# Patient Record
Sex: FEMALE | Race: BLACK OR AFRICAN AMERICAN | NOT HISPANIC OR LATINO | Employment: UNEMPLOYED | URBAN - METROPOLITAN AREA
[De-identification: names, ages, dates, MRNs, and addresses within clinical notes are randomized per-mention and may not be internally consistent; named-entity substitution may affect disease eponyms.]

---

## 2018-04-13 ENCOUNTER — HOSPITAL ENCOUNTER (EMERGENCY)
Facility: HOSPITAL | Age: 38
Discharge: HOME | End: 2018-04-13
Attending: EMERGENCY MEDICINE | Admitting: EMERGENCY MEDICINE
Payer: COMMERCIAL

## 2018-04-13 ENCOUNTER — APPOINTMENT (EMERGENCY)
Dept: CT IMAGING | Facility: HOSPITAL | Age: 38
End: 2018-04-13
Payer: COMMERCIAL

## 2018-04-13 VITALS
WEIGHT: 293 LBS | TEMPERATURE: 97.6 F | RESPIRATION RATE: 18 BRPM | OXYGEN SATURATION: 100 % | DIASTOLIC BLOOD PRESSURE: 100 MMHG | SYSTOLIC BLOOD PRESSURE: 187 MMHG | HEART RATE: 86 BPM

## 2018-04-13 DIAGNOSIS — R10.32 LEFT LOWER QUADRANT PAIN: ICD-10-CM

## 2018-04-13 DIAGNOSIS — M54.50 LOW BACK PAIN: Primary | ICD-10-CM

## 2018-04-13 LAB
ALBUMIN SERPL BCP-MCNC: 4.4 G/DL (ref 3.5–5)
ALP SERPL-CCNC: 68 U/L (ref 46–116)
ALT SERPL W P-5'-P-CCNC: 25 U/L (ref 12–78)
ANION GAP SERPL CALCULATED.3IONS-SCNC: 11 MMOL/L (ref 4–13)
AST SERPL W P-5'-P-CCNC: 18 U/L (ref 5–45)
BACTERIA UR QL AUTO: NORMAL /HPF
BASOPHILS # BLD AUTO: 0.04 THOUSANDS/ΜL (ref 0–0.1)
BASOPHILS NFR BLD AUTO: 0 % (ref 0–1)
BILIRUB SERPL-MCNC: 0.69 MG/DL (ref 0.2–1)
BILIRUB UR QL STRIP: ABNORMAL
BUN SERPL-MCNC: 7 MG/DL (ref 5–25)
CALCIUM SERPL-MCNC: 9.4 MG/DL
CHLORIDE SERPL-SCNC: 100 MMOL/L (ref 100–108)
CLARITY UR: ABNORMAL
CO2 SERPL-SCNC: 30 MMOL/L (ref 21–32)
COLOR UR: YELLOW
CREAT SERPL-MCNC: 0.87 MG/DL (ref 0.6–1.3)
EOSINOPHIL # BLD AUTO: 0.32 THOUSAND/ΜL (ref 0–0.61)
EOSINOPHIL NFR BLD AUTO: 4 % (ref 0–6)
ERYTHROCYTE [DISTWIDTH] IN BLOOD BY AUTOMATED COUNT: 15.2 % (ref 11.6–15.1)
EXT PREG TEST URINE: NEGATIVE
GFR SERPL CREATININE-BSD FRML MDRD: 86 ML/MIN/1.73SQ M
GLUCOSE SERPL-MCNC: 103 MG/DL (ref 65–140)
GLUCOSE UR STRIP-MCNC: NEGATIVE MG/DL
HCT VFR BLD AUTO: 39.1 % (ref 34.8–46.1)
HGB BLD-MCNC: 12.8 G/DL (ref 11.5–15.4)
HGB UR QL STRIP.AUTO: NEGATIVE
KETONES UR STRIP-MCNC: NEGATIVE MG/DL
LEUKOCYTE ESTERASE UR QL STRIP: NEGATIVE
LYMPHOCYTES # BLD AUTO: 3.99 THOUSANDS/ΜL (ref 0.6–4.47)
LYMPHOCYTES NFR BLD AUTO: 44 % (ref 14–44)
MCH RBC QN AUTO: 28.6 PG (ref 26.8–34.3)
MCHC RBC AUTO-ENTMCNC: 32.7 G/DL (ref 31.4–37.4)
MCV RBC AUTO: 87 FL (ref 82–98)
MONOCYTES # BLD AUTO: 0.59 THOUSAND/ΜL (ref 0.17–1.22)
MONOCYTES NFR BLD AUTO: 7 % (ref 4–12)
NEUTROPHILS # BLD AUTO: 4.18 THOUSANDS/ΜL (ref 1.85–7.62)
NEUTS SEG NFR BLD AUTO: 45 % (ref 43–75)
NITRITE UR QL STRIP: NEGATIVE
NON-SQ EPI CELLS URNS QL MICRO: NORMAL /HPF
NRBC BLD AUTO-RTO: 0 /100 WBCS
PH UR STRIP.AUTO: 7 [PH] (ref 4.5–8)
PLATELET # BLD AUTO: 317 THOUSANDS/UL (ref 149–390)
PMV BLD AUTO: 10.4 FL (ref 8.9–12.7)
POTASSIUM SERPL-SCNC: 3.8 MMOL/L (ref 3.5–5.3)
PROT SERPL-MCNC: 8.8 G/DL (ref 6.4–8.2)
PROT UR STRIP-MCNC: ABNORMAL MG/DL
RBC # BLD AUTO: 4.48 MILLION/UL (ref 3.81–5.12)
RBC #/AREA URNS AUTO: NORMAL /HPF
SODIUM SERPL-SCNC: 141 MMOL/L (ref 136–145)
SP GR UR STRIP.AUTO: 1.02 (ref 1–1.03)
UROBILINOGEN UR QL STRIP.AUTO: 1 E.U./DL
WBC # BLD AUTO: 9.12 THOUSAND/UL (ref 4.31–10.16)
WBC #/AREA URNS AUTO: NORMAL /HPF

## 2018-04-13 PROCEDURE — 81025 URINE PREGNANCY TEST: CPT | Performed by: PHYSICIAN ASSISTANT

## 2018-04-13 PROCEDURE — 74177 CT ABD & PELVIS W/CONTRAST: CPT

## 2018-04-13 PROCEDURE — 85025 COMPLETE CBC W/AUTO DIFF WBC: CPT | Performed by: PHYSICIAN ASSISTANT

## 2018-04-13 PROCEDURE — 81001 URINALYSIS AUTO W/SCOPE: CPT

## 2018-04-13 PROCEDURE — 99284 EMERGENCY DEPT VISIT MOD MDM: CPT

## 2018-04-13 PROCEDURE — 36415 COLL VENOUS BLD VENIPUNCTURE: CPT | Performed by: PHYSICIAN ASSISTANT

## 2018-04-13 PROCEDURE — 81002 URINALYSIS NONAUTO W/O SCOPE: CPT | Performed by: PHYSICIAN ASSISTANT

## 2018-04-13 PROCEDURE — 80053 COMPREHEN METABOLIC PANEL: CPT | Performed by: PHYSICIAN ASSISTANT

## 2018-04-13 RX ORDER — NAPROXEN 500 MG/1
500 TABLET ORAL 2 TIMES DAILY WITH MEALS
Qty: 10 TABLET | Refills: 0 | Status: SHIPPED | OUTPATIENT
Start: 2018-04-13 | End: 2018-05-09

## 2018-04-13 RX ADMIN — IOHEXOL 100 ML: 350 INJECTION, SOLUTION INTRAVENOUS at 20:14

## 2018-04-14 NOTE — DISCHARGE INSTRUCTIONS
Acute Abdominal Pain   WHAT YOU NEED TO KNOW:   The cause of your abdominal pain may not be found  If a cause is found, treatment will depend on what the cause is  DISCHARGE INSTRUCTIONS:   Return to the emergency department if:   · You vomit blood or cannot stop vomiting  · You have blood in your bowel movement or it looks like tar  · You have bleeding from your rectum  · Your abdomen is larger than usual, more painful, and hard  · You have severe pain in your abdomen  · You stop passing gas and having bowel movements  · You feel weak, dizzy, or faint  Contact your healthcare provider if:   · You have a fever  · You have new signs and symptoms  · Your symptoms do not get better with treatment  · You have questions or concerns about your condition or care  Medicines  may be given to decrease pain, treat an infection, and manage your symptoms  Take your medicine as directed  Call your healthcare provider if you think your medicine is not helping or if you have side effects  Tell him if you are allergic to any medicine  Keep a list of the medicines, vitamins, and herbs you take  Include the amounts, and when and why you take them  Bring the list or the pill bottles to follow-up visits  Carry your medicine list with you in case of an emergency  Manage your symptoms:   · Apply heat  on your abdomen for 20 to 30 minutes every 2 hours for as many days as directed  Heat helps decrease pain and muscle spasms  · Manage your stress  Stress may cause abdominal pain  Your healthcare provider may recommend relaxation techniques and deep breathing exercises to help decrease your stress  Your healthcare provider may recommend you talk to someone about your stress or anxiety, such as a counselor or a trusted friend  Get plenty of sleep and exercise regularly  · Limit or do not drink alcohol  Alcohol can make your abdominal pain worse   Ask your healthcare provider if it is safe for you to drink alcohol  Also ask how much is safe for you to drink  · Do not smoke  Nicotine and other chemicals in cigarettes can damage your esophagus and stomach  Ask your healthcare provider for information if you currently smoke and need help to quit  E-cigarettes or smokeless tobacco still contain nicotine  Talk to your healthcare provider before you use these products  Make changes to the food you eat as directed:  Do not eat foods that cause abdominal pain or other symptoms  Eat small meals more often  · Eat more high-fiber foods if you are constipated  High-fiber foods include fruits, vegetables, whole-grain foods, and legumes  · Do not eat foods that cause gas if you have bloating  Examples include broccoli, cabbage, and cauliflower  Do not drink soda or carbonated drinks, because these may also cause gas  · Do not eat foods or drinks that contain sorbitol or fructose if you have diarrhea and bloating  Some examples are fruit juices, candy, jelly, and sugar-free gum  · Do not eat high-fat foods, such as fried foods, cheeseburgers, hot dogs, and desserts  · Limit or do not drink caffeine  Caffeine may make symptoms, such as heart burn or nausea, worse  · Drink plenty of liquids to prevent dehydration from diarrhea or vomiting  Ask your healthcare provider how much liquid to drink each day and which liquids are best for you  Follow up with your healthcare provider as directed:  Write down your questions so you remember to ask them during your visits  © 2017 [address] Information is for End User's use only and may not be sold, redistributed or otherwise used for commercial purposes  All illustrations and images included in CareNotes® are the copyrighted property of [employer]  or [address]  The above information is an  only  It is not intended as medical advice for individual conditions or treatments   Talk to your doctor, nurse or pharmacist before following any medical regimen to see if it is safe and effective for you  Acute Low Back Pain   WHAT YOU NEED TO KNOW:   Acute low back pain is sudden discomfort in your lower back area that lasts for up to 6 weeks  The discomfort makes it difficult to tolerate activity  DISCHARGE INSTRUCTIONS:   Return to the emergency department if:   · You have severe pain  · You have sudden stiffness and heaviness on both buttocks down to both legs  · You have numbness or weakness in one leg, or pain in both legs  · You have numbness in your genital area or across your lower back  · You cannot control your urine or bowel movements  Contact your healthcare provider if:   · You have a fever  · You have pain at night or when you rest     · Your pain does not get better with treatment  · You have pain that worsens when you cough or sneeze  · You suddenly feel something pop or snap in your back  · You have questions or concerns about your condition or care  Medicines: The following medicines may be ordered by your healthcare provider:  · Acetaminophen  decreases pain  It is available without a doctor's order  Ask how much to take and how often to take it  Follow directions  Acetaminophen can cause liver damage if not taken correctly  · NSAIDs  help decrease swelling and pain  This medicine is available with or without a doctor's order  NSAIDs can cause stomach bleeding or kidney problems in certain people  If you take blood thinner medicine, always ask your healthcare provider if NSAIDs are safe for you  Always read the medicine label and follow directions  · Prescription pain medicine  may be given  Ask your healthcare provider how to take this medicine safely  · Muscle relaxers  decrease pain by relaxing the muscles in your lower spine  · Take your medicine as directed    Contact your healthcare provider if you think your medicine is not helping or if you have side effects  Tell him of her if you are allergic to any medicine  Keep a list of the medicines, vitamins, and herbs you take  Include the amounts, and when and why you take them  Bring the list or the pill bottles to follow-up visits  Carry your medicine list with you in case of an emergency  Self-care:   · Stay active  as much as you can without causing more pain  Bed rest could make your back pain worse  Start with some light exercises such as walking  Avoid heavy lifting until your pain is gone  Ask for more information about the activities or exercises that are right for you  · Ice  helps decrease swelling, pain, and muscle spams  Put crushed ice in a plastic bag  Cover it with a towel  Place it on your lower back for 20 to 30 minutes every 2 hours  Do this for about 2 to 3 days after your pain starts, or as directed  · Heat  helps decrease pain and muscle spasms  Start to use heat after treatment with ice has stopped  Use a small towel dampened with warm water or a heating pad, or sit in a warm bath  Apply heat on the area for 20 to 30 minutes every 2 hours for as many days as directed  Alternate heat and ice  Prevent acute low back pain:   · Use proper body mechanics  ¨ Bend at the hips and knees when you  objects  Do not bend from the waist  Use your leg muscles as you lift the load  Do not use your back  Keep the object close to your chest as you lift it  Try not to twist or lift anything above your waist     ¨ Change your position often when you stand for long periods of time  Rest one foot on a small box or footrest, and then switch to the other foot often  ¨ Try not to sit for long periods of time  When you do, sit in a straight-backed chair with your feet flat on the floor  Never reach, pull, or push while you are sitting  · Do exercises that strengthen your back muscles  Warm up before you exercise  Ask your healthcare provider the best exercises for you      · Maintain a healthy weight  Ask your healthcare provider how much you should weigh  Ask him to help you create a weight loss plan if you are overweight  Follow up with your healthcare provider as directed:  Return for a follow-up visit if you still have pain after 1 to 3 weeks of treatment  You may need to visit an orthopedist if your back pain lasts more than 12 weeks  Write down your questions so you remember to ask them during your visits  © 2017 [address] Information is for End User's use only and may not be sold, redistributed or otherwise used for commercial purposes  All illustrations and images included in CareNotes® are the copyrighted property of A D [employer] M [employer] Inc  or [employer]  The above information is an  only  It is not intended as medical advice for individual conditions or treatments  Talk to your doctor, nurse or pharmacist before following any medical regimen to see if it is safe and effective for you

## 2018-04-14 NOTE — ED PROVIDER NOTES
History  Chief Complaint   Patient presents with    Back Pain     lower back pain x 3 days  Pt states she has had abck pain like this before  Pt denies urinary symptoms/vomiting/nausea/fevers     Patient is a 30-year-old female past medical history of asthma and hypertension who presents today complaining of left-sided low back pain for the past few days  Patient reports history of similar pain a few weeks ago that resolved spontaneously  She denies any trauma or injuries  Says that the pain radiates to her left lower quadrant  Did not radiate down her legs  Pain is worse when she walks upstairs  PSH cholecystectomy  Denies headache, chest pain, shortness of breath, nausea, vomiting, diarrhea  Denies dysuria, hematuria, bowel or bladder incontinence  Prior to Admission Medications   Prescriptions Last Dose Informant Patient Reported? Taking? HYDROCHLOROTHIAZIDE PO   Yes Yes   Sig: Take by mouth   LISINOPRIL PO   Yes Yes   Sig: Take by mouth      Facility-Administered Medications: None       Past Medical History:   Diagnosis Date    Asthma     Hypertension        Past Surgical History:   Procedure Laterality Date    NO PAST SURGERIES         History reviewed  No pertinent family history  I have reviewed and agree with the history as documented  Social History   Substance Use Topics    Smoking status: Current Every Day Smoker    Smokeless tobacco: Never Used    Alcohol use No        Review of Systems   Gastrointestinal: Positive for abdominal pain  Genitourinary: Positive for flank pain  Musculoskeletal: Positive for back pain  All other systems reviewed and are negative        Physical Exam  ED Triage Vitals   Temperature Pulse Respirations Blood Pressure SpO2   04/13/18 1801 04/13/18 1803 04/13/18 1803 04/13/18 1804 04/13/18 1803   97 6 °F (36 4 °C) 86 18 (!) 199/86 100 %      Temp Source Heart Rate Source Patient Position - Orthostatic VS BP Location FiO2 (%)   04/13/18 1801 04/13/18 1803 04/13/18 1803 04/13/18 1803 --   Temporal Monitor Sitting Right arm       Pain Score       --                  Orthostatic Vital Signs  Vitals:    04/13/18 1803 04/13/18 1804 04/13/18 2044   BP:  (!) 199/86 (!) 187/100   Pulse: 86     Patient Position - Orthostatic VS: Sitting         Physical Exam   Constitutional: She appears well-developed and well-nourished  No distress  HENT:   Head: Normocephalic and atraumatic  Eyes: Conjunctivae and EOM are normal    Neck: Normal range of motion  Cardiovascular: Normal rate, regular rhythm and normal heart sounds  Pulmonary/Chest: Effort normal and breath sounds normal  No respiratory distress  She has no wheezes  Abdominal: Soft  Bowel sounds are normal  She exhibits no distension  There is tenderness in the left lower quadrant  Musculoskeletal: Normal range of motion  Lumbar back: She exhibits tenderness  She exhibits normal range of motion, no bony tenderness, no swelling, no edema, no spasm and normal pulse  Back:    Neurological: She is alert  Skin: Skin is warm and dry  Capillary refill takes less than 2 seconds  She is not diaphoretic  Psychiatric: She has a normal mood and affect         ED Medications  Medications   iohexol (OMNIPAQUE) 350 MG/ML injection (SINGLE-DOSE) 100 mL (100 mL Intravenous Given 4/13/18 2014)       Diagnostic Studies  Results Reviewed     Procedure Component Value Units Date/Time    Urine Microscopic [[id number]]  (Normal) Collected:  04/13/18 1940    Lab Status:  Final result Specimen:  Urine from Urine, Clean Catch Updated:  04/13/18 2007     RBC, UA None Seen /hpf      WBC, UA None Seen /hpf      Epithelial Cells Occasional /hpf      Bacteria, UA Occasional /hpf     Comprehensive metabolic panel [[id number]]  (Abnormal) Collected:  04/13/18 1939    Lab Status:  Final result Specimen:  Blood from Arm, Right Updated:  04/13/18 2001     Sodium 141 mmol/L      Potassium 3 8 mmol/L      Chloride 100 mmol/L CO2 30 mmol/L      Anion Gap 11 mmol/L      BUN 7 mg/dL      Creatinine 0 87 mg/dL      Glucose 103 mg/dL      Calcium 9 4 mg/dL      AST 18 U/L      ALT 25 U/L      Alkaline Phosphatase 68 U/L      Total Protein 8 8 (H) g/dL      Albumin 4 4 g/dL      Total Bilirubin 0 69 mg/dL      eGFR 86 ml/min/1 73sq m     Narrative:         National Kidney Disease Education Program recommendations are as follows:  GFR calculation is accurate only with a steady state creatinine  Chronic Kidney disease less than 60 ml/min/1 73 sq  meters  Kidney failure less than 15 ml/min/1 73 sq  meters      CBC and differential [[id number]]  (Abnormal) Collected:  04/13/18 1939    Lab Status:  Final result Specimen:  Blood from Arm, Right Updated:  04/13/18 1949     WBC 9 12 Thousand/uL      RBC 4 48 Million/uL      Hemoglobin 12 8 g/dL      Hematocrit 39 1 %      MCV 87 fL      MCH 28 6 pg      MCHC 32 7 g/dL      RDW 15 2 (H) %      MPV 10 4 fL      Platelets 183 Thousands/uL      nRBC 0 /100 WBCs      Neutrophils Relative 45 %      Lymphocytes Relative 44 %      Monocytes Relative 7 %      Eosinophils Relative 4 %      Basophils Relative 0 %      Neutrophils Absolute 4 18 Thousands/µL      Lymphocytes Absolute 3 99 Thousands/µL      Monocytes Absolute 0 59 Thousand/µL      Eosinophils Absolute 0 32 Thousand/µL      Basophils Absolute 0 04 Thousands/µL     POCT pregnancy, urine [[id number]]  (Normal) Resulted:  04/13/18 1942    Lab Status:  Final result Updated:  04/13/18 1942     EXT PREG TEST UR (Ref: Negative) Negative    POCT urinalysis dipstick [[id number]]  (Abnormal) Resulted:  04/13/18 1942    Lab Status:  Final result Updated:  04/13/18 1942    ED Urine Macroscopic [[id number]]  (Abnormal) Collected:  04/13/18 1940    Lab Status:  Final result Specimen:  Urine Updated:  04/13/18 1940     Color, UA Yellow     Clarity, UA Cloudy     pH, UA 7 0     Leukocytes, UA Negative     Nitrite, UA Negative     Protein, UA Trace (A) mg/dl Glucose, UA Negative mg/dl      Ketones, UA Negative mg/dl      Urobilinogen, UA 1 0 E U /dl      Bilirubin, UA Interference- unable to analyze (A)     Blood, UA Negative     Specific Gravity, UA 1 025    Narrative:       [facility] RESULT                 CT abdomen pelvis with contrast   Final Result by Trinity Ghosh MD (04/13 2022)         1  No evidence of bowel obstruction, colitis or diverticulitis  2   No pyelonephritis or obstructive uropathy            Workstation performed: [id number]                    Procedures  Procedures       Phone Contacts  ED Phone Contact    ED Course  ED Course                                MDM  Number of Diagnoses or Management Options  Left lower quadrant pain:   Low back pain:     CritCare Time    Disposition  Final diagnoses:   Low back pain   Left lower quadrant pain     Time reflects when diagnosis was documented in both MDM as applicable and the Disposition within this note     Time User Action Codes Description Comment    4/13/2018  8:43 PM Kera De La Torre [M54 5] Low back pain     4/13/2018  8:43 PM Lisa Frank Valiente [R10 32] Left lower quadrant pain       ED Disposition     ED Disposition Condition Comment    Discharge  Alleghany Zamora discharge to home/self care      Condition at discharge: Good        Follow-up Information     Follow up With Specialties Details Why Contact Silva Ugarte MD Family Medicine Schedule an appointment as soon as possible for a visit  [address]  [address] [facility] [address]  [phone]          Discharge Medication List as of 4/13/2018  8:44 PM      START taking these medications    Details   naproxen (NAPROSYN) 500 mg tablet Take 1 tablet (500 mg total) by mouth 2 (two) times a day with meals for 5 days, Starting Fri 4/13/2018, Until Wed 4/18/2018, Print         CONTINUE these medications which have NOT CHANGED    Details   HYDROCHLOROTHIAZIDE PO Take by mouth, Historical Med      LISINOPRIL PO Take by mouth, Historical Med           No discharge procedures on file      ED Provider  Electronically Signed by           Lavern Brandon PA-C  04/13/18 2103

## 2018-05-09 ENCOUNTER — APPOINTMENT (EMERGENCY)
Dept: RADIOLOGY | Facility: HOSPITAL | Age: 38
End: 2018-05-09
Payer: COMMERCIAL

## 2018-05-09 ENCOUNTER — HOSPITAL ENCOUNTER (EMERGENCY)
Facility: HOSPITAL | Age: 38
Discharge: HOME | End: 2018-05-09
Attending: EMERGENCY MEDICINE | Admitting: EMERGENCY MEDICINE
Payer: COMMERCIAL

## 2018-05-09 VITALS
TEMPERATURE: 98.4 F | DIASTOLIC BLOOD PRESSURE: 103 MMHG | HEART RATE: 83 BPM | WEIGHT: 293 LBS | RESPIRATION RATE: 16 BRPM | OXYGEN SATURATION: 99 % | SYSTOLIC BLOOD PRESSURE: 184 MMHG

## 2018-05-09 DIAGNOSIS — M25.562 ACUTE PAIN OF LEFT KNEE: ICD-10-CM

## 2018-05-09 DIAGNOSIS — M25.572 ACUTE LEFT ANKLE PAIN: ICD-10-CM
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
## 2018-05-09 PROCEDURE — 73564 X-RAY EXAM KNEE 4 OR MORE: CPT

## 2018-05-09 PROCEDURE — 73610 X-RAY EXAM OF ANKLE: CPT

## 2018-05-09 PROCEDURE — 99283 EMERGENCY DEPT VISIT LOW MDM: CPT

## 2018-05-09 RX ORDER — ACETAMINOPHEN 325 MG/1
650 TABLET ORAL ONCE
Status: COMPLETED | OUTPATIENT
Start: 2018-05-09 | End: 2018-05-09

## 2018-05-09 RX ORDER — METAXALONE 800 MG/1
TABLET ORAL 3 TIMES DAILY PRN
COMMUNITY
End: 2019-04-21

## 2018-05-09 RX ADMIN — ACETAMINOPHEN 650 MG: 325 TABLET, FILM COATED ORAL at 14:11

## 2018-05-09 NOTE — DISCHARGE INSTRUCTIONS
Knee Pain   WHAT YOU NEED TO KNOW:   Knee pain may start suddenly, or it may be a long-term problem  You may have pain on the side, front, or back of your knee  You may have knee stiffness and swelling  You may hear popping sounds or feel like your knee is giving way or locking up as you walk  You may feel pain when you sit, stand, walk, or climb up and down stairs  Knee pain can be caused by conditions such as obesity, inflammation, or strains or tears in ligaments or tendons  DISCHARGE INSTRUCTIONS:   Follow up with your healthcare provider within 24 hours or as directed: You may need follow-up treatments, such as steroid injections to decrease pain  Write down your questions so you remember to ask them during your visits  Self-care:   · Rest  your knee so it can heal  Limit activities that increase your pain  · Ice  can help reduce swelling  Wrap ice in a towel and put it on your knee for as long and as often as directed  · Compression  with a brace or bandage can help reduce swelling  Use a brace or bandage only as directed  · Elevation  helps decrease pain and swelling  Elevate your knee while you are sitting or lying down  Prop your leg on pillows to keep your knee above the level of your heart  Medicines:   · NSAIDs  help decrease swelling and pain or fever  This medicine is available with or without a doctor's order  NSAIDs can cause stomach bleeding or kidney problems in certain people  If you take blood thinner medicine, always ask your healthcare provider if NSAIDs are safe for you  Always read the medicine label and follow directions  · Acetaminophen  decreases pain and fever  It is available without a doctor's order  Ask how much to take and when to take it  Follow directions  Acetaminophen can cause liver damage if not taken correctly  · Take your medicine as directed  Contact your healthcare provider if you think your medicine is not helping or if you have side effects   Tell him or her if you are allergic to any medicine  Keep a list of the medicines, vitamins, and herbs you take  Include the amounts, and when and why you take them  Bring the list or the pill bottles to follow-up visits  Carry your medicine list with you in case of an emergency  Exercise as directed: You may need to see a physical therapist or do recommended exercises to improve movement and decrease your pain  You may be directed to walk, swim, or ride a bike  Follow your exercise plan exactly as directed to avoid further injury  Contact your healthcare provider if:   · You have questions or concerns about your condition or care  Return to the emergency department if:   · Your pain is worse, even after treatment  · You cannot bend or straighten your leg completely  · The swelling around your knee does not go down even with treatment  · Your knee is painful and hot to the touch  © 2017 [address] Information is for End User's use only and may not be sold, redistributed or otherwise used for commercial purposes  All illustrations and images included in CareNotes® are the copyrighted property of A D [employer] M [employer]  or [employer]gs  The above information is an  only  It is not intended as medical advice for individual conditions or treatments  Talk to your doctor, nurse or pharmacist before following any medical regimen to see if it is safe and effective for you  Arthralgia   WHAT YOU NEED TO KNOW:   Arthralgia is pain in one or more joints, with no inflammation  It may be short-term and get better within 6 to 8 weeks  Arthralgia can be an early sign of arthritis  Arthralgia may be caused by a medical condition, such as a hormone disorder or a tumor  It may also be caused by an infection or injury  DISCHARGE INSTRUCTIONS:   Medicines: The following medicines may  be ordered for you:  · Acetaminophen  decreases pain   Ask how much to take and how often to take it  Follow directions  Acetaminophen can cause liver damage if not taken correctly  · NSAIDs  decrease pain and prevent swelling  Ask your healthcare provider which medicine is right for you  Ask how much to take and when to take it  Take as directed  NSAIDs can cause stomach bleeding and kidney problems if not taken correctly  · Pain relief cream  decreases pain  Use this cream as directed  · Take your medicine as directed  Contact your healthcare provider if you think your medicine is not helping or if you have side effects  Tell him of her if you are allergic to any medicine  Keep a list of the medicines, vitamins, and herbs you take  Include the amounts, and when and why you take them  Bring the list or the pill bottles to follow-up visits  Carry your medicine list with you in case of an emergency  Follow up with your healthcare provider or specialist as directed:  Write down your questions so you remember to ask them during your visits  Self-care:   · Apply heat  to help decrease pain  Use a heating pad or heat wrap  Apply heat for 20 to 30 minutes every 2 hours for as many days as directed  · Rest  as much as possible  Avoid activities that cause joint pain  · Apply ice  to help decrease swelling and pain  Ice may also help prevent tissue damage  Use an ice pack, or put crushed ice in a plastic bag  Cover it with a towel and place it on your painful joint for 15 to 20 minutes every hour or as directed  · Support  the joint with a brace or elastic wrap as directed  · Elevate  your joint above the level of your heart as often as you can to help decrease swelling and pain  Prop your painful joint on pillows or blankets to keep it elevated comfortably  · Lose weight  if you are overweight  Extra weight can put pressure on your joints and cause more pain  Ask your healthcare provider how much you should weigh  Ask him to help you create a weight loss plan       · Exercise  regularly to help improve joint movement and to decrease pain  Ask about the best exercise plan for you  Low-impact exercises can help take the pressure off your joints  Examples are walking, swimming, and water aerobics  Physical therapy:  A physical therapist teaches you exercises to help improve movement and strength, and to decrease pain  Ask your healthcare provider if physical therapy is right for you  Contact your healthcare provider or specialist if:   · You have a fever  · You continue to have joint pain that cannot be relieved with heat, ice, or medicine  · You have pain and inflammation around your joint  · You have questions or concerns about your condition or care  Return to the emergency department if:   · You have sudden, severe pain when you move your joint  · You have a fever and shaking chills  · You cannot move your joint  · You lose feeling on the side of your body where you have the painful joint  © 2017 [facility] Information is for End User's use only and may not be sold, redistributed or otherwise used for commercial purposes  All illustrations and images included in CareNotes® are the copyrighted property of [employer] [address]  or [employer]gs  The above information is an  only  It is not intended as medical advice for individual conditions or treatments  Talk to your doctor, nurse or pharmacist before following any medical regimen to see if it is safe and effective for you  REST, ICE, ELEVATE  CONTINUE ALEVE FOR PAIN      FOLLOW UP WITH YOUR PCP OR ORTHO IF PAIN WORSENS

## 2018-06-27 ENCOUNTER — HOSPITAL ENCOUNTER (EMERGENCY)
Facility: HOSPITAL | Age: 38
Discharge: HOME | End: 2018-06-27
Attending: EMERGENCY MEDICINE
Payer: COMMERCIAL

## 2018-06-27 VITALS
TEMPERATURE: 97.9 F | WEIGHT: 293 LBS | HEIGHT: 63 IN | SYSTOLIC BLOOD PRESSURE: 164 MMHG | HEART RATE: 82 BPM | DIASTOLIC BLOOD PRESSURE: 96 MMHG | OXYGEN SATURATION: 96 % | BODY MASS INDEX: 51.91 KG/M2 | RESPIRATION RATE: 20 BRPM

## 2018-06-27 DIAGNOSIS — J45.901 ASTHMA EXACERBATION: Primary | ICD-10-CM

## 2018-06-27 PROCEDURE — 94640 AIRWAY INHALATION TREATMENT: CPT

## 2018-06-27 PROCEDURE — 99283 EMERGENCY DEPT VISIT LOW MDM: CPT

## 2018-06-27 RX ORDER — PREDNISONE 20 MG/1
TABLET ORAL
Status: COMPLETED
Start: 2018-06-27 | End: 2018-06-27

## 2018-06-27 RX ORDER — ALBUTEROL SULFATE 90 UG/1
1-2 AEROSOL, METERED RESPIRATORY (INHALATION) EVERY 6 HOURS PRN
Qty: 1 INHALER | Refills: 0 | Status: SHIPPED | OUTPATIENT
Start: 2018-06-27 | End: 2019-06-05 | Stop reason: SDUPTHER

## 2018-06-27 RX ORDER — ALBUTEROL SULFATE 2.5 MG/3ML
5 SOLUTION RESPIRATORY (INHALATION) ONCE
Status: COMPLETED | OUTPATIENT
Start: 2018-06-27 | End: 2018-06-27

## 2018-06-27 RX ORDER — PREDNISONE 20 MG/1
40 TABLET ORAL ONCE
Status: COMPLETED | OUTPATIENT
Start: 2018-06-27 | End: 2018-06-27

## 2018-06-27 RX ORDER — ALBUTEROL SULFATE 2.5 MG/3ML
5 SOLUTION RESPIRATORY (INHALATION) ONCE
Status: DISCONTINUED | OUTPATIENT
Start: 2018-06-27 | End: 2018-06-27 | Stop reason: HOSPADM

## 2018-06-27 RX ORDER — PREDNISONE 20 MG/1
40 TABLET ORAL DAILY
Qty: 8 TABLET | Refills: 0 | Status: SHIPPED | OUTPATIENT
Start: 2018-06-28 | End: 2018-07-02

## 2018-06-27 RX ADMIN — ALBUTEROL SULFATE 5 MG: 2.5 SOLUTION RESPIRATORY (INHALATION) at 19:57

## 2018-06-27 RX ADMIN — Medication 0.5 MG: at 19:57

## 2018-06-27 RX ADMIN — ALBUTEROL SULFATE 5 MG: 2.5 SOLUTION RESPIRATORY (INHALATION) at 18:49

## 2018-06-27 RX ADMIN — PREDNISONE 40 MG: 20 TABLET ORAL at 19:57

## 2018-06-27 RX ADMIN — IPRATROPIUM BROMIDE 0.5 MG: 0.5 SOLUTION RESPIRATORY (INHALATION) at 18:49

## 2018-06-27 RX ADMIN — IPRATROPIUM BROMIDE 0.5 MG: 0.5 SOLUTION RESPIRATORY (INHALATION) at 19:57

## 2018-06-27 NOTE — ED PROVIDER NOTES
History  Chief Complaint   Patient presents with    Asthma     pt c/o asthma exacerbation since this a m  with no relief from nebulizer  42 yo F with history of asthma who presents today for evaluation of asthma exacerbation x 2 days  She started yesterday with coughing, nasal congestion, rhinorrhea  States today she felt as if her asthma was acting up, she felt chest tightness and wheezing  She used her neb x 2 with only some relief  She does not have any inhaler or any other asthma medications  She vomited once yesterday but nothing today  She denies any f/c, CP, abd pain  She is a currently daily smoker, smokes 1/2 PPD  She denies any sick contacts or recent travel  She denies any hx of hospitalizations for asthma, or intubations  No recent steroid use  She has no other complaints at this time  Prior to Admission Medications   Prescriptions Last Dose Informant Patient Reported? Taking? HYDROCHLOROTHIAZIDE PO   Yes Yes   Sig: Take by mouth   LISINOPRIL PO   Yes Yes   Sig: Take by mouth   metaxalone (SKELAXIN) 800 mg tablet   Yes Yes   Sig: Take by mouth 3 (three) times a day as needed for muscle spasms      Facility-Administered Medications: None       Past Medical History:   Diagnosis Date    Asthma     Hypertension        Past Surgical History:   Procedure Laterality Date    NO PAST SURGERIES         History reviewed  No pertinent family history  I have reviewed and agree with the history as documented  Social History   Substance Use Topics    Smoking status: Current Every Day Smoker     Packs/day: 0 50     Types: Cigarettes    Smokeless tobacco: Never Used    Alcohol use No        Review of Systems   Constitutional: Negative for chills and fever  HENT: Positive for congestion and rhinorrhea  Negative for ear discharge, ear pain and sore throat  Respiratory: Positive for cough, chest tightness, shortness of breath and wheezing  Cardiovascular: Negative for chest pain  Gastrointestinal: Positive for vomiting (once, resolved)  Negative for abdominal pain, diarrhea and nausea  Musculoskeletal: Negative for back pain  Skin: Negative for rash  Physical Exam  Physical Exam   Constitutional: She is oriented to person, place, and time  She appears well-developed and well-nourished  Non-toxic appearance  She does not have a sickly appearance  She does not appear ill  No distress  Well appearing  Speaks in full sentences without difficulty  No hypoxia   HENT:   Head: Normocephalic and atraumatic  Right Ear: Hearing, tympanic membrane, external ear and ear canal normal    Left Ear: Hearing, tympanic membrane, external ear and ear canal normal    Nose: Mucosal edema present  Mouth/Throat: Uvula is midline, oropharynx is clear and moist and mucous membranes are normal  No tonsillar exudate  Eyes: Conjunctivae and EOM are normal  Pupils are equal, round, and reactive to light  Neck: Normal range of motion  Neck supple  Cardiovascular: Normal rate, regular rhythm and normal heart sounds  Exam reveals no gallop and no friction rub  No murmur heard  Pulmonary/Chest: Effort normal  No accessory muscle usage  No tachypnea  No respiratory distress  She has no decreased breath sounds  She has wheezes (end expiratory wheezing throughout)  She has no rhonchi  She has no rales  Musculoskeletal: Normal range of motion  Neurological: She is alert and oriented to person, place, and time  Skin: Skin is warm and dry  Capillary refill takes less than 2 seconds  She is not diaphoretic  Psychiatric: She has a normal mood and affect  Nursing note and vitals reviewed        Vital Signs  ED Triage Vitals [06/27/18 1839]   Temperature Pulse Respirations Blood Pressure SpO2   97 9 °F (36 6 °C) 94 (!) 24 164/96 96 %      Temp Source Heart Rate Source Patient Position - Orthostatic VS BP Location FiO2 (%)   Temporal Monitor Sitting Left arm --      Pain Score       No Pain Vitals:    06/27/18 1839 06/27/18 1945   BP: 164/96    Pulse: 94 82   Patient Position - Orthostatic VS: Sitting        Visual Acuity      ED Medications  Medications   albuterol inhalation solution 5 mg (5 mg Nebulization Not Given 6/27/18 1957)   ipratropium (ATROVENT) 0 02 % inhalation solution 0 5 mg (0 5 mg Nebulization Given 6/27/18 1957)   albuterol inhalation solution 5 mg (0 mg Nebulization Override Pull 6/27/18 1852)   ipratropium (ATROVENT) 0 02 % inhalation solution 0 5 mg (0 5 mg Nebulization Given 6/27/18 1849)   albuterol (5 mg/mL) 0 5 % inhalation solution **AcuDose Override Pull** (5 mg  Given 6/27/18 1849)   ipratropium (ATROVENT) 0 02 % inhalation solution **AcuDose Override Pull** ( Nebulization Override Pull 6/27/18 1852)   predniSONE tablet 40 mg (40 mg Oral Given 6/27/18 1957)   albuterol (5 mg/mL) 0 5 % inhalation solution **AcuDose Override Pull** (5 mg  Given 6/27/18 1957)       Diagnostic Studies  Results Reviewed     None                 No orders to display              Procedures  Procedures       Phone Contacts  ED Phone Contact    ED Course  ED Course as of Jun 27 2031 Wed Jun 27, 2018   1935 Reassessed after first neb, feeling improved, still with mild expiratory wheezing bilaterally in upper lobes, will repeat neb    2022 Reassessed after 2nd neb, feeling improve, faint expiratory wheezing throughout, no hypoxia, will d/c home                                MDM  Number of Diagnoses or Management Options  Asthma exacerbation: new and does not require workup  Diagnosis management comments:   42 yo F c/o asthma exacerbation  Well appearing on exam, speaks in full sentences without difficulty, no hypoxia  End expiratory wheezing throughout which improved with duoneb x 2 and prednisone  Patient felt improvement of symptoms  She was d/c home with prednisone, albuterol inhaler and advised to continue neb treatments  Advised f/u with PCP  RTED Precautions reviewed   Patient verbalizes understanding and agrees with plan  Patient Progress  Patient progress: stable    CritCare Time    Disposition  Final diagnoses:   Asthma exacerbation     Time reflects when diagnosis was documented in both MDM as applicable and the Disposition within this note     Time User Action Codes Description Comment    6/27/2018  8:23 PM Parisa Valiente [[id number]] Asthma exacerbation       ED Disposition     ED Disposition Condition Comment    Discharge  Kayla Guerrero discharge to home/self care  Condition at discharge: Good        Follow-up Information     Follow up With Specialties Details Why Contact Info    Sunita Pacheco MD Family Medicine Schedule an appointment as soon as possible for a visit  [address]  [address] [phone] [facility] Emergency Department Emergency Medicine  If symptoms worsen [address]  [facility]  [id number] [phone]          Patient's Medications   Discharge Prescriptions    ALBUTEROL (PROVENTIL HFA,VENTOLIN HFA) 90 MCG/ACT INHALER    Inhale 1-2 puffs every 6 (six) hours as needed for wheezing       Start Date: 6/27/2018 End Date: --       Order Dose: 1-2 puffs       Quantity: 1 Inhaler    Refills: 0    PREDNISONE 20 MG TABLET    Take 2 tablets (40 mg total) by mouth daily for 4 doses       Start Date: 6/28/2018 End Date: 7/2/2018       Order Dose: 40 mg       Quantity: 8 tablet    Refills: 0     No discharge procedures on file      ED Provider  Electronically Signed by           Roman Ruffin PA-C  06/27/18 2032

## 2018-06-28 NOTE — DISCHARGE INSTRUCTIONS
Asthma   WHAT YOU NEED TO KNOW:   Asthma is a lung disease that makes breathing difficult  Chronic inflammation and reactions to triggers narrow the airways in the lungs  Asthma can become life-threatening if it is not managed  DISCHARGE INSTRUCTIONS:   Return to the emergency department if:   · You have severe shortness of breath  · Your lips or nails turn blue or gray  · The skin around your neck and ribs pulls in with each breath  · You have shortness of breath, even after you take your short-term medicine as directed  · Your peak flow numbers are in the red zone of your AAP  Contact your healthcare provider if:   · You run out of medicine before your next refill is due  · Your symptoms get worse  · You need to take more medicine than usual to control your symptoms  · You have questions or concerns about your condition or care  Medicines:   · Medicines  decrease inflammation, open airways, and make it easier to breathe  Medicines may be inhaled, taken as a pill, or injected  Short-term medicines relieve your symptoms quickly  Long-term medicines are used to prevent future attacks  You may also need medicine to help control your allergies  Ask your healthcare provider for more information about the medicine you are given and how to take it safely  · Take your medicine as directed  Contact your healthcare provider if you think your medicine is not helping or if you have side effects  Tell him of her if you are allergic to any medicine  Keep a list of the medicines, vitamins, and herbs you take  Include the amounts, and when and why you take them  Bring the list or the pill bottles to follow-up visits  Carry your medicine list with you in case of an emergency  Follow up with your healthcare provider as directed: You will need to return to make sure your medicine is working and your symptoms are controlled   You may be referred to an asthma specialist  Helder Menendez may be asked to keep a record of your peak flow values and bring it with you to your appointments  Write down your questions so you remember to ask them during your visits  Manage your symptoms and prevent future attacks:   · Follow your Asthma Action Plan (AAP)  This is a written plan that you and your healthcare provider create  It explains which medicine you need and when to change doses if necessary  It also explains how you can monitor symptoms and use a peak flow meter  The meter measures how well your lungs are working  · Manage other health conditions , such as allergies, acid reflux, and sleep apnea  · Identify and avoid triggers  These may include pets, dust mites, mold, and cockroaches  · Do not smoke or be around others who smoke  Nicotine and other chemicals in cigarettes and cigars can cause lung damage  Ask your healthcare provider for information if you currently smoke and need help to quit  E-cigarettes or smokeless tobacco still contain nicotine  Talk to your healthcare provider before you use these products  · Ask about the flu vaccine  The flu can make your asthma worse  You may need a yearly flu shot  © 2017 [facility] Information is for End User's use only and may not be sold, redistributed or otherwise used for commercial purposes  All illustrations and images included in CareNotes® are the copyrighted property of [employer] D [employer]  or David Biggs  The above information is an  only  It is not intended as medical advice for individual conditions or treatments  Talk to your doctor, nurse or pharmacist before following any medical regimen to see if it is safe and effective for you

## 2018-12-10 ENCOUNTER — HOSPITAL ENCOUNTER (EMERGENCY)
Facility: HOSPITAL | Age: 38
Discharge: HOME | End: 2018-12-10
Attending: EMERGENCY MEDICINE | Admitting: EMERGENCY MEDICINE
Payer: COMMERCIAL

## 2018-12-10 VITALS
TEMPERATURE: 98.2 F | WEIGHT: 293 LBS | SYSTOLIC BLOOD PRESSURE: 190 MMHG | RESPIRATION RATE: 18 BRPM | OXYGEN SATURATION: 98 % | HEART RATE: 82 BPM | BODY MASS INDEX: 53.74 KG/M2 | DIASTOLIC BLOOD PRESSURE: 106 MMHG

## 2018-12-10 DIAGNOSIS — J45.909 ASTHMA: ICD-10-CM

## 2018-12-10 DIAGNOSIS — J40 BRONCHITIS: Primary | ICD-10-CM

## 2018-12-10 DIAGNOSIS — I10 HYPERTENSION: ICD-10-CM

## 2018-12-10 PROCEDURE — 94640 AIRWAY INHALATION TREATMENT: CPT

## 2018-12-10 PROCEDURE — 99283 EMERGENCY DEPT VISIT LOW MDM: CPT

## 2018-12-10 RX ORDER — ALBUTEROL SULFATE 90 UG/1
2 AEROSOL, METERED RESPIRATORY (INHALATION) EVERY 4 HOURS PRN
Qty: 1 INHALER | Refills: 0 | Status: SHIPPED | OUTPATIENT
Start: 2018-12-10 | End: 2019-06-05 | Stop reason: SDUPTHER

## 2018-12-10 RX ORDER — ALBUTEROL SULFATE 2.5 MG/3ML
2.5 SOLUTION RESPIRATORY (INHALATION) ONCE
Status: COMPLETED | OUTPATIENT
Start: 2018-12-10 | End: 2018-12-10

## 2018-12-10 RX ORDER — PREDNISONE 10 MG/1
TABLET ORAL
Qty: 30 TABLET | Refills: 0 | Status: SHIPPED | OUTPATIENT
Start: 2018-12-10 | End: 2019-04-21

## 2018-12-10 RX ORDER — ALBUTEROL SULFATE 2.5 MG/3ML
2.5 SOLUTION RESPIRATORY (INHALATION) EVERY 6 HOURS PRN
Qty: 75 ML | Refills: 0 | Status: SHIPPED | OUTPATIENT
Start: 2018-12-10 | End: 2019-12-06 | Stop reason: SDUPTHER

## 2018-12-10 RX ORDER — AZITHROMYCIN 250 MG/1
TABLET, FILM COATED ORAL
Qty: 6 TABLET | Refills: 0 | Status: SHIPPED | OUTPATIENT
Start: 2018-12-10 | End: 2018-12-14

## 2018-12-10 RX ORDER — PREDNISONE 20 MG/1
60 TABLET ORAL ONCE
Status: COMPLETED | OUTPATIENT
Start: 2018-12-10 | End: 2018-12-10

## 2018-12-10 RX ADMIN — IPRATROPIUM BROMIDE 0.5 MG: 0.5 SOLUTION RESPIRATORY (INHALATION) at 11:21

## 2018-12-10 RX ADMIN — ALBUTEROL SULFATE 2.5 MG: 2.5 SOLUTION RESPIRATORY (INHALATION) at 11:21

## 2018-12-10 RX ADMIN — PREDNISONE 60 MG: 20 TABLET ORAL at 11:21

## 2018-12-10 NOTE — DISCHARGE INSTRUCTIONS
Acute Bronchitis   WHAT YOU NEED TO KNOW:   Acute bronchitis is swelling and irritation in the air passages of your lungs  This irritation may cause you to cough or have other breathing problems  Acute bronchitis often starts because of another illness, such as a cold or the flu  The illness spreads from your nose and throat to your windpipe and airways  Bronchitis is often called a chest cold  Acute bronchitis lasts about 3 to 6 weeks and is usually not a serious illness  Your cough can last for several weeks  DISCHARGE INSTRUCTIONS:   Return to the emergency department if:   · You cough up blood  · Your lips or fingernails turn blue  · You feel like you are not getting enough air when you breathe  Contact your healthcare provider if:   · You have a fever  · Your breathing problems do not go away or get worse  · Your cough does not get better within 4 weeks  · You have questions or concerns about your condition or care  Self-care:   · Get more rest   Rest helps your body to heal  Slowly start to do more each day  Rest when you feel it is needed  · Avoid irritants in the air  Avoid chemicals, fumes, and dust  Wear a face mask if you must work around dust or fumes  Stay inside on days when air pollution levels are high  If you have allergies, stay inside when pollen counts are high  Do not use aerosol products, such as spray-on deodorant, bug spray, and hair spray  · Do not smoke or be around others who smoke  Nicotine and other chemicals in cigarettes and cigars damages the cilia that move mucus out of your lungs  Ask your healthcare provider for information if you currently smoke and need help to quit  E-cigarettes or smokeless tobacco still contain nicotine  Talk to your healthcare provider before you use these products  · Drink liquids as directed  Liquids help keep your air passages moist and help you cough up mucus   You may need to drink more liquids when you have acute bronchitis  Ask how much liquid to drink each day and which liquids are best for you  · Use a humidifier or vaporizer  Use a cool mist humidifier or a vaporizer to increase air moisture in your home  This may make it easier for you to breathe and help decrease your cough  Decrease risk for acute bronchitis:   · Get the vaccinations you need  Ask your healthcare provider if you should get vaccinated against the flu or pneumonia  · Prevent the spread of germs  You can decrease your risk of acute bronchitis and other illnesses by doing the following:     [facility] [place] [facility] your hands often with soap and water  Carry germ-killing hand lotion or gel with you  You can use the lotion or gel to clean your hands when soap and water are not available  ¨ Do not touch your eyes, nose, or mouth unless you have washed your hands first     ¨ Always cover your mouth when you cough to prevent the spread of germs  It is best to cough into a tissue or your shirt sleeve instead of into your hand  Ask those around you cover their mouths when they cough  ¨ Try to avoid people who have a cold or the flu  If you are sick, stay away from others as much as possible  Medicines: Your healthcare provider may  give you any of the following:  · Ibuprofen or acetaminophen  are medicines that help lower your fever  They are available without a doctor's order  Ask your healthcare provider which medicine is right for you  Ask how much to take and how often to take it  Follow directions  These medicines can cause stomach bleeding if not taken correctly  Ibuprofen can cause kidney damage  Do not take ibuprofen if you have kidney disease, an ulcer, or allergies to aspirin  Acetaminophen can cause liver damage  Do not take more than 4,000 milligrams in 24 hours  · Decongestants  help loosen mucus in your lungs and make it easier to cough up  This can help you breathe easier  · Cough suppressants  decrease your urge to cough   If your cough produces mucus, do not take a cough suppressant unless your healthcare provider tells you to  Your healthcare provider may suggest that you take a cough suppressant at night so you can rest     · Inhalers  may be given  Your healthcare provider may give you one or more inhalers to help you breathe easier and cough less  An inhaler gives your medicine to open your airways  Ask your healthcare provider to show you how to use your inhaler correctly  · Take your medicine as directed  Contact your healthcare provider if you think your medicine is not helping or if you have side effects  Tell him of her if you are allergic to any medicine  Keep a list of the medicines, vitamins, and herbs you take  Include the amounts, and when and why you take them  Bring the list or the pill bottles to follow-up visits  Carry your medicine list with you in case of an emergency  Follow up with your healthcare provider as directed:  Write down questions you have so you will remember to ask them during your follow-up visits  © 2017 [address] Information is for End User's use only and may not be sold, redistributed or otherwise used for commercial purposes  All illustrations and images included in CareNotes® are the copyrighted property of [employer]  or David Biggs  The above information is an  only  It is not intended as medical advice for individual conditions or treatments  Talk to your doctor, nurse or pharmacist before following any medical regimen to see if it is safe and effective for you  Asthma   WHAT YOU NEED TO KNOW:   What is asthma? Asthma is a lung disease that makes breathing difficult  Chronic inflammation and reactions to triggers narrow the airways in the lungs  Asthma can become life-threatening if it is not managed  What is cough-variant asthma? Cough-variant asthma is a type of asthma that causes a dry cough that keeps coming back   A dry cough may be your only symptom, or you may also have chest tightness  These symptoms may be caused by exercise or exposure to odors, allergens, or respiratory tract infections  Cough-variant asthma is treated the same way as typical asthma  What are the signs and symptoms of asthma? · Coughing     · Wheezing     · Shortness of breath     · Chest tightness  What may trigger an asthma attack? · A cold, the flu, or a sinus infection     · Exercise     · Weather changes, especially cold, dry air    · Smoking or secondhand smoke    · Fumes from chemicals, dust, air pollution, or other small particles in the air    · Pets, pollen, dust mites, or cockroaches       How is asthma diagnosed? Your healthcare provider will examine you and listen to your lungs  He or she will ask how often you have symptoms and what makes them worse  Tell him or her if you have trouble sleeping, exercising, or doing other activities because of shortness of breath  Your provider will ask about your allergies and past colds, and if anyone in your family has allergies or asthma  Tell your healthcare provider about medicines you take, including over-the-counter drugs and herbal supplements  You may need a chest x-ray to check for lung problems, or a lung function test  Lung function tests show how well you can breathe  How is asthma treated? · Medicines  decrease inflammation, open airways, and make it easier to breathe  Medicines may be inhaled, taken as a pill, or injected  Short-term medicines relieve your symptoms quickly  Long-term medicines are used to prevent future attacks  You may also need medicine to help control your allergies  · Allergy testing  may find allergies that trigger an asthma attack  You may need allergy shots or medicine to control allergies that make your asthma worse  How can I manage my symptoms and prevent future attacks? · Follow your Asthma Action Plan (AAP)    This is a written plan that you and your healthcare provider create  It explains which medicine you need and when to change doses if necessary  It also explains how you can monitor symptoms and use a peak flow meter  The meter measures how well your lungs are working  · Manage other health conditions , such as allergies, acid reflux, and sleep apnea  · Identify and avoid triggers  These may include pets, dust mites, mold, and cockroaches  · Do not smoke or be around others who smoke  Nicotine and other chemicals in cigarettes and cigars can cause lung damage  Ask your healthcare provider for information if you currently smoke and need help to quit  E-cigarettes or smokeless tobacco still contain nicotine  Talk to your healthcare provider before you use these products  · Ask about the flu vaccine  The flu can make your asthma worse  You may need a yearly flu shot  When should I seek immediate care? · You have severe shortness of breath  · Your lips or nails turn blue or gray  · The skin around your neck and ribs pulls in with each breath  · You have shortness of breath, even after you take your short-term medicine as directed  · Your peak flow numbers are in the red zone of your AAP  When should I contact my healthcare provider? · You run out of medicine before your next refill is due  · Your symptoms get worse  · You need to take more medicine than usual to control your symptoms  · You have questions or concerns about your condition or care  CARE AGREEMENT:   You have the right to help plan your care  Learn about your health condition and how it may be treated  Discuss treatment options with your caregivers to decide what care you want to receive  You always have the right to refuse treatment  The above information is an  only  It is not intended as medical advice for individual conditions or treatments   Talk to your doctor, nurse or pharmacist before following any medical regimen to see if it is safe and effective for you  © 2017 [address] Information is for End User's use only and may not be sold, redistributed or otherwise used for commercial purposes  All illustrations and images included in CareNotes® are the copyrighted property of [employer]  or David Biggs  [facility]   WHAT YOU NEED TO KNOW:   Hypertension is high blood pressure (BP)  Your BP is the force of your blood moving against the walls of your arteries  Normal BP is less than 120/80  Prehypertension is between 120/80 and 139/89  Hypertension is 140/90 or higher  Hypertension causes your BP to get so high that your heart has to work much harder than normal  This can damage your heart  You can control hypertension with a healthy lifestyle or medicines  A controlled blood pressure helps protect your organs, such as your heart, lungs, brain, and kidneys  DISCHARGE INSTRUCTIONS:   Call 911 for any of the following:   · You have discomfort in your chest that feels like squeezing, pressure, fullness, or pain  · You become confused or have difficulty speaking  · You suddenly feel lightheaded or have trouble breathing  · You have pain or discomfort in your back, neck, jaw, stomach, or arm  Return to the emergency department if:   · You have a severe headache or vision loss  · You have weakness in an arm or leg  Contact your healthcare provider if:   · You feel faint, dizzy, confused, or drowsy  · You have been taking your BP medicine and your BP is still higher than your healthcare provider says it should be  · You have questions or concerns about your condition or care  Medicines: You may  need any of the following:  · Medicine  may be used to help lower your BP  You may need more than one type of medicine  Take the medicine exactly as directed  · Diuretics  help decrease extra fluid that collects in your body  This will help lower your BP   You may urinate more often while you take this medicine  · Cholesterol medicine  helps lower your cholesterol level  A low cholesterol level helps prevent heart disease and makes it easier to control your blood pressure  · Take your medicine as directed  Contact your healthcare provider if you think your medicine is not helping or if you have side effects  Tell him or her if you are allergic to any medicine  Keep a list of the medicines, vitamins, and herbs you take  Include the amounts, and when and why you take them  Bring the list or the pill bottles to follow-up visits  Carry your medicine list with you in case of an emergency  Follow up with your healthcare provider as directed: You will need to return to have your BP checked and to have other lab tests done  Write down your questions so you remember to ask them during your visits  Manage hypertension:  Talk with your healthcare provider about these and other ways to manage hypertension:  · Check your BP at home  Sit and rest for 5 minutes before you take your BP  Extend your arm and support it on a flat surface  Your arm should be at the same level as your heart  Follow the directions that came with your BP monitor  If possible, take at least 2 BP readings each time  Take your BP at least twice a day at the same times each day, such as morning and evening  Keep a record of your BP readings and bring it to your follow-up visits  Ask your healthcare provider what your BP should be  · Limit sodium (salt) as directed  Too much sodium can affect your fluid balance  Check labels to find low-sodium or no-salt-added foods  Some low-sodium foods use potassium salts for flavor  Too much potassium can also cause health problems  Your healthcare provider will tell you how much sodium and potassium are safe for you to have in a day  He or she may recommend that you limit sodium to 2,300 mg a day  · Follow the meal plan recommended by your healthcare provider    A dietitian or your provider can give you more information on low-sodium plans or the DASH (Dietary Approaches to Stop Hypertension) eating plan  The DASH plan is low in sodium, unhealthy fats, and total fat  It is high in potassium, calcium, and fiber  · Exercise to maintain a healthy weight  Exercise at least 30 minutes per day, on most days of the week  This will help decrease your blood pressure  Ask your healthcare provider about the best exercise plan for you  · Decrease stress  This may help lower your BP  Learn ways to relax, such as deep breathing or listening to music  · Limit alcohol  Women should limit alcohol to 1 drink a day  Men should limit alcohol to 2 drinks a day  A drink of alcohol is 12 ounces of beer, 5 ounces of wine, or 1½ ounces of liquor  · Do not smoke  Nicotine and other chemicals in cigarettes and cigars can increase your BP and also cause lung damage  Ask your healthcare provider for information if you currently smoke and need help to quit  E-cigarettes or smokeless tobacco still contain nicotine  Talk to your healthcare provider before you use these products  · Manage any other health conditions you have  Health conditions such as diabetes can increase your risk for hypertension  Follow your healthcare provider's instructions and take all your medicines as directed  © 2017 [facility] Information is for End User's use only and may not be sold, redistributed or otherwise used for commercial purposes  All illustrations and images included in CareNotes® are the copyrighted property of [employer]  or David Biggs  The above information is an  only  It is not intended as medical advice for individual conditions or treatments  Talk to your doctor, nurse or pharmacist before following any medical regimen to see if it is safe and effective for you

## 2018-12-10 NOTE — ED PROVIDER NOTES
History  Chief Complaint   Patient presents with    Cough     Pt reports productive cough x one week       History provided by:  Patient   used: No    Medical Problem   Location:  Pt with cough and asthma for several days   nebulizer not helping  Severity:  Mild  Onset quality:  Gradual  Duration:  2 days  Timing:  Constant  Progression:  Unchanged  Chronicity:  Recurrent  Associated symptoms: congestion, cough and wheezing    Associated symptoms: no abdominal pain, no chest pain, no diarrhea, no ear pain, no fatigue, no fever, no headaches, no loss of consciousness, no myalgias, no nausea, no rash, no rhinorrhea, no shortness of breath, no sore throat and no vomiting        Prior to Admission Medications   Prescriptions Last Dose Informant Patient Reported? Taking? HYDROCHLOROTHIAZIDE PO   Yes No   Sig: Take by mouth   LISINOPRIL PO   Yes No   Sig: Take by mouth   albuterol (PROVENTIL HFA,VENTOLIN HFA) 90 mcg/act inhaler   No No   Sig: Inhale 1-2 puffs every 6 (six) hours as needed for wheezing   metaxalone (SKELAXIN) 800 mg tablet   Yes No   Sig: Take by mouth 3 (three) times a day as needed for muscle spasms      Facility-Administered Medications: None       Past Medical History:   Diagnosis Date    Asthma     Hypertension        Past Surgical History:   Procedure Laterality Date    NO PAST SURGERIES         History reviewed  No pertinent family history  I have reviewed and agree with the history as documented  Social History   Substance Use Topics    Smoking status: Current Every Day Smoker     Packs/day: 1 00     Types: Cigarettes    Smokeless tobacco: Never Used    Alcohol use No        Review of Systems   Constitutional: Negative  Negative for fatigue and fever  HENT: Positive for congestion  Negative for ear pain, rhinorrhea and sore throat  Eyes: Negative  Respiratory: Positive for cough and wheezing  Negative for shortness of breath  Cardiovascular: Negative  Negative for chest pain  Gastrointestinal: Negative  Negative for abdominal pain, diarrhea, nausea and vomiting  Endocrine: Negative  Genitourinary: Negative  Musculoskeletal: Negative  Negative for myalgias  Skin: Negative  Negative for rash  Allergic/Immunologic: Negative  Neurological: Negative  Negative for loss of consciousness and headaches  Hematological: Negative  Psychiatric/Behavioral: Negative  Physical Exam  Physical Exam   Constitutional: She is oriented to person, place, and time  She appears well-developed and well-nourished  Post neb increase airway  Pt feels normal with breathing    HENT:   Head: Normocephalic and atraumatic  Right Ear: External ear normal    Left Ear: External ear normal    Nose: Nose normal    Mouth/Throat: Oropharynx is clear and moist    Eyes: Pupils are equal, round, and reactive to light  Conjunctivae and EOM are normal    Neck: Normal range of motion  Neck supple  Cardiovascular: Normal rate, regular rhythm and normal heart sounds  Pulmonary/Chest: Effort normal    Decreased breath sounds all fields  minor wheeze all fields    Abdominal: Soft  Bowel sounds are normal    Neurological: She is alert and oriented to person, place, and time  Skin: Skin is warm  Psychiatric: She has a normal mood and affect  Her behavior is normal    Nursing note and vitals reviewed        Vital Signs  ED Triage Vitals [12/10/18 1037]   Temperature Pulse Respirations Blood Pressure SpO2   98 2 °F (36 8 °C) 82 18 (!) 190/106 98 %      Temp Source Heart Rate Source Patient Position - Orthostatic VS BP Location FiO2 (%)   Tympanic Monitor Sitting Right arm --      Pain Score       --           Vitals:    12/10/18 1037   BP: (!) 190/106   Pulse: 82   Patient Position - Orthostatic VS: Sitting       Visual Acuity      ED Medications  Medications   albuterol inhalation solution 2 5 mg (2 5 mg Nebulization Given 12/10/18 1121)   ipratropium (ATROVENT) 0 02 % inhalation solution 0 5 mg (0 5 mg Nebulization Given 12/10/18 1121)   predniSONE tablet 60 mg (60 mg Oral Given 12/10/18 1121)       Diagnostic Studies  Results Reviewed     None                 No orders to display              Procedures  Procedures       Phone Contacts  ED Phone Contact    ED Course                               MDM  CritCare Time    Disposition  Final diagnoses:   Bronchitis   Asthma   Hypertension     Time reflects when diagnosis was documented in both MDM as applicable and the Disposition within this note     Time User Action Codes Description Comment    12/10/2018 11:47 AM Janis Martin  Add [J40] Bronchitis     12/10/2018 11:48 AM Yolette Nelson Rosin  Add [J45 909] Asthma     12/10/2018 11:48 AM Darlys Prier Leslie Rosin  Add [I10] Hypertension       ED Disposition     ED Disposition Condition Comment    Discharge  Dorrine Justin discharge to home/self care      Condition at discharge: Good        Follow-up Information     Follow up With Specialties Details Why Contact Info Additional [address] [facility] Schedule an appointment as soon as possible for a visit  [address], [address] [facility] [phone]  [address], [address], [address] [place], [place], [address]          Discharge Medication List as of 12/10/2018 11:50 AM      START taking these medications    Details   albuterol (2 5 mg/3 mL) 0 083 % nebulizer solution Take 1 vial (2 5 mg total) by nebulization every 6 (six) hours as needed for wheezing or shortness of breath, Starting Mon 12/10/2018, Print      !! albuterol (PROVENTIL HFA,VENTOLIN HFA) 90 mcg/act inhaler Inhale 2 puffs every 4 (four) hours as needed for wheezing, Starting Mon 12/10/2018, Print      azithromycin (ZITHROMAX Z-CARROLL) 250 mg tablet Take 2 tablets today then 1 tablet daily x 4 days, Print      predniSONE 10 mg tablet 5 tabs po qd x 2 days then 4 tabs po qd x 2 days then 3 tabs po qd x 2 days then 2 tabs po qd x 2 days then 1 tab po qd x 2 days, Print       !! - Potential duplicate medications found  Please discuss with provider  CONTINUE these medications which have NOT CHANGED    Details   !! albuterol (PROVENTIL HFA,VENTOLIN HFA) 90 mcg/act inhaler Inhale 1-2 puffs every 6 (six) hours as needed for wheezing, Starting Wed 6/27/2018, Print      HYDROCHLOROTHIAZIDE PO Take by mouth, Historical Med      LISINOPRIL PO Take by mouth, Historical Med      metaxalone (SKELAXIN) 800 mg tablet Take by mouth 3 (three) times a day as needed for muscle spasms, Historical Med       !! - Potential duplicate medications found  Please discuss with provider  No discharge procedures on file      ED Provider  Electronically Signed by           Nayeli Figueroa PA-C  12/10/18 1650

## 2019-01-28 ENCOUNTER — HOSPITAL ENCOUNTER (EMERGENCY)
Facility: HOSPITAL | Age: 39
Discharge: HOME | End: 2019-01-28
Attending: EMERGENCY MEDICINE
Payer: COMMERCIAL

## 2019-01-28 VITALS
HEART RATE: 74 BPM | SYSTOLIC BLOOD PRESSURE: 161 MMHG | OXYGEN SATURATION: 98 % | WEIGHT: 293 LBS | BODY MASS INDEX: 53.85 KG/M2 | TEMPERATURE: 99.1 F | RESPIRATION RATE: 22 BRPM | DIASTOLIC BLOOD PRESSURE: 86 MMHG

## 2019-01-28 DIAGNOSIS — R94.31 EKG ABNORMALITY: ICD-10-CM

## 2019-01-28 DIAGNOSIS — R11.10 VOMITING: ICD-10-CM

## 2019-01-28 DIAGNOSIS — I10 HYPERTENSION: ICD-10-CM

## 2019-01-28 DIAGNOSIS — R11.0 NAUSEA: Primary | ICD-10-CM

## 2019-01-28 LAB
ALBUMIN SERPL BCP-MCNC: 4.4 G/DL (ref 3–5.2)
ALP SERPL-CCNC: 61 U/L (ref 43–122)
ALT SERPL W P-5'-P-CCNC: 22 U/L (ref 9–52)
ANION GAP SERPL CALCULATED.3IONS-SCNC: 8 MMOL/L (ref 5–14)
AST SERPL W P-5'-P-CCNC: 22 U/L (ref 14–36)
ATRIAL RATE: 81 BPM
BACTERIA UR QL AUTO: ABNORMAL /HPF
BASOPHILS # BLD AUTO: 0.1 THOUSANDS/ΜL (ref 0–0.1)
BASOPHILS NFR BLD AUTO: 1 % (ref 0–1)
BILIRUB SERPL-MCNC: 0.4 MG/DL
BILIRUB UR QL STRIP: NEGATIVE
BUN SERPL-MCNC: 9 MG/DL (ref 5–25)
CALCIUM SERPL-MCNC: 9.4 MG/DL (ref 8.4–10.2)
CHLORIDE SERPL-SCNC: 102 MMOL/L (ref 97–108)
CLARITY UR: ABNORMAL
CO2 SERPL-SCNC: 29 MMOL/L (ref 22–30)
COLOR UR: YELLOW
CREAT SERPL-MCNC: 0.7 MG/DL (ref 0.6–1.2)
EOSINOPHIL # BLD AUTO: 0.2 THOUSAND/ΜL (ref 0–0.4)
EOSINOPHIL NFR BLD AUTO: 2 % (ref 0–6)
ERYTHROCYTE [DISTWIDTH] IN BLOOD BY AUTOMATED COUNT: 15.7 %
EXT PREG TEST URINE: NEGATIVE
GFR SERPL CREATININE-BSD FRML MDRD: 112 ML/MIN/1.73SQ M
GLUCOSE SERPL-MCNC: 108 MG/DL (ref 70–99)
GLUCOSE UR STRIP-MCNC: NEGATIVE MG/DL
HCT VFR BLD AUTO: 40 % (ref 36–46)
HGB BLD-MCNC: 12.8 G/DL (ref 12–16)
HGB UR QL STRIP.AUTO: NEGATIVE
KETONES UR STRIP-MCNC: NEGATIVE MG/DL
LEUKOCYTE ESTERASE UR QL STRIP: 25
LIPASE SERPL-CCNC: 56 U/L (ref 23–300)
LYMPHOCYTES # BLD AUTO: 3 THOUSANDS/ΜL (ref 0.5–4)
LYMPHOCYTES NFR BLD AUTO: 35 % (ref 25–45)
MCH RBC QN AUTO: 27.7 PG (ref 26–34)
MCHC RBC AUTO-ENTMCNC: 31.9 G/DL (ref 31–36)
MCV RBC AUTO: 87 FL (ref 80–100)
MONOCYTES # BLD AUTO: 0.6 THOUSAND/ΜL (ref 0.2–0.9)
MONOCYTES NFR BLD AUTO: 7 % (ref 1–10)
NEUTROPHILS # BLD AUTO: 4.8 THOUSANDS/ΜL (ref 1.8–7.8)
NEUTS SEG NFR BLD AUTO: 55 % (ref 45–65)
NITRITE UR QL STRIP: NEGATIVE
NON-SQ EPI CELLS URNS QL MICRO: ABNORMAL /HPF
P AXIS: 63 DEGREES
PH UR STRIP.AUTO: 8 [PH] (ref 4.5–8)
PLATELET # BLD AUTO: 262 THOUSANDS/UL (ref 150–450)
PMV BLD AUTO: 9 FL (ref 8.9–12.7)
POTASSIUM SERPL-SCNC: 4.3 MMOL/L (ref 3.6–5)
PR INTERVAL: 150 MS
PROT SERPL-MCNC: 7.8 G/DL (ref 5.9–8.4)
PROT UR STRIP-MCNC: NEGATIVE MG/DL
QRS AXIS: 68 DEGREES
QRSD INTERVAL: 84 MS
QT INTERVAL: 364 MS
QTC INTERVAL: 422 MS
RBC # BLD AUTO: 4.6 MILLION/UL (ref 4–5.2)
RBC #/AREA URNS AUTO: ABNORMAL /HPF
SODIUM SERPL-SCNC: 139 MMOL/L (ref 137–147)
SP GR UR STRIP.AUTO: 1.01 (ref 1–1.04)
T WAVE AXIS: -28 DEGREES
TROPONIN I SERPL-MCNC: 0.02 NG/ML (ref 0–0.03)
UROBILINOGEN UA: 1 MG/DL
VENTRICULAR RATE: 81 BPM
WBC # BLD AUTO: 8.7 THOUSAND/UL (ref 4.5–11)
WBC #/AREA URNS AUTO: ABNORMAL /HPF

## 2019-01-28 PROCEDURE — 84484 ASSAY OF TROPONIN QUANT: CPT | Performed by: EMERGENCY MEDICINE

## 2019-01-28 PROCEDURE — 99283 EMERGENCY DEPT VISIT LOW MDM: CPT

## 2019-01-28 PROCEDURE — 83690 ASSAY OF LIPASE: CPT | Performed by: EMERGENCY MEDICINE

## 2019-01-28 PROCEDURE — 36415 COLL VENOUS BLD VENIPUNCTURE: CPT | Performed by: EMERGENCY MEDICINE

## 2019-01-28 PROCEDURE — 81001 URINALYSIS AUTO W/SCOPE: CPT | Performed by: EMERGENCY MEDICINE

## 2019-01-28 PROCEDURE — 93010 ELECTROCARDIOGRAM REPORT: CPT | Performed by: INTERNAL MEDICINE

## 2019-01-28 PROCEDURE — 93005 ELECTROCARDIOGRAM TRACING: CPT

## 2019-01-28 PROCEDURE — 85025 COMPLETE CBC W/AUTO DIFF WBC: CPT | Performed by: EMERGENCY MEDICINE

## 2019-01-28 PROCEDURE — 81025 URINE PREGNANCY TEST: CPT | Performed by: EMERGENCY MEDICINE

## 2019-01-28 PROCEDURE — 80053 COMPREHEN METABOLIC PANEL: CPT | Performed by: EMERGENCY MEDICINE

## 2019-01-28 RX ORDER — ONDANSETRON 4 MG/1
4-8 TABLET, ORALLY DISINTEGRATING ORAL EVERY 8 HOURS PRN
Qty: 20 TABLET | Refills: 0 | Status: SHIPPED | OUTPATIENT
Start: 2019-01-28 | End: 2019-04-21

## 2019-01-28 RX ORDER — ONDANSETRON 4 MG/1
8 TABLET, ORALLY DISINTEGRATING ORAL ONCE
Status: COMPLETED | OUTPATIENT
Start: 2019-01-28 | End: 2019-01-28

## 2019-01-28 RX ORDER — CLONIDINE HYDROCHLORIDE 0.1 MG/1
0.2 TABLET ORAL ONCE
Status: DISCONTINUED | OUTPATIENT
Start: 2019-01-28 | End: 2019-01-28

## 2019-01-28 RX ADMIN — ONDANSETRON 8 MG: 4 TABLET, ORALLY DISINTEGRATING ORAL at 17:09

## 2019-01-28 NOTE — ED PROVIDER NOTES
History  Chief Complaint   Patient presents with    Vomiting     pt c/o vomiting x3 days  Denies abdominal pain but states she has a upset stomach that won't stop   Headache     x3 days     Patient is a 68-year-old female  She has a history of hypertension  She reports compliance with her medications  She has a history of asthma  She has had a cholecystectomy  She presents to the emergency room with a 3 day history of a queasy feeling in her stomach and nausea/vomiting  The vomiting is not bloody or bilious  No associated diarrhea  No melena or hematochezia  She has had no relief with Pepto-Bismol  She denies any abdominal pain  No chest pain or shortness of breath  She did have a headache, which she described as a migraine  However that headache has been resolved for about a day now  No focal neurologic symptoms  She denies carbon monoxide exposure  There is no PD at home sick with similar symptoms  No fever or chills  No urinary or vaginal complaints  Patient denies pregnancy  Symptoms are moderate in intensity  She denies associated vertigo  Prior to Admission Medications   Prescriptions Last Dose Informant Patient Reported? Taking?    HYDROCHLOROTHIAZIDE PO   Yes No   Sig: Take by mouth   LISINOPRIL PO   Yes No   Sig: Take by mouth   albuterol (2 5 mg/3 mL) 0 083 % nebulizer solution   No No   Sig: Take 1 vial (2 5 mg total) by nebulization every 6 (six) hours as needed for wheezing or shortness of breath   albuterol (PROVENTIL HFA,VENTOLIN HFA) 90 mcg/act inhaler   No No   Sig: Inhale 1-2 puffs every 6 (six) hours as needed for wheezing   albuterol (PROVENTIL HFA,VENTOLIN HFA) 90 mcg/act inhaler   No No   Sig: Inhale 2 puffs every 4 (four) hours as needed for wheezing   metaxalone (SKELAXIN) 800 mg tablet   Yes No   Sig: Take by mouth 3 (three) times a day as needed for muscle spasms   predniSONE 10 mg tablet   No No   Si tabs po qd x 2 days then 4 tabs po qd x 2 days then 3 tabs po qd x 2 days then 2 tabs po qd x 2 days then 1 tab po qd x 2 days      Facility-Administered Medications: None       Past Medical History:   Diagnosis Date    Asthma     Hypertension        Past Surgical History:   Procedure Laterality Date    NO PAST SURGERIES         History reviewed  No pertinent family history  I have reviewed and agree with the history as documented  Social History   Substance Use Topics    Smoking status: Current Every Day Smoker     Packs/day: 1 00     Types: Cigarettes    Smokeless tobacco: Never Used    Alcohol use No        Review of Systems   Constitutional: Negative for chills and fever  HENT: Negative for rhinorrhea and sore throat  Eyes: Negative for pain, redness and visual disturbance  Respiratory: Negative for cough and shortness of breath  Cardiovascular: Negative for chest pain and leg swelling  Gastrointestinal: Positive for nausea and vomiting  Negative for abdominal pain and diarrhea  Endocrine: Negative for polydipsia and polyuria  Genitourinary: Negative for dysuria, frequency, hematuria, vaginal bleeding and vaginal discharge  Musculoskeletal: Negative for back pain and neck pain  Skin: Negative for rash and wound  Allergic/Immunologic: Negative for immunocompromised state  Neurological: Positive for headaches  Negative for weakness and numbness  Hematological: Does not bruise/bleed easily  Psychiatric/Behavioral: Negative for hallucinations and suicidal ideas  All other systems reviewed and are negative  Physical Exam  Physical Exam   Constitutional: She is oriented to person, place, and time  She appears well-developed and well-nourished  No distress  Morbidly obese  HENT:   Head: Normocephalic and atraumatic  Mouth/Throat: Oropharynx is clear and moist    Eyes: Conjunctivae are normal  Right eye exhibits no discharge  Left eye exhibits no discharge  No scleral icterus  Neck: Normal range of motion   Neck supple  Cardiovascular: Normal rate, regular rhythm, normal heart sounds and intact distal pulses  Exam reveals no gallop and no friction rub  No murmur heard  Pulmonary/Chest: Effort normal and breath sounds normal  No stridor  No respiratory distress  She has no wheezes  She has no rales  Abdominal: Soft  Bowel sounds are normal  She exhibits no distension  There is no tenderness  There is no rebound and no guarding  Musculoskeletal: Normal range of motion  She exhibits no edema, tenderness or deformity  No CVA tenderness  No calf tenderness  Neurological: She is alert and oriented to person, place, and time  She has normal strength  No sensory deficit  GCS eye subscore is 4  GCS verbal subscore is 5  GCS motor subscore is 6  Skin: Skin is warm and dry  No rash noted  She is not diaphoretic  Psychiatric: She has a normal mood and affect  Her behavior is normal    Vitals reviewed        Vital Signs  ED Triage Vitals   Temperature Pulse Respirations Blood Pressure SpO2   01/28/19 1525 01/28/19 1526 01/28/19 1525 01/28/19 1526 01/28/19 1526   99 1 °F (37 3 °C) 87 22 (!) 202/106 98 %      Temp Source Heart Rate Source Patient Position - Orthostatic VS BP Location FiO2 (%)   01/28/19 1525 01/28/19 1525 01/28/19 1525 01/28/19 1525 --   Tympanic Monitor Sitting Left arm       Pain Score       01/28/19 1525       6           Vitals:    01/28/19 1525 01/28/19 1526 01/28/19 1707   BP:  (!) 202/106 161/86   Pulse:  87 74   Patient Position - Orthostatic VS: Sitting  Lying       Visual Acuity      ED Medications  Medications   ondansetron (ZOFRAN-ODT) dispersible tablet 8 mg (8 mg Oral Given 1/28/19 1709)       Diagnostic Studies  Results Reviewed     Procedure Component Value Units Date/Time    Troponin I [[id number]]  (Normal) Collected:  01/28/19 1818    Lab Status:  Final result Specimen:  Blood from Arm, Right Updated:  01/28/19 1848     Troponin I 0 02 ng/mL     Lipase [[id number]]  (Normal) Collected: 01/28/19 1818    Lab Status:  Final result Specimen:  Blood from Arm, Right Updated:  01/28/19 1836     Lipase 56 u/L     Comprehensive metabolic panel [[id number]]  (Abnormal) Collected:  01/28/19 1818    Lab Status:  Final result Specimen:  Blood from Arm, Right Updated:  01/28/19 1836     Sodium 139 mmol/L      Potassium 4 3 mmol/L      Chloride 102 mmol/L      CO2 29 mmol/L      ANION GAP 8 mmol/L      BUN 9 mg/dL      Creatinine 0 70 mg/dL      Glucose 108 (H) mg/dL      Calcium 9 4 mg/dL      AST 22 U/L      ALT 22 U/L      Alkaline Phosphatase 61 U/L      Total Protein 7 8 g/dL      Albumin 4 4 g/dL      Total Bilirubin 0 40 mg/dL      eGFR 112 ml/min/1 73sq m     Narrative:         National Kidney Disease Education Program recommendations are as follows:  GFR calculation is accurate only with a steady state creatinine  Chronic Kidney disease less than 60 ml/min/1 73 sq  meters  Kidney failure less than 15 ml/min/1 73 sq  meters      CBC and differential [[id number]]  (Abnormal) Collected:  01/28/19 1818    Lab Status:  Final result Specimen:  Blood from Arm, Right Updated:  01/28/19 1827     WBC 8 70 Thousand/uL      RBC 4 60 Million/uL      Hemoglobin 12 8 g/dL      Hematocrit 40 0 %      MCV 87 fL      MCH 27 7 pg      MCHC 31 9 g/dL      RDW 15 7 (H) %      MPV 9 0 fL      Platelets 216 Thousands/uL      Neutrophils Relative 55 %      Lymphocytes Relative 35 %      Monocytes Relative 7 %      Eosinophils Relative 2 %      Basophils Relative 1 %      Neutrophils Absolute 4 80 Thousands/µL      Lymphocytes Absolute 3 00 Thousands/µL      Monocytes Absolute 0 60 Thousand/µL      Eosinophils Absolute 0 20 Thousand/µL      Basophils Absolute 0 10 Thousands/µL     Urine Microscopic [[id number]]  (Abnormal) Collected:  01/28/19 1608    Lab Status:  Final result Specimen:  Urine from Urine, Clean Catch Updated:  01/28/19 1624     RBC, UA None Seen /hpf      WBC, UA 2-4 (A) /hpf      Epithelial Cells Moderate (A) /hpf Bacteria, UA Occasional /hpf     UA w Reflex to Microscopic [[id number]]  (Abnormal) Collected:  01/28/19 1608    Lab Status:  Final result Specimen:  Urine from Urine, Clean Catch Updated:  01/28/19 1618     Color, UA Yellow     Clarity, UA Slightly Cloudy (A)     Specific Gravity, UA 1 015     pH, UA 8 0     Leukocytes, UA 25 0 (A)     Nitrite, UA Negative     Protein, UA Negative mg/dl      Glucose, UA Negative mg/dl      Ketones, UA Negative mg/dl      Bilirubin, UA Negative     Blood, UA Negative     UROBILINOGEN UA 1 0 mg/dL     POCT pregnancy, urine [[id number]]  (Normal) Resulted:  01/28/19 1609    Lab Status:  Final result Updated:  01/28/19 1609     EXT PREG TEST UR (Ref: Negative) negative                 No orders to display              Procedures  ECG 12 Lead Documentation  Date/Time: 1/28/2019 4:07 PM  Performed by: Edelmira Elliott  Authorized by: Edelmira Elliott     ECG reviewed by me, the ED Provider: yes    Patient location:  ED  Comments:      Normal sinus rhythm with PAC  Nonspecific T-wave abnormality inferiorly  No ectopy  Phone Contacts  ED Phone Contact    ED Course         HEART Risk Score      Most Recent Value   History  0 Filed at: 01/28/2019 1608   ECG  1 Filed at: 01/28/2019 1608   Age  0 Filed at: 01/28/2019 1608   Risk Factors  2 Filed at: 01/28/2019 1608   Troponin  0 Filed at: 01/28/2019 1608   Heart Score Risk Calculator   History  0 Filed at: 01/28/2019 1608   ECG  1 Filed at: 01/28/2019 1608   Age  0 Filed at: 01/28/2019 1608   Risk Factors  2 Filed at: 01/28/2019 1608   Troponin  0 Filed at: 01/28/2019 1608   HEART Score  3 Filed at: 01/28/2019 1608   HEART Score  3 Filed at: 01/28/2019 1608                            MDM  Number of Diagnoses or Management Options  Diagnosis management comments: Patient is not pregnant  Laboratory evaluation is nonspecific  She has benign abdomen  She has a nonspecific EKG change  Heart score is low    Troponin is negative despite several days of nausea  Doubt acute coronary syndrome  These EKG abnormalities can be worked up as an outpatient  Blood pressure improved spontaneously  Patient is appropriate for discharge and outpatient management  Amount and/or Complexity of Data Reviewed  Clinical lab tests: ordered and reviewed  Independent visualization of images, tracings, or specimens: yes      CritCare Time    Disposition  Final diagnoses:   Nausea   Hypertension   EKG abnormality     Time reflects when diagnosis was documented in both MDM as applicable and the Disposition within this note     Time User Action Codes Description Comment    1/28/2019  7:02 PM Lutricia Balo [R11 0] Nausea     1/28/2019  7:02 PM [facility] Add [I10] Hypertension     1/28/2019  7:03 PM [facility] Add [R94 31] EKG abnormality       ED Disposition     ED Disposition Condition Comment    Discharge  Acquanetta Locus discharge to home/self care  Condition at discharge: Good        Follow-up Information     Follow up With Specialties Details Why [address] Karyna Strange MD Family Medicine In 2 days  [address]  [address] [facility] [address]             Patient's Medications   Discharge Prescriptions    ONDANSETRON (ZOFRAN-ODT) 4 MG DISINTEGRATING TABLET    Take 1-2 tablets (4-8 mg total) by mouth every 8 (eight) hours as needed for nausea or vomiting       Start Date: 1/28/2019 End Date: --       Order Dose: 4-8 mg       Quantity: 20 tablet    Refills: 0     No discharge procedures on file      ED Provider  Electronically Signed by           Lucero Rayo MD  01/28/19 1287

## 2019-01-28 NOTE — ED NOTES
Provider notified of pt's updated vitals   Pt no longer needs clonidine at this time per provider     Eder Short RN  01/28/19 6513

## 2019-01-29 NOTE — DISCHARGE INSTRUCTIONS
Acute Nausea and Vomiting   WHAT YOU NEED TO KNOW:   Acute nausea and vomiting start suddenly, worsen quickly, and last a short time  DISCHARGE INSTRUCTIONS:   Return to the emergency department if:   · You see blood in your vomit or your bowel movements  · You have sudden, severe pain in your chest and upper abdomen after hard vomiting or retching  · You have swelling in your neck and chest      · You are dizzy, cold, and thirsty and your eyes and mouth are dry  · You are urinating very little or not at all  · You have muscle weakness, leg cramps, and trouble breathing  · Your heart is beating much faster than normal      · You continue to vomit for more than 48 hours  Contact your healthcare provider if:   · You have frequent dry heaves (vomiting but nothing comes out)  · Your nausea and vomiting does not get better or go away after you use medicine  · You have questions or concerns about your condition or treatment  Medicines: You may need any of the following:  · Medicines  may be given to calm your stomach and stop your vomiting  You may also need medicines to help you feel more relaxed or to stop nausea and vomiting caused by motion sickness  · Gastrointestinal stimulants  are used to help empty your stomach and bowels  This may help decrease nausea and vomiting  · Take your medicine as directed  Contact your healthcare provider if you think your medicine is not helping or if you have side effects  Tell him or her if you are allergic to any medicine  Keep a list of the medicines, vitamins, and herbs you take  Include the amounts, and when and why you take them  Bring the list or the pill bottles to follow-up visits  Carry your medicine list with you in case of an emergency  Prevent or manage acute nausea and vomiting:   · Do not drink alcohol  Alcohol may upset or irritate your stomach  Too much alcohol can also cause acute nausea and vomiting  · Control stress    Headaches due to stress may cause nausea and vomiting  Find ways to relax and manage your stress  Get more rest and sleep  · Drink more liquids as directed  Vomiting can lead to dehydration  It is important to drink more liquids to help replace lost body fluids  Ask your healthcare provider how much liquid to drink each day and which liquids are best for you  Your provider may recommend that you drink an oral rehydration solution (ORS)  ORS contains water, salts, and sugar that are needed to replace the lost body fluids  Ask what kind of ORS to use, how much to drink, and where to get it  · Eat smaller meals, more often  Eat small amounts of food every 2 to 3 hours, even if you are not hungry  Food in your stomach may decrease your nausea  · Talk to your healthcare provider before you take over-the-counter (OTC) medicines  These medicines can cause serious problems if you use certain other medicines, or you have a medical condition  You may have problems if you use too much or use them for longer than the label says  Follow directions on the label carefully  Follow up with your healthcare provider as directed:  Write down your questions so you remember to ask them during your follow-up visits  © 2017 [employer]  Information is for End User's use only and may not be sold, redistributed or otherwise used for commercial purposes  All illustrations and images included in CareNotes® are the copyrighted property of [employer]  or David Biggs  The above information is an  only  It is not intended as medical advice for individual conditions or treatments  Talk to your doctor, nurse or pharmacist before following any medical regimen to see if it is safe and effective for you  Hypertension   WHAT YOU NEED TO KNOW:   Hypertension is high blood pressure (BP)  Your BP is the force of your blood moving against the walls of your arteries  Normal BP is less than 120/80  Prehypertension is between 120/80 and 139/89  Hypertension is 140/90 or higher  Hypertension causes your BP to get so high that your heart has to work much harder than normal  This can damage your heart  You can control hypertension with a healthy lifestyle or medicines  A controlled blood pressure helps protect your organs, such as your heart, lungs, brain, and kidneys  DISCHARGE INSTRUCTIONS:   Call 911 for any of the following:   · You have discomfort in your chest that feels like squeezing, pressure, fullness, or pain  · You become confused or have difficulty speaking  · You suddenly feel lightheaded or have trouble breathing  · You have pain or discomfort in your back, neck, jaw, stomach, or arm  Return to the emergency department if:   · You have a severe headache or vision loss  · You have weakness in an arm or leg  Contact your healthcare provider if:   · You feel faint, dizzy, confused, or drowsy  · You have been taking your BP medicine and your BP is still higher than your healthcare provider says it should be  · You have questions or concerns about your condition or care  Medicines: You may  need any of the following:  · Medicine  may be used to help lower your BP  You may need more than one type of medicine  Take the medicine exactly as directed  · Diuretics  help decrease extra fluid that collects in your body  This will help lower your BP  You may urinate more often while you take this medicine  · Cholesterol medicine  helps lower your cholesterol level  A low cholesterol level helps prevent heart disease and makes it easier to control your blood pressure  · Take your medicine as directed  Contact your healthcare provider if you think your medicine is not helping or if you have side effects  Tell him or her if you are allergic to any medicine  Keep a list of the medicines, vitamins, and herbs you take  Include the amounts, and when and why you take them   Bring the list or the pill bottles to follow-up visits  Carry your medicine list with you in case of an emergency  Follow up with your healthcare provider as directed: You will need to return to have your BP checked and to have other lab tests done  Write down your questions so you remember to ask them during your visits  Manage hypertension:  Talk with your healthcare provider about these and other ways to manage hypertension:  · Check your BP at home  Sit and rest for 5 minutes before you take your BP  Extend your arm and support it on a flat surface  Your arm should be at the same level as your heart  Follow the directions that came with your BP monitor  If possible, take at least 2 BP readings each time  Take your BP at least twice a day at the same times each day, such as morning and evening  Keep a record of your BP readings and bring it to your follow-up visits  Ask your healthcare provider what your BP should be  · Limit sodium (salt) as directed  Too much sodium can affect your fluid balance  Check labels to find low-sodium or no-salt-added foods  Some low-sodium foods use potassium salts for flavor  Too much potassium can also cause health problems  Your healthcare provider will tell you how much sodium and potassium are safe for you to have in a day  He or she may recommend that you limit sodium to 2,300 mg a day  · Follow the meal plan recommended by your healthcare provider  A dietitian or your provider can give you more information on low-sodium plans or the DASH (Dietary Approaches to Stop Hypertension) eating plan  The DASH plan is low in sodium, unhealthy fats, and total fat  It is high in potassium, calcium, and fiber  · Exercise to maintain a healthy weight  Exercise at least 30 minutes per day, on most days of the week  This will help decrease your blood pressure  Ask your healthcare provider about the best exercise plan for you  · Decrease stress    This may help lower your BP  Learn ways to relax, such as deep breathing or listening to music  · Limit alcohol  Women should limit alcohol to 1 drink a day  Men should limit alcohol to 2 drinks a day  A drink of alcohol is 12 ounces of beer, 5 ounces of wine, or 1½ ounces of liquor  · Do not smoke  Nicotine and other chemicals in cigarettes and cigars can increase your BP and also cause lung damage  Ask your healthcare provider for information if you currently smoke and need help to quit  E-cigarettes or smokeless tobacco still contain nicotine  Talk to your healthcare provider before you use these products  · Manage any other health conditions you have  Health conditions such as diabetes can increase your risk for hypertension  Follow your healthcare provider's instructions and take all your medicines as directed  © 2017 [facility] Information is for End User's use only and may not be sold, redistributed or otherwise used for commercial purposes  All illustrations and images included in CareNotes® are the copyrighted property of A [employer] M [employer] Inc  or David Biggs  The above information is an  only  It is not intended as medical advice for individual conditions or treatments  Talk to your doctor, nurse or pharmacist before following any medical regimen to see if it is safe and effective for you  Electrocardiogram   WHAT YOU NEED TO KNOW:   What do I need to know about an electrocardiogram?  An electrocardiogram is a test that measures the electrical activity of your heart  It is also called an EKG or ECG  It may be used to monitor your heart or to diagnose heart problems  How do I prepare for an EKG? Do not use lotion on your skin for 1 day before the test  Tell your healthcare provider about the medicines you take  Some medicine may affect the results of your EKG  Tell your healthcare provider if you have an allergy to adhesives  What will happen during an EKG? · Your healthcare provider will place several patches, called electrodes, on your chest, arms, or legs  He may need to clean or shave a small area of skin if the patches will not stick  Your healthcare provider will attach a wire to each patch  The wires are connected to a monitor that will display the electrical activity of your heart  Lie still, and do not talk while the test is being done  · If you are in the hospital, your healthcare provider may want to monitor your heart continually  Some, or all, of the patches may be left on your chest  He may also do an EKG when you are resting, then again after you exercise or take medicine to stress your heart  CARE AGREEMENT:   You have the right to help plan your care  Learn about your health condition and how it may be treated  Discuss treatment options with your caregivers to decide what care you want to receive  You always have the right to refuse treatment  The above information is an  only  It is not intended as medical advice for individual conditions or treatments  Talk to your doctor, nurse or pharmacist before following any medical regimen to see if it is safe and effective for you  © 2017 [employer] Information is for End User's use only and may not be sold, redistributed or otherwise used for commercial purposes  All illustrations and images included in CareNotes® are the copyrighted property of [employer]  or David Biggs

## 2019-04-21 ENCOUNTER — HOSPITAL ENCOUNTER (EMERGENCY)
Facility: HOSPITAL | Age: 39
Discharge: HOME | End: 2019-04-21
Attending: EMERGENCY MEDICINE | Admitting: EMERGENCY MEDICINE
Payer: COMMERCIAL

## 2019-04-21 ENCOUNTER — APPOINTMENT (EMERGENCY)
Dept: RADIOLOGY | Facility: HOSPITAL | Age: 39
End: 2019-04-21
Payer: COMMERCIAL

## 2019-04-21 VITALS
OXYGEN SATURATION: 100 % | BODY MASS INDEX: 57.52 KG/M2 | HEIGHT: 60 IN | HEART RATE: 76 BPM | TEMPERATURE: 98.5 F | DIASTOLIC BLOOD PRESSURE: 93 MMHG | RESPIRATION RATE: 20 BRPM | WEIGHT: 293 LBS | SYSTOLIC BLOOD PRESSURE: 183 MMHG

## 2019-04-21 DIAGNOSIS — J45.41 MODERATE PERSISTENT ASTHMA WITH EXACERBATION: Primary | ICD-10-CM

## 2019-04-21 LAB — TROPONIN I SERPL-MCNC: 0.02 NG/ML (ref 0–0.03)

## 2019-04-21 PROCEDURE — 36415 COLL VENOUS BLD VENIPUNCTURE: CPT | Performed by: PHYSICIAN ASSISTANT

## 2019-04-21 PROCEDURE — 71046 X-RAY EXAM CHEST 2 VIEWS: CPT

## 2019-04-21 PROCEDURE — 94640 AIRWAY INHALATION TREATMENT: CPT

## 2019-04-21 PROCEDURE — 96372 THER/PROPH/DIAG INJ SC/IM: CPT

## 2019-04-21 PROCEDURE — 93005 ELECTROCARDIOGRAM TRACING: CPT

## 2019-04-21 PROCEDURE — 99284 EMERGENCY DEPT VISIT MOD MDM: CPT | Performed by: EMERGENCY MEDICINE

## 2019-04-21 PROCEDURE — 84484 ASSAY OF TROPONIN QUANT: CPT | Performed by: PHYSICIAN ASSISTANT

## 2019-04-21 PROCEDURE — 99284 EMERGENCY DEPT VISIT MOD MDM: CPT

## 2019-04-21 RX ORDER — GUAIFENESIN/DEXTROMETHORPHAN 100-10MG/5
5 SYRUP ORAL 3 TIMES DAILY PRN
Qty: 118 ML | Refills: 0 | Status: SHIPPED | OUTPATIENT
Start: 2019-04-21 | End: 2019-05-01

## 2019-04-21 RX ORDER — LORATADINE 10 MG/1
10 TABLET ORAL DAILY
Qty: 20 TABLET | Refills: 0 | Status: SHIPPED | OUTPATIENT
Start: 2019-04-21 | End: 2019-06-05 | Stop reason: SDUPTHER

## 2019-04-21 RX ORDER — LORATADINE 10 MG/1
10 TABLET ORAL ONCE
Status: COMPLETED | OUTPATIENT
Start: 2019-04-21 | End: 2019-04-21

## 2019-04-21 RX ORDER — GUAIFENESIN/DEXTROMETHORPHAN 100-10MG/5
10 SYRUP ORAL ONCE
Status: COMPLETED | OUTPATIENT
Start: 2019-04-21 | End: 2019-04-21

## 2019-04-21 RX ORDER — CLONIDINE HYDROCHLORIDE 0.1 MG/1
0.2 TABLET ORAL ONCE
Status: DISCONTINUED | OUTPATIENT
Start: 2019-04-21 | End: 2019-04-21

## 2019-04-21 RX ORDER — KETOROLAC TROMETHAMINE 30 MG/ML
15 INJECTION, SOLUTION INTRAMUSCULAR; INTRAVENOUS ONCE
Status: COMPLETED | OUTPATIENT
Start: 2019-04-21 | End: 2019-04-21

## 2019-04-21 RX ORDER — ALBUTEROL SULFATE 90 UG/1
2 AEROSOL, METERED RESPIRATORY (INHALATION) EVERY 4 HOURS PRN
Qty: 1 INHALER | Refills: 0 | Status: SHIPPED | OUTPATIENT
Start: 2019-04-21 | End: 2019-06-05 | Stop reason: SDUPTHER

## 2019-04-21 RX ORDER — ALBUTEROL SULFATE 2.5 MG/3ML
5 SOLUTION RESPIRATORY (INHALATION) ONCE
Status: COMPLETED | OUTPATIENT
Start: 2019-04-21 | End: 2019-04-21

## 2019-04-21 RX ADMIN — KETOROLAC TROMETHAMINE 15 MG: 30 INJECTION, SOLUTION INTRAMUSCULAR; INTRAVENOUS at 09:43

## 2019-04-21 RX ADMIN — ALBUTEROL SULFATE 5 MG: 2.5 SOLUTION RESPIRATORY (INHALATION) at 09:42

## 2019-04-21 RX ADMIN — LORATADINE 10 MG: 10 TABLET ORAL at 09:42

## 2019-04-21 RX ADMIN — GUAIFENESIN AND DEXTROMETHORPHAN 10 ML: 100; 10 SYRUP ORAL at 09:42

## 2019-04-21 RX ADMIN — IPRATROPIUM BROMIDE 0.5 MG: 0.5 SOLUTION RESPIRATORY (INHALATION) at 09:42

## 2019-04-22 LAB
ATRIAL RATE: 89 BPM
P AXIS: 67 DEGREES
PR INTERVAL: 148 MS
QRS AXIS: 66 DEGREES
QRSD INTERVAL: 84 MS
QT INTERVAL: 378 MS
QTC INTERVAL: 459 MS
T WAVE AXIS: -64 DEGREES
VENTRICULAR RATE: 89 BPM

## 2019-04-22 PROCEDURE — 93010 ELECTROCARDIOGRAM REPORT: CPT | Performed by: INTERNAL MEDICINE

## 2019-06-05 ENCOUNTER — OFFICE VISIT (OUTPATIENT)
Dept: FAMILY MEDICINE CLINIC | Facility: CLINIC | Age: 39
End: 2019-06-05

## 2019-06-05 VITALS
BODY MASS INDEX: 58.39 KG/M2 | SYSTOLIC BLOOD PRESSURE: 144 MMHG | RESPIRATION RATE: 19 BRPM | DIASTOLIC BLOOD PRESSURE: 92 MMHG | TEMPERATURE: 97.8 F | WEIGHT: 293 LBS | HEART RATE: 88 BPM | OXYGEN SATURATION: 98 %

## 2019-06-05 DIAGNOSIS — J45.41 MODERATE PERSISTENT ASTHMA WITH EXACERBATION: Primary | ICD-10-CM

## 2019-06-05 DIAGNOSIS — J30.9 ALLERGIC RHINITIS, UNSPECIFIED SEASONALITY, UNSPECIFIED TRIGGER: ICD-10-CM

## 2019-06-05 DIAGNOSIS — Z00.00 HEALTHCARE MAINTENANCE: ICD-10-CM

## 2019-06-05 DIAGNOSIS — I10 HYPERTENSION, UNSPECIFIED TYPE: ICD-10-CM

## 2019-06-05 PROCEDURE — 99213 OFFICE O/P EST LOW 20 MIN: CPT | Performed by: PHYSICIAN ASSISTANT

## 2019-06-05 PROCEDURE — 3725F SCREEN DEPRESSION PERFORMED: CPT | Performed by: PHYSICIAN ASSISTANT

## 2019-06-05 RX ORDER — LISINOPRIL 10 MG/1
10 TABLET ORAL DAILY
Qty: 30 TABLET | Refills: 1 | Status: SHIPPED | OUTPATIENT
Start: 2019-06-05 | End: 2019-11-24 | Stop reason: HOSPADM

## 2019-06-05 RX ORDER — LORATADINE 10 MG/1
10 TABLET ORAL DAILY
Qty: 90 TABLET | Refills: 0 | Status: SHIPPED | OUTPATIENT
Start: 2019-06-05 | End: 2019-11-23 | Stop reason: ALTCHOICE

## 2019-06-05 RX ORDER — ALBUTEROL SULFATE 90 UG/1
2 AEROSOL, METERED RESPIRATORY (INHALATION) EVERY 4 HOURS PRN
Qty: 1 INHALER | Refills: 3 | Status: SHIPPED | OUTPATIENT
Start: 2019-06-05 | End: 2019-12-06 | Stop reason: SDUPTHER

## 2019-06-05 RX ORDER — FLUTICASONE PROPIONATE 50 MCG
1 SPRAY, SUSPENSION (ML) NASAL DAILY
Qty: 16 G | Refills: 2 | Status: SHIPPED | OUTPATIENT
Start: 2019-06-05

## 2019-06-06 ENCOUNTER — APPOINTMENT (OUTPATIENT)
Dept: LAB | Facility: CLINIC | Age: 39
End: 2019-06-06
Payer: COMMERCIAL

## 2019-06-06 DIAGNOSIS — Z00.00 ANNUAL PHYSICAL EXAM: ICD-10-CM

## 2019-06-06 PROCEDURE — 83036 HEMOGLOBIN GLYCOSYLATED A1C: CPT

## 2019-06-06 PROCEDURE — 36415 COLL VENOUS BLD VENIPUNCTURE: CPT

## 2019-06-06 PROCEDURE — 85025 COMPLETE CBC W/AUTO DIFF WBC: CPT

## 2019-06-06 PROCEDURE — 84443 ASSAY THYROID STIM HORMONE: CPT

## 2019-06-06 PROCEDURE — 80053 COMPREHEN METABOLIC PANEL: CPT

## 2019-06-06 PROCEDURE — 80061 LIPID PANEL: CPT

## 2019-06-07 LAB
ALBUMIN SERPL BCP-MCNC: 3.8 G/DL (ref 3.5–5)
ALP SERPL-CCNC: 58 U/L (ref 46–116)
ALT SERPL W P-5'-P-CCNC: 18 U/L (ref 12–78)
ANION GAP SERPL CALCULATED.3IONS-SCNC: 6 MMOL/L (ref 4–13)
AST SERPL W P-5'-P-CCNC: 14 U/L (ref 5–45)
BASOPHILS # BLD AUTO: 0.04 THOUSANDS/ΜL (ref 0–0.1)
BASOPHILS NFR BLD AUTO: 1 % (ref 0–1)
BILIRUB SERPL-MCNC: 0.21 MG/DL (ref 0.2–1)
BUN SERPL-MCNC: 12 MG/DL (ref 5–25)
CALCIUM SERPL-MCNC: 9.5 MG/DL (ref 8.3–10.1)
CHLORIDE SERPL-SCNC: 108 MMOL/L (ref 100–108)
CHOLEST SERPL-MCNC: 146 MG/DL (ref 50–200)
CO2 SERPL-SCNC: 30 MMOL/L (ref 21–32)
CREAT SERPL-MCNC: 0.88 MG/DL (ref 0.6–1.3)
EOSINOPHIL # BLD AUTO: 0.3 THOUSAND/ΜL (ref 0–0.61)
EOSINOPHIL NFR BLD AUTO: 4 % (ref 0–6)
ERYTHROCYTE [DISTWIDTH] IN BLOOD BY AUTOMATED COUNT: 15.5 % (ref 11.6–15.1)
EST. AVERAGE GLUCOSE BLD GHB EST-MCNC: 117 MG/DL
GFR SERPL CREATININE-BSD FRML MDRD: 84 ML/MIN/1.73SQ M
GLUCOSE P FAST SERPL-MCNC: 79 MG/DL (ref 65–99)
HBA1C MFR BLD: 5.7 % (ref 4.2–6.3)
HCT VFR BLD AUTO: 39.2 % (ref 34.8–46.1)
HDLC SERPL-MCNC: 45 MG/DL (ref 40–60)
HGB BLD-MCNC: 11.8 G/DL (ref 11.5–15.4)
IMM GRANULOCYTES # BLD AUTO: 0.05 THOUSAND/UL (ref 0–0.2)
IMM GRANULOCYTES NFR BLD AUTO: 1 % (ref 0–2)
LDLC SERPL CALC-MCNC: 82 MG/DL (ref 0–100)
LYMPHOCYTES # BLD AUTO: 2.76 THOUSANDS/ΜL (ref 0.6–4.47)
LYMPHOCYTES NFR BLD AUTO: 37 % (ref 14–44)
MCH RBC QN AUTO: 28.2 PG (ref 26.8–34.3)
MCHC RBC AUTO-ENTMCNC: 30.1 G/DL (ref 31.4–37.4)
MCV RBC AUTO: 94 FL (ref 82–98)
MONOCYTES # BLD AUTO: 0.51 THOUSAND/ΜL (ref 0.17–1.22)
MONOCYTES NFR BLD AUTO: 7 % (ref 4–12)
NEUTROPHILS # BLD AUTO: 3.84 THOUSANDS/ΜL (ref 1.85–7.62)
NEUTS SEG NFR BLD AUTO: 50 % (ref 43–75)
NONHDLC SERPL-MCNC: 101 MG/DL
NRBC BLD AUTO-RTO: 0 /100 WBCS
PLATELET # BLD AUTO: 328 THOUSANDS/UL (ref 149–390)
PMV BLD AUTO: 11.2 FL (ref 8.9–12.7)
POTASSIUM SERPL-SCNC: 4.4 MMOL/L (ref 3.5–5.3)
PROT SERPL-MCNC: 7.3 G/DL (ref 6.4–8.2)
RBC # BLD AUTO: 4.19 MILLION/UL (ref 3.81–5.12)
SODIUM SERPL-SCNC: 144 MMOL/L (ref 136–145)
TRIGL SERPL-MCNC: 96 MG/DL
TSH SERPL DL<=0.05 MIU/L-ACNC: 2.74 UIU/ML (ref 0.36–3.74)
WBC # BLD AUTO: 7.5 THOUSAND/UL (ref 4.31–10.16)

## 2019-06-10 PROBLEM — I10 HYPERTENSION: Status: ACTIVE | Noted: 2019-06-10

## 2019-06-10 PROBLEM — J45.41 MODERATE PERSISTENT ASTHMA WITH EXACERBATION: Status: ACTIVE | Noted: 2019-06-10

## 2019-06-10 PROBLEM — J30.9 ALLERGIC RHINITIS: Status: ACTIVE | Noted: 2019-06-10

## 2019-11-23 ENCOUNTER — HOSPITAL ENCOUNTER (OUTPATIENT)
Facility: HOSPITAL | Age: 39
Setting detail: OBSERVATION
Discharge: HOME | End: 2019-11-24
Attending: EMERGENCY MEDICINE | Admitting: FAMILY MEDICINE
Payer: COMMERCIAL

## 2019-11-23 ENCOUNTER — APPOINTMENT (EMERGENCY)
Dept: RADIOLOGY | Facility: IMAGING CENTER | Age: 39
End: 2019-11-23
Payer: COMMERCIAL

## 2019-11-23 ENCOUNTER — APPOINTMENT (EMERGENCY)
Dept: CT IMAGING | Facility: HOSPITAL | Age: 39
End: 2019-11-23
Payer: COMMERCIAL

## 2019-11-23 DIAGNOSIS — I16.0 HYPERTENSIVE URGENCY: ICD-10-CM

## 2019-11-23 DIAGNOSIS — R94.31 EKG ABNORMALITIES: ICD-10-CM

## 2019-11-23 DIAGNOSIS — Z72.0 TOBACCO ABUSE: ICD-10-CM

## 2019-11-23 DIAGNOSIS — I10 HYPERTENSION: Primary | ICD-10-CM

## 2019-11-23 DIAGNOSIS — E87.6 HYPOKALEMIA: ICD-10-CM

## 2019-11-23 DIAGNOSIS — R42 DIZZINESS: ICD-10-CM

## 2019-11-23 PROBLEM — I49.3 PVC (PREMATURE VENTRICULAR CONTRACTION): Status: ACTIVE | Noted: 2019-11-23

## 2019-11-23 PROBLEM — R82.90 ABNORMAL URINALYSIS: Status: ACTIVE | Noted: 2019-11-23

## 2019-11-23 PROBLEM — R51.9 HEADACHE: Status: ACTIVE | Noted: 2019-11-23

## 2019-11-23 LAB
ANION GAP SERPL CALCULATED.3IONS-SCNC: 7 MMOL/L (ref 5–14)
BACTERIA UR QL AUTO: ABNORMAL /HPF
BASOPHILS # BLD AUTO: 0.07 THOUSAND/UL (ref 0–0.1)
BASOPHILS NFR MAR MANUAL: 1 % (ref 0–1)
BILIRUB UR QL STRIP: NEGATIVE
BUN SERPL-MCNC: 9 MG/DL (ref 5–25)
CALCIUM SERPL-MCNC: 9.5 MG/DL (ref 8.4–10.2)
CHLORIDE SERPL-SCNC: 101 MMOL/L (ref 97–108)
CLARITY UR: ABNORMAL
CO2 SERPL-SCNC: 32 MMOL/L (ref 22–30)
COLOR UR: ABNORMAL
CREAT SERPL-MCNC: 0.79 MG/DL (ref 0.6–1.2)
EOSINOPHIL # BLD AUTO: 0.29 THOUSAND/UL (ref 0–0.4)
EOSINOPHIL NFR BLD MANUAL: 4 % (ref 0–6)
ERYTHROCYTE [DISTWIDTH] IN BLOOD BY AUTOMATED COUNT: 15.3 %
EXT PREG TEST URINE: NEGATIVE
EXT. CONTROL ED NAV: NORMAL
GFR SERPL CREATININE-BSD FRML MDRD: 96 ML/MIN/1.73SQ M
GLUCOSE SERPL-MCNC: 105 MG/DL (ref 70–99)
GLUCOSE UR STRIP-MCNC: NEGATIVE MG/DL
HCT VFR BLD AUTO: 41.5 % (ref 36–46)
HGB BLD-MCNC: 13.8 G/DL (ref 12–16)
HGB UR QL STRIP.AUTO: 250
KETONES UR STRIP-MCNC: ABNORMAL MG/DL
LEUKOCYTE ESTERASE UR QL STRIP: 100
LYMPHOCYTES # BLD AUTO: 2.95 THOUSAND/UL (ref 0.5–4)
LYMPHOCYTES # BLD AUTO: 41 % (ref 25–45)
MCH RBC QN AUTO: 29 PG (ref 26–34)
MCHC RBC AUTO-ENTMCNC: 33.2 G/DL (ref 31–36)
MCV RBC AUTO: 87 FL (ref 80–100)
MONOCYTES # BLD AUTO: 0.72 THOUSAND/UL (ref 0.2–0.9)
MONOCYTES NFR BLD AUTO: 10 % (ref 1–10)
NEUTS SEG # BLD: 3.17 THOUSAND/UL (ref 1.8–7.8)
NEUTS SEG NFR BLD AUTO: 44 %
NITRITE UR QL STRIP: NEGATIVE
NON-SQ EPI CELLS URNS QL MICRO: ABNORMAL /HPF
PH UR STRIP.AUTO: 6 [PH]
PLATELET # BLD AUTO: 325 THOUSANDS/UL (ref 150–450)
PLATELET BLD QL SMEAR: ADEQUATE
PMV BLD AUTO: 8.8 FL (ref 8.9–12.7)
POTASSIUM SERPL-SCNC: 3.4 MMOL/L (ref 3.6–5)
PROT UR STRIP-MCNC: ABNORMAL MG/DL
RBC # BLD AUTO: 4.76 MILLION/UL (ref 4–5.2)
RBC #/AREA URNS AUTO: ABNORMAL /HPF
RBC MORPH BLD: NORMAL
SODIUM SERPL-SCNC: 140 MMOL/L (ref 137–147)
SP GR UR STRIP.AUTO: 1.02 (ref 1–1.04)
TOTAL CELLS COUNTED SPEC: 100
TROPONIN I SERPL-MCNC: 0.03 NG/ML (ref 0–0.03)
UROBILINOGEN UA: 1 MG/DL
WBC # BLD AUTO: 7.2 THOUSAND/UL (ref 4.5–11)
WBC #/AREA URNS AUTO: ABNORMAL /HPF

## 2019-11-23 PROCEDURE — 84484 ASSAY OF TROPONIN QUANT: CPT | Performed by: PHYSICIAN ASSISTANT

## 2019-11-23 PROCEDURE — 81025 URINE PREGNANCY TEST: CPT | Performed by: PHYSICIAN ASSISTANT

## 2019-11-23 PROCEDURE — 70450 CT HEAD/BRAIN W/O DYE: CPT

## 2019-11-23 PROCEDURE — 99285 EMERGENCY DEPT VISIT HI MDM: CPT

## 2019-11-23 PROCEDURE — 85027 COMPLETE CBC AUTOMATED: CPT | Performed by: PHYSICIAN ASSISTANT

## 2019-11-23 PROCEDURE — 93005 ELECTROCARDIOGRAM TRACING: CPT

## 2019-11-23 PROCEDURE — 81001 URINALYSIS AUTO W/SCOPE: CPT | Performed by: PHYSICIAN ASSISTANT

## 2019-11-23 PROCEDURE — 96375 TX/PRO/DX INJ NEW DRUG ADDON: CPT

## 2019-11-23 PROCEDURE — 80048 BASIC METABOLIC PNL TOTAL CA: CPT | Performed by: PHYSICIAN ASSISTANT

## 2019-11-23 PROCEDURE — 99285 EMERGENCY DEPT VISIT HI MDM: CPT | Performed by: PHYSICIAN ASSISTANT

## 2019-11-23 PROCEDURE — 90686 IIV4 VACC NO PRSV 0.5 ML IM: CPT | Performed by: FAMILY MEDICINE

## 2019-11-23 PROCEDURE — 85007 BL SMEAR W/DIFF WBC COUNT: CPT | Performed by: PHYSICIAN ASSISTANT

## 2019-11-23 PROCEDURE — 36415 COLL VENOUS BLD VENIPUNCTURE: CPT | Performed by: PHYSICIAN ASSISTANT

## 2019-11-23 PROCEDURE — 90471 IMMUNIZATION ADMIN: CPT | Performed by: FAMILY MEDICINE

## 2019-11-23 PROCEDURE — NC001 PR NO CHARGE: Performed by: FAMILY MEDICINE

## 2019-11-23 PROCEDURE — 83735 ASSAY OF MAGNESIUM: CPT | Performed by: FAMILY MEDICINE

## 2019-11-23 PROCEDURE — 96374 THER/PROPH/DIAG INJ IV PUSH: CPT

## 2019-11-23 PROCEDURE — 81003 URINALYSIS AUTO W/O SCOPE: CPT | Performed by: PHYSICIAN ASSISTANT

## 2019-11-23 RX ORDER — HYDROCHLOROTHIAZIDE 12.5 MG/1
12.5 TABLET ORAL DAILY
Status: DISCONTINUED | OUTPATIENT
Start: 2019-11-23 | End: 2019-11-24 | Stop reason: HOSPADM

## 2019-11-23 RX ORDER — HYDRALAZINE HYDROCHLORIDE 20 MG/ML
5 INJECTION INTRAMUSCULAR; INTRAVENOUS EVERY 8 HOURS PRN
Status: DISCONTINUED | OUTPATIENT
Start: 2019-11-23 | End: 2019-11-24 | Stop reason: HOSPADM

## 2019-11-23 RX ORDER — ALBUTEROL SULFATE 2.5 MG/3ML
2.5 SOLUTION RESPIRATORY (INHALATION) EVERY 6 HOURS PRN
Status: DISCONTINUED | OUTPATIENT
Start: 2019-11-23 | End: 2019-11-23

## 2019-11-23 RX ORDER — KETOROLAC TROMETHAMINE 30 MG/ML
15 INJECTION, SOLUTION INTRAMUSCULAR; INTRAVENOUS ONCE
Status: COMPLETED | OUTPATIENT
Start: 2019-11-23 | End: 2019-11-23

## 2019-11-23 RX ORDER — ACETAMINOPHEN 325 MG/1
650 TABLET ORAL EVERY 8 HOURS PRN
Status: DISCONTINUED | OUTPATIENT
Start: 2019-11-23 | End: 2019-11-24 | Stop reason: HOSPADM

## 2019-11-23 RX ORDER — ONDANSETRON 2 MG/ML
4 INJECTION INTRAMUSCULAR; INTRAVENOUS ONCE
Status: COMPLETED | OUTPATIENT
Start: 2019-11-23 | End: 2019-11-23

## 2019-11-23 RX ORDER — LISINOPRIL 5 MG/1
5 TABLET ORAL DAILY
Status: DISCONTINUED | OUTPATIENT
Start: 2019-11-24 | End: 2019-11-24

## 2019-11-23 RX ORDER — POTASSIUM CHLORIDE 750 MG/1
20 TABLET, EXTENDED RELEASE ORAL ONCE
Status: COMPLETED | OUTPATIENT
Start: 2019-11-23 | End: 2019-11-23

## 2019-11-23 RX ORDER — DIPHENHYDRAMINE HYDROCHLORIDE 50 MG/ML
25 INJECTION INTRAMUSCULAR; INTRAVENOUS ONCE
Status: COMPLETED | OUTPATIENT
Start: 2019-11-23 | End: 2019-11-23

## 2019-11-23 RX ORDER — ALBUTEROL SULFATE 90 UG/1
2 AEROSOL, METERED RESPIRATORY (INHALATION) EVERY 4 HOURS PRN
Status: DISCONTINUED | OUTPATIENT
Start: 2019-11-23 | End: 2019-11-24 | Stop reason: HOSPADM

## 2019-11-23 RX ORDER — LABETALOL 20 MG/4 ML (5 MG/ML) INTRAVENOUS SYRINGE
10 ONCE
Status: COMPLETED | OUTPATIENT
Start: 2019-11-23 | End: 2019-11-23

## 2019-11-23 RX ORDER — LISINOPRIL 10 MG/1
10 TABLET ORAL DAILY
Status: DISCONTINUED | OUTPATIENT
Start: 2019-11-24 | End: 2019-11-23

## 2019-11-23 RX ORDER — ONDANSETRON 2 MG/ML
4 INJECTION INTRAMUSCULAR; INTRAVENOUS EVERY 8 HOURS PRN
Status: DISCONTINUED | OUTPATIENT
Start: 2019-11-23 | End: 2019-11-24 | Stop reason: HOSPADM

## 2019-11-23 RX ADMIN — POTASSIUM CHLORIDE 20 MEQ: 10 TABLET, EXTENDED RELEASE ORAL at 22:17

## 2019-11-23 RX ADMIN — DIPHENHYDRAMINE HYDROCHLORIDE 25 MG: 50 INJECTION INTRAMUSCULAR; INTRAVENOUS at 21:23

## 2019-11-23 RX ADMIN — HYDROCHLOROTHIAZIDE 12.5 MG: 12.5 TABLET ORAL at 23:44

## 2019-11-23 RX ADMIN — LABETALOL 20 MG/4 ML (5 MG/ML) INTRAVENOUS SYRINGE 10 MG: at 21:24

## 2019-11-23 RX ADMIN — ONDANSETRON 4 MG: 2 INJECTION, SOLUTION INTRAMUSCULAR; INTRAVENOUS at 21:24

## 2019-11-23 RX ADMIN — KETOROLAC TROMETHAMINE 15 MG: 30 INJECTION, SOLUTION INTRAMUSCULAR; INTRAVENOUS at 22:17

## 2019-11-23 RX ADMIN — INFLUENZA VIRUS VACCINE 0.5 ML: 15; 15; 15; 15 SUSPENSION INTRAMUSCULAR at 23:44

## 2019-11-24 VITALS
HEIGHT: 60 IN | HEART RATE: 66 BPM | OXYGEN SATURATION: 98 % | TEMPERATURE: 97 F | BODY MASS INDEX: 56.22 KG/M2 | SYSTOLIC BLOOD PRESSURE: 158 MMHG | DIASTOLIC BLOOD PRESSURE: 86 MMHG | RESPIRATION RATE: 18 BRPM | WEIGHT: 286.38 LBS

## 2019-11-24 PROBLEM — I16.0 HYPERTENSIVE URGENCY: Status: RESOLVED | Noted: 2019-11-23 | Resolved: 2019-11-24

## 2019-11-24 PROBLEM — G47.30 SLEEP APNEA: Status: ACTIVE | Noted: 2019-11-24

## 2019-11-24 PROBLEM — J45.41 MODERATE PERSISTENT ASTHMA WITH EXACERBATION: Status: RESOLVED | Noted: 2019-06-10 | Resolved: 2019-11-24

## 2019-11-24 LAB
ANION GAP SERPL CALCULATED.3IONS-SCNC: 7 MMOL/L (ref 5–14)
BUN SERPL-MCNC: 9 MG/DL (ref 5–25)
CALCIUM SERPL-MCNC: 8.7 MG/DL (ref 8.4–10.2)
CHLORIDE SERPL-SCNC: 102 MMOL/L (ref 97–108)
CO2 SERPL-SCNC: 30 MMOL/L (ref 22–30)
CREAT SERPL-MCNC: 0.74 MG/DL (ref 0.6–1.2)
GFR SERPL CREATININE-BSD FRML MDRD: 104 ML/MIN/1.73SQ M
GLUCOSE SERPL-MCNC: 101 MG/DL (ref 70–99)
MAGNESIUM SERPL-MCNC: 1.9 MG/DL (ref 1.6–2.3)
POTASSIUM SERPL-SCNC: 3.4 MMOL/L (ref 3.6–5)
SODIUM SERPL-SCNC: 139 MMOL/L (ref 137–147)
TROPONIN I SERPL-MCNC: 0.02 NG/ML (ref 0–0.03)
TROPONIN I SERPL-MCNC: 0.02 NG/ML (ref 0–0.03)

## 2019-11-24 PROCEDURE — 99220 PR INITIAL OBSERVATION CARE/DAY 70 MINUTES: CPT | Performed by: FAMILY MEDICINE

## 2019-11-24 PROCEDURE — 93005 ELECTROCARDIOGRAM TRACING: CPT

## 2019-11-24 PROCEDURE — 84484 ASSAY OF TROPONIN QUANT: CPT | Performed by: FAMILY MEDICINE

## 2019-11-24 PROCEDURE — 80048 BASIC METABOLIC PNL TOTAL CA: CPT | Performed by: FAMILY MEDICINE

## 2019-11-24 RX ORDER — ACETAMINOPHEN 325 MG/1
650 TABLET ORAL EVERY 8 HOURS PRN
Qty: 30 TABLET | Refills: 0 | Status: SHIPPED | OUTPATIENT
Start: 2019-11-24 | End: 2019-12-06 | Stop reason: SDUPTHER

## 2019-11-24 RX ORDER — POTASSIUM CHLORIDE 20 MEQ/1
40 TABLET, EXTENDED RELEASE ORAL ONCE
Status: COMPLETED | OUTPATIENT
Start: 2019-11-24 | End: 2019-11-24

## 2019-11-24 RX ORDER — AMLODIPINE BESYLATE 5 MG/1
5 TABLET ORAL DAILY
Status: DISCONTINUED | OUTPATIENT
Start: 2019-11-24 | End: 2019-11-24 | Stop reason: HOSPADM

## 2019-11-24 RX ORDER — POTASSIUM CHLORIDE 750 MG/1
10 TABLET, EXTENDED RELEASE ORAL DAILY
Qty: 10 TABLET | Refills: 0 | Status: SHIPPED | OUTPATIENT
Start: 2019-11-24 | End: 2020-03-11

## 2019-11-24 RX ORDER — AMLODIPINE BESYLATE 5 MG/1
5 TABLET ORAL DAILY
Qty: 30 TABLET | Refills: 0 | Status: SHIPPED | OUTPATIENT
Start: 2019-11-25 | End: 2019-12-06 | Stop reason: DRUGHIGH

## 2019-11-24 RX ORDER — HYDROCHLOROTHIAZIDE 12.5 MG/1
12.5 TABLET ORAL DAILY
Qty: 30 TABLET | Refills: 0 | Status: SHIPPED | OUTPATIENT
Start: 2019-11-25 | End: 2019-12-06 | Stop reason: SDUPTHER

## 2019-11-24 RX ADMIN — HYDRALAZINE HYDROCHLORIDE 5 MG: 20 INJECTION INTRAMUSCULAR; INTRAVENOUS at 08:48

## 2019-11-24 RX ADMIN — NICOTINE 1 PATCH: 7 PATCH, EXTENDED RELEASE TRANSDERMAL at 08:09

## 2019-11-24 RX ADMIN — ACETAMINOPHEN 650 MG: 325 TABLET ORAL at 06:06

## 2019-11-24 RX ADMIN — HYDROCHLOROTHIAZIDE 12.5 MG: 12.5 TABLET ORAL at 08:09

## 2019-11-24 RX ADMIN — ENOXAPARIN SODIUM 40 MG: 40 INJECTION SUBCUTANEOUS at 08:08

## 2019-11-24 RX ADMIN — AMLODIPINE BESYLATE 5 MG: 5 TABLET ORAL at 08:09

## 2019-11-24 RX ADMIN — POTASSIUM CHLORIDE 40 MEQ: 20 TABLET, EXTENDED RELEASE ORAL at 06:02

## 2019-11-24 RX ADMIN — ACETAMINOPHEN 650 MG: 325 TABLET ORAL at 13:33

## 2019-11-24 NOTE — PLAN OF CARE
Problem: Potential for Falls  Goal: Patient will remain free of falls  Description  INTERVENTIONS:  - Assess patient frequently for physical needs  -  Identify cognitive and physical deficits and behaviors that affect risk of falls    -  [place] fall precautions as indicated by assessment   - Educate patient/family on patient safety including physical limitations  - Instruct patient to call for assistance with activity based on assessment  - Modify environment to reduce risk of injury  - Consider OT/PT consult to assist with strengthening/mobility  Outcome: Progressing     Problem: PAIN - ADULT  Goal: Verbalizes/displays adequate comfort level or baseline comfort level  Description  Interventions:  - Encourage patient to monitor pain and request assistance  - Assess pain using appropriate pain scale  - Administer analgesics based on type and severity of pain and evaluate response  - Implement non-pharmacological measures as appropriate and evaluate response  - Consider cultural and social influences on pain and pain management  - Notify physician/advanced practitioner if interventions unsuccessful or patient reports new pain  Outcome: Progressing     Problem: INFECTION - ADULT  Goal: Absence or prevention of progression during hospitalization  Description  INTERVENTIONS:  - Assess and monitor for signs and symptoms of infection  - Monitor lab/diagnostic results  - Monitor all insertion sites, i e  indwelling lines, tubes, and drains  - Monitor endotracheal if appropriate and nasal secretions for changes in amount and color  - [place] appropriate cooling/warming therapies per order  - Administer medications as ordered  - Instruct and encourage patient and family to use good hand hygiene technique  - Identify and instruct in appropriate isolation precautions for identified infection/condition  Outcome: Progressing     Problem: SAFETY ADULT  Goal: Patient will remain free of falls  Description  INTERVENTIONS:  - Assess patient frequently for physical needs  -  Identify cognitive and physical deficits and behaviors that affect risk of falls    -  [place] fall precautions as indicated by assessment   - Educate patient/family on patient safety including physical limitations  - Instruct patient to call for assistance with activity based on assessment  - Modify environment to reduce risk of injury  - Consider OT/PT consult to assist with strengthening/mobility  Outcome: Progressing  Goal: Maintain or return to baseline ADL function  Description  INTERVENTIONS:  -  Assess patient's ability to carry out ADLs; assess patient's baseline for ADL function and identify physical deficits which impact ability to perform ADLs (bathing, care of mouth/teeth, toileting, grooming, dressing, etc )  - Assess/evaluate cause of self-care deficits   - Assess range of motion  - Assess patient's mobility; develop plan if impaired  - Assess patient's need for assistive devices and provide as appropriate  - Encourage maximum independence but intervene and supervise when necessary  - Involve family in performance of ADLs  - Assess for home care needs following discharge   - Consider OT consult to assist with ADL evaluation and planning for discharge  - Provide patient education as appropriate  Outcome: Progressing  Goal: Maintain or return mobility status to optimal level  Description  INTERVENTIONS:  - Assess patient's baseline mobility status (ambulation, transfers, stairs, etc )    - Identify cognitive and physical deficits and behaviors that affect mobility  - Identify mobility aids required to assist with transfers and/or ambulation (gait belt, sit-to-stand, lift, walker, cane, etc )  - [place] fall precautions as indicated by assessment  - Record patient progress and toleration of activity level on Mobility SBAR; progress patient to next Phase/Stage  - Instruct patient to call for assistance with activity based on assessment  - Consider rehabilitation consult to assist with strengthening/weightbearing, etc   Outcome: Progressing     Problem: DISCHARGE PLANNING  Goal: Discharge to home or other facility with appropriate resources  Description  INTERVENTIONS:  - Identify barriers to discharge w/patient and caregiver  - Arrange for needed discharge resources and transportation as appropriate  - Identify discharge learning needs (meds, wound care, etc )  - Arrange for interpretive services to assist at discharge as needed  - Refer to Case Management Department for coordinating discharge planning if the patient needs post-hospital services based on physician/advanced practitioner order or complex needs related to functional status, cognitive ability, or social support system  Outcome: Progressing     Problem: Knowledge Deficit  Goal: Patient/family/caregiver demonstrates understanding of disease process, treatment plan, medications, and discharge instructions  Description  Complete learning assessment and assess knowledge base    Interventions:  - Provide teaching at level of understanding  - Provide teaching via preferred learning methods  Outcome: Progressing

## 2019-11-24 NOTE — DISCHARGE INSTRUCTIONS
Hypertension   WHAT YOU NEED TO KNOW:   Hypertension is high blood pressure (BP)  Your BP is the force of your blood moving against the walls of your arteries  Normal BP is less than 120/80  Prehypertension is between 120/80 and 139/89  Hypertension is 140/90 or higher  Hypertension causes your BP to get so high that your heart has to work much harder than normal  This can damage your heart  You can control hypertension with a healthy lifestyle or medicines  A controlled blood pressure helps protect your organs, such as your heart, lungs, brain, and kidneys  DISCHARGE INSTRUCTIONS:   Call 911 for any of the following:   · You have discomfort in your chest that feels like squeezing, pressure, fullness, or pain  · You become confused or have difficulty speaking  · You suddenly feel lightheaded or have trouble breathing  · You have pain or discomfort in your back, neck, jaw, stomach, or arm  Return to the emergency department if:   · You have a severe headache or vision loss  · You have weakness in an arm or leg  Contact your healthcare provider if:   · You feel faint, dizzy, confused, or drowsy  · You have been taking your BP medicine and your BP is still higher than your healthcare provider says it should be  · You have questions or concerns about your condition or care  Medicines: You may  need any of the following:  · Medicine  may be used to help lower your BP  You may need more than one type of medicine  Take the medicine exactly as directed  · Diuretics  help decrease extra fluid that collects in your body  This will help lower your BP  You may urinate more often while you take this medicine  · Cholesterol medicine  helps lower your cholesterol level  A low cholesterol level helps prevent heart disease and makes it easier to control your blood pressure  · Take your medicine as directed    Contact your healthcare provider if you think your medicine is not helping or if you have side effects  Tell him or her if you are allergic to any medicine  Keep a list of the medicines, vitamins, and herbs you take  Include the amounts, and when and why you take them  Bring the list or the pill bottles to follow-up visits  Carry your medicine list with you in case of an emergency  Follow up with your healthcare provider as directed: You will need to return to have your BP checked and to have other lab tests done  Write down your questions so you remember to ask them during your visits  Manage hypertension:  Talk with your healthcare provider about these and other ways to manage hypertension:  · Check your BP at home  Sit and rest for 5 minutes before you take your BP  Extend your arm and support it on a flat surface  Your arm should be at the same level as your heart  Follow the directions that came with your BP monitor  If possible, take at least 2 BP readings each time  Take your BP at least twice a day at the same times each day, such as morning and evening  Keep a record of your BP readings and bring it to your follow-up visits  Ask your healthcare provider what your BP should be  · Limit sodium (salt) as directed  Too much sodium can affect your fluid balance  Check labels to find low-sodium or no-salt-added foods  Some low-sodium foods use potassium salts for flavor  Too much potassium can also cause health problems  Your healthcare provider will tell you how much sodium and potassium are safe for you to have in a day  He or she may recommend that you limit sodium to 2,300 mg a day  · Follow the meal plan recommended by your healthcare provider  A dietitian or your provider can give you more information on low-sodium plans or the DASH (Dietary Approaches to Stop Hypertension) eating plan  The DASH plan is low in sodium, unhealthy fats, and total fat  It is high in potassium, calcium, and fiber  · Exercise to maintain a healthy weight    Exercise at least 30 minutes per day, on most days of the week  This will help decrease your blood pressure  Ask your healthcare provider about the best exercise plan for you  · Decrease stress  This may help lower your BP  Learn ways to relax, such as deep breathing or listening to music  · Limit alcohol  Women should limit alcohol to 1 drink a day  Men should limit alcohol to 2 drinks a day  A drink of alcohol is 12 ounces of beer, 5 ounces of wine, or 1½ ounces of liquor  · Do not smoke  Nicotine and other chemicals in cigarettes and cigars can increase your BP and also cause lung damage  Ask your healthcare provider for information if you currently smoke and need help to quit  E-cigarettes or smokeless tobacco still contain nicotine  Talk to your healthcare provider before you use these products  · Manage any other health conditions you have  Health conditions such as diabetes can increase your risk for hypertension  Follow your healthcare provider's instructions and take all your medicines as directed  © 2017 [employer] Information is for End User's use only and may not be sold, redistributed or otherwise used for commercial purposes  All illustrations and images included in CareNotes® are the copyrighted property of [employer]  or [employer]  The above information is an  only  It is not intended as medical advice for individual conditions or treatments  Talk to your doctor, nurse or pharmacist before following any medical regimen to see if it is safe and effective for you  Sleep Apnea   AMBULATORY CARE:   Sleep apnea  is also called obstructive sleep apnea  It is a condition that causes you to stop breathing for 10 seconds or more while you are sleeping  During normal sleep, your throat is kept open by muscles that let air pass through easily  Sleep apnea causes the muscles and tissues around your throat to relax and block air from passing through   Sleep apnea may happen many times while you are asleep  Common symptoms include the following:   · No signs of breathing for 10 seconds or more while you sleep    · Snoring loudly, snorting, gasping or choking while you sleep, and waking up suddenly because of these    · A hard time thinking, remembering things, or focusing on your tasks the following day    · Headache or nausea    · Bedwetting or waking up often during the night to urinate    · Feeling sleepy, slow, and tired during the day  Seek care immediately if:   · You have chest pain or trouble breathing  Contact your healthcare provider if:   · You feel tired or depressed  · You have trouble staying awake during the day  · You have trouble thinking clearly  · You have questions or concerns about your condition or care  Treatment for sleep apnea  includes using a continuous positive airway pressure (CPAP) machine to keep your airway open during sleep  A mask is placed over your nose and mouth, or just your nose  The mask is hooked to the CPAP machine that blows a gentle stream of air into the mask when you breathe  This helps keep your airway open so you can breathe more regularly  Extra oxygen may be given to you through the machine  You may be given a mouth device  It looks like a mouth guard or dental retainer and stops your tongue and mouth tissues from blocking your throat while you sleep  Surgery may be needed to remove extra tissues that block your mouth, throat, or nose  Manage sleep apnea:   · Do not smoke  Nicotine and other chemicals in cigarettes and cigars can cause lung damage  Ask your healthcare provider for information if you currently smoke and need help to quit  E-cigarettes or smokeless tobacco still contain nicotine  Talk to your healthcare provider before you use these products  · Do not drink alcohol or take sedative medicine before you go to sleep    Alcohol and sedatives can relax the muscles and tissues around your throat  This can block the airflow to your lungs  · Maintain a healthy weight  Excess tissue around your throat may restrict your breathing  Ask your healthcare provider for information if you need to lose weight  · Sleep on your side or use pillows designed to prevent sleep apnea  This prevents your tongue or other tissues from blocking your throat  You can also raise the head of your bed  Follow up with your healthcare provider as directed:  Write down your questions so you remember to ask them during your visits  © 2017 [employer] Information is for End User's use only and may not be sold, redistributed or otherwise used for commercial purposes  All illustrations and images included in CareNotes® are the copyrighted property of [employer]  or [employer]  The above information is an  only  It is not intended as medical advice for individual conditions or treatments  Talk to your doctor, nurse or pharmacist before following any medical regimen to see if it is safe and effective for you

## 2019-11-24 NOTE — NURSING NOTE
A&Ox4  No c/o pain  Lungs CTA  No SOB  SR with occassional PVCs on teli monitor  Heart sounds present  No edema  Pulses +2 in all extremities  ABD soft, NT, ND, with +BS x4  Skin warm D&I  VSS  IV site C/D/I and flushed  Pt resting comfortably with call bell in reach

## 2019-11-24 NOTE — NURSING NOTE
Pt discharged  IV removed  Belongings accounted for  AVS reviewed with pt  Pt aware of meds to  at pharmacy tomorrow morning  Pt ambulated out with RN

## 2019-11-24 NOTE — H&P
History and Physical - Moisés Mabry    Patient Information: Alvera Cancer 42 y o  female MRN: [id number]  Unit/Bed#: 7T [facility] 710-02 Encounter: [id number]  Admitting Physician: Jose F Mei MD  PCP: Columba Rosa PA-C  Date of Admission:  11/24/19    Assessment and Plan    * Hypertensive urgency  Assessment & Plan  41 y/o W/ history of HTN  presents with sharp headache frontal ,10/10, reports that she has had headache in the past but this headache was not responsive tylenol  Patient denies any lightheadedness, syncope, focal weakness, chest pain, shortness of breath  Patient reports that she has not been very complaint with her medication  She reports that she only takes lisinopril but inconsistently       Patieint presented to ED with BP of 211/127 and received labetalol 10 mg Iv and blood pressure improved to 168/106  Received 15 mg of Toradol injection, benadryl 25 mg and reports that headache has resolved  CT head Negative for any acute intracranial process  Troponin negative X 1  EKG shows nonspecific T wave changes similar to old EKG in April 2019 and PVC   CBC and BMP unremarkable except for mild hypokalemia  Had Lengthy discussion on importance of blood pressure control with patient  Her headache is likely due to hypertensive urgency since there is no sign of any end organ damage but other differentials include primary headache    Will start 12 5mg of HCTZ tonight and start 5mg of lisinopril tomorrow (11/25), 5 mg of hydralazine PRN for systolic >807  Trend Troponin X 2  24 hour tele  Monitor I/O  EKG in am  BMP in am  Tylenol/zofran prn for headache          PVC (premature ventricular contraction)  Assessment & Plan  PVC on EKG    Will check Mg  Follow up morning BMP    Hypokalemia  Assessment & Plan  Hypokalemia on admission  Received 20 meq of K PO    BMP in am  Goal K>4 0 before discharge      Moderate persistent asthma with exacerbation  Assessment & Plan  Stable, well controllled    Continue Albuterol inhaler as needed    Abnormal urinalysis  Assessment & Plan  On Admission, her Urinalysis shows hematuria and numerous RBC  Patient denies any urinary complaints  She reports that is currently on her period  Tobacco abuse  Assessment & Plan  Smokes 1 PPD since 3 years ago    Tobacco cessation counseling provided  7 mg/24hour of Nicotine patch       VTE Prophylaxis: Enoxaparin (Lovenox)  Code Status: Level 1 - Full Code  Anticipated Length of Stay:  Patient will be admitted on an Observation basis with an anticipated length of stay of  less than 2 midnights  Justification for Hospital Stay: hypertensive urgency  Total Time for Visit, including Counseling / Coordination of Care: 30 mins  Greater than 50% of this total time spent on direct patient counseling and coordination of care  Chief Complaint:     Chief Complaint   Patient presents with    Headache     "I have a headache, it started this morning "  Took extra strength Tylenol this morning  History of Present Illness:    Abimael Rooney is a 42 y o  female w/ PMH of HTN who presents with headache that started earlier in the morning  Patient reports sharp, frontal ,10/10, nonradiating headache that was associated with some lightheadedness today  She reports that she gets headache occasionally in the past that usually responds to tylenol  She reports that her headache has been persistent the entire day and was not relieved with tylenol and rest  Patient denies any fever, chills, chest pain, SOB, focal weakness, nausea, vomiting, abdominal pain, urinary complaints, and leg swelling  She reports that she has not been very complaint with her medication  She reports that she has only takes lisinopril but inconsistently  In the ED, her blood pressure was elevated at 211/127 and she got IV 10 mg labetalol, 15 mg Toradol and 25 mg of benadryl and reports that her headache has resolved           Review of Systems:  Review of Systems   Constitutional: Negative for chills, diaphoresis, fatigue and fever  HENT: Negative for congestion, ear pain, hearing loss, nosebleeds, postnasal drip, rhinorrhea, sinus pressure, sinus pain and sneezing  Eyes: Negative for photophobia, pain and redness  Respiratory: Negative for cough, choking and shortness of breath  Cardiovascular: Negative for chest pain, palpitations and leg swelling  Gastrointestinal: Negative for abdominal distention, abdominal pain, anal bleeding, blood in stool and constipation  Endocrine: Negative for polydipsia, polyphagia and polyuria  Genitourinary: Negative for dysuria, flank pain, frequency, genital sores and menstrual problem  Musculoskeletal: Negative for arthralgias, back pain and gait problem  Skin: Negative for color change  Neurological: Positive for headaches  Negative for dizziness, tremors, seizures, syncope, facial asymmetry, weakness, light-headedness and numbness  Hematological: Negative for adenopathy  Psychiatric/Behavioral: Negative for agitation  Past Medical and Surgical History:   Past Medical History:   Diagnosis Date    Asthma     Hypertension      Past Surgical History:   Procedure Laterality Date    NO PAST SURGERIES       Meds/Allergies: Allergies: No Known Allergies  Prior to Admission Medications   Prescriptions Last Dose Informant Patient Reported? Taking?    HYDROCHLOROTHIAZIDE PO 11/23/2019 at Unknown time  Yes Yes   Sig: Take by mouth   albuterol (2 5 mg/3 mL) 0 083 % nebulizer solution Past Month at Unknown time  No Yes   Sig: Take 1 vial (2 5 mg total) by nebulization every 6 (six) hours as needed for wheezing or shortness of breath   albuterol (PROVENTIL HFA,VENTOLIN HFA) 90 mcg/act inhaler Past Month at Unknown time  No Yes   Sig: Inhale 2 puffs every 4 (four) hours as needed for wheezing or shortness of breath   fluticasone (FLONASE) 50 mcg/act nasal spray More than a month at Unknown time  No No   Si spray into each nostril daily   lisinopril (ZESTRIL) 10 mg tablet 2019 at Unknown time  No Yes   Sig: Take 1 tablet (10 mg total) by mouth daily      Facility-Administered Medications: None     Social History:     Social History     Socioeconomic History    Marital status: /Civil Union     Spouse name: Not on file    Number of children: Not on file    Years of education: Not on file    Highest education level: Not on file   Occupational History    Not on file   Social Needs    Financial resource strain: Not on file    Food insecurity:     Worry: Not on file     Inability: Not on file    Transportation needs:     Medical: Not on file     Non-medical: Not on file   Tobacco Use    Smoking status: Current Every Day Smoker     Packs/day:      Types: Cigarettes    Smokeless tobacco: Never Used   Substance and Sexual Activity    Alcohol use: No    Drug use: No    Sexual activity: Not on file   Lifestyle    Physical activity:     Days per week: Not on file     Minutes per session: Not on file    Stress: Not on file   Relationships    Social connections:     Talks on phone: Not on file     Gets together: Not on file     Attends Taoism service: Not on file     Active member of club or organization: Not on file     Attends meetings of clubs or organizations: Not on file     Relationship status: Not on file    Intimate partner violence:     Fear of current or ex partner: Not on file     Emotionally abused: Not on file     Physically abused: Not on file     Forced sexual activity: Not on file   Other Topics Concern    Not on file   Social History Narrative    Not on file     Patient Pre-hospital Living Situation: Home  Patient Pre-hospital Level of Mobility: ambulates on her own  Patient Pre-hospital Diet Restrictions: none    Family History:  History reviewed  No pertinent family history    Physical Exam:   Vitals:   Blood Pressure: 164/97 (19 2305)  Pulse: 64 (11/23/19 2305)  Temperature: 98 1 °F (36 7 °C) (11/23/19 2305)  Temp Source: Temporal (11/23/19 2305)  Respirations: 18 (11/23/19 2305)  Height: 5' (152 4 cm) (11/23/19 2305)  Weight - Scale: 130 kg (286 lb 6 oz) (11/23/19 2305)  SpO2: 96 % (11/23/19 2305)    Physical Exam   Constitutional: She is oriented to person, place, and time  She appears well-developed and well-nourished  HENT:   Head: Normocephalic and atraumatic  Mouth/Throat: Oropharynx is clear and moist  No oropharyngeal exudate  Eyes: Pupils are equal, round, and reactive to light  EOM are normal  Right eye exhibits no discharge  Left eye exhibits no discharge  No scleral icterus  Neck: Normal range of motion  Neck supple  No JVD present  No tracheal deviation present  No thyromegaly present  Cardiovascular: Normal rate, regular rhythm, normal heart sounds and intact distal pulses  Exam reveals no gallop and no friction rub  No murmur heard  Pulmonary/Chest: Effort normal and breath sounds normal  No respiratory distress  She has no wheezes  Abdominal: Soft  Bowel sounds are normal  She exhibits no distension and no mass  There is no tenderness  There is no rebound and no guarding  Musculoskeletal: Normal range of motion  She exhibits no edema or tenderness  Neurological: She is alert and oriented to person, place, and time  She displays normal reflexes  No cranial nerve deficit or sensory deficit  She exhibits normal muscle tone  Coordination normal    CNII-XII grossly intact   Skin: Skin is warm  Capillary refill takes less than 2 seconds  No rash noted  Psychiatric: She has a normal mood and affect  Her behavior is normal        Lab Results: I have personally reviewed pertinent reports      Results from last 7 days   Lab Units 11/23/19  2121   WBC Thousand/uL 7 20   HEMOGLOBIN g/dL 13 8   HEMATOCRIT % 41 5   PLATELETS Thousands/uL 325   LYMPHO PCT % 41   MONO PCT % 10   EOS PCT % 4     Results from last 7 days   Lab Units 11/23/19 2121   POTASSIUM mmol/L 3 4*   CHLORIDE mmol/L 101   CO2 mmol/L 32*   BUN mg/dL 9   CREATININE mg/dL 0 79   CALCIUM mg/dL 9 5   EGFR ml/min/1 73sq m 96   MAGNESIUM mg/dL 1 9         Results from last 7 days   Lab Units 11/23/19 2121   TROPONIN I ng/mL 0 03                  Results from last 7 days   Lab Units 11/23/19 2124   COLOR UA  Red*   CLARITY UA  Turbid*   SPEC GRAV UA  1 020   PH UA  6 0   LEUKOCYTES UA  100 0*   NITRITE UA  Negative   GLUCOSE UA mg/dl Negative   KETONES UA mg/dl 5 (Trace)*   BILIRUBIN UA  Negative   BLOOD UA  250 0*      Results from last 7 days   Lab Units 11/23/19 2124   RBC UA /hpf Innumerable*   WBC UA /hpf 1-2*   EPITHELIAL CELLS WET PREP /hpf Occasional   BACTERIA UA /hpf Occasional        Imaging: I have personally reviewed pertinent reports  Ct Head Without Contrast    Result Date: 11/23/2019  Narrative: CT BRAIN - WITHOUT CONTRAST INDICATION:   HTN/headache  Headache, hypertension, dizziness  COMPARISON:  April 11, 2016  TECHNIQUE:  CT examination of the brain was performed  In addition to axial images, coronal 2D reformatted images were created and submitted for interpretation  Radiation dose length product (DLP) for this visit:  858 mGy-cm   This examination, like all CT scans performed in the [facility], was performed utilizing techniques to minimize radiation dose exposure, including the use of iterative reconstruction and automated exposure control  IMAGE QUALITY:  Diagnostic  FINDINGS: PARENCHYMA:  No intracranial mass, mass effect or midline shift  No CT signs of acute infarction  No acute parenchymal hemorrhage  VENTRICLES AND EXTRA-AXIAL SPACES:  Normal for the patient's age  VISUALIZED ORBITS AND PARANASAL SINUSES:  Unremarkable  CALVARIUM AND EXTRACRANIAL SOFT TISSUES:  Normal      Impression: No acute intracranial abnormality   Workstation performed: [id number]       EKG, Pathology, and Other Studies Reviewed on Admission: EKG  Result Date: 11/24/19  Impression: Normal sinus rhythm, Nonspecific T wave changes, PVC    [employer] Records Reviewed: Yes    Entire H&P was discussed with Dr Abisai Edmonds who agreed to what is noted above    Cindy Gray MD  11/24/19  12:45 AM

## 2019-11-24 NOTE — ASSESSMENT & PLAN NOTE
41 y/o W/ history of HTN  presents with sharp headache frontal ,10/10, reports that she has had headache in the past but this headache was not responsive tylenol  Patient denies any lightheadedness, syncope, focal weakness, chest pain, shortness of breath  Patient reports that she has not been very complaint with her medication  She reports that she only takes lisinopril but inconsistently       Patieint presented to ED with BP of 211/127 and received labetalol 10 mg Iv and blood pressure improved to 168/106  Received 15 mg of Toradol injection, benadryl 25 mg and reports that headache has resolved  CT head Negative for any acute intracranial process  Troponin negative X 1  EKG shows nonspecific T wave changes similar to old EKG in April 2019 and PVC   CBC and BMP unremarkable except for mild hypokalemia  Had Lengthy discussion on importance of blood pressure control with patient  Her headache is likely due to hypertensive urgency since there is no sign of any end organ damage but other differentials include primary headache    Will start 12 5mg of HCTZ tonight and start 5mg of lisinopril tomorrow (11/25), 5 mg of hydralazine PRN for systolic >220  Trend Troponin X 2  24 hour tele  Monitor I/O  EKG in am  BMP in am  Tylenol/zofran prn for headache

## 2019-11-24 NOTE — ASSESSMENT & PLAN NOTE
On Admission, her Urinalysis shows hematuria and numerous RBC  Patient denies any urinary complaints  She reports that is currently on her period

## 2019-11-24 NOTE — DISCHARGE INSTR - AVS FIRST PAGE
Please get blood work the day before your next PCP appointment, this appointment will be made for you this week, our office will contact you within 48 hrs to do so  Be sure to mention your sleep apnea at your next appointment

## 2019-11-24 NOTE — ED PROVIDER NOTES
History  Chief Complaint   Patient presents with    Headache     "I have a headache, it started this morning "  Took extra strength Tylenol this morning  This is a 17-year-old female with past medical history of asthma and hypertension who presents today for evaluation of headache that has been persistent since this morning  Patient reports she took 2 Tylenol without minimal relief  She reports no focal weakness of her lower or upper extremities  She reports no blurred vision  She does report a slight dizziness and lightheadedness  She states she felt like she was going to pass out prior to arrival   She states she has not had a syncopal episode  She denies any chest pain or shortness of breath  She reports that she does have some photophobia  She has no lower extremity edema  No abdominal pain  No shortness of breath  Prior to Admission Medications   Prescriptions Last Dose Informant Patient Reported? Taking? HYDROCHLOROTHIAZIDE PO 2019 at Unknown time  Yes Yes   Sig: Take by mouth   albuterol (2 5 mg/3 mL) 0 083 % nebulizer solution Past Month at Unknown time  No Yes   Sig: Take 1 vial (2 5 mg total) by nebulization every 6 (six) hours as needed for wheezing or shortness of breath   albuterol (PROVENTIL HFA,VENTOLIN HFA) 90 mcg/act inhaler Past Month at Unknown time  No Yes   Sig: Inhale 2 puffs every 4 (four) hours as needed for wheezing or shortness of breath   fluticasone (FLONASE) 50 mcg/act nasal spray More than a month at Unknown time  No No   Si spray into each nostril daily   lisinopril (ZESTRIL) 10 mg tablet 2019 at Unknown time  No Yes   Sig: Take 1 tablet (10 mg total) by mouth daily      Facility-Administered Medications: None       Past Medical History:   Diagnosis Date    Asthma     Hypertension        Past Surgical History:   Procedure Laterality Date    NO PAST SURGERIES         History reviewed  No pertinent family history    I have reviewed and agree with the history as documented  Social History     Tobacco Use    Smoking status: Current Every Day Smoker     Packs/day: 1 00     Types: Cigarettes    Smokeless tobacco: Never Used   Substance Use Topics    Alcohol use: No    Drug use: No        Review of Systems   Constitutional: Negative  HENT: Negative  Eyes: Negative  Respiratory: Negative  Cardiovascular: Negative  Gastrointestinal: Negative  Endocrine: Negative  Genitourinary: Negative  Negative for vaginal pain  Musculoskeletal: Negative  Skin: Negative  Allergic/Immunologic: Negative  Neurological: Positive for headaches  Hematological: Negative  Psychiatric/Behavioral: Negative  Physical Exam  Physical Exam   Constitutional: She is oriented to person, place, and time  She appears well-developed and well-nourished  HENT:   Head: Normocephalic and atraumatic  Right Ear: Tympanic membrane normal    Left Ear: Tympanic membrane normal    Mouth/Throat: Uvula is midline, oropharynx is clear and moist and mucous membranes are normal    Eyes: Pupils are equal, round, and reactive to light  EOM are normal    Neck: Normal range of motion  Cardiovascular: Normal rate, regular rhythm and normal heart sounds  Pulmonary/Chest: Effort normal and breath sounds normal    Abdominal: Soft  She exhibits no distension  There is no tenderness  There is no guarding  Neurological: She is alert and oriented to person, place, and time  She has normal strength  No cranial nerve deficit or sensory deficit  She displays a negative Romberg sign  GCS eye subscore is 4  GCS verbal subscore is 5  GCS motor subscore is 6  Reflex Scores:       Brachioradialis reflexes are 2+ on the right side and 2+ on the left side  Patellar reflexes are 2+ on the right side and 2+ on the left side  Skin: Skin is warm  Capillary refill takes less than 2 seconds         Vital Signs  ED Triage Vitals [11/23/19 2059]   Temperature Pulse Respirations Blood Pressure SpO2   98 8 °F (37 1 °C) 84 16 (!) 211/127 98 %      Temp src Heart Rate Source Patient Position - Orthostatic VS BP Location FiO2 (%)   -- Monitor Lying Left arm --      Pain Score       Worst Possible Pain           Vitals:    11/23/19 2059 11/23/19 2148 11/23/19 2208   BP: (!) 211/127 167/100 (!) 173/96   Pulse: 84 72 71   Patient Position - Orthostatic VS: Lying Lying Lying         Visual Acuity      ED Medications  Medications   Labetalol HCl (NORMODYNE) injection 10 mg (10 mg Intravenous Given 11/23/19 2124)   ondansetron (ZOFRAN) injection 4 mg (4 mg Intravenous Given 11/23/19 2124)   diphenhydrAMINE (BENADRYL) injection 25 mg (25 mg Intravenous Given 11/23/19 2123)   potassium chloride (K-DUR,KLOR-CON) CR tablet 20 mEq (20 mEq Oral Given 11/23/19 2217)   ketorolac (TORADOL) injection 15 mg (15 mg Intravenous Given 11/23/19 2217)       Diagnostic Studies  Results Reviewed     Procedure Component Value Units Date/Time    Troponin I [[id number]]  (Normal) Collected:  11/23/19 2121    Lab Status:  Final result Specimen:  Blood from Arm, Right Updated:  11/23/19 2151     Troponin I 0 03 ng/mL     Basic metabolic panel [[id number]]  (Abnormal) Collected:  11/23/19 2121    Lab Status:  Final result Specimen:  Blood from Arm, Right Updated:  11/23/19 2138     Sodium 140 mmol/L      Potassium 3 4 mmol/L      Chloride 101 mmol/L      CO2 32 mmol/L      ANION GAP 7 mmol/L      BUN 9 mg/dL      Creatinine 0 79 mg/dL      Glucose 105 mg/dL      Calcium 9 5 mg/dL      eGFR 96 ml/min/1 73sq m     Narrative:       Zofia guidelines for Chronic Kidney Disease (CKD):     Stage 1 with normal or high GFR (GFR > 90 mL/min/1 73 square meters)    Stage 2 Mild CKD (GFR = 60-89 mL/min/1 73 square meters)    Stage 3A Moderate CKD (GFR = 45-59 mL/min/1 73 square meters)    Stage 3B Moderate CKD (GFR = 30-44 mL/min/1 73 square meters)    Stage 4 Severe CKD (GFR = 15-29 mL/min/1 73 square meters)    Stage 5 End Stage CKD (GFR <15 mL/min/1 73 square meters)  Note: GFR calculation is accurate only with a steady state creatinine    Urine Microscopic [[id number]]  (Abnormal) Collected:  11/23/19 2124    Lab Status:  Final result Specimen:  Urine, Clean Catch Updated:  11/23/19 2137     RBC, UA Innumerable /hpf      WBC, UA 1-2 /hpf      Epithelial Cells Occasional /hpf      Bacteria, UA Occasional /hpf     UA w Reflex to Microscopic w Reflex to Culture [[id number]]  (Abnormal) Collected:  11/23/19 2124    Lab Status:  Final result Specimen:  Urine, Clean Catch Updated:  11/23/19 2132     Color, UA Red     Clarity, UA Turbid     Specific Treadwell, UA 1 020     pH, UA 6 0     Leukocytes,  0     Nitrite, UA Negative     Protein,  (2+) mg/dl      Glucose, UA Negative mg/dl      Ketones, UA 5 (Trace) mg/dl      Bilirubin, UA Negative     Blood,  0     UROBILINOGEN UA 1 0 mg/dL     CBC and differential [[id number]]  (Abnormal) Collected:  11/23/19 2121    Lab Status:  Final result Specimen:  Blood from Arm, Right Updated:  11/23/19 2132     WBC 7 20 Thousand/uL      RBC 4 76 Million/uL      Hemoglobin 13 8 g/dL      Hematocrit 41 5 %      MCV 87 fL      MCH 29 0 pg      MCHC 33 2 g/dL      RDW 15 3 %      MPV 8 8 fL      Platelets 783 Thousands/uL     POCT pregnancy, urine [[id number]]  (Normal) Resulted:  11/23/19 2120    Lab Status:  Final result Updated:  11/23/19 2121     EXT PREG TEST UR (Ref: Negative) negative     Control valid                 CT head without contrast    (Results Pending)              Procedures  ECG 12 Lead Documentation Only  Date/Time: 11/23/2019 9:12 PM  Performed by: Oh Ortiz PA-C  Authorized by: Oh Ortiz PA-C     Indications / Diagnosis:  HTN/headache  Patient location:  ED  Interpretation:     Interpretation: non-specific    Rate:     ECG rate:  81    ECG rate assessment: normal    Rhythm:     Rhythm: sinus rhythm Ectopy:     Ectopy: none    QRS:     QRS axis:  Normal  ST segments:     ST segments:  Normal  T waves:     T waves: non-specific    Comments:      Noted flattening in lateral leads  ED Course  ED Course as of Nov 23 2224   Sat Nov 23, 2019 2206 With the patient  Pain is still 6/10  She reports some dizziness and lightheadedness  2214 Will await CT scan readings  Spoke to family medicine who is agreeable to admission to observation under Dr Lissett Perez  MDM  Number of Diagnoses or Management Options  Dizziness:   EKG abnormalities:   Hypertension:   Diagnosis management comments: This is a 82-year-old female with past medical history of hypertension and morbid obesity presents today for evaluation headache that has been persistent since earlier this morning  She states that currently the pain is 10/10  Blood pressure was elevated upon presentation to the emergency department  Patient was given IV labetalol, Toradol, and Zofran, and Benadryl for relief  EKG revealed some T-wave flattening and depressions  CT of head revealed no acute abnormality  Spoke to family medicine who agreed to admit the patient for observation  Disposition  Final diagnoses:   Hypertension   Dizziness   EKG abnormalities     Time reflects when diagnosis was documented in both MDM as applicable and the Disposition within this note     Time User Action Codes Description Comment    11/23/2019 10:23 PM Lala Ban R Add [I10] Hypertension     11/23/2019 10:24 PM Lala Ban R Add [R42] Dizziness     11/23/2019 10:24 PM Jayda Pierce Add [R94 31] EKG abnormalities       ED Disposition     ED Disposition Condition Date/Time Comment    Admit Stable Sat Nov 23, 2019 10:24 PM Case was discussed with Raffy and the patient's admission status was agreed to be Admission Status: observation status to the service of Dr Lissett Perez           Follow-up Information    None         Patient's Medications   Discharge Prescriptions    No medications on file     No discharge procedures on file      ED Provider  Electronically Signed by           Viv Liz PA-C  11/25/19 2514

## 2019-11-24 NOTE — ASSESSMENT & PLAN NOTE
Smokes 1 PPD since 3 years ago    Tobacco cessation counseling provided  7 mg/24hour of Nicotine patch

## 2019-11-24 NOTE — DISCHARGE SUMMARY
Discharge Summary - Moisés Mabry    Patient Information: Scott Pringle 42 y o  female MRN: [id number]  Unit/Bed#: 7T [facility] 710-02 Encounter: [id number]    Discharging Physician / Practitioner: Aster Carmen MD  PCP: Jeff Kay PA-C  Admission Date:   Admission Orders (From admission, onward)     Ordered        11/23/19 2234  Place in Observation (expected length of stay for this patient is less than two midnights)  Once         11/23/19 2246  Place in Observation  Once                   Discharge Date: 11/24/19   Reason for Admission: Hypertensive urgency     Discharge Diagnoses:     Principal Problem (Resolved): Hypertensive urgency  Active Problems:    Hypokalemia    PVC (premature ventricular contraction)    Tobacco abuse    Abnormal urinalysis  Resolved Problems: Moderate persistent asthma with exacerbation    Consultations During Hospital Stay:  · None    Procedures Performed:   · None    Significant Findings / Test Results:   · CT head 11/23/2019  - No acute intracranial abnormality  · Creat 11/23/2019 0 79  · Troponin negative x 3     Incidental Findings:   · Hypokalemia - 10 mEq x 10 days      Test Results Pending at Discharge (will require follow up): · None      Outpatient Tests Requested:  · BMP one day prior to TCM visit  · Will need CPAP readjusted as patient has not used  · Suggest a focused secondary hypertension work up    Outpatient Follow-up Requested:   · PCP Office   · Sleep medicine     Complications:    None    Hospital Course:     Scott Pringle is a 42 y o  female patient who originally presented to the hospital on 11/23/2019 due to C/O headache of one day duration  On arrival to the ER was found to have BP of 211/127  On initial exam had no focal neurological deficits  CT head negative   W/u showed normal CBC, serum creat and negative troponin  EKG showed PVC and non specific T wave changes unchanged from 4/2019   In the ER patient received labetalol 10 mg OT, toradol 15 mg, Benadryl 25 mg, Zofran 4 mg and placed under observation  Headache improved but patients BP was elevated in the AM and required hydralazine once  BP medication was adjusted to hydrochlorothiazide 12 5 mg daily and Amlodipine 5 mg daily for discharge  Will give K-Dur 10 mEq x 10 days with a repeat BMP  Patient reports known sleep apnea but has not used CPAP machine due to dryness  Suggested that it gets reassessed  Patient agreed to NRT with patches and appeared motivated to stop smoking  It is important to assess for compliance as patient previously reported using her lisinopril mainly when she would get a headache from her blood pressure  Prior to discharge patients blood pressure was improved  At time of discharge patient denied any fever, chills, chest pain, palpitations, light headedness, headaches, vision change, abdominal pain, N/V/D, urinary symptoms  Condition at Discharge: good     Discharge Day Visit / Exam:     Vitals: Blood Pressure: 158/86 (11/24/19 1202)  Pulse: 66 (11/24/19 0746)  Temperature: (!) 97 °F (36 1 °C) (11/24/19 0746)  Temp Source: Temporal (11/24/19 0746)  Respirations: 18 (11/24/19 0746)  Height: 5' (152 4 cm) (11/23/19 2305)  Weight - Scale: 130 kg (286 lb 6 oz) (11/23/19 2305)  SpO2: 98 % (11/24/19 0746)  Exam:   Physical Exam   Constitutional: She is oriented to person, place, and time  She appears well-developed and well-nourished  No distress  HENT:   Head: Normocephalic and atraumatic  Mouth/Throat: Oropharynx is clear and moist    Eyes: EOM are normal    Neck: Normal range of motion  Neck supple  Cardiovascular: Normal rate, regular rhythm, normal heart sounds and intact distal pulses  No murmur heard  Distant heart sounds 2/2 body habitus   Pulmonary/Chest: Effort normal and breath sounds normal  No respiratory distress  She has no wheezes  She exhibits no tenderness  Abdominal: Soft  Bowel sounds are normal  She exhibits no distension   There is no tenderness  Musculoskeletal: Normal range of motion  She exhibits no edema or tenderness  Neurological: She is alert and oriented to person, place, and time  Skin: Skin is warm and dry  Psychiatric: She has a normal mood and affect  Her behavior is normal    Nursing note and vitals reviewed  Physical exam was completed by [facility] medical student on our team Jorge Lopez)    Discussion with Family: None    Discharge instructions/Information to patient and family:   See after visit summary for information provided to patient and family  Discharge Medications:  STOP taking these medications    lisinopril 10 mg tablet   Commonly known as: ZESTRIL      TAKE these medications     Morning Afternoon Evening Bedtime As Needed    acetaminophen 325 mg tablet   Commonly known as: TYLENOL   Take 2 tablets (650 mg total) by mouth every 8 (eight) hours as needed for mild pain or headaches   Refills: 0          * albuterol (2 5 mg/3 mL) 0 083 % nebulizer solution   Take 1 vial (2 5 mg total) by nebulization every 6 (six) hours as needed for wheezing or shortness of breath   Refills: 0          * albuterol 90 mcg/act inhaler   Commonly known as: PROVENTIL HFA,VENTOLIN HFA   Inhale 2 puffs every 4 (four) hours as needed for wheezing or shortness of breath   Refills: 3   Doctor's comments: Substitution to a formulary equivalent within the same pharmaceutical class is authorized            amLODIPine 5 mg tablet   Commonly known as: NORVASC   Start taking on: November 25, 2019   Take 1 tablet (5 mg total) by mouth daily   Refills: 0          fluticasone 50 mcg/act nasal spray   Commonly known as: FLONASE   1 spray into each nostril daily   Refills: 2          hydrochlorothiazide 12 5 mg tablet   Commonly known as: HYDRODIURIL   Start taking on: November 25, 2019   Take 1 tablet (12 5 mg total) by mouth daily   Refills: 0   What changed:    0 medication strength   0 how much to take   0 when to take this nicotine 7 mg/24hr TD 24 hr patch   Commonly known as: Unk Mcburney   Start taking on: November 25, 2019   Place 1 patch on the skin daily   Refills: 0          potassium chloride 10 mEq tablet   Commonly known as: UNRULY-DUR,KLOR-CON   Take 1 tablet (10 mEq total) by mouth daily for 10 days   Refills: 0           Provisions for Follow-Up Care:  See after visit summary for information related to follow-up care and any pertinent home health orders  Disposition:     Home    For Discharges to [facility]:   · Not Applicable to this Patient - Not Applicable to this Patient    Planned Readmission: None     Discharge Statement:  I spent 45 minutes discharging the patient  This time was spent on the day of discharge  I had direct contact with the patient on the day of discharge  Greater than 50% of the total time was spent examining patient, answering all patient questions, arranging and discussing plan of care with patient as well as directly providing post-discharge instructions  Additional time then spent on discharge activities      ** Please Note: This note has been constructed using a voice recognition system Alex Boyle MD  11/24/19  12:43 PM

## 2019-11-25 LAB
ATRIAL RATE: 70 BPM
ATRIAL RATE: 81 BPM
P AXIS: 62 DEGREES
P AXIS: 71 DEGREES
PR INTERVAL: 138 MS
PR INTERVAL: 144 MS
QRS AXIS: 65 DEGREES
QRS AXIS: 68 DEGREES
QRSD INTERVAL: 78 MS
QRSD INTERVAL: 90 MS
QT INTERVAL: 370 MS
QT INTERVAL: 398 MS
QTC INTERVAL: 429 MS
QTC INTERVAL: 429 MS
T WAVE AXIS: -26 DEGREES
T WAVE AXIS: 270 DEGREES
VENTRICULAR RATE: 70 BPM
VENTRICULAR RATE: 81 BPM

## 2019-11-25 PROCEDURE — 93010 ELECTROCARDIOGRAM REPORT: CPT | Performed by: INTERNAL MEDICINE

## 2019-11-26 ENCOUNTER — TRANSITIONAL CARE MANAGEMENT (OUTPATIENT)
Dept: FAMILY MEDICINE CLINIC | Facility: CLINIC | Age: 39
End: 2019-11-26

## 2019-12-06 ENCOUNTER — TELEPHONE (OUTPATIENT)
Dept: FAMILY MEDICINE CLINIC | Facility: CLINIC | Age: 39
End: 2019-12-06

## 2019-12-06 ENCOUNTER — OFFICE VISIT (OUTPATIENT)
Dept: FAMILY MEDICINE CLINIC | Facility: CLINIC | Age: 39
End: 2019-12-06

## 2019-12-06 VITALS
TEMPERATURE: 97 F | WEIGHT: 280 LBS | DIASTOLIC BLOOD PRESSURE: 100 MMHG | OXYGEN SATURATION: 97 % | SYSTOLIC BLOOD PRESSURE: 152 MMHG | RESPIRATION RATE: 18 BRPM | BODY MASS INDEX: 54.68 KG/M2 | HEART RATE: 96 BPM

## 2019-12-06 DIAGNOSIS — I16.0 HYPERTENSIVE URGENCY: ICD-10-CM

## 2019-12-06 DIAGNOSIS — J45.41 MODERATE PERSISTENT ASTHMA WITH EXACERBATION: ICD-10-CM

## 2019-12-06 DIAGNOSIS — J45.909 ASTHMA: ICD-10-CM

## 2019-12-06 DIAGNOSIS — G47.30 SLEEP APNEA, UNSPECIFIED TYPE: ICD-10-CM

## 2019-12-06 DIAGNOSIS — Z09 HOSPITAL DISCHARGE FOLLOW-UP: Primary | ICD-10-CM

## 2019-12-06 DIAGNOSIS — R51.9 NONINTRACTABLE HEADACHE, UNSPECIFIED CHRONICITY PATTERN, UNSPECIFIED HEADACHE TYPE: ICD-10-CM

## 2019-12-06 DIAGNOSIS — I10 HYPERTENSION: ICD-10-CM

## 2019-12-06 DIAGNOSIS — E87.6 HYPOKALEMIA: ICD-10-CM

## 2019-12-06 PROCEDURE — 1111F DSCHRG MED/CURRENT MED MERGE: CPT | Performed by: PHYSICIAN ASSISTANT

## 2019-12-06 PROCEDURE — 99495 TRANSJ CARE MGMT MOD F2F 14D: CPT | Performed by: PHYSICIAN ASSISTANT

## 2019-12-06 RX ORDER — AMLODIPINE BESYLATE 10 MG/1
10 TABLET ORAL DAILY
Qty: 90 TABLET | Refills: 0 | Status: ON HOLD | OUTPATIENT
Start: 2019-12-06 | End: 2020-07-25 | Stop reason: ALTCHOICE

## 2019-12-06 RX ORDER — ACETAMINOPHEN 325 MG/1
650 TABLET ORAL EVERY 8 HOURS PRN
Qty: 30 TABLET | Refills: 0 | Status: SHIPPED | OUTPATIENT
Start: 2019-12-06

## 2019-12-06 RX ORDER — ALBUTEROL SULFATE 2.5 MG/3ML
2.5 SOLUTION RESPIRATORY (INHALATION) EVERY 6 HOURS PRN
Qty: 75 ML | Refills: 1 | Status: SHIPPED | OUTPATIENT
Start: 2019-12-06 | End: 2021-05-26 | Stop reason: SDUPTHER

## 2019-12-06 RX ORDER — HYDROCHLOROTHIAZIDE 12.5 MG/1
12.5 TABLET ORAL DAILY
Qty: 90 TABLET | Refills: 0 | Status: SHIPPED | OUTPATIENT
Start: 2019-12-06 | End: 2020-07-28 | Stop reason: HOSPADM

## 2019-12-06 RX ORDER — LISINOPRIL 20 MG/1
TABLET ORAL EVERY 24 HOURS
COMMUNITY
Start: 2018-04-26 | End: 2019-12-06 | Stop reason: ALTCHOICE

## 2019-12-06 RX ORDER — HYDROCHLOROTHIAZIDE 25 MG/1
12.5 TABLET ORAL EVERY 24 HOURS
COMMUNITY
Start: 2017-10-27 | End: 2019-12-06 | Stop reason: SDUPTHER

## 2019-12-06 RX ORDER — ALBUTEROL SULFATE 90 UG/1
2 AEROSOL, METERED RESPIRATORY (INHALATION) EVERY 4 HOURS PRN
Qty: 1 INHALER | Refills: 3 | Status: SHIPPED | OUTPATIENT
Start: 2019-12-06 | End: 2021-05-26 | Stop reason: SDUPTHER

## 2019-12-06 NOTE — PROGRESS NOTES
Transition of Care  Follow-up After Hospitalization    Anna Jones 42 y o  female   Date:  12/6/2019    TCM Call (since 11/7/2019)     Date and time call was made  11/26/2019  2:35 PM    Hospital care reviewed  Records reviewed    Patient was hospitialized at  [facility]    Date of Admission  11/23/19    Date of discharge  11/24/19    Diagnosis  Hypertensive urgency    Disposition  Home    Current Symptoms  Headache    Headache pain severity  Severe    Headache pain onset  Progressive    Episode pattern  Constant    Headache pain level  7      TCM Call (since 11/7/2019)     Scheduled for follow up? Yes    I have advised the patient to call PCP with any new or worsening symptoms  Sujatha [facility] records were reviewed  Medications upon discharge reviewed/updated  Discharge Disposition: Home  Follow up visits with other specialists: Sleep medicine      HPI:  - Patient presented to [facility] Emergency Department on 11/23/2019 due to headache  On arrival, patient's blood pressure was found to be 211/127  CT head was completed and results were negative  Blood work was completed which revealed normal CBC, serum creatinine, and negative troponin  EKG showed PVC and nonspecific ST wave changes unchanged from April 2019  In the emergency department, patient received labetalol 10 mg, Toradol 15 mg, Benadryl 25 mg, Zofran 4 mg, and was placed under observation  Patient's headache improved, but patient's blood pressure was elevated in the morning and required hydralazine once  Patient's blood pressure medication was adjusted to hydrochlorothiazide 12 5 mg daily and amlodipine 5 mg daily for discharge  Patient was also given a 10 day supply of potassium 10 mEq due to low potassium level  Patient was to have repeat BMP completed before this visit, however patient has yet to do so    Patient also reported about known sleep apnea but has not used her CPAP machine due to dryness  It was suggested that patient return to sleep medicine to have this reassessed  Patient was also given nicotine patches to wear to help to stop smoking     - Today, patient notes overall she is doing a lot better than she was before  Patient notes she still sometimes has a headache, and when she has it she rates it as 3/10  Patient notes she has noticed that her headaches is usually worse when her stress increases such as when she is yelling at her children  Patient notes she is currently still wearing the nicotine patches currently, the patient does not believe they are effective  Patient notes she can stop smoking on her own  Patient is currently smoking about 5-6 cigarettes a day, which patient notes is less than her usual   Patient notes her boyfriend also smokes, but he is also trying to quit and he only smokes if he sees that patient has a pack  Therefore, they are helping each other to quit smoking     - Patient notes her biggest thing is remembering to take her medications every day  Patient notes she has been better with taking them since hospital discharge  Patient notes she will put her medications right in front of the TV so that she is reminded to take them every day  Patient notes she is planning to go to the [facility] and by a pill organized her so that will also help with taking her medications  - Patient notes she used to use CPAP machine  Patient notes she last used it probably about a year so ago  Patient notes that did not break necessarily, but it caused a lot of irritation to her nose and then causes her mouth to be very dry  Patient notes she could not take it anymore so she stopped using it  Patient would like to return back to Sleep Medicine for further evaluation  ROS: Review of Systems   Constitutional: Negative for appetite change, fatigue and fever     HENT: Negative for congestion, ear pain, rhinorrhea and sore throat  Eyes: Negative for pain and visual disturbance  Respiratory: Negative for cough, chest tightness and shortness of breath  Cardiovascular: Negative for chest pain and palpitations  Gastrointestinal: Negative for abdominal pain, constipation, diarrhea, nausea and vomiting  Genitourinary: Negative for difficulty urinating and dysuria  Musculoskeletal: Negative for arthralgias  Skin: Negative for rash  Neurological: Positive for headaches  Negative for dizziness  Psychiatric/Behavioral: Negative for behavioral problems  The patient is not nervous/anxious          Past Medical History:   Diagnosis Date    Asthma     Hypertension        Past Surgical History:   Procedure Laterality Date    NO PAST SURGERIES         Social History     Socioeconomic History    Marital status: /Civil Union     Spouse name: None    Number of children: None    Years of education: None    Highest education level: None   Occupational History    None   Social Needs    Financial resource strain: None    Food insecurity:     Worry: None     Inability: None    Transportation needs:     Medical: None     Non-medical: None   Tobacco Use    Smoking status: Current Every Day Smoker     Packs/day: 1 00     Types: Cigarettes    Smokeless tobacco: Never Used   Substance and Sexual Activity    Alcohol use: No    Drug use: No    Sexual activity: None   Lifestyle    Physical activity:     Days per week: None     Minutes per session: None    Stress: None   Relationships    Social connections:     Talks on phone: None     Gets together: None     Attends [employer] service: None     Active member of club or organization: None     Attends meetings of clubs or organizations: None     Relationship status: None    Intimate partner violence:     Fear of current or ex partner: None     Emotionally abused: None     Physically abused: None     Forced sexual activity: None   Other Topics Concern    None   Social History Narrative    None       History reviewed  No pertinent family history  No Known Allergies      Current Outpatient Medications:     acetaminophen (TYLENOL) 325 mg tablet, Take 2 tablets (650 mg total) by mouth every 8 (eight) hours as needed for mild pain or headaches, Disp: 30 tablet, Rfl: 0    albuterol (2 5 mg/3 mL) 0 083 % nebulizer solution, Take 1 vial (2 5 mg total) by nebulization every 6 (six) hours as needed for wheezing or shortness of breath, Disp: 75 mL, Rfl: 1    albuterol (PROVENTIL HFA,VENTOLIN HFA) 90 mcg/act inhaler, Inhale 2 puffs every 4 (four) hours as needed for wheezing or shortness of breath, Disp: 1 Inhaler, Rfl: 3    hydrochlorothiazide (HYDRODIURIL) 12 5 mg tablet, Take 1 tablet (12 5 mg total) by mouth daily, Disp: 90 tablet, Rfl: 0    nicotine (NICODERM CQ) 7 mg/24hr TD 24 hr patch, Place 1 patch on the skin daily, Disp: 28 patch, Rfl: 0    potassium chloride (K-DUR,KLOR-CON) 10 mEq tablet, Take 1 tablet (10 mEq total) by mouth daily for 10 days, Disp: 10 tablet, Rfl: 0    amLODIPine (NORVASC) 10 mg tablet, Take 1 tablet (10 mg total) by mouth daily, Disp: 90 tablet, Rfl: 0    fluticasone (FLONASE) 50 mcg/act nasal spray, 1 spray into each nostril daily (Patient not taking: Reported on 12/6/2019), Disp: 16 g, Rfl: 2      Physical Exam:  /100   Pulse 96   Temp (!) 97 °F (36 1 °C) (Temporal)   Resp 18   Wt 127 kg (280 lb)   LMP 11/22/2019 (Exact Date)   SpO2 97%   BMI 54 68 kg/m²     Physical Exam   Constitutional: She is oriented to person, place, and time  She appears well-developed and well-nourished  No distress  HENT:   Head: Normocephalic and atraumatic  Right Ear: External ear normal    Left Ear: External ear normal    Nose: Nose normal    Mouth/Throat: Uvula is midline, oropharynx is clear and moist and mucous membranes are normal    Eyes: Pupils are equal, round, and reactive to light   Conjunctivae and EOM are normal  Neck: Normal range of motion  Neck supple  Cardiovascular: Normal rate, regular rhythm, normal heart sounds and intact distal pulses  No murmur heard  Pulmonary/Chest: Effort normal and breath sounds normal  She has no wheezes  Musculoskeletal: Normal range of motion  She exhibits no edema  Neurological: She is alert and oriented to person, place, and time  Skin: Skin is warm and dry  Psychiatric: She has a normal mood and affect  Her speech is normal and behavior is normal    Nursing note and vitals reviewed  Labs:  Lab Results   Component Value Date    WBC 7 20 11/23/2019    HGB 13 8 11/23/2019    HCT 41 5 11/23/2019    MCV 87 11/23/2019     11/23/2019     Lab Results   Component Value Date     04/19/2015    K 3 4 (L) 11/24/2019     11/24/2019    CO2 30 11/24/2019    ANIONGAP 6 04/19/2015    BUN 9 11/24/2019    CREATININE 0 74 11/24/2019    GLUCOSE 98 02/07/2016    GLUF 79 06/06/2019    CALCIUM 8 7 11/24/2019    AST 14 06/06/2019    ALT 18 06/06/2019    ALKPHOS 58 06/06/2019    PROT 7 9 04/19/2015    BILITOT 0 3 04/19/2015    EGFR 104 11/24/2019       Assessment and Plan:    Hypertension/Hypertensive Urgency  - Patient continues to have headaches, although they have significantly decreased in severity (from rating her headaches as 10/10 to 3/10)  Blood pressure is elevated today, although it is much better when compared to her emergency room visit (152/100 vs 211/127)  Will increase amlodipine from 5 mg once daily to 10 mg once daily  Continue HCTZ 12 5 mg once daily    - Reviewed BP target goal with patient  - Expressed importance of taking her antihypertensive medications every day  Agreed with patient to purchase pill organizer and to place it in front of her television so she is always reminded to take her medications everyday  - Advised to maintain healthy balanced diet with focus on low salt intake  Limit alcohol intake   Advised to exercise at least 30 minutes a day for at least 5 days out of the week  Hypokalemia  - Patient has finished 10-day course of potassium  Instructed patient to obtain the repeat BMP to recheck potassium level  Will reassess after blood test is resulted  Tobacco Abuse  - Patient did not find the nicotine patches effective  At this time, patient believes she can stop smoking on her own  Advised patient to continue working on smoking cessation  Will continue to monitor  Sleep Apnea  - Will refer to sleep medicine for further evaluation and management  Headaches  - Patient continues to have headaches, although they have significantly decreased in severity (from rating her headaches as 10/10 to 3/10)  Explained to patient her headaches can be due to her elevated blood pressure  Advised to continue taking tylenol as needed for now for the headaches  Will optimize blood pressure control and see if that makes a difference for patient's headaches  - Will continue to monitor  Asthma  - Stable  Continue using albuterol inhaler and nebulizer as needed  Loyda Tejada was seen today for transition of care management  Diagnoses and all orders for this visit:    Hospital discharge follow-up    Hypertensive urgency    Hypertension  -     amLODIPine (NORVASC) 10 mg tablet; Take 1 tablet (10 mg total) by mouth daily  -     albuterol (2 5 mg/3 mL) 0 083 % nebulizer solution; Take 1 vial (2 5 mg total) by nebulization every 6 (six) hours as needed for wheezing or shortness of breath  -     hydrochlorothiazide (HYDRODIURIL) 12 5 mg tablet; Take 1 tablet (12 5 mg total) by mouth daily    Moderate persistent asthma with exacerbation  -     albuterol (PROVENTIL HFA,VENTOLIN HFA) 90 mcg/act inhaler; Inhale 2 puffs every 4 (four) hours as needed for wheezing or shortness of breath    Asthma  -     albuterol (2 5 mg/3 mL) 0 083 % nebulizer solution;  Take 1 vial (2 5 mg total) by nebulization every 6 (six) hours as needed for wheezing or shortness of breath    Hypokalemia  -     Basic metabolic panel; Future    Sleep apnea, unspecified type  -     Ambulatory referral to Sleep Medicine; Future    Nonintractable headache, unspecified chronicity pattern, unspecified headache type  -     acetaminophen (TYLENOL) 325 mg tablet; Take 2 tablets (650 mg total) by mouth every 8 (eight) hours as needed for mild pain or headaches          All of patients questions were answered  Patient understands and agrees with the above plan       Delfina Corcoran PA-C  12/06/19  DREA Quinteros

## 2019-12-06 NOTE — TELEPHONE ENCOUNTER
LM and order mailed     Sleep dr MIRELES([phone]) is on 12/13/2019 at 10:30 am at [address] [place] [address], [address], [facility]

## 2019-12-08 PROBLEM — I10 HYPERTENSION: Status: ACTIVE | Noted: 2019-12-08

## 2019-12-08 PROBLEM — R51.9 NONINTRACTABLE HEADACHE: Status: ACTIVE | Noted: 2019-12-08

## 2020-03-01 ENCOUNTER — APPOINTMENT (EMERGENCY)
Dept: RADIOLOGY | Facility: HOSPITAL | Age: 40
End: 2020-03-01
Payer: COMMERCIAL

## 2020-03-01 ENCOUNTER — HOSPITAL ENCOUNTER (EMERGENCY)
Facility: HOSPITAL | Age: 40
Discharge: HOME | End: 2020-03-01
Attending: EMERGENCY MEDICINE | Admitting: EMERGENCY MEDICINE
Payer: COMMERCIAL

## 2020-03-01 VITALS
WEIGHT: 284.83 LBS | HEART RATE: 93 BPM | HEIGHT: 60 IN | RESPIRATION RATE: 20 BRPM | TEMPERATURE: 99.2 F | SYSTOLIC BLOOD PRESSURE: 188 MMHG | BODY MASS INDEX: 55.92 KG/M2 | OXYGEN SATURATION: 97 % | DIASTOLIC BLOOD PRESSURE: 97 MMHG

## 2020-03-01 DIAGNOSIS — S63.501A RIGHT WRIST SPRAIN, INITIAL ENCOUNTER: Primary | ICD-10-CM

## 2020-03-01 PROCEDURE — 73110 X-RAY EXAM OF WRIST: CPT

## 2020-03-01 PROCEDURE — 99283 EMERGENCY DEPT VISIT LOW MDM: CPT

## 2020-03-01 PROCEDURE — 99284 EMERGENCY DEPT VISIT MOD MDM: CPT | Performed by: EMERGENCY MEDICINE

## 2020-03-02 NOTE — ED PROVIDER NOTES
History  Chief Complaint   Patient presents with   Pete Villalpando Fall     Passed out last night and now has pain to right wrist      Patient is a 30-year-old right-hand-dominant female who is complaining of pain in the right wrist that started around 6:00 p m  Tonight  Achy pain on the lateral aspect worse with any attempted movement  Denies any numbness weakness tingling  Patient states that she when out drinking last night in celebration her mother's birthday and proceeded to fall on her wrist   Did not wake up with pain but started later today  Did take Motrin without any relief  Patient worried because 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  Taking?   acetaminophen (TYLENOL) 325 mg tablet   No No   Sig: Take 2 tablets (650 mg total) by mouth every 8 (eight) hours as needed for mild pain or headaches   albuterol (2 5 mg/3 mL) 0 083 % nebulizer solution   No No   Sig: Take 1 vial (2 5 mg total) by nebulization every 6 (six) hours as needed for wheezing or shortness of breath   albuterol (PROVENTIL HFA,VENTOLIN HFA) 90 mcg/act inhaler   No No   Sig: Inhale 2 puffs every 4 (four) hours as needed for wheezing or shortness of breath   amLODIPine (NORVASC) 10 mg tablet   No No   Sig: Take 1 tablet (10 mg total) by mouth daily   fluticasone (FLONASE) 50 mcg/act nasal spray   No No   Si spray into each nostril daily   Patient not taking: Reported on 2019   hydrochlorothiazide (HYDRODIURIL) 12 5 mg tablet   No No   Sig: Take 1 tablet (12 5 mg total) by mouth daily   nicotine (NICODERM CQ) 7 mg/24hr TD 24 hr patch   No No   Sig: Place 1 patch on the skin daily   potassium chloride (K-DUR,KLOR-CON) 10 mEq tablet   No No   Sig: Take 1 tablet (10 mEq total) by mouth daily for 10 days      Facility-Administered Medications: None       Past Medical History:   Diagnosis Date    Asthma     Hypertension        Past Surgical History:   Procedure Laterality Date    NO PAST SURGERIES         History reviewed  No pertinent family history  I have reviewed and agree with the history as documented  E-Cigarette/Vaping     E-Cigarette/Vaping Substances     Social History     Tobacco Use    Smoking status: Current Every Day Smoker     Packs/day: 1 00     Types: Cigarettes    Smokeless tobacco: Never Used   Substance Use Topics    Alcohol use: No    Drug use: No       Review of Systems   Constitutional: Negative  HENT: Negative  Eyes: Negative  Respiratory: Negative  Cardiovascular: Negative  Gastrointestinal: Negative  Endocrine: Negative  Genitourinary: Negative  Musculoskeletal: Positive for arthralgias  Skin: Negative  Allergic/Immunologic: Negative  Neurological: Negative  Negative for weakness and numbness  Hematological: Negative  Psychiatric/Behavioral: Negative  All other systems reviewed and are negative  Physical Exam  Physical Exam   Constitutional: She is oriented to person, place, and time  She appears well-developed and well-nourished  Neck: Normal range of motion  Neck supple  Cardiovascular: Normal rate, regular rhythm and normal heart sounds  Pulmonary/Chest: Effort normal and breath sounds normal    Abdominal: Soft  Bowel sounds are normal    Musculoskeletal: Normal range of motion  She exhibits tenderness  She exhibits no edema or deformity  Arms:  Patient with tenderness to the ulnar styloid on the right wrist   No snuffbox tenderness palpation no elbow pain and no pain at the digits  Patient with pain with wrist deviation  Normal apposition flexion abduction adduction of the fingers  Neurological: She is alert and oriented to person, place, and time  No sensory deficit  She exhibits normal muscle tone  Skin: Skin is warm and dry  Capillary refill takes less than 2 seconds     No skin breakdown   Psychiatric: She has a normal mood and affect  Her behavior is normal    Nursing note and vitals reviewed  Vital Signs  ED Triage Vitals [03/01/20 2114]   Temperature Pulse Respirations Blood Pressure SpO2   99 2 °F (37 3 °C) 93 20 (!) 188/97 97 %      Temp Source Heart Rate Source Patient Position - Orthostatic VS BP Location FiO2 (%)   Tympanic Monitor Sitting Left arm --      Pain Score       7           Vitals:    03/01/20 2114   BP: (!) 188/97   Pulse: 93   Patient Position - Orthostatic VS: Sitting         Visual Acuity      ED Medications  Medications - No data to display    Diagnostic Studies  Results Reviewed     None                 XR wrist 3+ views RIGHT   ED Interpretation by Rajesh Cuellar MD (03/01 2126)   No fx                 Procedures  Procedures         ED Course  ED Course as of Mar 01 2321   Tom Verde Mar 01, 2020   2126 One over results with patient  Explained will call back if x-ray is read differently by Radiology  Offer patient splint  Declining splint  Wants ace instead  will give follow up with ortho, light duty, return for any concern  2138 Ace wrap applied by me  Neurovascular intact pre and postprocedure  MDM  Number of Diagnoses or Management Options  Right wrist sprain, initial encounter:      Amount and/or Complexity of Data Reviewed  Tests in the radiology section of CPT®: ordered and reviewed  Independent visualization of images, tracings, or specimens: yes          Disposition  Final diagnoses:   Right wrist sprain, initial encounter     Time reflects when diagnosis was documented in both MDM as applicable and the Disposition within this note     Time User Action Codes Description Comment    3/1/2020  9:31 PM Luan Almodovar Add [N92 [id number]] Right wrist sprain, initial encounter       ED Disposition     ED Disposition Condition Date/Time Comment    Discharge Stable Sun Mar 1, 2020  9:31 PM Jame Bumpers discharge to home/self care              Follow-up Information     Follow up With Specialties Details Why Contact Info    Lucero GARCIA Louise, [address]  1000 [facility]  Anuj MARRERO  [phone]  [phone]  [address] [place] [address], [facility][note 3] E [facility]  [place] [address]  [phone]            Discharge Medication List as of 3/1/2020  9:34 PM      CONTINUE these medications which have NOT CHANGED    Details   acetaminophen (TYLENOL) 325 mg tablet Take 2 tablets (650 mg total) by mouth every 8 (eight) hours as needed for mild pain or headaches, Starting Fri 12/6/2019, Normal      albuterol (2 5 mg/3 mL) 0 083 % nebulizer solution Take 1 vial (2 5 mg total) by nebulization every 6 (six) hours as needed for wheezing or shortness of breath, Starting Fri 12/6/2019, Normal      albuterol (PROVENTIL HFA,VENTOLIN HFA) 90 mcg/act inhaler Inhale 2 puffs every 4 (four) hours as needed for wheezing or shortness of breath, Starting Fri 12/6/2019, Normal      amLODIPine (NORVASC) 10 mg tablet Take 1 tablet (10 mg total) by mouth daily, Starting Fri 12/6/2019, Normal      fluticasone (FLONASE) 50 mcg/act nasal spray 1 spray into each nostril daily, Starting Wed 6/5/2019, Normal      hydrochlorothiazide (HYDRODIURIL) 12 5 mg tablet Take 1 tablet (12 5 mg total) by mouth daily, Starting Fri 12/6/2019, Until Thu 3/5/2020, Normal      nicotine (NICODERM CQ) 7 mg/24hr TD 24 hr patch Place 1 patch on the skin daily, Starting Mon 11/25/2019, Normal      potassium chloride (K-DUR,KLOR-CON) 10 mEq tablet Take 1 tablet (10 mEq total) by mouth daily for 10 days, Starting Sun 11/24/2019, Until Fri 12/6/2019, Normal           No discharge procedures on file      PDMP Review     None          ED Provider  Electronically Signed by           Janette Stiles MD  03/01/20 0172

## 2020-03-11 ENCOUNTER — APPOINTMENT (EMERGENCY)
Dept: RADIOLOGY | Facility: HOSPITAL | Age: 40
End: 2020-03-11
Payer: COMMERCIAL

## 2020-03-11 ENCOUNTER — HOSPITAL ENCOUNTER (EMERGENCY)
Facility: HOSPITAL | Age: 40
Discharge: HOME | End: 2020-03-11
Attending: EMERGENCY MEDICINE
Payer: COMMERCIAL

## 2020-03-11 VITALS
WEIGHT: 289.4 LBS | BODY MASS INDEX: 56.52 KG/M2 | TEMPERATURE: 97.3 F | HEART RATE: 82 BPM | DIASTOLIC BLOOD PRESSURE: 111 MMHG | OXYGEN SATURATION: 98 % | RESPIRATION RATE: 19 BRPM | SYSTOLIC BLOOD PRESSURE: 162 MMHG

## 2020-03-11 DIAGNOSIS — I10 HIGH BLOOD PRESSURE: ICD-10-CM

## 2020-03-11 DIAGNOSIS — J18.9 PNEUMONIA: Primary | ICD-10-CM

## 2020-03-11 PROCEDURE — 71046 X-RAY EXAM CHEST 2 VIEWS: CPT

## 2020-03-11 PROCEDURE — 99283 EMERGENCY DEPT VISIT LOW MDM: CPT

## 2020-03-11 PROCEDURE — 94640 AIRWAY INHALATION TREATMENT: CPT

## 2020-03-11 PROCEDURE — 99284 EMERGENCY DEPT VISIT MOD MDM: CPT | Performed by: EMERGENCY MEDICINE

## 2020-03-11 PROCEDURE — 94640 AIRWAY INHALATION TREATMENT: CPT | Performed by: EMERGENCY MEDICINE

## 2020-03-11 RX ORDER — AMOXICILLIN 500 MG/1
500 CAPSULE ORAL ONCE
Status: COMPLETED | OUTPATIENT
Start: 2020-03-11 | End: 2020-03-11

## 2020-03-11 RX ORDER — AMOXICILLIN 500 MG/1
500 CAPSULE ORAL EVERY 12 HOURS SCHEDULED
Qty: 20 CAPSULE | Refills: 0 | Status: SHIPPED | OUTPATIENT
Start: 2020-03-11 | End: 2020-03-21

## 2020-03-11 RX ORDER — ALBUTEROL SULFATE 2.5 MG/3ML
5 SOLUTION RESPIRATORY (INHALATION) ONCE
Status: COMPLETED | OUTPATIENT
Start: 2020-03-11 | End: 2020-03-11

## 2020-03-11 RX ADMIN — AMOXICILLIN 500 MG: 500 CAPSULE ORAL at 12:55

## 2020-03-11 RX ADMIN — ALBUTEROL SULFATE 5 MG: 2.5 SOLUTION RESPIRATORY (INHALATION) at 12:24

## 2020-03-11 RX ADMIN — IPRATROPIUM BROMIDE 0.5 MG: 0.5 SOLUTION RESPIRATORY (INHALATION) at 12:24

## 2020-03-11 RX ADMIN — PREDNISONE 50 MG: 20 TABLET ORAL at 12:23

## 2020-03-11 NOTE — ED PROVIDER NOTES
History  Chief Complaint   Patient presents with    Asthma     "My asthma is acting up"  Productive cough x 1 week, green in color  States did 3 treatments PTA with no relief  Used treatments all last night as well  "My ribs and back hurt when I cough"  37-year-old female presents for evaluation of productive cough for 1 week  Patient states she has been using her nebulizer much more frequently for the last few days  Reports associated chest pain only with coughing  Associated myalgias  Prior to Admission Medications   Prescriptions Last Dose Informant Patient Reported? Taking?   acetaminophen (TYLENOL) 325 mg tablet   No No   Sig: Take 2 tablets (650 mg total) by mouth every 8 (eight) hours as needed for mild pain or headaches   albuterol (2 5 mg/3 mL) 0 083 % nebulizer solution   No No   Sig: Take 1 vial (2 5 mg total) by nebulization every 6 (six) hours as needed for wheezing or shortness of breath   albuterol (PROVENTIL HFA,VENTOLIN HFA) 90 mcg/act inhaler   No No   Sig: Inhale 2 puffs every 4 (four) hours as needed for wheezing or shortness of breath   amLODIPine (NORVASC) 10 mg tablet   No No   Sig: Take 1 tablet (10 mg total) by mouth daily   fluticasone (FLONASE) 50 mcg/act nasal spray Not Taking at Unknown time  No No   Si spray into each nostril daily   Patient not taking: Reported on 2019   hydrochlorothiazide (HYDRODIURIL) 12 5 mg tablet   No No   Sig: Take 1 tablet (12 5 mg total) by mouth daily   nicotine (NICODERM CQ) 7 mg/24hr TD 24 hr patch Not Taking at Unknown time  No No   Sig: Place 1 patch on the skin daily   Patient not taking: Reported on 3/11/2020      Facility-Administered Medications: None       Past Medical History:   Diagnosis Date    Asthma     Hypertension        Past Surgical History:   Procedure Laterality Date    NO PAST SURGERIES         History reviewed  No pertinent family history    I have reviewed and agree with the history as documented  E-Cigarette/Vaping     E-Cigarette/Vaping Substances     Social History     Tobacco Use    Smoking status: Current Every Day Smoker     Packs/day: 1 00     Types: Cigarettes    Smokeless tobacco: Never Used    Tobacco comment: " not for past couple days"  Substance Use Topics    Alcohol use: No    Drug use: No       Review of Systems   Constitutional: Positive for chills and fever  Negative for diaphoresis  HENT: Negative for congestion and rhinorrhea  Eyes: Negative for pain and visual disturbance  Respiratory: Positive for cough and wheezing  Negative for shortness of breath  Cardiovascular: Positive for chest pain  Negative for leg swelling  Gastrointestinal: Negative for abdominal pain, diarrhea, nausea and vomiting  Genitourinary: Negative for difficulty urinating, dysuria, frequency and urgency  Musculoskeletal: Positive for myalgias  Negative for back pain and neck pain  Skin: Negative for color change and rash  Neurological: Negative for syncope, numbness and headaches  All other systems reviewed and are negative  Physical Exam  Physical Exam   Constitutional: She is oriented to person, place, and time  She appears well-developed and well-nourished  HENT:   Head: Normocephalic and atraumatic  Eyes: Conjunctivae and EOM are normal    Neck: Normal range of motion  Neck supple  Cardiovascular: Normal rate and regular rhythm  Pulmonary/Chest: Effort normal  No respiratory distress  She has no wheezes  She has no rales  Diminished breath sounds diffusely   Abdominal: Soft  Bowel sounds are normal  There is no tenderness  There is no guarding  Musculoskeletal: Normal range of motion  She exhibits no edema or tenderness  Neurological: She is alert and oriented to person, place, and time  No cranial nerve deficit  Skin: Skin is warm  No erythema  Psychiatric: She has a normal mood and affect   Her behavior is normal    Nursing note and vitals reviewed  Vital Signs  ED Triage Vitals [03/11/20 1201]   Temperature Pulse Respirations Blood Pressure SpO2   (!) 97 3 °F (36 3 °C) 82 19 (!) 162/111 98 %      Temp Source Heart Rate Source Patient Position - Orthostatic VS BP Location FiO2 (%)   Tympanic Monitor Sitting Left arm --      Pain Score       --           Vitals:    03/11/20 1201   BP: (!) 162/111   Pulse: 82   Patient Position - Orthostatic VS: Sitting         Visual Acuity      ED Medications  Medications   albuterol inhalation solution 5 mg (5 mg Nebulization Given 3/11/20 1224)   ipratropium (ATROVENT) 0 02 % inhalation solution 0 5 mg (0 5 mg Nebulization Given 3/11/20 1224)   predniSONE tablet 50 mg (50 mg Oral Given 3/11/20 1223)   amoxicillin (AMOXIL) capsule 500 mg (500 mg Oral Given 3/11/20 1255)       Diagnostic Studies  Results Reviewed     None                 XR chest 2 views   ED Interpretation by Morena Mortensen DO (03/11 1231)   RLL infiltrate      Final Result by Nitesh Friedman MD (03/11 1311)      No acute cardiopulmonary disease  Workstation performed: [id number]                    Procedures  Procedures         ED Course                                 MDM  Number of Diagnoses or Management Options  High blood pressure:   Pneumonia:   Diagnosis management comments: 79-year-old female presenting with URI symptoms  Obtain chest work to assess for pneumonia  DuoNeb and steroids  Chest x-ray reveals pneumonia  Will treat with antibiotics  Follow-up with PCP  Return precautions provided          Disposition  Final diagnoses:   Pneumonia   High blood pressure     Time reflects when diagnosis was documented in both MDM as applicable and the Disposition within this note     Time User Action Codes Description Comment    3/11/2020 12:43 PM Washington Valiente [J18 [id number]] Pneumonia     3/11/2020 12:44 PM Cyndy Jenkins [facility] High blood pressure       ED Disposition     ED Disposition Condition Date/Time Comment    Discharge Stable Wed Mar 11, 2020 12:39 PM Sang Salvador discharge to home/self care  Follow-up Information     Follow up With Specialties Details Why 125 Margarita Cole PA-C Family Medicine In 1 week  [address]  [address] Emergency Department Emergency Medicine  If symptoms worsen [address] [address] [phone]          Discharge Medication List as of 3/11/2020 12:44 PM      START taking these medications    Details   amoxicillin (AMOXIL) 500 mg capsule Take 1 capsule (500 mg total) by mouth every 12 (twelve) hours for 10 days, Starting Wed 3/11/2020, Until Sat 3/21/2020, Print         CONTINUE these medications which have NOT CHANGED    Details   acetaminophen (TYLENOL) 325 mg tablet Take 2 tablets (650 mg total) by mouth every 8 (eight) hours as needed for mild pain or headaches, Starting Fri 12/6/2019, Normal      albuterol (2 5 mg/3 mL) 0 083 % nebulizer solution Take 1 vial (2 5 mg total) by nebulization every 6 (six) hours as needed for wheezing or shortness of breath, Starting Fri 12/6/2019, Normal      albuterol (PROVENTIL HFA,VENTOLIN HFA) 90 mcg/act inhaler Inhale 2 puffs every 4 (four) hours as needed for wheezing or shortness of breath, Starting Fri 12/6/2019, Normal      amLODIPine (NORVASC) 10 mg tablet Take 1 tablet (10 mg total) by mouth daily, Starting Fri 12/6/2019, Normal      fluticasone (FLONASE) 50 mcg/act nasal spray 1 spray into each nostril daily, Starting Wed 6/5/2019, Normal      hydrochlorothiazide (HYDRODIURIL) 12 5 mg tablet Take 1 tablet (12 5 mg total) by mouth daily, Starting Fri 12/6/2019, Until Thu 3/5/2020, Normal      nicotine (NICODERM CQ) 7 mg/24hr TD 24 hr patch Place 1 patch on the skin daily, Starting Mon 11/25/2019, Normal           No discharge procedures on file      PDMP Review     None          ED Provider  Electronically Signed by Randolph Stratton, DO  03/11/20 1341

## 2020-07-24 ENCOUNTER — HOSPITAL ENCOUNTER (EMERGENCY)
Facility: HOSPITAL | Age: 40
End: 2020-07-25
Attending: EMERGENCY MEDICINE | Admitting: EMERGENCY MEDICINE
Payer: COMMERCIAL

## 2020-07-24 DIAGNOSIS — I21.4 NSTEMI (NON-ST ELEVATED MYOCARDIAL INFARCTION) (HCC): Primary | ICD-10-CM

## 2020-07-24 PROCEDURE — 93005 ELECTROCARDIOGRAM TRACING: CPT

## 2020-07-24 PROCEDURE — 80061 LIPID PANEL: CPT | Performed by: FAMILY MEDICINE

## 2020-07-24 PROCEDURE — 84484 ASSAY OF TROPONIN QUANT: CPT | Performed by: EMERGENCY MEDICINE

## 2020-07-24 PROCEDURE — 83880 ASSAY OF NATRIURETIC PEPTIDE: CPT | Performed by: EMERGENCY MEDICINE

## 2020-07-24 PROCEDURE — 99285 EMERGENCY DEPT VISIT HI MDM: CPT

## 2020-07-24 PROCEDURE — 83690 ASSAY OF LIPASE: CPT | Performed by: EMERGENCY MEDICINE

## 2020-07-24 PROCEDURE — 85025 COMPLETE CBC W/AUTO DIFF WBC: CPT | Performed by: EMERGENCY MEDICINE

## 2020-07-24 PROCEDURE — 80053 COMPREHEN METABOLIC PANEL: CPT | Performed by: EMERGENCY MEDICINE

## 2020-07-24 PROCEDURE — 36415 COLL VENOUS BLD VENIPUNCTURE: CPT | Performed by: EMERGENCY MEDICINE

## 2020-07-25 ENCOUNTER — HOSPITAL ENCOUNTER (INPATIENT)
Facility: HOSPITAL | Age: 40
LOS: 3 days | Discharge: HOME | DRG: 192 | End: 2020-07-28
Attending: FAMILY MEDICINE | Admitting: FAMILY MEDICINE
Payer: COMMERCIAL

## 2020-07-25 ENCOUNTER — APPOINTMENT (EMERGENCY)
Dept: RADIOLOGY | Facility: HOSPITAL | Age: 40
End: 2020-07-25
Payer: COMMERCIAL

## 2020-07-25 VITALS
OXYGEN SATURATION: 99 % | RESPIRATION RATE: 18 BRPM | HEART RATE: 89 BPM | TEMPERATURE: 97.9 F | DIASTOLIC BLOOD PRESSURE: 102 MMHG | WEIGHT: 286 LBS | SYSTOLIC BLOOD PRESSURE: 196 MMHG | BODY MASS INDEX: 55.86 KG/M2

## 2020-07-25 DIAGNOSIS — Z72.0 TOBACCO ABUSE: ICD-10-CM

## 2020-07-25 DIAGNOSIS — I10 HYPERTENSION, UNSPECIFIED TYPE: ICD-10-CM

## 2020-07-25 DIAGNOSIS — R77.8 ELEVATED TROPONIN: Primary | ICD-10-CM

## 2020-07-25 PROBLEM — R79.89 ELEVATED TROPONIN: Status: ACTIVE | Noted: 2020-07-25

## 2020-07-25 PROBLEM — R07.9 CHEST PAIN: Status: ACTIVE | Noted: 2020-07-25

## 2020-07-25 PROBLEM — J45.909 ASTHMA: Status: ACTIVE | Noted: 2020-07-25

## 2020-07-25 LAB
ALBUMIN SERPL BCP-MCNC: 4.2 G/DL (ref 3–5.2)
ALP SERPL-CCNC: 54 U/L (ref 43–122)
ALT SERPL W P-5'-P-CCNC: 18 U/L (ref 9–52)
ANION GAP SERPL CALCULATED.3IONS-SCNC: 7 MMOL/L (ref 5–14)
APTT PPP: 33 SECONDS (ref 23–37)
APTT PPP: 59 SECONDS (ref 23–37)
APTT PPP: 64 SECONDS (ref 23–37)
APTT PPP: 79 SECONDS (ref 23–37)
AST SERPL W P-5'-P-CCNC: 32 U/L (ref 14–36)
ATRIAL RATE: 76 BPM
ATRIAL RATE: 78 BPM
BASOPHILS # BLD AUTO: 0 THOUSANDS/ΜL (ref 0–0.1)
BASOPHILS NFR BLD AUTO: 0 % (ref 0–1)
BILIRUB SERPL-MCNC: 0.4 MG/DL
BUN SERPL-MCNC: 8 MG/DL (ref 5–25)
CALCIUM SERPL-MCNC: 9.5 MG/DL (ref 8.4–10.2)
CHLORIDE SERPL-SCNC: 105 MMOL/L (ref 97–108)
CHOLEST SERPL-MCNC: 154 MG/DL
CO2 SERPL-SCNC: 28 MMOL/L (ref 22–30)
CREAT SERPL-MCNC: 0.82 MG/DL (ref 0.6–1.2)
EOSINOPHIL # BLD AUTO: 0.1 THOUSAND/ΜL (ref 0–0.4)
EOSINOPHIL NFR BLD AUTO: 3 % (ref 0–6)
ERYTHROCYTE [DISTWIDTH] IN BLOOD BY AUTOMATED COUNT: 14.4 % (ref 11.6–15.1)
ERYTHROCYTE [DISTWIDTH] IN BLOOD BY AUTOMATED COUNT: 15.1 %
EST. AVERAGE GLUCOSE BLD GHB EST-MCNC: 123 MG/DL
GFR SERPL CREATININE-BSD FRML MDRD: 91 ML/MIN/1.73SQ M
GLUCOSE SERPL-MCNC: 110 MG/DL (ref 70–99)
HBA1C MFR BLD: 5.9 %
HCT VFR BLD AUTO: 38.6 % (ref 34.8–46.1)
HCT VFR BLD AUTO: 39 % (ref 36–46)
HDLC SERPL-MCNC: 35 MG/DL
HGB BLD-MCNC: 12.5 G/DL (ref 11.5–15.4)
HGB BLD-MCNC: 13.1 G/DL (ref 12–16)
INR PPP: 1.05 (ref 0.84–1.19)
INR PPP: 1.09 (ref 0.84–1.19)
LDLC SERPL CALC-MCNC: 79 MG/DL
LIPASE SERPL-CCNC: 84 U/L (ref 23–300)
LYMPHOCYTES # BLD AUTO: 2.4 THOUSANDS/ΜL (ref 0.5–4)
LYMPHOCYTES NFR BLD AUTO: 45 % (ref 25–45)
MCH RBC QN AUTO: 29.1 PG (ref 26.8–34.3)
MCH RBC QN AUTO: 29.4 PG (ref 26–34)
MCHC RBC AUTO-ENTMCNC: 32.4 G/DL (ref 31.4–37.4)
MCHC RBC AUTO-ENTMCNC: 33.6 G/DL (ref 31–36)
MCV RBC AUTO: 87 FL (ref 80–100)
MCV RBC AUTO: 90 FL (ref 82–98)
MONOCYTES # BLD AUTO: 0.6 THOUSAND/ΜL (ref 0.2–0.9)
MONOCYTES NFR BLD AUTO: 11 % (ref 1–10)
NEUTROPHILS # BLD AUTO: 2.2 THOUSANDS/ΜL (ref 1.8–7.8)
NEUTS SEG NFR BLD AUTO: 41 % (ref 45–65)
NONHDLC SERPL-MCNC: 119 MG/DL
NT-PROBNP SERPL-MCNC: 283 PG/ML (ref 0–299)
P AXIS: 51 DEGREES
P AXIS: 67 DEGREES
PLATELET # BLD AUTO: 228 THOUSANDS/UL (ref 149–390)
PLATELET # BLD AUTO: 239 THOUSANDS/UL (ref 150–450)
PMV BLD AUTO: 10.6 FL (ref 8.9–12.7)
PMV BLD AUTO: 8.8 FL (ref 8.9–12.7)
POTASSIUM SERPL-SCNC: 3.9 MMOL/L (ref 3.6–5)
PR INTERVAL: 152 MS
PR INTERVAL: 168 MS
PROT SERPL-MCNC: 7.3 G/DL (ref 5.9–8.4)
PROTHROMBIN TIME: 13.8 SECONDS (ref 11.6–14.5)
PROTHROMBIN TIME: 14.2 SECONDS (ref 11.6–14.5)
QRS AXIS: 60 DEGREES
QRS AXIS: 72 DEGREES
QRSD INTERVAL: 82 MS
QRSD INTERVAL: 88 MS
QT INTERVAL: 402 MS
QT INTERVAL: 402 MS
QTC INTERVAL: 452 MS
QTC INTERVAL: 458 MS
RBC # BLD AUTO: 4.3 MILLION/UL (ref 3.81–5.12)
RBC # BLD AUTO: 4.46 MILLION/UL (ref 4–5.2)
SODIUM SERPL-SCNC: 140 MMOL/L (ref 137–147)
T WAVE AXIS: -50 DEGREES
T WAVE AXIS: 269 DEGREES
TRIGL SERPL-MCNC: 200 MG/DL
TROPONIN I SERPL-MCNC: 0.18 NG/ML (ref 0–0.03)
TROPONIN I SERPL-MCNC: 0.36 NG/ML (ref 0–0.03)
TROPONIN I SERPL-MCNC: 0.52 NG/ML (ref 0–0.03)
TROPONIN I SERPL-MCNC: 1.01 NG/ML
TROPONIN I SERPL-MCNC: 1.69 NG/ML
TROPONIN I SERPL-MCNC: 2.1 NG/ML
TROPONIN I SERPL-MCNC: 2.25 NG/ML
VENTRICULAR RATE: 76 BPM
VENTRICULAR RATE: 78 BPM
WBC # BLD AUTO: 5.28 THOUSAND/UL (ref 4.31–10.16)
WBC # BLD AUTO: 5.3 THOUSAND/UL (ref 4.5–11)

## 2020-07-25 PROCEDURE — 96375 TX/PRO/DX INJ NEW DRUG ADDON: CPT

## 2020-07-25 PROCEDURE — G0379 DIRECT REFER HOSPITAL OBSERV: HCPCS

## 2020-07-25 PROCEDURE — 96365 THER/PROPH/DIAG IV INF INIT: CPT

## 2020-07-25 PROCEDURE — 85730 THROMBOPLASTIN TIME PARTIAL: CPT | Performed by: FAMILY MEDICINE

## 2020-07-25 PROCEDURE — 71045 X-RAY EXAM CHEST 1 VIEW: CPT

## 2020-07-25 PROCEDURE — 84484 ASSAY OF TROPONIN QUANT: CPT | Performed by: EMERGENCY MEDICINE

## 2020-07-25 PROCEDURE — 85730 THROMBOPLASTIN TIME PARTIAL: CPT | Performed by: EMERGENCY MEDICINE

## 2020-07-25 PROCEDURE — 85610 PROTHROMBIN TIME: CPT | Performed by: FAMILY MEDICINE

## 2020-07-25 PROCEDURE — 96361 HYDRATE IV INFUSION ADD-ON: CPT

## 2020-07-25 PROCEDURE — 93010 ELECTROCARDIOGRAM REPORT: CPT | Performed by: INTERNAL MEDICINE

## 2020-07-25 PROCEDURE — 85027 COMPLETE CBC AUTOMATED: CPT | Performed by: FAMILY MEDICINE

## 2020-07-25 PROCEDURE — 84484 ASSAY OF TROPONIN QUANT: CPT | Performed by: FAMILY MEDICINE

## 2020-07-25 PROCEDURE — 93005 ELECTROCARDIOGRAM TRACING: CPT

## 2020-07-25 PROCEDURE — 94760 N-INVAS EAR/PLS OXIMETRY 1: CPT

## 2020-07-25 PROCEDURE — 96374 THER/PROPH/DIAG INJ IV PUSH: CPT

## 2020-07-25 PROCEDURE — 36415 COLL VENOUS BLD VENIPUNCTURE: CPT | Performed by: EMERGENCY MEDICINE

## 2020-07-25 PROCEDURE — 94660 CPAP INITIATION&MGMT: CPT

## 2020-07-25 PROCEDURE — 85610 PROTHROMBIN TIME: CPT | Performed by: EMERGENCY MEDICINE

## 2020-07-25 PROCEDURE — NC001 PR NO CHARGE: Performed by: FAMILY MEDICINE

## 2020-07-25 PROCEDURE — 99255 IP/OBS CONSLTJ NEW/EST HI 80: CPT | Performed by: INTERNAL MEDICINE

## 2020-07-25 PROCEDURE — 99285 EMERGENCY DEPT VISIT HI MDM: CPT | Performed by: EMERGENCY MEDICINE

## 2020-07-25 PROCEDURE — 96376 TX/PRO/DX INJ SAME DRUG ADON: CPT

## 2020-07-25 PROCEDURE — 99223 1ST HOSP IP/OBS HIGH 75: CPT | Performed by: FAMILY MEDICINE

## 2020-07-25 PROCEDURE — 83036 HEMOGLOBIN GLYCOSYLATED A1C: CPT | Performed by: FAMILY MEDICINE

## 2020-07-25 PROCEDURE — 96366 THER/PROPH/DIAG IV INF ADDON: CPT

## 2020-07-25 RX ORDER — LABETALOL 20 MG/4 ML (5 MG/ML) INTRAVENOUS SYRINGE
10 ONCE
Status: COMPLETED | OUTPATIENT
Start: 2020-07-25 | End: 2020-07-25

## 2020-07-25 RX ORDER — HYDROCHLOROTHIAZIDE 12.5 MG/1
12.5 TABLET ORAL DAILY
Status: DISCONTINUED | OUTPATIENT
Start: 2020-07-25 | End: 2020-07-26

## 2020-07-25 RX ORDER — HEPARIN SODIUM 1000 [USP'U]/ML
2000 INJECTION, SOLUTION INTRAVENOUS; SUBCUTANEOUS
Status: DISCONTINUED | OUTPATIENT
Start: 2020-07-25 | End: 2020-07-27

## 2020-07-25 RX ORDER — LISINOPRIL 10 MG/1
10 TABLET ORAL DAILY
Status: DISCONTINUED | OUTPATIENT
Start: 2020-07-25 | End: 2020-07-25

## 2020-07-25 RX ORDER — HEPARIN SODIUM 10000 [USP'U]/100ML
3-20 INJECTION, SOLUTION INTRAVENOUS
Status: DISCONTINUED | OUTPATIENT
Start: 2020-07-25 | End: 2020-07-27

## 2020-07-25 RX ORDER — ALBUTEROL SULFATE 90 UG/1
2 AEROSOL, METERED RESPIRATORY (INHALATION) EVERY 4 HOURS PRN
Status: DISCONTINUED | OUTPATIENT
Start: 2020-07-25 | End: 2020-07-28 | Stop reason: HOSPADM

## 2020-07-25 RX ORDER — HYDRALAZINE HYDROCHLORIDE 20 MG/ML
5 INJECTION INTRAMUSCULAR; INTRAVENOUS ONCE
Status: COMPLETED | OUTPATIENT
Start: 2020-07-25 | End: 2020-07-25

## 2020-07-25 RX ORDER — HEPARIN SODIUM 1000 [USP'U]/ML
2000 INJECTION, SOLUTION INTRAVENOUS; SUBCUTANEOUS
Status: DISCONTINUED | OUTPATIENT
Start: 2020-07-25 | End: 2020-07-25 | Stop reason: HOSPADM

## 2020-07-25 RX ORDER — ACETAMINOPHEN 325 MG/1
650 TABLET ORAL EVERY 6 HOURS PRN
Status: DISCONTINUED | OUTPATIENT
Start: 2020-07-25 | End: 2020-07-26

## 2020-07-25 RX ORDER — HEPARIN SODIUM 10000 [USP'U]/100ML
3-20 INJECTION, SOLUTION INTRAVENOUS
Status: DISCONTINUED | OUTPATIENT
Start: 2020-07-25 | End: 2020-07-25 | Stop reason: HOSPADM

## 2020-07-25 RX ORDER — HEPARIN SODIUM 1000 [USP'U]/ML
4000 INJECTION, SOLUTION INTRAVENOUS; SUBCUTANEOUS
Status: DISCONTINUED | OUTPATIENT
Start: 2020-07-25 | End: 2020-07-27

## 2020-07-25 RX ORDER — MORPHINE SULFATE 4 MG/ML
4 INJECTION, SOLUTION INTRAMUSCULAR; INTRAVENOUS ONCE
Status: DISCONTINUED | OUTPATIENT
Start: 2020-07-25 | End: 2020-07-25 | Stop reason: HOSPADM

## 2020-07-25 RX ORDER — FLUTICASONE PROPIONATE 50 MCG
1 SPRAY, SUSPENSION (ML) NASAL DAILY
Status: DISCONTINUED | OUTPATIENT
Start: 2020-07-25 | End: 2020-07-28 | Stop reason: HOSPADM

## 2020-07-25 RX ORDER — NICOTINE 21 MG/24HR
1 PATCH, TRANSDERMAL 24 HOURS TRANSDERMAL DAILY
Status: DISCONTINUED | OUTPATIENT
Start: 2020-07-25 | End: 2020-07-25

## 2020-07-25 RX ORDER — LISINOPRIL 20 MG/1
20 TABLET ORAL DAILY
Status: DISCONTINUED | OUTPATIENT
Start: 2020-07-26 | End: 2020-07-28 | Stop reason: HOSPADM

## 2020-07-25 RX ORDER — HEPARIN SODIUM 1000 [USP'U]/ML
4000 INJECTION, SOLUTION INTRAVENOUS; SUBCUTANEOUS
Status: DISCONTINUED | OUTPATIENT
Start: 2020-07-25 | End: 2020-07-25 | Stop reason: HOSPADM

## 2020-07-25 RX ORDER — LISINOPRIL 10 MG/1
10 TABLET ORAL DAILY
COMMUNITY
End: 2020-07-28 | Stop reason: HOSPADM

## 2020-07-25 RX ORDER — MORPHINE SULFATE 4 MG/ML
4 INJECTION, SOLUTION INTRAMUSCULAR; INTRAVENOUS ONCE
Status: COMPLETED | OUTPATIENT
Start: 2020-07-25 | End: 2020-07-25

## 2020-07-25 RX ORDER — ASPIRIN 81 MG/1
324 TABLET, CHEWABLE ORAL ONCE
Status: COMPLETED | OUTPATIENT
Start: 2020-07-25 | End: 2020-07-25

## 2020-07-25 RX ORDER — SODIUM CHLORIDE 9 MG/ML
125 INJECTION, SOLUTION INTRAVENOUS CONTINUOUS
Status: DISCONTINUED | OUTPATIENT
Start: 2020-07-25 | End: 2020-07-26

## 2020-07-25 RX ORDER — NITROGLYCERIN 0.4 MG/1
0.4 TABLET SUBLINGUAL ONCE
Status: COMPLETED | OUTPATIENT
Start: 2020-07-25 | End: 2020-07-25

## 2020-07-25 RX ORDER — HEPARIN SODIUM 1000 [USP'U]/ML
4000 INJECTION, SOLUTION INTRAVENOUS; SUBCUTANEOUS ONCE
Status: COMPLETED | OUTPATIENT
Start: 2020-07-25 | End: 2020-07-25

## 2020-07-25 RX ORDER — NICOTINE 21 MG/24HR
21 PATCH, TRANSDERMAL 24 HOURS TRANSDERMAL DAILY
Status: DISCONTINUED | OUTPATIENT
Start: 2020-07-25 | End: 2020-07-28 | Stop reason: HOSPADM

## 2020-07-25 RX ADMIN — SODIUM CHLORIDE 125 ML/HR: 0.9 INJECTION, SOLUTION INTRAVENOUS at 10:42

## 2020-07-25 RX ADMIN — ACETAMINOPHEN 650 MG: 325 TABLET, FILM COATED ORAL at 13:43

## 2020-07-25 RX ADMIN — NITROGLYCERIN 0.4 MG: 0.4 TABLET SUBLINGUAL at 01:36

## 2020-07-25 RX ADMIN — HEPARIN SODIUM AND DEXTROSE 11.1 UNITS/KG/HR: 10000; 5 INJECTION INTRAVENOUS at 10:41

## 2020-07-25 RX ADMIN — HYDROCHLOROTHIAZIDE 12.5 MG: 12.5 TABLET ORAL at 10:52

## 2020-07-25 RX ADMIN — SODIUM CHLORIDE 1000 ML: 0.9 INJECTION, SOLUTION INTRAVENOUS at 00:04

## 2020-07-25 RX ADMIN — MORPHINE SULFATE 4 MG: 4 INJECTION INTRAVENOUS at 00:09

## 2020-07-25 RX ADMIN — ALBUTEROL SULFATE 2 PUFF: 90 AEROSOL, METERED RESPIRATORY (INHALATION) at 10:53

## 2020-07-25 RX ADMIN — NICOTINE 21 MG: 21 PATCH TRANSDERMAL at 10:52

## 2020-07-25 RX ADMIN — HEPARIN SODIUM 2000 UNITS: 1000 INJECTION, SOLUTION INTRAVENOUS; SUBCUTANEOUS at 07:27

## 2020-07-25 RX ADMIN — LISINOPRIL 10 MG: 10 TABLET ORAL at 10:53

## 2020-07-25 RX ADMIN — SODIUM CHLORIDE 125 ML/HR: 0.9 INJECTION, SOLUTION INTRAVENOUS at 20:06

## 2020-07-25 RX ADMIN — LABETALOL 20 MG/4 ML (5 MG/ML) INTRAVENOUS SYRINGE 10 MG: at 00:47

## 2020-07-25 RX ADMIN — FLUTICASONE PROPIONATE 1 SPRAY: 50 SPRAY, METERED NASAL at 10:53

## 2020-07-25 RX ADMIN — HEPARIN SODIUM AND DEXTROSE 11.1 UNITS/KG/HR: 10000; 5 INJECTION INTRAVENOUS at 01:03

## 2020-07-25 RX ADMIN — HYDRALAZINE HYDROCHLORIDE 5 MG: 20 INJECTION INTRAMUSCULAR; INTRAVENOUS at 23:42

## 2020-07-25 RX ADMIN — ASPIRIN 81 MG 324 MG: 81 TABLET ORAL at 00:09

## 2020-07-25 RX ADMIN — HEPARIN SODIUM 4000 UNITS: 1000 INJECTION, SOLUTION INTRAVENOUS; SUBCUTANEOUS at 01:02

## 2020-07-25 RX ADMIN — MORPHINE SULFATE 4 MG: 4 INJECTION INTRAVENOUS at 07:23

## 2020-07-25 NOTE — ED NOTES
Patient noted to be sleeping upon entering room  Dr Mouna Ramos in room to make patient aware of transfer to [facility] but arrangements are pending  Monitor - sinus with PAC's       Elias Elizabeth RN  07/25/20 3263

## 2020-07-25 NOTE — ED PROVIDER NOTES
History  Chief Complaint   Patient presents with    Chest Pain       History provided by:  Patient   used: No    Chest Pain   Pain location:  Substernal area  Pain quality: aching and sharp    Pain radiates to the back: no    Pain severity:  No pain  Onset quality:  Gradual  Duration:  1 day  Timing:  Constant  Progression:  Worsening  Chronicity:  Recurrent  Context: at rest    Relieved by:  Nothing  Worsened by:  Nothing tried  Associated symptoms: no abdominal pain, no claudication, no cough, no dizziness, no dysphagia, no fever, no nausea, no numbness, no palpitations, no shortness of breath, not vomiting and no weakness    Risk factors: hypertension and obesity        Prior to Admission Medications   Prescriptions Last Dose Informant Patient Reported?  Taking?   acetaminophen (TYLENOL) 325 mg tablet Not Taking at Unknown time  No No   Sig: Take 2 tablets (650 mg total) by mouth every 8 (eight) hours as needed for mild pain or headaches   Patient not taking: Reported on 2020   albuterol (2 5 mg/3 mL) 0 083 % nebulizer solution   No No   Sig: Take 1 vial (2 5 mg total) by nebulization every 6 (six) hours as needed for wheezing or shortness of breath   albuterol (PROVENTIL HFA,VENTOLIN HFA) 90 mcg/act inhaler   No No   Sig: Inhale 2 puffs every 4 (four) hours as needed for wheezing or shortness of breath   amLODIPine (NORVASC) 10 mg tablet Not Taking at Unknown time  No No   Sig: Take 1 tablet (10 mg total) by mouth daily   Patient not taking: Reported on 2020   fluticasone (FLONASE) 50 mcg/act nasal spray Not Taking at Unknown time  No No   Si spray into each nostril daily   Patient not taking: Reported on 2019   hydrochlorothiazide (HYDRODIURIL) 12 5 mg tablet   No No   Sig: Take 1 tablet (12 5 mg total) by mouth daily   lisinopril (ZESTRIL) 10 mg tablet  Self Yes Yes   Sig: Take 10 mg by mouth daily   nicotine (NICODERM CQ) 7 mg/24hr TD 24 hr patch   No No   Sig: Place 1 patch on the skin daily   Patient not taking: Reported on 3/11/2020      Facility-Administered Medications: None       Past Medical History:   Diagnosis Date    Asthma     Hypertension        Past Surgical History:   Procedure Laterality Date    NO PAST SURGERIES         History reviewed  No pertinent family history  I have reviewed and agree with the history as documented  E-Cigarette/Vaping     E-Cigarette/Vaping Substances     Social History     Tobacco Use    Smoking status: Current Every Day Smoker     Packs/day: 1 00     Types: Cigarettes    Smokeless tobacco: Never Used    Tobacco comment: " not for past couple days"  Substance Use Topics    Alcohol use: No    Drug use: No       Review of Systems   Constitutional: Negative  Negative for chills and fever  HENT: Negative  Negative for rhinorrhea, sore throat, trouble swallowing and voice change  Eyes: Negative  Negative for pain and visual disturbance  Respiratory: Negative  Negative for cough, shortness of breath and wheezing  Cardiovascular: Positive for chest pain  Negative for palpitations and claudication  Gastrointestinal: Negative for abdominal pain, diarrhea, nausea and vomiting  Genitourinary: Negative  Negative for dysuria and frequency  Musculoskeletal: Negative  Negative for neck pain and neck stiffness  Skin: Negative  Negative for rash  Neurological: Negative  Negative for dizziness, speech difficulty, weakness, light-headedness and numbness  Physical Exam  Physical Exam   Constitutional: She is oriented to person, place, and time  She appears well-developed and well-nourished  No distress  HENT:   Head: Normocephalic and atraumatic  Mouth/Throat: Oropharynx is clear and moist    Eyes: Pupils are equal, round, and reactive to light  Conjunctivae and EOM are normal    Neck: Normal range of motion  Neck supple  No tracheal deviation present     Cardiovascular: Normal rate, regular rhythm and intact distal pulses  Pulmonary/Chest: Effort normal and breath sounds normal  No respiratory distress  She has no wheezes  She has no rales  Abdominal: Soft  Bowel sounds are normal  She exhibits no distension  There is no tenderness  There is no rebound and no guarding  Musculoskeletal: Normal range of motion  She exhibits no tenderness or deformity  Neurological: She is alert and oriented to person, place, and time  Skin: Skin is warm and dry  Capillary refill takes less than 2 seconds  No rash noted  Psychiatric: She has a normal mood and affect  Her behavior is normal    Nursing note and vitals reviewed        Vital Signs  ED Triage Vitals [07/24/20 2323]   Temperature Pulse Respirations Blood Pressure SpO2   97 9 °F (36 6 °C) 80 20 (!) 185/108 100 %      Temp Source Heart Rate Source Patient Position - Orthostatic VS BP Location FiO2 (%)   Tympanic Monitor Sitting Left arm --      Pain Score       Worst Possible Pain           Vitals:    07/25/20 0200 07/25/20 0215 07/25/20 0230 07/25/20 0300   BP: 163/92 157/77 158/90 167/88   Pulse: 79 82 76 82   Patient Position - Orthostatic VS: Lying Lying Lying Lying         Visual Acuity      ED Medications  Medications   heparin (porcine) 25,000 units in 250 mL infusion (premix) (11 1 Units/kg/hr × 90 kg (Order-Specific) Intravenous New Bag 7/25/20 0103)   heparin (porcine) injection 4,000 Units (has no administration in time range)   heparin (porcine) injection 2,000 Units (has no administration in time range)   morphine (PF) 4 mg/mL injection 4 mg (has no administration in time range)   sodium chloride 0 9 % bolus 1,000 mL (1,000 mL Intravenous New Bag 7/25/20 0004)   aspirin chewable tablet 324 mg (324 mg Oral Given 7/25/20 0009)   morphine (PF) 4 mg/mL injection 4 mg (4 mg Intravenous Given 7/25/20 0009)   heparin (porcine) injection 4,000 Units (4,000 Units Intravenous Given 7/25/20 0102)   Labetalol HCl (NORMODYNE) injection 10 mg (10 mg Intravenous Given 7/25/20 0047)   nitroglycerin (NITROSTAT) SL tablet 0 4 mg (0 4 mg Sublingual Given 7/25/20 0136)       Diagnostic Studies  Results Reviewed     Procedure Component Value Units Date/Time    Troponin I [[id number]]  (Abnormal) Collected:  07/25/20 0237    Lab Status:  Final result Specimen:  Blood from Arm, Right Updated:  07/25/20 0305     Troponin I 0 36 ng/mL     APTT six (6) hours after Heparin bolus/drip initiation or dosing change [[id number]]  (Normal) Collected:  07/25/20 0038    Lab Status:  Final result Specimen:  Blood from Arm, Right Updated:  07/25/20 0100     PTT 33 seconds     Protime-INR [[id number]]  (Normal) Collected:  07/25/20 0038    Lab Status:  Final result Specimen:  Blood from Arm, Right Updated:  07/25/20 0100     Protime 13 8 seconds      INR 1 05    Troponin I [[id number]]  (Abnormal) Collected:  07/24/20 2359    Lab Status:  Final result Specimen:  Blood from Arm, Right Updated:  07/25/20 0029     Troponin I 0 18 ng/mL     NT-BNP PRO [[id number]]  (Normal) Collected:  07/24/20 2359    Lab Status:  Final result Specimen:  Blood from Arm, Right Updated:  07/25/20 0027     NT-proBNP 283 pg/mL     Lipase [[id number]]  (Normal) Collected:  07/24/20 2359    Lab Status:  Final result Specimen:  Blood from Arm, Right Updated:  07/25/20 0018     Lipase 84 u/L     Comprehensive metabolic panel [[id number]]  (Abnormal) Collected:  07/24/20 2359    Lab Status:  Final result Specimen:  Blood from Arm, Right Updated:  07/25/20 0018     Sodium 140 mmol/L      Potassium 3 9 mmol/L      Chloride 105 mmol/L      CO2 28 mmol/L      ANION GAP 7 mmol/L      BUN 8 mg/dL      Creatinine 0 82 mg/dL      Glucose 110 mg/dL      Calcium 9 5 mg/dL      AST 32 U/L      ALT 18 U/L      Alkaline Phosphatase 54 U/L      Total Protein 7 3 g/dL      Albumin 4 2 g/dL      Total Bilirubin 0 40 mg/dL      eGFR 91 ml/min/1 73sq m     Narrative:       [place] guidelines for Chronic Kidney Disease (CKD):    Stage 1 with normal or high GFR (GFR > 90 mL/min/1 73 square meters)    Stage 2 Mild CKD (GFR = 60-89 mL/min/1 73 square meters)    Stage 3A Moderate CKD (GFR = 45-59 mL/min/1 73 square meters)    Stage 3B Moderate CKD (GFR = 30-44 mL/min/1 73 square meters)    Stage 4 Severe CKD (GFR = 15-29 mL/min/1 73 square meters)    Stage 5 End Stage CKD (GFR <15 mL/min/1 73 square meters)  Note: GFR calculation is accurate only with a steady state creatinine    CBC and differential [[id number]]  (Abnormal) Collected:  07/24/20 9030    Lab Status:  Final result Specimen:  Blood from Arm, Right Updated:  07/25/20 0008     WBC 5 30 Thousand/uL      RBC 4 46 Million/uL      Hemoglobin 13 1 g/dL      Hematocrit 39 0 %      MCV 87 fL      MCH 29 4 pg      MCHC 33 6 g/dL      RDW 15 1 %      MPV 8 8 fL      Platelets 992 Thousands/uL      Neutrophils Relative 41 %      Lymphocytes Relative 45 %      Monocytes Relative 11 %      Eosinophils Relative 3 %      Basophils Relative 0 %      Neutrophils Absolute 2 20 Thousands/µL      Lymphocytes Absolute 2 40 Thousands/µL      Monocytes Absolute 0 60 Thousand/µL      Eosinophils Absolute 0 10 Thousand/µL      Basophils Absolute 0 00 Thousands/µL     POCT pregnancy, urine [[id number]]     Lab Status:  No result                  XR chest 1 view portable    (Results Pending)              Procedures  Procedures         ED Course  ED Course as of Jul 26 2145   Sat Jul 25, 2020   0005 Procedure Note: EKG  Date/Time: 07/25/20 12:05 AM   Performed by: Nadeen Esqueda  Authorized by: Nadeen Esqueda  ECG interpreted by me, the ED Provider: yes   The EKG demonstrates:  Rate 78  Rhythm sinus w/ 2nd degree AV block  Qtc 458  No new ST elevations/depressions  II,III, aVF unchanged from previous EKG  0114 Patient elevated troponin  Reviewed case with on call cards, Dr Austin Parada  Rec for transfer to higher level of care and heparin infusion  Patient updated on plan and results   Agreeable with current plan of action  Chest pain controlled  States she did take her BP medication as directed  Will give additional intervention in ED  Awaiting call back from transfer center  0225 Patient accepted for transfer by family medicine service at [facility]  Care accepted in the service of Dr Ana Luisa Wiley  0226 Pain remains controlled  BP improved  2536 Patient's repeat troponin is slightly elevated from previous  She is chest pain-free  Awaiting for transport to higher level of care  1079 Procedure Note: EKG  Date/Time: 07/25/20 6:53 AM   Performed by: Quin Colon  Authorized by: Quin Colon  ECG interpreted by me, the ED Provider: yes   The EKG demonstrates:  Rate 76  Rhythm sinus  QTc 452  No ST elevations  Chronic ST depressions inferior leads  US AUDIT      Most Recent Value   Initial Alcohol Screen: US AUDIT-C    1  How often do you have a drink containing alcohol?  0 Filed at: 07/25/2020 0017   2  How many drinks containing alcohol do you have on a typical day you are drinking? 0 Filed at: 07/25/2020 0017   3b  FEMALE Any Age, or MALE 65+: How often do you have 4 or more drinks on one occassion? 0 Filed at: 07/25/2020 0017   Audit-C Score  0 Filed at: 07/25/2020 0017            HEART Risk Score      Most Recent Value   Heart Score Risk Calculator   History  2 Filed at: 07/25/2020 0044   ECG  2 Filed at: 07/25/2020 0044   Age  0 Filed at: 07/25/2020 0044   Risk Factors  1 Filed at: 07/25/2020 0044   Troponin  2 Filed at: 07/25/2020 0044   HEART Score  7 Filed at: 07/25/2020 0044            VANCE/DAST-10      Most Recent Value   How many times in the past year have you    Used an illegal drug or used a prescription medication for non-medical reasons?   Never Filed at: 07/25/2020 0000                                MDM  Number of Diagnoses or Management Options  NSTEMI (non-ST elevated myocardial infarction) [facility]):   Diagnosis management comments: See ED course       Amount and/or Complexity of Data Reviewed  Clinical lab tests: ordered and reviewed  Tests in the radiology section of CPT®: ordered and reviewed  Tests in the medicine section of CPT®: ordered and reviewed  Independent visualization of images, tracings, or specimens: yes          Disposition  Final diagnoses:   NSTEMI (non-ST elevated myocardial infarction) ([facility] [address] )     Time reflects when diagnosis was documented in both MDM as applicable and the Disposition within this note     Time User Action Codes Description Comment    7/25/2020  2:21 AM Donna Valiente [I21 4] NSTEMI (non-ST elevated myocardial infarction) [facility])       ED Disposition     ED Disposition Condition Date/Time Comment    Transfer to Another Facility-In [facility] Jul 25, 2020  2:21 AM Transfer to [facility]           MD Documentation      Most Recent Value   Patient Condition  The patient has been stabilized such that within reasonable medical probability, no material deterioration of the patient condition or the condition of the unborn child(yoan) is likely to result from the transfer   Reason for Transfer  Level of Care needed not available at this facility   Benefits of Transfer  Specialized equipment and/or services available at the receiving facility (Include comment)________________________   Risks of Transfer  Potential for delay in receiving treatment, Potential deterioration of medical condition, Loss of IV, Increased discomfort during transfer, Possible worsening of condition or death during transfer   Accepting Physician  Dr Leslee Isaacs Name, Radha Rodgers   Provider Certification  General risk, such as traffic hazards, adverse weather conditions, rough terrain or turbulence, possible failure of equipment (including vehicle or aircraft), or consequences of actions of persons outside the control of the transport personnel, Unanticipated needs of medical equipment and personnel during transport, Risk of worsening condition, The possibility of a transport vehicle being unavailable      RN Documentation      [facility] [address], [address]   Bed Assignment  728      Follow-up Information    None         Patient's Medications   Discharge Prescriptions    No medications on file     No discharge procedures on file      PDMP Review     None          ED Provider  Electronically Signed by           Riley Birmingham,   07/25/20 Kelsey Noland,   07/26/20 6065

## 2020-07-25 NOTE — ED NOTES
Labatelol given as ordered for elevated BP  Patient states that her chest pain is coming and going and states that it is an 8 at this time  Dr Alis Parham in to speak with patient concerning results of her tests and transfer       Tony Evans RN  07/25/20 Faisal Lehman RN  07/25/20 6896

## 2020-07-25 NOTE — EMTALA/ACUTE CARE TRANSFER
[facility] EMERGENCY DEPARTMENT  [address] [facility] [address]  [phone]  Dept: [address] [facility] TRANSFER CONSENT    NAME Noemi Veronica                                         1980                              MRN [id number]    I have been informed of my rights regarding examination, treatment, and transfer   by Dr Gladys Lee DO    Benefits: Specialized equipment and/or services available at the receiving facility (Include comment)________________________    Risks: Potential for delay in receiving treatment, Potential deterioration of medical condition, Loss of IV, Increased discomfort during transfer, Possible worsening of condition or death during transfer      Consent for Transfer:  I acknowledge that my medical condition has been evaluated and explained to me by the emergency department physician or other qualified medical person and/or my attending physician, who has recommended that I be transferred to the service of  Accepting Physician: Dr Feliz Saul at [facility] Name, [address] : Ted Conde  The above potential benefits of such transfer, the potential risks associated with such transfer, and the probable risks of not being transferred have been explained to me, and I fully understand them  The doctor has explained that, in my case, the benefits of transfer outweigh the risks  I agree to be transferred  I authorize the performance of emergency medical procedures and treatments upon me in both transit and upon arrival at the receiving facility  Additionally, I authorize the release of any and all medical records to the receiving facility and request they be transported with me, if possible  I understand that the safest mode of transportation during a medical emergency is an ambulance and that the Hospital advocates the use of this mode of transport   Risks of traveling to the receiving facility by car, including absence of medical control, life sustaining equipment, such as oxygen, and medical personnel has been explained to me and I fully understand them  (ASIF CORRECT BOX BELOW)  [  ]  I consent to the stated transfer and to be transported by ambulance/helicopter  [  ]  I consent to the stated transfer, but refuse transportation by ambulance and accept full responsibility for my transportation by car  I understand the risks of non-ambulance transfers and I exonerate the Hospital and its staff from any deterioration in my condition that results from this refusal     X___________________________________________    DATE  20  TIME________  Signature of patient or legally responsible individual signing on patient behalf           RELATIONSHIP TO PATIENT_________________________          Provider Certification    NAME Sandra Ureña                                         1980                              MRN [id number]    A medical screening exam was performed on the above named patient  Based on the examination:    Condition Necessitating Transfer The encounter diagnosis was NSTEMI (non-ST elevated myocardial infarction) ([facility] [address] )      Patient Condition: The patient has been stabilized such that within reasonable medical probability, no material deterioration of the patient condition or the condition of the unborn child(yoan) is likely to result from the transfer    Reason for Transfer: Level of Care needed not available at this facility    Transfer Requirements: [address]   · Space available and qualified personnel available for treatment as acknowledged by    · Agreed to accept transfer and to provide appropriate medical treatment as acknowledged by       Dr Guardado Breeding  · Appropriate medical records of the examination and treatment of the patient are provided at the time of transfer   [address] _______  · Transfer will be performed by qualified personnel from    and appropriate transfer equipment as required, including the use of necessary and appropriate life support measures  Provider Certification: I have examined the patient and explained the following risks and benefits of being transferred/refusing transfer to the patient/family:  General risk, such as traffic hazards, adverse weather conditions, rough terrain or turbulence, possible failure of equipment (including vehicle or aircraft), or consequences of actions of persons outside the control of the transport personnel, Unanticipated needs of medical equipment and personnel during transport, Risk of worsening condition, The possibility of a transport vehicle being unavailable      Based on these reasonable risks and benefits to the patient and/or the unborn child(yoan), and based upon the information available at the time of the patients examination, I certify that the medical benefits reasonably to be expected from the provision of appropriate medical treatments at another medical facility outweigh the increasing risks, if any, to the individuals medical condition, and in the case of labor to the unborn child, from effecting the transfer      X____________________________________________ DATE 07/25/20        TIME_______      ORIGINAL - SEND TO MEDICAL RECORDS   COPY - SEND WITH PATIENT DURING TRANSFER

## 2020-07-25 NOTE — ASSESSMENT & PLAN NOTE
Smoker, 1 PPD for past 3 years  Nicotine 21 mg patch ordered  Tobacco counseling prior to discharge

## 2020-07-25 NOTE — ASSESSMENT & PLAN NOTE
Elevated troponins x3 - 0 18 -> 0 36 -> 0 52  Will continue to trend until peak  Cardiology consulted

## 2020-07-25 NOTE — ED NOTES
Patient reports that her pain is now down to a 5 - she states that she is feeling better and doesn't want any further pain medication at this time  Dr Brea Branch was made aware of vital signs and pain status  Patient appears quite comfortable, using cell phone as well  Monitor - sinus rhythm       Dry Branch [place], RN  07/25/20 3589

## 2020-07-25 NOTE — ED NOTES
Several more attempts at starting second line for patient but catheters unalbe to be advanced  AC heparin started as ordered  Still waiting to hear form PACS  For transfer information       Huy Guillermo, CLAIR  07/25/20 7892

## 2020-07-25 NOTE — ASSESSMENT & PLAN NOTE
PVCs present on recent EKG (07/24/20) performed at [facility] ED  Unchanged from previous EKG - 11/2019  Cardiology consulted for current admission of chest pain

## 2020-07-25 NOTE — ED NOTES
Patient reports that her pain is now a 7 prior to transfer - Dr Jaime Lazaro made aware of same and patient medicated with morphine prior to transfer       Robert Thompson RN  07/25/20 5238

## 2020-07-25 NOTE — ASSESSMENT & PLAN NOTE
Current /103  On admission - 185/105  Continuing Lisinopril 10 mg PO DY and HCTZ 12 5 mg PO DY  Monitoring BMP  Monitoring BP

## 2020-07-25 NOTE — ASSESSMENT & PLAN NOTE
Typical, exertional chest pain  Primarily midsternal and radiates in a bandlike fashion around her neck, patient also felt that up to her shoulders and upper arms bilaterally  Initial troponin positive x3 ( 0 18 -> 0 36 -> 0 52), no acute changes on EKG from previous  Continue to trend troponins until peaked  CXR - negative for cardiopulmonary disease 07/24/20  EKG - PVCs, 2nd degree heart block, and old ST depressions in inferior leads  (unchanged)  Patient to be admitted for observation  Monitor on tele  Given the elevation in troponin and patient with acute chest pain will consult Cardiology  Gave nitroglycerin which helped with pain at [facility] ED  Gave morphine which also reduced pain at [facility] ED  Currently on heparin drip 25k units based on weight  Patient made NPO for potential intervention if cardiology deems necessary, hydration via IV NS 0 9% 125cc/hr  Pt denies N/V, SOB, or cough

## 2020-07-25 NOTE — H&P
SENIOR History and Physical Note - Sundra Castleman 47 y o  female MRN: [id number]    Unit/Bed#: [facility] 728-01 Encounter: [id number]        Subjective:  Sundra Castleman is a 47 y o  female who presented as transfer to [facility] with for evaluation of chest pain  She has a past medical history of HTN, KEYONA, tobacco abuse, and asthma  She developed chest pain two days ago, worsening yesterday with radiation to upper back and arms, with associated fatigue/weakness  She was evaluated in [facility] ED and found to have mildly elevated troponin 0 16 > 0 38 > 0 52  Her SBP was 190s - improved with Labetalol, and EKG showed ST depressions in inferior leads and second degree AV block which was unchanged from previous studies  Patient received Aspirin 324 mg and was started on Heparin drip  Morphine and Nitro relieved chest pain  CXR negative for acute process  CBC and CMP were unremarkable, Cardiology was consulted and recommended transfer to [facility] for higher level of care  Patient arrived this morning doing well with no acute complaints  Chest pain has not recurred  Denies shortness of breath, nausea, abdominal pain, diaphoresis, leg pain  She reports her asthma has been worsening recently with weather changes and she is using her Albuterol more frequently  Objective:  Vitals:    07/25/20 1041   BP: (!) 161/103   Pulse: 73   Resp: 18   Temp:        Physical Exam   Constitutional: She is oriented to person, place, and time  She appears well-developed and well-nourished  Obese   HENT:   Head: Normocephalic and atraumatic  Mouth/Throat: Oropharynx is clear and moist    Eyes: Conjunctivae and EOM are normal    Neck: Normal range of motion  Cardiovascular: Normal rate, regular rhythm, normal heart sounds and intact distal pulses  Pulmonary/Chest: Effort normal    Breath sounds diminished and mild wheezing bilaterally   Abdominal: Soft  Bowel sounds are normal  She exhibits no distension  There is no tenderness  Musculoskeletal: She exhibits no edema  Neurological: She is alert and oriented to person, place, and time  No cranial nerve deficit  Skin: Skin is warm and dry  Capillary refill takes less than 2 seconds  Psychiatric: She has a normal mood and affect  Her behavior is normal    Tearful, scared  Vitals reviewed        Pertinent Labs:  Recent Results (from the past 12 hour(s))   ECG 12 lead    Collection Time: 07/24/20 11:27 PM   Result Value Ref Range    Ventricular Rate 78 BPM    Atrial Rate 78 BPM    WY Interval 152 ms    QRSD Interval 82 ms    QT Interval 402 ms    QTC Interval 458 ms    P Axis 51 degrees    QRS Axis 60 degrees    T Wave Axis -50 degrees   CBC and differential    Collection Time: 07/24/20 11:59 PM   Result Value Ref Range    WBC 5 30 4 50 - 11 00 Thousand/uL    RBC 4 46 4 00 - 5 20 Million/uL    Hemoglobin 13 1 12 0 - 16 0 g/dL    Hematocrit 39 0 36 0 - 46 0 %    MCV 87 80 - 100 fL    MCH 29 4 26 0 - 34 0 pg    MCHC 33 6 31 0 - 36 0 g/dL    RDW 15 1 <15 3 %    MPV 8 8 (L) 8 9 - 12 7 fL    Platelets 781 622 - 802 Thousands/uL    Neutrophils Relative 41 (L) 45 - 65 %    Lymphocytes Relative 45 25 - 45 %    Monocytes Relative 11 (H) 1 - 10 %    Eosinophils Relative 3 0 - 6 %    Basophils Relative 0 0 - 1 %    Neutrophils Absolute 2 20 1 80 - 7 80 Thousands/µL    Lymphocytes Absolute 2 40 0 50 - 4 00 Thousands/µL    Monocytes Absolute 0 60 0 20 - 0 90 Thousand/µL    Eosinophils Absolute 0 10 0 00 - 0 40 Thousand/µL    Basophils Absolute 0 00 0 00 - 0 10 Thousands/µL   Comprehensive metabolic panel    Collection Time: 07/24/20 11:59 PM   Result Value Ref Range    Sodium 140 137 - 147 mmol/L    Potassium 3 9 3 6 - 5 0 mmol/L    Chloride 105 97 - 108 mmol/L    CO2 28 22 - 30 mmol/L    ANION GAP 7 5 - 14 mmol/L    BUN 8 5 - 25 mg/dL    Creatinine 0 82 0 60 - 1 20 mg/dL    Glucose 110 (H) 70 - 99 mg/dL    Calcium 9 5 8 4 - 10 2 mg/dL    AST 32 14 - 36 U/L    ALT 18 9 - 52 U/L    Alkaline Phosphatase 54 43 - 122 U/L    Total Protein 7 3 5 9 - 8 4 g/dL    Albumin 4 2 3 0 - 5 2 g/dL    Total Bilirubin 0 40 <1 30 mg/dL    eGFR 91 >60 ml/min/1 73sq m   Lipase    Collection Time: 07/24/20 11:59 PM   Result Value Ref Range    Lipase 84 23 - 300 u/L   Troponin I    Collection Time: 07/24/20 11:59 PM   Result Value Ref Range    Troponin I 0 18 (H) 0 00 - 0 03 ng/mL   NT-BNP PRO    Collection Time: 07/24/20 11:59 PM   Result Value Ref Range    NT-proBNP 283 0 - 299 pg/mL   APTT six (6) hours after Heparin bolus/drip initiation or dosing change    Collection Time: 07/25/20 12:38 AM   Result Value Ref Range    PTT 33 23 - 37 seconds   Protime-INR    Collection Time: 07/25/20 12:38 AM   Result Value Ref Range    Protime 13 8 11 6 - 14 5 seconds    INR 1 05 0 84 - 1 19   Troponin I    Collection Time: 07/25/20  2:37 AM   Result Value Ref Range    Troponin I 0 36 (H) 0 00 - 0 03 ng/mL   Troponin I    Collection Time: 07/25/20  5:54 AM   Result Value Ref Range    Troponin I 0 52 (H) 0 00 - 0 03 ng/mL   APTT    Collection Time: 07/25/20  6:50 AM   Result Value Ref Range    PTT 59 (H) 23 - 37 seconds   ECG 12 lead    Collection Time: 07/25/20  6:50 AM   Result Value Ref Range    Ventricular Rate 76 BPM    Atrial Rate 76 BPM    SC Interval 168 ms    QRSD Interval 88 ms    QT Interval 402 ms    QTC Interval 452 ms    P [place] 67 degrees    QRS Axis 72 degrees    T Wave Axis 269 degrees       Imaging:  No orders to display         A/P    Chest pain with elevated troponin  - Relieved with Morphine and Nitro, has not recurred   - EKG with PVCs and ST depressions in inferior leads and second degree AV block, unchanged from previous studies  - CXR negative for acute process  - Heparin gtt  - Monitor on telemetry  - Continue trending troponins q3h until peaked  - Cardiology consulted, appreciate recommendations  - Will keep NPO until cardiology input and NS @ 125 ml/hr IV hydration while NPO    Hypertensive emergency  - SBP 185 on admission, improved with Labetalol  - Noncompliant with home medications, will resume home Lisinopril and HCTZ - monitor BP and adjust doses as needed    KEYONA  - Home CPAP damaged and not being used x3 years, likely contributing to uncontrolled BP  - Will order CPAP while admitted, RT to titrate settings  - Patient will need outpatient follow up with sleep medicine for CPAP titration and replacement    Asthma  - Worsening recently with heat/humidity/allergies  - Continue home Albuterol inhaler prn  - Continue home Flonase    Tobacco abuse  - 1 PPD smoker x3 years  - Patient would like to quit, counseling provided  - Will provide cessation resources upon discharge  - Nicoderm 21 mg daily     Discussed with Dr Yoselin Zamora  I have personally seen and examined patient and agree with the intern's documentation  Please contact Jh Child at  or via [employer] with any questions      Jorge Alberto Jc DO  [facility] Medicine PGY2  7/25/2020  11:02 AM

## 2020-07-25 NOTE — ED NOTES
Patient was medicated with morphine for her 10/10 chest discomfort  Monitor - sinus rhythm  Lights were dimmed, call bell within reach       Shawna Christianson RN  07/25/20 1261

## 2020-07-25 NOTE — ED NOTES
Patient reports her pain is a 11/5 - Dr Nickolas Bauman made aware and 1 sl nitro ordered for same and given - BP at time of admittance 169/86  Patient does not appear uncomfortable and is resting quietly       Royce Haro RN  07/25/20 6951

## 2020-07-25 NOTE — PLAN OF CARE
Problem: CARDIOVASCULAR - ADULT  Goal: Maintains optimal cardiac output and hemodynamic stability  Description  INTERVENTIONS:  - Monitor I/O, vital signs and rhythm  - Monitor for S/S and trends of decreased cardiac output  - Administer and titrate ordered vasoactive medications to optimize hemodynamic stability  - Assess quality of pulses, skin color and temperature  - Assess for signs of decreased coronary artery perfusion  - Instruct patient to report change in severity of symptoms  Outcome: Progressing  Goal: Absence of cardiac dysrhythmias or at baseline rhythm  Description  INTERVENTIONS:  - Continuous cardiac monitoring, vital signs, obtain 12 lead EKG if ordered  - Administer antiarrhythmic and heart rate control medications as ordered  - Monitor electrolytes and administer replacement therapy as ordered  Outcome: Progressing     Problem: RESPIRATORY - ADULT  Goal: Achieves optimal ventilation and oxygenation  Description  INTERVENTIONS:  - Assess for changes in respiratory status  - Assess for changes in mentation and behavior  - Position to facilitate oxygenation and minimize respiratory effort  - Oxygen administered by appropriate delivery if ordered  - Initiate smoking cessation education as indicated  - Encourage broncho-pulmonary hygiene including cough, deep breathe, Incentive Spirometry  - Assess the need for suctioning and aspirate as needed  - Assess and instruct to report SOB or any respiratory difficulty  - Respiratory Therapy support as indicated  Outcome: Progressing     Problem: HEMATOLOGIC - ADULT  Goal: Maintains hematologic stability  Description  INTERVENTIONS  - Assess for signs and symptoms of bleeding or hemorrhage  - Monitor labs  - Administer supportive blood products/factors as ordered and appropriate  Outcome: Progressing     Problem: PAIN - ADULT  Goal: Verbalizes/displays adequate comfort level or baseline comfort level  Description  Interventions:  - Encourage patient to monitor pain and request assistance  - Assess pain using appropriate pain scale  - Administer analgesics based on type and severity of pain and evaluate response  - Implement non-pharmacological measures as appropriate and evaluate response  - Consider cultural and social influences on pain and pain management  - Notify physician/advanced practitioner if interventions unsuccessful or patient reports new pain  Outcome: Progressing     Problem: INFECTION - ADULT  Goal: Absence or prevention of progression during hospitalization  Description  INTERVENTIONS:  - Assess and monitor for signs and symptoms of infection  - Monitor lab/diagnostic results  - Monitor all insertion sites, i e  indwelling lines, tubes, and drains  - Monitor endotracheal if appropriate and nasal secretions for changes in amount and color  - [place] appropriate cooling/warming therapies per order  - Administer medications as ordered  - Instruct and encourage patient and family to use good hand hygiene technique  - Identify and instruct in appropriate isolation precautions for identified infection/condition  Outcome: Progressing     Problem: SAFETY ADULT  Goal: Patient will remain free of falls  Description  INTERVENTIONS:  - Assess patient frequently for physical needs  -  Identify cognitive and physical deficits and behaviors that affect risk of falls    -  [place] fall precautions as indicated by assessment   - Educate patient/family on patient safety including physical limitations  - Instruct patient to call for assistance with activity based on assessment  - Modify environment to reduce risk of injury  - Consider OT/PT consult to assist with strengthening/mobility  Outcome: Progressing  Goal: Maintain or return to baseline ADL function  Description  INTERVENTIONS:  -  Assess patient's ability to carry out ADLs; assess patient's baseline for ADL function and identify physical deficits which impact ability to perform ADLs (bathing, care of mouth/teeth, toileting, grooming, dressing, etc )  - Assess/evaluate cause of self-care deficits   - Assess range of motion  - Assess patient's mobility; develop plan if impaired  - Assess patient's need for assistive devices and provide as appropriate  - Encourage maximum independence but intervene and supervise when necessary  - Involve family in performance of ADLs  - Assess for home care needs following discharge   - Consider OT consult to assist with ADL evaluation and planning for discharge  - Provide patient education as appropriate  Outcome: Progressing  Goal: Maintain or return mobility status to optimal level  Description  INTERVENTIONS:  - Assess patient's baseline mobility status (ambulation, transfers, stairs, etc )    - Identify cognitive and physical deficits and behaviors that affect mobility  - Identify mobility aids required to assist with transfers and/or ambulation (gait belt, sit-to-stand, lift, walker, cane, etc )  - Mobile fall precautions as indicated by assessment  - Record patient progress and toleration of activity level on Mobility SBAR; progress patient to next Phase/Stage  - Instruct patient to call for assistance with activity based on assessment  - Consider rehabilitation consult to assist with strengthening/weightbearing, etc   Outcome: Progressing     Problem: DISCHARGE PLANNING  Goal: Discharge to home or other facility with appropriate resources  Description  INTERVENTIONS:  - Identify barriers to discharge w/patient and caregiver  - Arrange for needed discharge resources and transportation as appropriate  - Identify discharge learning needs (meds, wound care, etc )  - Arrange for interpretive services to assist at discharge as needed  - Refer to Case Management Department for coordinating discharge planning if the patient needs post-hospital services based on physician/advanced practitioner order or complex needs related to functional status, cognitive ability, or social support system  Outcome: Progressing     Problem: Knowledge Deficit  Goal: Patient/family/caregiver demonstrates understanding of disease process, treatment plan, medications, and discharge instructions  Description  Complete learning assessment and assess knowledge base    Interventions:  - Provide teaching at level of understanding  - Provide teaching via preferred learning methods  Outcome: Progressing

## 2020-07-25 NOTE — H&P
H&P- Delia Henderson 1980, 47 y o  female MRN: [id number]    Unit/Bed#: [facility] 728-01 Encounter: [id number]    Primary Care Provider: Emilia Taylor PA-C   Date and time admitted to hospital: 7/25/2020  8:08 AM      * Chest pain  Assessment & Plan  Typical, exertional chest pain  Primarily midsternal and radiates in a bandlike fashion around her neck, patient also felt that up to her shoulders and upper arms bilaterally  Initial troponin positive x3 ( 0 18 -> 0 36 -> 0 52), no acute changes on EKG from previous  Continue to trend troponins until peaked  CXR - negative for cardiopulmonary disease 07/24/20  EKG - PVCs, 2nd degree heart block, and old ST depressions in inferior leads  (unchanged)  Patient to be admitted for observation  Monitor on tele  Given the elevation in troponin and patient with acute chest pain will consult Cardiology  Gave nitroglycerin which helped with pain at [address] [place] [address] ED  Gave morphine which also reduced pain at [address] [place] [address] ED  Currently on heparin drip 25k units based on weight  Patient made NPO for potential intervention if cardiology deems necessary, hydration via IV NS 0 9% 125cc/hr  Pt denies N/V, SOB, or cough  Asthma  Assessment & Plan  Chronic asthmatic  Albulterol inhaler ordered PRN for any exacerbations  Continuing at home med - Flonase  Elevated troponin  Assessment & Plan  Elevated troponins x3 - 0 18 -> 0 36 -> 0 52  Will continue to trend until peak  Cardiology consulted  Hypertension  Assessment & Plan  Current /103  On admission - 185/105  Continuing Lisinopril 10 mg PO DY and HCTZ 12 5 mg PO DY  Monitoring BMP  Monitoring BP  Tobacco abuse  Assessment & Plan  Smoker, 1 PPD for past 3 years  Nicotine 21 mg patch ordered  Tobacco counseling prior to discharge  PVC (premature ventricular contraction)  Assessment & Plan  PVCs present on recent EKG (07/24/20) performed at [address] [place] [address]    Unchanged from previous EKG - 11/2019  Cardiology consulted for current admission of chest pain  Diet: NPO  DVT PPx: heparin drip  Code Status: Full code  Disposition: This patient requires observation  History of Present Illness     HPI:  Collin Coronado is a 47 y o  female who presents with Chest pain as a transfer from [facility]  Patient has been having progressively increasing chest pain on exertion since yesterday (07/24/20)  The chest pain is described as a "stabbing" and squeezing pain that radiates to the back of the neck and bilateral shoulders and upper arms  Patient states the pain had a gradual onset and was constant  She mentions it was relieved by nothing and she had not tried anything for the pain  Patient also admits to fatigue and mild shortness of breath with ambulation  Patient does have a past medical history of hypertension and chronic asthma  Patient denies cough, chills, recent fevers, weight changes, bowel movement changes, or palpitations  ED Management: Patient was seen at [facility] ED  EKG performed, rate 78, 2nd degree AV block, , no new ST elevations or depressions, chronic II, III, aVF depressions unchanged from previous EKG  Troponins were elevated x3, 018 -> 0 36 -> 0 52  Was given morphine for pain, heparin infusion and sublingual Nitro which alleviated her pain further  CXR negative for cardiopulmonary dx  CBC, CMP, Lipase, BNP, and PT/INR ordered - unremarkable  Initiated transfer to higher level of care - [facility]  Review of Systems   Constitutional: Negative for chills, fatigue, fever and unexpected weight change  Eyes: Negative for discharge and redness  Respiratory: Positive for chest tightness  Negative for cough, shortness of breath and wheezing  Cardiovascular: Positive for chest pain  Negative for palpitations and leg swelling  Gastrointestinal: Negative for abdominal pain, constipation, diarrhea, nausea and vomiting     Musculoskeletal: Positive for myalgias and neck pain  Neurological: Negative for syncope, weakness and headaches  Historical Information   Past Medical History:   Diagnosis Date    Asthma     Hypertension      Past Surgical History:   Procedure Laterality Date    NO PAST SURGERIES       Social History   Social History     Substance and Sexual Activity   Alcohol Use No     Social History     Substance and Sexual Activity   Drug Use No     Social History     Tobacco Use   Smoking Status Current Every Day Smoker    Packs/day: 1 00    Types: Cigarettes   Smokeless Tobacco Never Used   Tobacco Comment    " not for past couple days"  Family History: No family history on file  Meds/Allergies   PTA meds:   Prior to Admission Medications   Prescriptions Last Dose Informant Patient Reported?  Taking?   acetaminophen (TYLENOL) 325 mg tablet   No No   Sig: Take 2 tablets (650 mg total) by mouth every 8 (eight) hours as needed for mild pain or headaches   Patient not taking: Reported on 2020   albuterol (2 5 mg/3 mL) 0 083 % nebulizer solution   No No   Sig: Take 1 vial (2 5 mg total) by nebulization every 6 (six) hours as needed for wheezing or shortness of breath   albuterol (PROVENTIL HFA,VENTOLIN HFA) 90 mcg/act inhaler   No No   Sig: Inhale 2 puffs every 4 (four) hours as needed for wheezing or shortness of breath   fluticasone (FLONASE) 50 mcg/act nasal spray   No No   Si spray into each nostril daily   Patient not taking: Reported on 2019   hydrochlorothiazide (HYDRODIURIL) 12 5 mg tablet   No No   Sig: Take 1 tablet (12 5 mg total) by mouth daily   lisinopril (ZESTRIL) 10 mg tablet  Self Yes No   Sig: Take 10 mg by mouth daily   nicotine (NICODERM CQ) 7 mg/24hr TD 24 hr patch   No No   Sig: Place 1 patch on the skin daily   Patient not taking: Reported on 3/11/2020      Facility-Administered Medications: None     No Known Allergies    Objective   Vitals: Blood pressure (!) 161/103, pulse 73, temperature 98 1 °F (36 7 °C), temperature source Oral, resp  rate 18, height 5' 1" (1 549 m), weight 130 kg (286 lb 9 6 oz), last menstrual period 07/02/2020  No intake or output data in the 24 hours ending 07/25/20 1132    Invasive Devices     Peripheral Intravenous Line            Peripheral IV 07/25/20 Left Hand less than 1 day    Peripheral IV 07/25/20 Right Antecubital less than 1 day                Physical Exam   Constitutional: She appears well-developed and well-nourished  No distress  HENT:   Head: Normocephalic and atraumatic  Nose: Nose normal    Eyes: Conjunctivae and EOM are normal  Right eye exhibits no discharge  Left eye exhibits no discharge  No scleral icterus  Neck: Normal range of motion  Neck supple  Cardiovascular: Normal rate, regular rhythm, normal heart sounds and intact distal pulses  Exam reveals no gallop and no friction rub  No murmur heard  Pulmonary/Chest: Effort normal  She has wheezes  Decreased breath sounds bilaterally on posterior lung fields with appreciable wheezing  Abdominal: Soft  She exhibits no mass  There is no tenderness  There is no guarding  Neurological: She is alert  Skin: Skin is warm  She is not diaphoretic  Psychiatric: She has a normal mood and affect  Her behavior is normal    Vitals reviewed        Lab Results:   I have personally reviewed pertinent films in PACS    Recent Results (from the past 24 hour(s))   ECG 12 lead    Collection Time: 07/24/20 11:27 PM   Result Value Ref Range    Ventricular Rate 78 BPM    Atrial Rate 78 BPM    OH Interval 152 ms    QRSD Interval 82 ms    QT Interval 402 ms    QTC Interval 458 ms    P Axis 51 degrees    QRS Axis 60 degrees    T Wave Axis -50 degrees   CBC and differential    Collection Time: 07/24/20 11:59 PM   Result Value Ref Range    WBC 5 30 4 50 - 11 00 Thousand/uL    RBC 4 46 4 00 - 5 20 Million/uL    Hemoglobin 13 1 12 0 - 16 0 g/dL    Hematocrit 39 0 36 0 - 46 0 %    MCV 87 80 - 100 fL    MCH 29 4 26 0 - 34 0 pg    MCHC 33 6 31 0 - 36 0 g/dL    RDW 15 1 <15 3 %    MPV 8 8 (L) 8 9 - 12 7 fL    Platelets 360 829 - 855 Thousands/uL    Neutrophils Relative 41 (L) 45 - 65 %    Lymphocytes Relative 45 25 - 45 %    Monocytes Relative 11 (H) 1 - 10 %    Eosinophils Relative 3 0 - 6 %    Basophils Relative 0 0 - 1 %    Neutrophils Absolute 2 20 1 80 - 7 80 Thousands/µL    Lymphocytes Absolute 2 40 0 50 - 4 00 Thousands/µL    Monocytes Absolute 0 60 0 20 - 0 90 Thousand/µL    Eosinophils Absolute 0 10 0 00 - 0 40 Thousand/µL    Basophils Absolute 0 00 0 00 - 0 10 Thousands/µL   Comprehensive metabolic panel    Collection Time: 07/24/20 11:59 PM   Result Value Ref Range    Sodium 140 137 - 147 mmol/L    Potassium 3 9 3 6 - 5 0 mmol/L    Chloride 105 97 - 108 mmol/L    CO2 28 22 - 30 mmol/L    ANION GAP 7 5 - 14 mmol/L    BUN 8 5 - 25 mg/dL    Creatinine 0 82 0 60 - 1 20 mg/dL    Glucose 110 (H) 70 - 99 mg/dL    Calcium 9 5 8 4 - 10 2 mg/dL    AST 32 14 - 36 U/L    ALT 18 9 - 52 U/L    Alkaline Phosphatase 54 43 - 122 U/L    Total Protein 7 3 5 9 - 8 4 g/dL    Albumin 4 2 3 0 - 5 2 g/dL    Total Bilirubin 0 40 <1 30 mg/dL    eGFR 91 >60 ml/min/1 73sq m   Lipase    Collection Time: 07/24/20 11:59 PM   Result Value Ref Range    Lipase 84 23 - 300 u/L   Troponin I    Collection Time: 07/24/20 11:59 PM   Result Value Ref Range    Troponin I 0 18 (H) 0 00 - 0 03 ng/mL   NT-BNP PRO    Collection Time: 07/24/20 11:59 PM   Result Value Ref Range    NT-proBNP 283 0 - 299 pg/mL   APTT six (6) hours after Heparin bolus/drip initiation or dosing change    Collection Time: 07/25/20 12:38 AM   Result Value Ref Range    PTT 33 23 - 37 seconds   Protime-INR    Collection Time: 07/25/20 12:38 AM   Result Value Ref Range    Protime 13 8 11 6 - 14 5 seconds    INR 1 05 0 84 - 1 19   Troponin I    Collection Time: 07/25/20  2:37 AM   Result Value Ref Range    Troponin I 0 36 (H) 0 00 - 0 03 ng/mL   Troponin I    Collection Time: 07/25/20  5:54 AM Result Value Ref Range    Troponin I 0 52 (H) 0 00 - 0 03 ng/mL   APTT    Collection Time: 07/25/20  6:50 AM   Result Value Ref Range    PTT 59 (H) 23 - 37 seconds   ECG 12 lead    Collection Time: 07/25/20  6:50 AM   Result Value Ref Range    Ventricular Rate 76 BPM    Atrial Rate 76 BPM    AK Interval 168 ms    QRSD Interval 88 ms    QT Interval 402 ms    QTC Interval 452 ms    P [place] 67 degrees    QRS Axis 72 degrees    T Wave Axis 269 degrees     Blood Culture: No results found for: BLOODCX  Urinalysis:   Lab Results   Component Value Date    COLORU Red (A) 11/23/2019    COLORU Yellow 04/19/2015    CLARITYU Turbid (A) 11/23/2019    CLARITYU Clear 04/19/2015    SPECGRAV 1 020 11/23/2019    SPECGRAV 1 025 04/19/2015    PHUR 6 0 11/23/2019    PHUR 8 0 01/28/2019    PHUR 7 0 04/19/2015    LEUKOCYTESUR 100 0 (A) 11/23/2019    LEUKOCYTESUR Moderate (A) 04/19/2015    NITRITE Negative 11/23/2019    NITRITE Negative 04/19/2015    PROTEINUA Negative 04/19/2015    GLUCOSEU Negative 11/23/2019    GLUCOSEU Negative 04/19/2015    KETONESU 5 (Trace) (A) 11/23/2019    KETONESU Negative 04/19/2015    BILIRUBINUR Negative 11/23/2019    BILIRUBINUR Negative 04/19/2015    BLOODU 250 0 (A) 11/23/2019    BLOODU Negative 04/19/2015     Urine Culture: No results found for: URINECX  Wound Culure: No results found for: WOUNDCULT    Imaging:   EKG, Pathology, and Other Studies: I have personally reviewed pertinent reports  EKG: unchanged from previous tracings, nonspecific ST and T waves changes, occasional PVC noted, unifocal, 2nd degree AV block  Code Status: Level 1 - Full Code  POLST:    VTE Prophylaxis: Heparin sequential compression device and foot pump applied  Admission Status: OBSERVATION    Counseling / Coordination of Care  Total floor / unit time spent today 30 minutes  Greater than 50% of total time was spent with the patient and / or family counseling and / or coordination of care      DO [facility], PGY-1  7/25/2020 11:32 AM    Please be aware that this note contains text that was dictated and there may be errors pertaining to "sound-alike "words during the dictation process

## 2020-07-25 NOTE — ED NOTES
PTT results back and heparin increased     Everton Monday, [facility]  07/25/20 7543

## 2020-07-25 NOTE — CONSULTS
Consultation - Cardiology Team One  Juanjose Dubose 47 y o  female MRN: [id number]  Unit/Bed#: [facility] 728-01 Encounter: [id number]    Inpatient consult to Cardiology  Consult performed by: JOHN Tellez  Consult ordered by: Darron Hinojosa DO      Physician Requesting Consult: [address]  Reason for Consult / Principal Problem: Elevated troponin       Assessment/ Plan    1  Chest pain with elevated troponin   Troponin 0 18/0 36/0 52  ECG reviewed showing NSR with ST and T wave abnormalities in inferior and lateral leads  More pronounced compared to 11/2019  On heparin gtt   Lipid panel pending   Check echocardiogram  Will d/w attending regarding ischemic workup  2  Accelerated Hypertension   /103  On lisinopril 10 mg PO daily and HCTz 12 5 mg PO daily   Medications recently received   Continue to monitor   If BP remains elevated can titrate lisinopril     3  Tobacco abuse   Continue nicotine patch   Counseled on smoking cessation       History of Present Illness   HPI: Juanjose Dubose is a 47y o  year old female who has a history of hypertension, KEYONA, tobacco abuse, morbid obesity and asthma  She does not follow with a cardiologist  She reports no history of known CAD, heart failure or dysrhythmias  She presents to [facility] 7/24/2020 with complaint of chest pain  Patient reports she was getting out of the shower yesterday evening and had pressure and stabbing pain in her chest that radiated to her neck  No complaint of SOB or diaphoresis  Chest pain resolved once she had SL nitroglycerin in the ER  She was also given morphine but patient reports chest pain resolved with nitroglycerin  She reports no history of chest pain prior to this  She is active at home doing household chores and reports no exertional chest pain or SOB  She does report increased fatigue with any activity which is new over the past couple weeks  She denies recent illness  No fever or chills       She lives at home with her children ages 13-20 years  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  She smokes 1 ppd for the past 15 years  She denies alcohol use and no illicit drug use  She reports only family history she is aware of is grandfather had heart disease but she does not known details  EKG reviewed personally:   NSR with ST and T wave abnormalities in inferior and lateral leads  Ventricular rate 76 bpm  MT interval 168 ms  QRSD 88 ms  QT interval 402 ms  QTc interval 452 ms     Telemetry reviewed personally:   NSR HR 70s    Review of Systems   Constitution: Negative for chills, fever and malaise/fatigue  HENT: Negative for congestion  Cardiovascular: Negative for chest pain, dyspnea on exertion, leg swelling and orthopnea  Respiratory: Negative for cough and shortness of breath  Musculoskeletal: Negative for arthritis and falls  Gastrointestinal: Negative for bloating, nausea and vomiting  Neurological: Negative for dizziness and light-headedness  Psychiatric/Behavioral: Negative for altered mental status  All other systems reviewed and are negative  Historical Information   Past Medical History:   Diagnosis Date    Asthma     Hypertension      Past Surgical History:   Procedure Laterality Date    NO PAST SURGERIES       Social History     Substance and Sexual Activity   Alcohol Use No     Social History     Substance and Sexual Activity   Drug Use No     Social History     Tobacco Use   Smoking Status Current Every Day Smoker    Packs/day: 1 00    Types: Cigarettes   Smokeless Tobacco Never Used   Tobacco Comment    " not for past couple days"  Family History: No family history on file      Meds/Allergies   all current active meds have been reviewed and current meds:   Current Facility-Administered Medications   Medication Dose Route Frequency    albuterol (PROVENTIL HFA,VENTOLIN HFA) inhaler 2 puff  2 puff Inhalation Q4H PRN    fluticasone (FLONASE) 50 mcg/act nasal spray 1 spray  1 spray Nasal Daily    heparin (porcine) 25,000 units in 250 mL infusion (premix)  3-20 Units/kg/hr (Order-Specific) Intravenous Titrated    heparin (porcine) injection 2,000 Units  2,000 Units Intravenous Q1H PRN    heparin (porcine) injection 4,000 Units  4,000 Units Intravenous Q1H PRN    hydrochlorothiazide (HYDRODIURIL) tablet 12 5 mg  12 5 mg Oral Daily    lisinopril (ZESTRIL) tablet 10 mg  10 mg Oral Daily    nicotine (NICODERM CQ) 21 mg/24 hr TD 24 hr patch 21 mg  21 mg Transdermal Daily    sodium chloride 0 9 % infusion  125 mL/hr Intravenous Continuous       heparin (porcine) 3-20 Units/kg/hr (Order-Specific) Last Rate: 11 1 Units/kg/hr (07/25/20 1101)   sodium chloride 125 mL/hr Last Rate: 125 mL/hr (07/25/20 1042)     No Known Allergies    Objective   Vitals: Blood pressure (!) 161/103, pulse 73, temperature 98 1 °F (36 7 °C), temperature source Oral, resp  rate 18, height 5' 1" (1 549 m), weight 130 kg (286 lb 9 6 oz), last menstrual period 07/02/2020 ,     Body mass index is 54 15 kg/m²  ,     Systolic (31ONH), YMH:481 , Min:155 , JYM:342     Diastolic (00AOX), NCF:860, Min:77, Max:138    No intake or output data in the 24 hours ending 07/25/20 1157  Weight (last 2 days)     Date/Time   Weight    07/25/20 0829   130 (286 6)            Invasive Devices     Peripheral Intravenous Line            Peripheral IV 07/25/20 Left Hand less than 1 day    Peripheral IV 07/25/20 Right Antecubital less than 1 day              Physical Exam   Constitutional: She is oriented to person, place, and time  She appears well-developed  No distress  HENT:   Head: Normocephalic  Neck: Neck supple  Cardiovascular: Normal rate, regular rhythm, normal heart sounds and intact distal pulses  Pulmonary/Chest: Effort normal and breath sounds normal  No stridor  No respiratory distress  Abdominal: Soft  Bowel sounds are normal    Musculoskeletal: Normal range of motion   She exhibits no edema  Neurological: She is alert and oriented to person, place, and time  Skin: Skin is warm and dry  She is not diaphoretic  Psychiatric: She has a normal mood and affect  Her behavior is normal        LABORATORY RESULTS:  Results from last 7 days   Lab Units 07/25/20  0554 07/25/20  0237 07/24/20  2359   TROPONIN I ng/mL 0 52* 0 36* 0 18*     CBC with diff: Results from last 7 days   Lab Units 07/24/20  2359   WBC Thousand/uL 5 30   HEMOGLOBIN g/dL 13 1   HEMATOCRIT % 39 0   MCV fL 87   PLATELETS Thousands/uL 239   MCH pg 29 4   MCHC g/dL 33 6   RDW % 15 1   MPV fL 8 8*       CMP:  Results from last 7 days   Lab Units 07/24/20  2359   POTASSIUM mmol/L 3 9   CHLORIDE mmol/L 105   CO2 mmol/L 28   BUN mg/dL 8   CREATININE mg/dL 0 82   CALCIUM mg/dL 9 5   AST U/L 32   ALT U/L 18   ALK PHOS U/L 54   EGFR ml/min/1 73sq m 91       BMP:  Results from last 7 days   Lab Units 07/24/20  2359   POTASSIUM mmol/L 3 9   CHLORIDE mmol/L 105   CO2 mmol/L 28   BUN mg/dL 8   CREATININE mg/dL 0 82   CALCIUM mg/dL 9 5          Lab Results   Component Value Date    NTBNP 283 07/24/2020       Results from last 7 days   Lab Units 07/25/20  0038   INR  1 05     Lipid Profile:   No results found for: CHOL  Lab Results   Component Value Date    HDL 45 06/06/2019     Lab Results   Component Value Date    LDLCALC 82 06/06/2019     Lab Results   Component Value Date    TRIG 96 06/06/2019     Cardiac testing:   No results found for this or any previous visit  No results found for this or any previous visit  No procedure found  No results found for this or any previous visit  Imaging:   Xr Chest 1 View Portable    Result Date: 7/25/2020  Narrative: CHEST INDICATION:   chest pain  COMPARISON:  03/11/2020 EXAM PERFORMED/VIEWS:  XR CHEST PORTABLE 1 image FINDINGS: Cardiomediastinal silhouette appears unremarkable  The lungs are clear  No pneumothorax or pleural effusion   Osseous structures appear within normal limits for patient age      Impression: No acute cardiopulmonary disease  Workstation performed: [id number]     Thank you for allowing us to participate in this patient's care  Counseling / Coordination of Care  Total floor / unit time spent today 45 minutes  Greater than 50% of total time was spent with the patient and / or family counseling and / or coordination of care  A description of the counseling / coordination of care: Review of history, current assessment, development of a plan  Code Status: Level 1 - Full Code    ** Please Note: Dragon 360 Dictation voice to text software may have been used in the creation of this document   **

## 2020-07-25 NOTE — ED NOTES
Patient reports that her pain is down to a 3 and that she is comfortable without need for further pain medication at this time  Heparin drip continues as ordered       Dann Daly RN  07/25/20 8225

## 2020-07-25 NOTE — ASSESSMENT & PLAN NOTE
Chronic asthmatic  Albulterol inhaler ordered PRN for any exacerbations  Continuing at home med - Flonase

## 2020-07-25 NOTE — ED NOTES
Missed attempts x 2 for 2nd IV start (inner RW & LAC)   blood return obtained but unable to advance IV catheter    sites discontinued   gauze dressing applied     Nayeli Patton RN  07/25/20 8323

## 2020-07-25 NOTE — ED NOTES
PTT results were returned as ambulance about to leave - able to titrate heparin and repeat bolus dose per protocol  Patient also stated that her pain was better since receiving the morphine  Report was called to Edgar Hale RN with all updates in latest meds given and recent vital signs       Pino Parker RN  07/25/20 7358

## 2020-07-25 NOTE — PLAN OF CARE
Problem: CARDIOVASCULAR - ADULT  Goal: Maintains optimal cardiac output and hemodynamic stability  Description  INTERVENTIONS:  - Monitor I/O, vital signs and rhythm  - Monitor for S/S and trends of decreased cardiac output  - Administer and titrate ordered vasoactive medications to optimize hemodynamic stability  - Assess quality of pulses, skin color and temperature  - Assess for signs of decreased coronary artery perfusion  - Instruct patient to report change in severity of symptoms  7/25/2020 1332 by Georges Castillo RN  Outcome: Progressing  7/25/2020 1331 by Georges Castillo RN  Outcome: Progressing  Goal: Absence of cardiac dysrhythmias or at baseline rhythm  Description  INTERVENTIONS:  - Continuous cardiac monitoring, vital signs, obtain 12 lead EKG if ordered  - Administer antiarrhythmic and heart rate control medications as ordered  - Monitor electrolytes and administer replacement therapy as ordered  7/25/2020 1332 by Georges Castillo RN  Outcome: Progressing  7/25/2020 1331 by Georges Castillo RN  Outcome: Progressing     Problem: RESPIRATORY - ADULT  Goal: Achieves optimal ventilation and oxygenation  Description  INTERVENTIONS:  - Assess for changes in respiratory status  - Assess for changes in mentation and behavior  - Position to facilitate oxygenation and minimize respiratory effort  - Oxygen administered by appropriate delivery if ordered  - Initiate smoking cessation education as indicated  - Encourage broncho-pulmonary hygiene including cough, deep breathe, Incentive Spirometry  - Assess the need for suctioning and aspirate as needed  - Assess and instruct to report SOB or any respiratory difficulty  - Respiratory Therapy support as indicated  7/25/2020 1332 by Georges Castillo RN  Outcome: Progressing  7/25/2020 1331 by Georges Castillo RN  Outcome: Progressing     Problem: HEMATOLOGIC - ADULT  Goal: Maintains hematologic stability  Description  INTERVENTIONS  - Assess for signs and symptoms of bleeding or hemorrhage  - Monitor labs  - Administer supportive blood products/factors as ordered and appropriate  7/25/2020 1332 by Dorothy Wign RN  Outcome: Progressing  7/25/2020 1331 by Dorothy Wing RN  Outcome: Progressing     Problem: PAIN - ADULT  Goal: Verbalizes/displays adequate comfort level or baseline comfort level  Description  Interventions:  - Encourage patient to monitor pain and request assistance  - Assess pain using appropriate pain scale  - Administer analgesics based on type and severity of pain and evaluate response  - Implement non-pharmacological measures as appropriate and evaluate response  - Consider cultural and social influences on pain and pain management  - Notify physician/advanced practitioner if interventions unsuccessful or patient reports new pain  7/25/2020 1332 by Dorothy Wing RN  Outcome: Progressing  7/25/2020 1331 by Dorothy Wing RN  Outcome: Progressing     Problem: INFECTION - ADULT  Goal: Absence or prevention of progression during hospitalization  Description  INTERVENTIONS:  - Assess and monitor for signs and symptoms of infection  - Monitor lab/diagnostic results  - Monitor all insertion sites, i e  indwelling lines, tubes, and drains  - Monitor endotracheal if appropriate and nasal secretions for changes in amount and color  - [place] appropriate cooling/warming therapies per order  - Administer medications as ordered  - Instruct and encourage patient and family to use good hand hygiene technique  - Identify and instruct in appropriate isolation precautions for identified infection/condition  7/25/2020 1332 by Dorothy Wing RN  Outcome: Progressing  7/25/2020 1331 by Dorothy Wing RN  Outcome: Progressing     Problem: SAFETY ADULT  Goal: Patient will remain free of falls  Description  INTERVENTIONS:  - Assess patient frequently for physical needs  -  Identify cognitive and physical deficits and behaviors that affect risk of falls    -  [place] fall precautions as indicated by assessment   - Educate patient/family on patient safety including physical limitations  - Instruct patient to call for assistance with activity based on assessment  - Modify environment to reduce risk of injury  - Consider OT/PT consult to assist with strengthening/mobility  7/25/2020 1332 by Beti Daugherty RN  Outcome: Progressing  7/25/2020 1331 by Beti Daugherty RN  Outcome: Progressing  Goal: Maintain or return to baseline ADL function  Description  INTERVENTIONS:  -  Assess patient's ability to carry out ADLs; assess patient's baseline for ADL function and identify physical deficits which impact ability to perform ADLs (bathing, care of mouth/teeth, toileting, grooming, dressing, etc )  - Assess/evaluate cause of self-care deficits   - Assess range of motion  - Assess patient's mobility; develop plan if impaired  - Assess patient's need for assistive devices and provide as appropriate  - Encourage maximum independence but intervene and supervise when necessary  - Involve family in performance of ADLs  - Assess for home care needs following discharge   - Consider OT consult to assist with ADL evaluation and planning for discharge  - Provide patient education as appropriate  7/25/2020 1332 by Beti Daugherty RN  Outcome: Progressing  7/25/2020 1331 by Beti Daugherty RN  Outcome: Progressing  Goal: Maintain or return mobility status to optimal level  Description  INTERVENTIONS:  - Assess patient's baseline mobility status (ambulation, transfers, stairs, etc )    - Identify cognitive and physical deficits and behaviors that affect mobility  - Identify mobility aids required to assist with transfers and/or ambulation (gait belt, sit-to-stand, lift, walker, cane, etc )  - [place] fall precautions as indicated by assessment  - Record patient progress and toleration of activity level on Mobility SBAR; progress patient to next Phase/Stage  - Instruct patient to call for assistance with activity based on assessment  - Consider rehabilitation consult to assist with strengthening/weightbearing, etc   7/25/2020 1332 by Carol Mitchell RN  Outcome: Progressing  7/25/2020 1331 by Carol Mitchell RN  Outcome: Progressing     Problem: DISCHARGE PLANNING  Goal: Discharge to home or other facility with appropriate resources  Description  INTERVENTIONS:  - Identify barriers to discharge w/patient and caregiver  - Arrange for needed discharge resources and transportation as appropriate  - Identify discharge learning needs (meds, wound care, etc )  - Arrange for interpretive services to assist at discharge as needed  - Refer to Case Management Department for coordinating discharge planning if the patient needs post-hospital services based on physician/advanced practitioner order or complex needs related to functional status, cognitive ability, or social support system  7/25/2020 1332 by Carol Mitchell RN  Outcome: Progressing  7/25/2020 1331 by Carol Mitchell RN  Outcome: Progressing     Problem: Knowledge Deficit  Goal: Patient/family/caregiver demonstrates understanding of disease process, treatment plan, medications, and discharge instructions  Description  Complete learning assessment and assess knowledge base    Interventions:  - Provide teaching at level of understanding  - Provide teaching via preferred learning methods  7/25/2020 1332 by Carol Mitchell RN  Outcome: Progressing  7/25/2020 1331 by Carol Mitchell RN  Outcome: Progressing

## 2020-07-26 ENCOUNTER — APPOINTMENT (OUTPATIENT)
Dept: NON INVASIVE DIAGNOSTICS | Facility: HOSPITAL | Age: 40
DRG: 192 | End: 2020-07-26
Payer: COMMERCIAL

## 2020-07-26 LAB
ANION GAP SERPL CALCULATED.3IONS-SCNC: 6 MMOL/L (ref 4–13)
APTT PPP: 78 SECONDS (ref 23–37)
BASOPHILS # BLD AUTO: 0.04 THOUSANDS/ΜL (ref 0–0.1)
BASOPHILS NFR BLD AUTO: 1 % (ref 0–1)
BUN SERPL-MCNC: 6 MG/DL (ref 5–25)
CALCIUM SERPL-MCNC: 8.5 MG/DL (ref 8.3–10.1)
CHLORIDE SERPL-SCNC: 107 MMOL/L (ref 100–108)
CO2 SERPL-SCNC: 26 MMOL/L (ref 21–32)
CREAT SERPL-MCNC: 0.53 MG/DL (ref 0.6–1.3)
EOSINOPHIL # BLD AUTO: 0.28 THOUSAND/ΜL (ref 0–0.61)
EOSINOPHIL NFR BLD AUTO: 5 % (ref 0–6)
ERYTHROCYTE [DISTWIDTH] IN BLOOD BY AUTOMATED COUNT: 14.3 % (ref 11.6–15.1)
GFR SERPL CREATININE-BSD FRML MDRD: 121 ML/MIN/1.73SQ M
GLUCOSE P FAST SERPL-MCNC: 99 MG/DL (ref 65–99)
GLUCOSE SERPL-MCNC: 99 MG/DL (ref 65–140)
HCT VFR BLD AUTO: 38.5 % (ref 34.8–46.1)
HGB BLD-MCNC: 12.5 G/DL (ref 11.5–15.4)
IMM GRANULOCYTES # BLD AUTO: 0.01 THOUSAND/UL (ref 0–0.2)
IMM GRANULOCYTES NFR BLD AUTO: 0 % (ref 0–2)
LYMPHOCYTES # BLD AUTO: 2.69 THOUSANDS/ΜL (ref 0.6–4.47)
LYMPHOCYTES NFR BLD AUTO: 50 % (ref 14–44)
MCH RBC QN AUTO: 29.1 PG (ref 26.8–34.3)
MCHC RBC AUTO-ENTMCNC: 32.5 G/DL (ref 31.4–37.4)
MCV RBC AUTO: 90 FL (ref 82–98)
MONOCYTES # BLD AUTO: 0.47 THOUSAND/ΜL (ref 0.17–1.22)
MONOCYTES NFR BLD AUTO: 9 % (ref 4–12)
NEUTROPHILS # BLD AUTO: 1.87 THOUSANDS/ΜL (ref 1.85–7.62)
NEUTS SEG NFR BLD AUTO: 35 % (ref 43–75)
NRBC BLD AUTO-RTO: 0 /100 WBCS
PLATELET # BLD AUTO: 243 THOUSANDS/UL (ref 149–390)
PMV BLD AUTO: 11.1 FL (ref 8.9–12.7)
POTASSIUM SERPL-SCNC: 3.5 MMOL/L (ref 3.5–5.3)
RBC # BLD AUTO: 4.3 MILLION/UL (ref 3.81–5.12)
SARS-COV-2 RNA RESP QL NAA+PROBE: NEGATIVE
SODIUM SERPL-SCNC: 139 MMOL/L (ref 136–145)
TROPONIN I SERPL-MCNC: 0.85 NG/ML
TROPONIN I SERPL-MCNC: 1.53 NG/ML
WBC # BLD AUTO: 5.36 THOUSAND/UL (ref 4.31–10.16)

## 2020-07-26 PROCEDURE — 94760 N-INVAS EAR/PLS OXIMETRY 1: CPT

## 2020-07-26 PROCEDURE — U0003 INFECTIOUS AGENT DETECTION BY NUCLEIC ACID (DNA OR RNA); SEVERE ACUTE RESPIRATORY SYNDROME CORONAVIRUS 2 (SARS-COV-2) (CORONAVIRUS DISEASE [COVID-19]), AMPLIFIED PROBE TECHNIQUE, MAKING USE OF HIGH THROUGHPUT TECHNOLOGIES AS DESCRIBED BY CMS-2020-01-R: HCPCS | Performed by: NURSE PRACTITIONER

## 2020-07-26 PROCEDURE — 99232 SBSQ HOSP IP/OBS MODERATE 35: CPT | Performed by: FAMILY MEDICINE

## 2020-07-26 PROCEDURE — 84484 ASSAY OF TROPONIN QUANT: CPT | Performed by: FAMILY MEDICINE

## 2020-07-26 PROCEDURE — 93306 TTE W/DOPPLER COMPLETE: CPT

## 2020-07-26 PROCEDURE — 85730 THROMBOPLASTIN TIME PARTIAL: CPT | Performed by: FAMILY MEDICINE

## 2020-07-26 PROCEDURE — 94660 CPAP INITIATION&MGMT: CPT

## 2020-07-26 PROCEDURE — 85025 COMPLETE CBC W/AUTO DIFF WBC: CPT | Performed by: FAMILY MEDICINE

## 2020-07-26 PROCEDURE — 99233 SBSQ HOSP IP/OBS HIGH 50: CPT | Performed by: INTERNAL MEDICINE

## 2020-07-26 PROCEDURE — 93325 DOPPLER ECHO COLOR FLOW MAPG: CPT | Performed by: INTERNAL MEDICINE

## 2020-07-26 PROCEDURE — 93321 DOPPLER ECHO F-UP/LMTD STD: CPT | Performed by: INTERNAL MEDICINE

## 2020-07-26 PROCEDURE — 80048 BASIC METABOLIC PNL TOTAL CA: CPT | Performed by: FAMILY MEDICINE

## 2020-07-26 PROCEDURE — 93308 TTE F-UP OR LMTD: CPT | Performed by: INTERNAL MEDICINE

## 2020-07-26 RX ORDER — LABETALOL 20 MG/4 ML (5 MG/ML) INTRAVENOUS SYRINGE
10 ONCE
Status: COMPLETED | OUTPATIENT
Start: 2020-07-26 | End: 2020-07-26

## 2020-07-26 RX ORDER — HYDROCHLOROTHIAZIDE 25 MG/1
25 TABLET ORAL DAILY
Status: DISCONTINUED | OUTPATIENT
Start: 2020-07-26 | End: 2020-07-28 | Stop reason: HOSPADM

## 2020-07-26 RX ORDER — HYDRALAZINE HYDROCHLORIDE 20 MG/ML
5 INJECTION INTRAMUSCULAR; INTRAVENOUS ONCE
Status: COMPLETED | OUTPATIENT
Start: 2020-07-26 | End: 2020-07-26

## 2020-07-26 RX ORDER — ASPIRIN 81 MG/1
324 TABLET, CHEWABLE ORAL ONCE
Status: COMPLETED | OUTPATIENT
Start: 2020-07-26 | End: 2020-07-26

## 2020-07-26 RX ORDER — POTASSIUM CHLORIDE 20 MEQ/1
40 TABLET, EXTENDED RELEASE ORAL ONCE
Status: COMPLETED | OUTPATIENT
Start: 2020-07-26 | End: 2020-07-26

## 2020-07-26 RX ORDER — SODIUM CHLORIDE 9 MG/ML
100 INJECTION, SOLUTION INTRAVENOUS CONTINUOUS
Status: DISCONTINUED | OUTPATIENT
Start: 2020-07-27 | End: 2020-07-27

## 2020-07-26 RX ORDER — SODIUM CHLORIDE 9 MG/ML
100 INJECTION, SOLUTION INTRAVENOUS CONTINUOUS
Status: DISCONTINUED | OUTPATIENT
Start: 2020-07-26 | End: 2020-07-26

## 2020-07-26 RX ORDER — HYDRALAZINE HYDROCHLORIDE 20 MG/ML
10 INJECTION INTRAMUSCULAR; INTRAVENOUS ONCE
Status: COMPLETED | OUTPATIENT
Start: 2020-07-26 | End: 2020-07-26

## 2020-07-26 RX ORDER — DOCUSATE SODIUM 100 MG/1
100 CAPSULE, LIQUID FILLED ORAL 2 TIMES DAILY
Status: DISCONTINUED | OUTPATIENT
Start: 2020-07-26 | End: 2020-07-28 | Stop reason: HOSPADM

## 2020-07-26 RX ORDER — ACETAMINOPHEN 325 MG/1
975 TABLET ORAL EVERY 6 HOURS PRN
Status: DISCONTINUED | OUTPATIENT
Start: 2020-07-26 | End: 2020-07-27 | Stop reason: ALTCHOICE

## 2020-07-26 RX ADMIN — POTASSIUM CHLORIDE 40 MEQ: 1500 TABLET, EXTENDED RELEASE ORAL at 06:42

## 2020-07-26 RX ADMIN — NICOTINE 21 MG: 21 PATCH TRANSDERMAL at 09:38

## 2020-07-26 RX ADMIN — PERFLUTREN 0.6 ML/MIN: 6.52 INJECTION, SUSPENSION INTRAVENOUS at 08:57

## 2020-07-26 RX ADMIN — HEPARIN SODIUM AND DEXTROSE 11.1 UNITS/KG/HR: 10000; 5 INJECTION INTRAVENOUS at 06:44

## 2020-07-26 RX ADMIN — HYDROCHLOROTHIAZIDE 25 MG: 25 TABLET ORAL at 09:37

## 2020-07-26 RX ADMIN — HYDRALAZINE HYDROCHLORIDE 10 MG: 20 INJECTION INTRAMUSCULAR; INTRAVENOUS at 19:27

## 2020-07-26 RX ADMIN — DOCUSATE SODIUM 100 MG: 100 CAPSULE, LIQUID FILLED ORAL at 17:40

## 2020-07-26 RX ADMIN — DOCUSATE SODIUM 100 MG: 100 CAPSULE, LIQUID FILLED ORAL at 12:06

## 2020-07-26 RX ADMIN — ALBUTEROL SULFATE 2 PUFF: 90 AEROSOL, METERED RESPIRATORY (INHALATION) at 00:32

## 2020-07-26 RX ADMIN — LABETALOL 20 MG/4 ML (5 MG/ML) INTRAVENOUS SYRINGE 10 MG: at 20:46

## 2020-07-26 RX ADMIN — HYDRALAZINE HYDROCHLORIDE 5 MG: 20 INJECTION INTRAMUSCULAR; INTRAVENOUS at 12:06

## 2020-07-26 RX ADMIN — ACETAMINOPHEN 650 MG: 325 TABLET, FILM COATED ORAL at 00:26

## 2020-07-26 RX ADMIN — LISINOPRIL 20 MG: 20 TABLET ORAL at 09:37

## 2020-07-26 RX ADMIN — HYDRALAZINE HYDROCHLORIDE 5 MG: 20 INJECTION INTRAMUSCULAR; INTRAVENOUS at 00:22

## 2020-07-26 RX ADMIN — ASPIRIN 81 MG 324 MG: 81 TABLET ORAL at 12:06

## 2020-07-26 RX ADMIN — ACETAMINOPHEN 975 MG: 325 TABLET, FILM COATED ORAL at 20:46

## 2020-07-26 RX ADMIN — SODIUM CHLORIDE 125 ML/HR: 0.9 INJECTION, SOLUTION INTRAVENOUS at 04:03

## 2020-07-26 NOTE — ASSESSMENT & PLAN NOTE
Patient states she slept on/off overnight with CPAP, but has no complaint  O2 saturation 92-98% overnight  PLAN:   - continue CPAP

## 2020-07-26 NOTE — ASSESSMENT & PLAN NOTE
BP not well controlled  SBP ranging 175-196, -113  Latest BP today 7:45am 181/107  On admission - 185/105  Patient received 2 dose of hydralazine 5 mg IV at 23:42 PM and 00:22 AM last night  PLAN:   1  Increase lisinopril 10 mg to 20 mg daily   2  Increase HCTZ 12 5 mg to 25 mg daily   3  Consider starting hydralazine 5 mg Q 6 PRN for SBP>160 if BP is still not controlled today  4  Continue to monitor renal function with BMP daily  5  Continue to monitor BP  6  If BP is not adequately controlled, consider investigating secondary causes

## 2020-07-26 NOTE — ASSESSMENT & PLAN NOTE
Patient complains of 1 episode of palpitation around 3:00 AM last night, lasted approximately 1 minute, feel like someone was coloring her mouth, resolved with closing eyes and relaxation  Patient otherwise denies any chest pain overnight  - troponin on admission 0 18, peaks at 2 25 (6:28 PM 07/25/2020), and have been trending down to 0 85 this morning 3:22 AM   - EKG on 07/25/2020 shows sinus rhythm with premature ventricular complexes  No sign of acute ischemic changes  - CXR - negative for cardiopulmonary disease 07/24/20  PLAN:   1  Stop trending troponin   2  Continue monitor on tele   3  Continue heparin drip 25K units based on weight   4  Follow-up with cardiology consult: Up titrate current antihypertensives   5  Echo done this morning  Follow-up with results

## 2020-07-26 NOTE — PROGRESS NOTES
Progress Note - [facility] 1980, 47 y o  female MRN: [id number]    [facility] Encounter: [id number]    Primary Care Provider: Berhane Fang PA-C   Date and time admitted to hospital: 7/25/2020  8:08 AM        * Chest pain  Assessment & Plan  Chest pain controlled  No CP overnight  - troponin on admission 0 18, peaks at 2 25 (6:28 PM 07/25/2020), and have been trending down to 0 85 this morning 3:22 AM   - EKG on 07/25/2020 shows sinus rhythm with premature ventricular complexes  No sign of acute ischemic changes  - CXR - negative for cardiopulmonary disease 07/24/20  PLAN:   1  Stop trending troponin   2  Continue monitor on tele   3  Continue heparin drip 25K units based on weight   4  Follow-up with cardiology consult: Up titrate current antihypertensives   5  Echo done this morning  Follow-up with results  Hypertension  Assessment & Plan  BP not well controlled  SBP ranging 175-196, -113  Latest BP today 7:45am 181/107  On admission - 185/105  Patient received 2 dose of hydralazine 5 mg IV at 23:42 PM and 00:22 AM last night  PLAN:   1  Increase lisinopril 10 mg to 20 mg daily   2  Increase HCTZ 12 5 mg to 25 mg daily   3  Consider starting hydralazine 5 mg Q 6 PRN for SBP>160 if BP is still not controlled today  4  Continue to monitor renal function with BMP daily  5  Continue to monitor BP  6  If BP is not adequately controlled, consider investigating secondary causes  Sleep apnea  Assessment & Plan  Patient states she slept on/off overnight with CPAP, but has no complaint  O2 saturation 92-98% overnight  PLAN:   - continue CPAP  Asthma  Assessment & Plan  Chronic asthma well controlled  Patient denies any wheezing overnight  Physical exam shows mild wheezing  PLAN:     - Albulterol inhaler ordered PRN for  exacerbations  - Continuing at home med - Flonase        Diet:        Diet Orders   (From admission, onward)             Start Ordered    07/25/20 1304  Diet Cardiovascular; Cardiac  Diet effective now     Question Answer Comment   Diet Type Cardiovascular    Cardiac Cardiac    RD to adjust diet per protocol? Yes        07/25/20 1303              Pharmacologic VTE prophylaxis: Heparin  Mechanical VTE prophylaxis: sequential compression device  Code status: FULL       Dispo:   Plan discussed with Dr ROMERO and [facility] Team       Subjective:   Patient states she has been sleeping on and off overnight with CPAP  She has 1 episode of palpitation lasting 1 minutes at 3:00 AM but resolved relaxation  Also complains of bottom left tooth pain, dentist appointment Tuesday  She is also constipated, last bowel movement was Friday morning  Patient otherwise denies any chest pain, or other systemic symptoms  Objective:     Vitals:  Vitals:    07/26/20 0226 07/26/20 0338 07/26/20 0745 07/26/20 0755   BP: (!) 172/97  (!) 181/107    BP Location:   Right arm    Pulse:   75 75   Resp:   18    Temp:   98 3 °F (36 8 °C)    TempSrc:   Oral    SpO2:  92% 98% 98%   Weight:       Height:             Intake/Output Summary (Last 24 hours) at 7/26/2020 5229  Last data filed at 7/26/2020 6623  Gross per 24 hour   Intake 2284 25 ml   Output --   Net 2284 25 ml       Invasive Devices     Peripheral Intravenous Line            Peripheral IV 07/25/20 Left Hand 1 day    Peripheral IV 07/25/20 Right Antecubital 1 day                Physical Exam   Constitutional: She is oriented to person, place, and time  She appears well-developed and well-nourished  No distress  HENT:   Head: Normocephalic  Right Ear: External ear normal    Left Ear: External ear normal    Eyes: Pupils are equal, round, and reactive to light  Right eye exhibits no discharge  Left eye exhibits no discharge  Neck: Normal range of motion  Neck supple  Cardiovascular: Intact distal pulses  Heart sound distant  Pulmonary/Chest: Effort normal  No respiratory distress   She has wheezes (Mild wheezing on auscultation)  She has no rales  She exhibits no tenderness  Abdominal: Soft  Bowel sounds are normal  She exhibits no distension  There is no tenderness  There is no rebound and no guarding  Musculoskeletal: Normal range of motion  She exhibits edema (Bilateral LE +1 edema)  She exhibits no tenderness or deformity  Neurological: She is alert and oriented to person, place, and time  No cranial nerve deficit  Skin: Skin is warm  She is not diaphoretic  Psychiatric: She has a normal mood and affect  Her behavior is normal  Judgment and thought content normal        Labs  Recent Results (from the past 24 hour(s))   Troponin I    Collection Time: 07/25/20 11:58 AM   Result Value Ref Range    Troponin I 1 01 (H) <=0 04 ng/mL   CBC    Collection Time: 07/25/20 11:58 AM   Result Value Ref Range    WBC 5 28 4 31 - 10 16 Thousand/uL    RBC 4 30 3 81 - 5 12 Million/uL    Hemoglobin 12 5 11 5 - 15 4 g/dL    Hematocrit 38 6 34 8 - 46 1 %    MCV 90 82 - 98 fL    MCH 29 1 26 8 - 34 3 pg    MCHC 32 4 31 4 - 37 4 g/dL    RDW 14 4 11 6 - 15 1 %    Platelets 442 027 - 978 Thousands/uL    MPV 10 6 8 9 - 12 7 fL   APTT    Collection Time: 07/25/20 11:58 AM   Result Value Ref Range    PTT 64 (H) 23 - 37 seconds   Protime-INR    Collection Time: 07/25/20 11:58 AM   Result Value Ref Range    Protime 14 2 11 6 - 14 5 seconds    INR 1 09 0 84 - 1 19   Hemoglobin A1C w/ EAG Estimation    Collection Time: 07/25/20 11:58 AM   Result Value Ref Range    Hemoglobin A1C 5 9 (H) Normal 3 8-5 6%; PreDiabetic 5 7-6 4%;  Diabetic >=6 5%; Glycemic control for adults with diabetes <7 0% %     mg/dl   Troponin I    Collection Time: 07/25/20  3:38 PM   Result Value Ref Range    Troponin I 1 69 (H) <=0 04 ng/mL   Troponin I    Collection Time: 07/25/20  6:28 PM   Result Value Ref Range    Troponin I 2 25 (H) <=0 04 ng/mL   APTT    Collection Time: 07/25/20  6:38 PM   Result Value Ref Range    PTT 79 (H) 23 - 37 seconds Troponin I    Collection Time: 07/25/20  9:32 PM   Result Value Ref Range    Troponin I 2 10 (H) <=0 04 ng/mL   Troponin I    Collection Time: 07/26/20 12:23 AM   Result Value Ref Range    Troponin I 1 53 (H) <=0 04 ng/mL   Troponin I    Collection Time: 07/26/20  3:22 AM   Result Value Ref Range    Troponin I 0 85 (H) <=0 04 ng/mL   CBC and differential    Collection Time: 07/26/20  5:00 AM   Result Value Ref Range    WBC 5 36 4 31 - 10 16 Thousand/uL    RBC 4 30 3 81 - 5 12 Million/uL    Hemoglobin 12 5 11 5 - 15 4 g/dL    Hematocrit 38 5 34 8 - 46 1 %    MCV 90 82 - 98 fL    MCH 29 1 26 8 - 34 3 pg    MCHC 32 5 31 4 - 37 4 g/dL    RDW 14 3 11 6 - 15 1 %    MPV 11 1 8 9 - 12 7 fL    Platelets 219 190 - 412 Thousands/uL    nRBC 0 /100 WBCs    Neutrophils Relative 35 (L) 43 - 75 %    Immat GRANS % 0 0 - 2 %    Lymphocytes Relative 50 (H) 14 - 44 %    Monocytes Relative 9 4 - 12 %    Eosinophils Relative 5 0 - 6 %    Basophils Relative 1 0 - 1 %    Neutrophils Absolute 1 87 1 85 - 7 62 Thousands/µL    Immature Grans Absolute 0 01 0 00 - 0 20 Thousand/uL    Lymphocytes Absolute 2 69 0 60 - 4 47 Thousands/µL    Monocytes Absolute 0 47 0 17 - 1 22 Thousand/µL    Eosinophils Absolute 0 28 0 00 - 0 61 Thousand/µL    Basophils Absolute 0 04 0 00 - 0 10 Thousands/µL   Basic metabolic panel    Collection Time: 07/26/20  5:00 AM   Result Value Ref Range    Sodium 139 136 - 145 mmol/L    Potassium 3 5 3 5 - 5 3 mmol/L    Chloride 107 100 - 108 mmol/L    CO2 26 21 - 32 mmol/L    ANION GAP 6 4 - 13 mmol/L    BUN 6 5 - 25 mg/dL    Creatinine 0 53 (L) 0 60 - 1 30 mg/dL    Glucose 99 65 - 140 mg/dL    Glucose, Fasting 99 65 - 99 mg/dL    Calcium 8 5 8 3 - 10 1 mg/dL    eGFR 121 ml/min/1 73sq m   APTT    Collection Time: 07/26/20  5:00 AM   Result Value Ref Range    PTT 78 (H) 23 - 37 seconds           Current Facility-Administered Medications   Medication Dose Route Frequency    acetaminophen (TYLENOL) tablet 650 mg  650 mg Oral Q6H PRN    albuterol (PROVENTIL HFA,VENTOLIN HFA) inhaler 2 puff  2 puff Inhalation Q4H PRN    fluticasone (FLONASE) 50 mcg/act nasal spray 1 spray  1 spray Nasal Daily    heparin (porcine) 25,000 units in 250 mL infusion (premix)  3-20 Units/kg/hr (Order-Specific) Intravenous Titrated    heparin (porcine) injection 2,000 Units  2,000 Units Intravenous Q1H PRN    heparin (porcine) injection 4,000 Units  4,000 Units Intravenous Q1H PRN    hydrochlorothiazide (HYDRODIURIL) tablet 25 mg  25 mg Oral Daily    lisinopril (ZESTRIL) tablet 20 mg  20 mg Oral Daily    nicotine (NICODERM CQ) 21 mg/24 hr TD 24 hr patch 21 mg  21 mg Transdermal Daily    sodium chloride 0 9 % infusion  125 mL/hr Intravenous Continuous     No Known Allergies      Imaging/Other:    No orders to display        Coby Alaniz MD  Family Medicine  PGY-1

## 2020-07-26 NOTE — QUICK NOTE
6:39 PM : Patient seen examined at bedside during evening rounds  She was resting comfortably and denied any symptoms  Blood pressures have been consistently elevated throughout her admission  This is likely due to patient's obesity and KEYONA  Last blood pressure was 163/107 at 18:21  Lisinopril was increased from 10-20 milligrams today as well as hydrochlorothiazide from 12 5 milligrams to 25 milligrams  She has had a few doses of p r n  hydralazine 5 milligrams throughout the day however has had little impact  Plan  -continue heparin drip  -NPO for cardiac catheterization tomorrow  -will give hydralazine 10 mg now and check for response  -continue monitor blood pressures    Update 11:34 PM : Hydralazine 10 mg  did not help lower BP  I ordered labetalol 10 mg at 20:45  This helped decrease BP from 169/106 to 137/80  A lot of her Bps are in the severe range or borderline severe  Given her non compliance, obesity and KEYONA this is likely her Bps at home  Considered putting patient on Cardene drip however did not want to drop her pressures too fast from their baseline  Lisinopril and HCTZ were increased today  If they do not help lower BP I would consider starting  BB since it helped lower BP to 137/80  She does have KEYONA and is obese  which may be the cause of her severe HTN however she is young so I would consider workup for secondary HTN after cath or as outpatient       [address]  medicine PGY3

## 2020-07-26 NOTE — UTILIZATION REVIEW
Initial Clinical Review    Admit OBS status  ([facility])  On  7/25 @  0913    CHANGED to INPT status on  7/26  @  1054   Due to need for ischemic workup     Inpatient Admission Once     Transfer Service: General Medicine       Question Answer Comment   Admitting Physician GISSELL James [facility]    Level of Care Med Surg    Estimated length of stay More than 2 Midnights    Certification I certify that inpatient services are medically necessary for this patient for a duration of greater than two midnights  See H&P and MD Progress Notes for additional information about the patient's course of treatment  Pt presented   to [facility] ER on 7/24/2020 with complaint of chest pain  Patient reports she was getting out of the shower yesterday evening and had pressure and stabbing pain in her chest that radiated to her neck  Chest pain resolved once she had SL nitroglycerin in the ER  reports no exertional chest pain or SOB  (+)for increased fatigue with any activity which is new over the past couple weeks  IN ER heparin gtt initiated,  Given IVF , ASA, IV MS x2,  Ntg sl tab @  0136 & IV Labetalol @ 0047  TRANSPORT to [address] [place] [address],[address]  For higher level of care (cardiology team)       Heavenly Garcia ADMIT to [facility],  OBS status,    Medsurg level of care on  7/25  @  0913   For R/O ACS with telemetry monitoring, trending troponins,  Cardiology consult       7/25  CARDIOLOGY CONSULT:    Troponin 0 18/0 36/0  52  EKG ST-T wave abnormalities more pronounced compared to 11/2019  Cont heparin gtt, CK Echo  May need ischemic workup  Accelerated HTN:  On lisinopril and HCTZ at home - may need to add lisinopril      7/26  FAMILY MED:    C/o episode palpitaion approx 0300, 1 min, self resolved    Trops trending down this am  Patient received 2 dose of hydralazine 5 mg IV at 23:42 PM and 00:22 AM last night  Will stop trending trops; Cont tele and heparin gtt;   fup ECHO     7/26 CARDIOLOGY:   Pt inconsistent in taking meds at home:  [address] home meds lisinopirl and hctz  EKG, non specific TWI that are not much different than last years  Trop elevation to 1, most likely due to subendocardial ischemia from high filling pressures and not obstructive CAD  First control BP, check echo, possible stress/ ischemia eval after BP control  (scheduled for heart Cath tomorrow  7/27:  Npo after 7/26 7547)               ED Triage Vitals   Temperature Pulse Respirations Blood Pressure SpO2   07/25/20 0819 07/25/20 0819 07/25/20 0819 07/25/20 0819 07/25/20 2107   98 1 °F (36 7 °C) 88 16 160/99 97 %      Temp Source Heart Rate Source Patient Position - Orthostatic VS BP Location FiO2 (%)   07/25/20 0819 07/25/20 0819 07/25/20 0819 07/25/20 0819 --   Oral Monitor Lying Right arm       Pain Score       07/25/20 0819       6        Wt Readings from Last 1 Encounters:   07/25/20 130 kg (286 lb 9 6 oz)     Additional Vital Signs:   07/26/20 10:36:44  --  87  --  180/124Abnormal   143  98 %  --   07/26/20 09:36:57  --  82  --  180/106Abnormal   131  98 %  --   07/26/20 07:55:57  --  75  --  --  --  98 %  --   07/26/20 07:45:12  98 3 °F  75  18  181/107Abnormal   132  98 %  Lying   07/26/20 0338  --  --  --  --  --  92 %  --   07/26/20 02:26:21  --  --  --  172/97Abnormal   122  --  --   07/26/20 01:05:08  --  --  --  175/99Abnormal   124  --  --   07/26/20 00:10:29  --  --  --  176/112Abnormal   133  --  --   07/25/20 23:10:02  --  --  --  175/113Abnormal   134           Pertinent Labs/Diagnostic Test Results:   7/25  cxr - nad  7/25  EKG - NSR with PVC's;   ST and T wave abnormalities in inferior and lateral leads  Results from last 7 days   Lab Units 07/26/20  0500 07/25/20  1158 07/24/20  2359   WBC Thousand/uL 5 36 5 28 5 30   HEMOGLOBIN g/dL 12 5 12 5 13 1   HEMATOCRIT % 38 5 38 6 39 0   PLATELETS Thousands/uL 243 228 239   NEUTROS ABS Thousands/µL 1 87  --  2 20         Results from last 7 days   Lab Units 07/26/20  0500 07/24/20  2359   SODIUM mmol/L 139 140   POTASSIUM mmol/L 3 5 3 9   CHLORIDE mmol/L 107 105   CO2 mmol/L 26 28   ANION GAP mmol/L 6 7   BUN mg/dL 6 8   CREATININE mg/dL 0 53* 0 82   EGFR ml/min/1 73sq m 121 91   CALCIUM mg/dL 8 5 9 5     Results from last 7 days   Lab Units 07/24/20  2359   AST U/L 32   ALT U/L 18   ALK PHOS U/L 54   TOTAL PROTEIN g/dL 7 3   ALBUMIN g/dL 4 2   TOTAL BILIRUBIN mg/dL 0 40         Results from last 7 days   Lab Units 07/26/20  0500 07/24/20  2359   GLUCOSE RANDOM mg/dL 99 110*         Results from last 7 days   Lab Units 07/25/20  1158   HEMOGLOBIN A1C % 5 9*   EAG mg/dl 123     Results from last 7 days   Lab Units 07/26/20  0322 07/26/20  0023 07/25/20  2132 07/25/20  1828 07/25/20  1538 07/25/20  1158 07/25/20  0554   TROPONIN I ng/mL 0 85* 1 53* 2 10* 2 25* 1 69* 1 01* 0 52*         Results from last 7 days   Lab Units 07/26/20  0500 07/25/20  1838 07/25/20  1158  07/25/20  0038   PROTIME seconds  --   --  14 2  --  13 8   INR   --   --  1 09  --  1 05   PTT seconds 78* 79* 64*   < > 33    < > = values in this interval not displayed           Results from last 7 days   Lab Units 07/24/20  2359   NT-PRO BNP pg/mL 283     Results from last 7 days   Lab Units 07/24/20  2359   LIPASE u/L 84         Past Medical History:   Diagnosis Date    Asthma     Hypertension      Present on Admission:   Chest pain   Tobacco abuse   PVC (premature ventricular contraction)   Hypertension   Sleep apnea      Admitting Diagnosis: Chest pain [R07 9]  Age/Sex: 47 y o  female  Admission Orders:  Telemetry; Consult cardiology;  Trend troponins;  CONTINUOUS heparin gtt;    SCD;s;  CPAP at hs (as at home)  ;   IVF NS @  125  (dc'ed 7/26  @  7672)    Scheduled Medications:    Medications:  docusate sodium 100 mg Oral BID   fluticasone 1 spray Nasal Daily   hydrochlorothiazide 25 mg Oral Daily   lisinopril 20 mg Oral Daily   nicotine 21 mg Transdermal Daily     Continuous IV Infusions:    heparin (porcine) 3-20 Units/kg/hr (Order-Specific) Intravenous Titrated     PRN Meds:    acetaminophen 650 mg Oral Q6H PRN   albuterol 2 puff Inhalation Q4H PRN   heparin (porcine) 2,000 Units Intravenous Q1H PRN   heparin (porcine) 4,000 Units Intravenous Q1H PRN     Network Utilization Review Department  [email or web address]  ATTENTION: Please call with any questions or concerns to [phone] and carefully listen to the prompts so that you are directed to the right person  All voicemails are confidential   Edna Grand all requests for admission clinical reviews, approved or denied determinations and any other requests to dedicated fax number below belonging to the campus where the patient is receiving treatment   List of dedicated fax numbers for the Facilities:  [address] DENIALS (Administrative/Medical Necessity) [phone]   [address] (Maternity/NICU/Pediatrics) [phone]   Elizabeth Wilde [phone]   Magdiel Daniel [phone]   So Form [phone]   [address] [facility]  [phone]   [address] [phone]   [facility]  [phone]   [address] [facility] [address] [facility]  [address] [phone]

## 2020-07-26 NOTE — RESPIRATORY THERAPY NOTE
RT Protocol Note  Hector Gunn 47 y o  female MRN: [id number]  Unit/Bed#: [facility] 728-01 Encounter: [id number]    Assessment    Principal Problem:    Chest pain  Active Problems:    PVC (premature ventricular contraction)    Tobacco abuse    Sleep apnea    Hypertension    Elevated troponin    Asthma      Home Pulmonary Medications:  Albuterol mdi/udn prn       Past Medical History:   Diagnosis Date    Asthma     Hypertension      Social History     Socioeconomic History    Marital status: /Civil Union     Spouse name: Not on file    Number of children: Not on file    Years of education: Not on file    Highest education level: Not on file   Occupational History    Not on file   Social Needs    Financial resource strain: Not on file    Food insecurity:     Worry: Not on file     Inability: Not on file    Transportation needs:     Medical: Not on file     Non-medical: Not on file   Tobacco Use    Smoking status: Current Every Day Smoker     Packs/day: 1 00     Types: Cigarettes    Smokeless tobacco: Never Used    Tobacco comment: " not for past couple days"      Substance and Sexual Activity    Alcohol use: Not Currently    Drug use: No    Sexual activity: Not on file   Lifestyle    Physical activity:     Days per week: Not on file     Minutes per session: Not on file    Stress: Not on file   Relationships    Social connections:     Talks on phone: Not on file     Gets together: Not on file     Attends [employer] service: Not on file     Active member of club or organization: Not on file     Attends meetings of clubs or organizations: Not on file     Relationship status: Not on file    Intimate partner violence:     Fear of current or ex partner: Not on file     Emotionally abused: Not on file     Physically abused: Not on file     Forced sexual activity: Not on file   Other Topics Concern    Not on file   Social History Narrative    Not on file       Subjective         Objective    Physical Exam:   Assessment Type: (P) Assess only  General Appearance: (P) Alert, Awake  Respiratory Pattern: (P) Normal  Chest Assessment: (P) Chest expansion symmetrical  Bilateral Breath Sounds: (P) Clear, Diminished  Cough: (P) None    Vitals:  Blood pressure (!) 180/106, pulse 82, temperature 98 3 °F (36 8 °C), temperature source Oral, resp  rate 18, height 5' 1" (1 549 m), weight 130 kg (286 lb 9 6 oz), last menstrual period 07/02/2020, SpO2 98 %  Imaging and other studies: I have personally reviewed pertinent reports              Plan    Respiratory Plan: (P) Home Bronchodilator Patient pathway        Resp Comments: (P) per pt she smokes 1/2pk x 3 yrs, pmhx of asthma, pt uses mdi's prn and neb prn at home, cxr shows clear lungs, no distress noted, pt already ordered on an albuterol mdi q 4 prn for sob/wheezing, d/c'd resp protocol

## 2020-07-26 NOTE — PROGRESS NOTES
General Cardiology   Progress Note -  [facility] One   Daniela Urbano 47 y o  female MRN: [id number]    [facility] Encounter: [id number]    Assessment/ Plan    1  Chest pain with elevated troponin   Troponin 0 18/0 36/0 52/1 0/1 69/2 25/2 10/1 53/0 85  ECG reviewed showing NSR with ST and T wave abnormalities in inferior and lateral leads  More pronounced compared to 11/2019  Continue heparin gtt   Echocardiogram pending   Consider ischemic workup pending echocardiogram and after better BP control       2  Accelerated Hypertension   /107 this morning   Patient is prescribed lisinopril 10 mg PO daily and HCTz 12 5 mg PO daily at home  Lisinopril increased to 20 mg PO daily and HCT 25 mg PO daily  She did not receive doses today  Discontinue IV fluids  Will continue to monitor      3  Tobacco abuse   Continue nicotine patch   Counseled on smoking cessation ]    4  Morbid Obesity- counseled on lifestyle modifications     5  KEYONA- CPA at HS     6  Hypokalemia- K 3 5  Replete      Subjective  Patient reports she felt mild SOB this morning due to her asthma  No complaint of chest pain, palpitations or dizziness  No fever or chills  Review of Systems   Constitution: Negative for chills, fever and malaise/fatigue  HENT: Negative for congestion  Cardiovascular: Negative for chest pain, dyspnea on exertion, leg swelling, orthopnea and palpitations  Respiratory: Positive for shortness of breath  Musculoskeletal: Negative for falls  Gastrointestinal: Negative for bloating, nausea and vomiting  Neurological: Negative for dizziness and light-headedness  Psychiatric/Behavioral: Negative for altered mental status  Objective:   Vitals: Blood pressure (!) 181/107, pulse 75, temperature 98 3 °F (36 8 °C), temperature source Oral, resp  rate 18, height 5' 1" (1 549 m), weight 130 kg (286 lb 9 6 oz), last menstrual period 07/02/2020, SpO2 98 %  ,       Body mass index is 54 15 kg/m²  , Systolic (07LGT), IVR:960 , Min:155 , PAE:333     Diastolic (09FIX), NNC:768, Min:97, Max:113      Intake/Output Summary (Last 24 hours) at 7/26/2020 9342  Last data filed at 7/26/2020 0658  Gross per 24 hour   Intake 2284 25 ml   Output --   Net 2284 25 ml     Weight (last 2 days)     Date/Time   Weight    07/25/20 0829   130 (286 6)            Telemetry Review: Normal sinus rhythm with 3 beats of NSVT     Physical Exam   Constitutional: She is oriented to person, place, and time  No distress  HENT:   Head: Normocephalic  Neck: Neck supple  Cardiovascular: Normal rate, regular rhythm, normal heart sounds and intact distal pulses  Pulmonary/Chest: Effort normal  No stridor  No respiratory distress  Fine expiratory wheezing in YULIYA    Abdominal: Soft  Bowel sounds are normal    Morbidly obese    Musculoskeletal: Normal range of motion  She exhibits no edema  Neurological: She is alert and oriented to person, place, and time  Skin: Skin is warm and dry  She is not diaphoretic  Psychiatric: She has a normal mood and affect   Her behavior is normal        LABORATORY RESULTS  Results from last 7 days   Lab Units 07/26/20  0322 07/26/20  0023 07/25/20  2132   TROPONIN I ng/mL 0 85* 1 53* 2 10*     CBC with diff: Results from last 7 days   Lab Units 07/26/20  0500 07/25/20  1158 07/24/20  2359   WBC Thousand/uL 5 36 5 28 5 30   HEMOGLOBIN g/dL 12 5 12 5 13 1   HEMATOCRIT % 38 5 38 6 39 0   MCV fL 90 90 87   PLATELETS Thousands/uL 243 228 239   MCH pg 29 1 29 1 29 4   MCHC g/dL 32 5 32 4 33 6   RDW % 14 3 14 4 15 1   MPV fL 11 1 10 6 8 8*   NRBC AUTO /100 WBCs 0  --   --        CMP:  Results from last 7 days   Lab Units 07/26/20  0500 07/24/20  2359   POTASSIUM mmol/L 3 5 3 9   CHLORIDE mmol/L 107 105   CO2 mmol/L 26 28   BUN mg/dL 6 8   CREATININE mg/dL 0 53* 0 82   CALCIUM mg/dL 8 5 9 5   AST U/L  --  32   ALT U/L  --  18   ALK PHOS U/L  --  54   EGFR ml/min/1 73sq m 121 91       BMP:  Results from last 7 days   Lab Units 07/26/20  0500 07/24/20  2359   POTASSIUM mmol/L 3 5 3 9   CHLORIDE mmol/L 107 105   CO2 mmol/L 26 28   BUN mg/dL 6 8   CREATININE mg/dL 0 53* 0 82   CALCIUM mg/dL 8 5 9 5       Lab Results   Component Value Date    NTBNP 283 07/24/2020                    Results from last 7 days   Lab Units 07/25/20  1158   HEMOGLOBIN A1C % 5 9*              Results from last 7 days   Lab Units 07/25/20  1158 07/25/20  0038   INR  1 09 1 05       Lipid Profile:   No results found for: CHOL  Lab Results   Component Value Date    HDL 35 (L) 07/24/2020    HDL 45 06/06/2019     Lab Results   Component Value Date    LDLCALC 79 07/24/2020    LDLCALC 82 06/06/2019     Lab Results   Component Value Date    TRIG 200 (H) 07/24/2020    TRIG 96 06/06/2019       Cardiac testing:   No results found for this or any previous visit  No results found for this or any previous visit  No results found for this or any previous visit  No procedure found  No results found for this or any previous visit        Meds/Allergies   all current active meds have been reviewed and current meds:   Current Facility-Administered Medications   Medication Dose Route Frequency    acetaminophen (TYLENOL) tablet 650 mg  650 mg Oral Q6H PRN    albuterol (PROVENTIL HFA,VENTOLIN HFA) inhaler 2 puff  2 puff Inhalation Q4H PRN    fluticasone (FLONASE) 50 mcg/act nasal spray 1 spray  1 spray Nasal Daily    heparin (porcine) 25,000 units in 250 mL infusion (premix)  3-20 Units/kg/hr (Order-Specific) Intravenous Titrated    heparin (porcine) injection 2,000 Units  2,000 Units Intravenous Q1H PRN    heparin (porcine) injection 4,000 Units  4,000 Units Intravenous Q1H PRN    hydrochlorothiazide (HYDRODIURIL) tablet 25 mg  25 mg Oral Daily    lisinopril (ZESTRIL) tablet 20 mg  20 mg Oral Daily    nicotine (NICODERM CQ) 21 mg/24 hr TD 24 hr patch 21 mg  21 mg Transdermal Daily    sodium chloride 0 9 % infusion  125 mL/hr Intravenous Continuous Medications Prior to Admission   Medication    acetaminophen (TYLENOL) 325 mg tablet    albuterol (2 5 mg/3 mL) 0 083 % nebulizer solution    albuterol (PROVENTIL HFA,VENTOLIN HFA) 90 mcg/act inhaler    fluticasone (FLONASE) 50 mcg/act nasal spray    hydrochlorothiazide (HYDRODIURIL) 12 5 mg tablet    lisinopril (ZESTRIL) 10 mg tablet    nicotine (NICODERM CQ) 7 mg/24hr TD 24 hr patch         heparin (porcine) 3-20 Units/kg/hr (Order-Specific) Last Rate: 11 1 Units/kg/hr (07/26/20 4824)   sodium chloride 125 mL/hr Last Rate: 125 mL/hr (07/26/20 3672)     Counseling / Coordination of Care  Total floor / unit time spent today 20 minutes  Greater than 50% of total time was spent with the patient and / or family counseling and / or coordination of care  ** Please Note: Dragon 360 Dictation voice to text software may have been used in the creation of this document   **

## 2020-07-26 NOTE — ASSESSMENT & PLAN NOTE
Chronic asthma well controlled  Patient denies any wheezing overnight  Physical exam shows mild wheezing  PLAN:     - Albulterol inhaler ordered PRN for  exacerbations  - Continuing at home med - Flonase

## 2020-07-27 ENCOUNTER — APPOINTMENT (INPATIENT)
Dept: NON INVASIVE DIAGNOSTICS | Facility: HOSPITAL | Age: 40
DRG: 192 | End: 2020-07-27
Payer: COMMERCIAL

## 2020-07-27 LAB
ANION GAP SERPL CALCULATED.3IONS-SCNC: 7 MMOL/L (ref 4–13)
APTT PPP: 57 SECONDS (ref 23–37)
ATRIAL RATE: 77 BPM
BUN SERPL-MCNC: 7 MG/DL (ref 5–25)
CALCIUM SERPL-MCNC: 10.2 MG/DL (ref 8.3–10.1)
CHLORIDE SERPL-SCNC: 105 MMOL/L (ref 100–108)
CO2 SERPL-SCNC: 24 MMOL/L (ref 21–32)
CREAT SERPL-MCNC: 0.72 MG/DL (ref 0.6–1.3)
ERYTHROCYTE [DISTWIDTH] IN BLOOD BY AUTOMATED COUNT: 14.5 % (ref 11.6–15.1)
GFR SERPL CREATININE-BSD FRML MDRD: 107 ML/MIN/1.73SQ M
GLUCOSE SERPL-MCNC: 100 MG/DL (ref 65–140)
HCG SERPL QL: NEGATIVE
HCT VFR BLD AUTO: 43.1 % (ref 34.8–46.1)
HGB BLD-MCNC: 13.7 G/DL (ref 11.5–15.4)
MCH RBC QN AUTO: 28.7 PG (ref 26.8–34.3)
MCHC RBC AUTO-ENTMCNC: 31.8 G/DL (ref 31.4–37.4)
MCV RBC AUTO: 90 FL (ref 82–98)
P AXIS: 79 DEGREES
PLATELET # BLD AUTO: 269 THOUSANDS/UL (ref 149–390)
PMV BLD AUTO: 10.9 FL (ref 8.9–12.7)
POTASSIUM SERPL-SCNC: 4.2 MMOL/L (ref 3.5–5.3)
PR INTERVAL: 154 MS
QRS AXIS: 86 DEGREES
QRSD INTERVAL: 76 MS
QT INTERVAL: 386 MS
QTC INTERVAL: 436 MS
RBC # BLD AUTO: 4.78 MILLION/UL (ref 3.81–5.12)
SODIUM SERPL-SCNC: 136 MMOL/L (ref 136–145)
T WAVE AXIS: 256 DEGREES
VENTRICULAR RATE: 77 BPM
WBC # BLD AUTO: 5.23 THOUSAND/UL (ref 4.31–10.16)

## 2020-07-27 PROCEDURE — 85730 THROMBOPLASTIN TIME PARTIAL: CPT | Performed by: FAMILY MEDICINE

## 2020-07-27 PROCEDURE — 85027 COMPLETE CBC AUTOMATED: CPT | Performed by: NURSE PRACTITIONER

## 2020-07-27 PROCEDURE — 93005 ELECTROCARDIOGRAM TRACING: CPT

## 2020-07-27 PROCEDURE — 93010 ELECTROCARDIOGRAM REPORT: CPT | Performed by: INTERNAL MEDICINE

## 2020-07-27 PROCEDURE — 99152 MOD SED SAME PHYS/QHP 5/>YRS: CPT | Performed by: NURSE PRACTITIONER

## 2020-07-27 PROCEDURE — 80048 BASIC METABOLIC PNL TOTAL CA: CPT | Performed by: FAMILY MEDICINE

## 2020-07-27 PROCEDURE — C1894 INTRO/SHEATH, NON-LASER: HCPCS | Performed by: NURSE PRACTITIONER

## 2020-07-27 PROCEDURE — 93458 L HRT ARTERY/VENTRICLE ANGIO: CPT | Performed by: INTERNAL MEDICINE

## 2020-07-27 PROCEDURE — 99153 MOD SED SAME PHYS/QHP EA: CPT | Performed by: NURSE PRACTITIONER

## 2020-07-27 PROCEDURE — 93458 L HRT ARTERY/VENTRICLE ANGIO: CPT | Performed by: NURSE PRACTITIONER

## 2020-07-27 PROCEDURE — 84703 CHORIONIC GONADOTROPIN ASSAY: CPT | Performed by: INTERNAL MEDICINE

## 2020-07-27 PROCEDURE — C1769 GUIDE WIRE: HCPCS | Performed by: NURSE PRACTITIONER

## 2020-07-27 PROCEDURE — 94660 CPAP INITIATION&MGMT: CPT

## 2020-07-27 PROCEDURE — B2011ZZ PLAIN RADIOGRAPHY OF MULTIPLE CORONARY ARTERIES USING LOW OSMOLAR CONTRAST: ICD-10-PCS | Performed by: INTERNAL MEDICINE

## 2020-07-27 PROCEDURE — 99152 MOD SED SAME PHYS/QHP 5/>YRS: CPT | Performed by: INTERNAL MEDICINE

## 2020-07-27 PROCEDURE — 4A023N7 MEASUREMENT OF CARDIAC SAMPLING AND PRESSURE, LEFT HEART, PERCUTANEOUS APPROACH: ICD-10-PCS | Performed by: INTERNAL MEDICINE

## 2020-07-27 PROCEDURE — 99232 SBSQ HOSP IP/OBS MODERATE 35: CPT | Performed by: INTERNAL MEDICINE

## 2020-07-27 PROCEDURE — 94760 N-INVAS EAR/PLS OXIMETRY 1: CPT

## 2020-07-27 PROCEDURE — 99232 SBSQ HOSP IP/OBS MODERATE 35: CPT | Performed by: FAMILY MEDICINE

## 2020-07-27 RX ORDER — ACETAMINOPHEN 325 MG/1
650 TABLET ORAL EVERY 4 HOURS PRN
Status: DISCONTINUED | OUTPATIENT
Start: 2020-07-27 | End: 2020-07-28 | Stop reason: HOSPADM

## 2020-07-27 RX ORDER — MIDAZOLAM HYDROCHLORIDE 2 MG/2ML
INJECTION, SOLUTION INTRAMUSCULAR; INTRAVENOUS CODE/TRAUMA/SEDATION MEDICATION
Status: COMPLETED | OUTPATIENT
Start: 2020-07-27 | End: 2020-07-27

## 2020-07-27 RX ORDER — SODIUM CHLORIDE 9 MG/ML
75 INJECTION, SOLUTION INTRAVENOUS CONTINUOUS
Status: DISCONTINUED | OUTPATIENT
Start: 2020-07-27 | End: 2020-07-27

## 2020-07-27 RX ORDER — LIDOCAINE HYDROCHLORIDE 10 MG/ML
INJECTION, SOLUTION EPIDURAL; INFILTRATION; INTRACAUDAL; PERINEURAL CODE/TRAUMA/SEDATION MEDICATION
Status: COMPLETED | OUTPATIENT
Start: 2020-07-27 | End: 2020-07-27

## 2020-07-27 RX ORDER — SODIUM CHLORIDE 9 MG/ML
75 INJECTION, SOLUTION INTRAVENOUS CONTINUOUS
Status: DISCONTINUED | OUTPATIENT
Start: 2020-07-28 | End: 2020-07-27

## 2020-07-27 RX ORDER — VERAPAMIL HYDROCHLORIDE 2.5 MG/ML
INJECTION, SOLUTION INTRAVENOUS CODE/TRAUMA/SEDATION MEDICATION
Status: COMPLETED | OUTPATIENT
Start: 2020-07-27 | End: 2020-07-27

## 2020-07-27 RX ORDER — ASPIRIN 81 MG/1
TABLET, CHEWABLE ORAL CODE/TRAUMA/SEDATION MEDICATION
Status: COMPLETED | OUTPATIENT
Start: 2020-07-27 | End: 2020-07-27

## 2020-07-27 RX ORDER — HEPARIN SODIUM 1000 [USP'U]/ML
INJECTION, SOLUTION INTRAVENOUS; SUBCUTANEOUS CODE/TRAUMA/SEDATION MEDICATION
Status: COMPLETED | OUTPATIENT
Start: 2020-07-27 | End: 2020-07-27

## 2020-07-27 RX ORDER — FENTANYL CITRATE 50 UG/ML
INJECTION, SOLUTION INTRAMUSCULAR; INTRAVENOUS CODE/TRAUMA/SEDATION MEDICATION
Status: COMPLETED | OUTPATIENT
Start: 2020-07-27 | End: 2020-07-27

## 2020-07-27 RX ORDER — NITROGLYCERIN 20 MG/100ML
INJECTION INTRAVENOUS CODE/TRAUMA/SEDATION MEDICATION
Status: COMPLETED | OUTPATIENT
Start: 2020-07-27 | End: 2020-07-27

## 2020-07-27 RX ADMIN — ACETAMINOPHEN 650 MG: 325 TABLET, FILM COATED ORAL at 18:14

## 2020-07-27 RX ADMIN — SODIUM CHLORIDE 100 ML/HR: 0.9 INJECTION, SOLUTION INTRAVENOUS at 10:51

## 2020-07-27 RX ADMIN — ACETAMINOPHEN 650 MG: 325 TABLET, FILM COATED ORAL at 22:30

## 2020-07-27 RX ADMIN — NICOTINE 21 MG: 21 PATCH TRANSDERMAL at 09:11

## 2020-07-27 RX ADMIN — LIDOCAINE HYDROCHLORIDE 1 ML: 10 INJECTION, SOLUTION EPIDURAL; INFILTRATION; INTRACAUDAL; PERINEURAL at 14:05

## 2020-07-27 RX ADMIN — MIDAZOLAM 1 MG: 1 INJECTION INTRAMUSCULAR; INTRAVENOUS at 13:59

## 2020-07-27 RX ADMIN — FENTANYL CITRATE 50 MCG: 50 INJECTION, SOLUTION INTRAMUSCULAR; INTRAVENOUS at 13:53

## 2020-07-27 RX ADMIN — SODIUM CHLORIDE 75 ML/HR: 0.9 INJECTION, SOLUTION INTRAVENOUS at 14:57

## 2020-07-27 RX ADMIN — HYDROCHLOROTHIAZIDE 25 MG: 25 TABLET ORAL at 09:11

## 2020-07-27 RX ADMIN — HEPARIN SODIUM AND DEXTROSE 11.1 UNITS/KG/HR: 10000; 5 INJECTION INTRAVENOUS at 05:23

## 2020-07-27 RX ADMIN — MIDAZOLAM 2 MG: 1 INJECTION INTRAMUSCULAR; INTRAVENOUS at 13:53

## 2020-07-27 RX ADMIN — SODIUM CHLORIDE 100 ML/HR: 0.9 INJECTION, SOLUTION INTRAVENOUS at 07:12

## 2020-07-27 RX ADMIN — ACETAMINOPHEN 975 MG: 325 TABLET, FILM COATED ORAL at 09:12

## 2020-07-27 RX ADMIN — FENTANYL CITRATE 50 MCG: 50 INJECTION, SOLUTION INTRAMUSCULAR; INTRAVENOUS at 13:59

## 2020-07-27 RX ADMIN — VERAPAMIL HYDROCHLORIDE 5 MG: 2.5 INJECTION INTRAVENOUS at 14:03

## 2020-07-27 RX ADMIN — LISINOPRIL 20 MG: 20 TABLET ORAL at 09:07

## 2020-07-27 RX ADMIN — ASPIRIN 81 MG 324 MG: 81 TABLET ORAL at 13:54

## 2020-07-27 RX ADMIN — DOCUSATE SODIUM 100 MG: 100 CAPSULE, LIQUID FILLED ORAL at 09:10

## 2020-07-27 RX ADMIN — DOCUSATE SODIUM 100 MG: 100 CAPSULE, LIQUID FILLED ORAL at 17:05

## 2020-07-27 RX ADMIN — HEPARIN SODIUM 2000 UNITS: 1000 INJECTION INTRAVENOUS; SUBCUTANEOUS at 10:52

## 2020-07-27 RX ADMIN — IOHEXOL 106 ML: 350 INJECTION, SOLUTION INTRAVENOUS at 14:18

## 2020-07-27 RX ADMIN — LIDOCAINE HYDROCHLORIDE 1 ML: 10 INJECTION, SOLUTION EPIDURAL; INFILTRATION; INTRACAUDAL; PERINEURAL at 14:01

## 2020-07-27 RX ADMIN — HEPARIN SODIUM 4000 UNITS: 1000 INJECTION INTRAVENOUS; SUBCUTANEOUS at 14:03

## 2020-07-27 RX ADMIN — LIDOCAINE HYDROCHLORIDE 1 ML: 10 INJECTION, SOLUTION EPIDURAL; INFILTRATION; INTRACAUDAL; PERINEURAL at 13:56

## 2020-07-27 RX ADMIN — Medication 200 MCG: at 14:03

## 2020-07-27 NOTE — PROGRESS NOTES
Cardiology Progress Note - Paresh Calderón 47 y o  female MRN: [id number]    Unit/Bed#: [facility] 728-01 Encounter: [id number]      Assessment:  Principal Problem:    Chest pain  Active Problems:    PVC (premature ventricular contraction)    Tobacco abuse    Sleep apnea    Hypertension    Elevated troponin    Asthma      Plan:  Patient is comfortable this morning  She has no chest pain or significant dyspnea  She is in sinus rhythm on telemetry  Blood pressure is trending downward with current medical therapy  Patient is NPO for cardiac catheterization today  Echocardiogram demonstrated left ventricular ejection fraction of 50% with possible inferior wall hypokinesis and mild left atrial cavity enlargement  There is no significant valvular heart disease  Further management based on results of cardiac catheterization today  Subjective:   Patient seen and examined  No significant events overnight   negative  Objective:     Vitals: Blood pressure 150/86, pulse 71, temperature (!) 97 4 °F (36 3 °C), resp  rate 18, height 5' (1 524 m), weight 129 kg (284 lb 2 8 oz), last menstrual period 07/02/2020, SpO2 97 %  , Body mass index is 55 5 kg/m² ,   Orthostatic Blood Pressures      Most Recent Value   Blood Pressure  150/86 filed at 07/27/2020 1613   Patient Position - Orthostatic VS  Lying filed at 07/26/2020 0745      ,      Intake/Output Summary (Last 24 hours) at 7/27/2020 2361  Last data filed at 7/26/2020 1838  Gross per 24 hour   Intake 494 ml   Output --   Net 494 ml       No significant arrhythmias seen on telemetry review         Physical Exam:    GEN: Paresh Calderón appears well, alert and oriented x 3, pleasant and cooperative   NECK: supple, no carotid bruits, no JVD or HJR  HEART: normal rate, regular rhythm, normal S1 and S2, no murmurs, clicks, gallops or rubs   LUNGS: clear to auscultation bilaterally; no wheezes, rales, or rhonchi   ABDOMEN: normal bowel sounds, soft, no tenderness, no distention  EXTREMITIES: peripheral pulses normal; no clubbing, cyanosis, or edema  SKIN: warm and well perfused, no suspicious lesions on exposed skin    Labs & Results:    Admission on 07/25/2020   Component Date Value    Troponin I 07/25/2020 1 01*    WBC 07/25/2020 5 28     RBC 07/25/2020 4 30     Hemoglobin 07/25/2020 12 5     Hematocrit 07/25/2020 38 6     MCV 07/25/2020 90     MCH 07/25/2020 29 1     MCHC 07/25/2020 32 4     RDW 07/25/2020 14 4     Platelets 15/24/2821 228     MPV 07/25/2020 10 6     PTT 07/25/2020 64*    Protime 07/25/2020 14 2     INR 07/25/2020 1 09     Troponin I 07/25/2020 1 69*    Troponin I 07/25/2020 2 25*    Cholesterol 07/24/2020 154     Triglycerides 07/24/2020 200*    HDL, Direct 07/24/2020 35*    LDL Calculated 07/24/2020 79     Non-HDL-Chol (CHOL-HDL) 07/24/2020 119     PTT 07/25/2020 79*    Hemoglobin A1C 07/25/2020 5 9*    EAG 07/25/2020 123     Troponin I 07/25/2020 2 10*    Troponin I 07/26/2020 1 53*    Troponin I 07/26/2020 0 85*    WBC 07/26/2020 5 36     RBC 07/26/2020 4 30     Hemoglobin 07/26/2020 12 5     Hematocrit 07/26/2020 38 5     MCV 07/26/2020 90     MCH 07/26/2020 29 1     MCHC 07/26/2020 32 5     RDW 07/26/2020 14 3     MPV 07/26/2020 11 1     Platelets 24/61/7310 243     nRBC 07/26/2020 0     Neutrophils Relative 07/26/2020 35*    Immat GRANS % 07/26/2020 0     Lymphocytes Relative 07/26/2020 50*    Monocytes Relative 07/26/2020 9     Eosinophils Relative 07/26/2020 5     Basophils Relative 07/26/2020 1     Neutrophils Absolute 07/26/2020 1 87     Immature Grans Absolute 07/26/2020 0 01     Lymphocytes Absolute 07/26/2020 2 69     Monocytes Absolute 07/26/2020 0 47     Eosinophils Absolute 07/26/2020 0 28     Basophils Absolute 07/26/2020 0 04     Sodium 07/26/2020 139     Potassium 07/26/2020 3 5     Chloride 07/26/2020 107     CO2 07/26/2020 26     ANION GAP 07/26/2020 6     BUN 07/26/2020 6     Creatinine 07/26/2020 0 53*    Glucose 07/26/2020 99     Glucose, Fasting 07/26/2020 99     Calcium 07/26/2020 8 5     eGFR 07/26/2020 121     PTT 07/26/2020 78*    SARS-CoV-2 07/26/2020 Negative     WBC 07/27/2020 5 23     RBC 07/27/2020 4 78     Hemoglobin 07/27/2020 13 7     Hematocrit 07/27/2020 43 1     MCV 07/27/2020 90     MCH 07/27/2020 28 7     MCHC 07/27/2020 31 8     RDW 07/27/2020 14 5     Platelets 88/30/0703 269     MPV 07/27/2020 10 9     Ventricular Rate 07/27/2020 77     Atrial Rate 07/27/2020 77     ID Interval 07/27/2020 154     QRSD Interval 07/27/2020 76     QT Interval 07/27/2020 386     QTC Interval 07/27/2020 436     P Axis 07/27/2020 79     QRS Axis 07/27/2020 86     T Wave Axis 07/27/2020 256        Xr Chest 1 View Portable    Result Date: 7/25/2020  Narrative: CHEST INDICATION:   chest pain  COMPARISON:  03/11/2020 EXAM PERFORMED/VIEWS:  XR CHEST PORTABLE 1 image FINDINGS: Cardiomediastinal silhouette appears unremarkable  The lungs are clear  No pneumothorax or pleural effusion  Osseous structures appear within normal limits for patient age  Impression: No acute cardiopulmonary disease  Workstation performed: [id number]       EKG personally reviewed by Ebony Funez MD      Counseling / Coordination of Care  Total floor / unit time spent today 30 minutes  Greater than 50% of total time was spent with the patient and / or family counseling and / or coordination of care

## 2020-07-27 NOTE — ASSESSMENT & PLAN NOTE
EKG on 07/27/2020 shows sinus rhythm with premature supraventricular complexes  And ST  And T wave changes in        inferior and anterolateral leads   PVCs present on recent EKG (07/24/20) performed at [facility] ED    Unchanged from previous EKG - 11/2019   -Pt is scheduled for cardiac cath today

## 2020-07-27 NOTE — ASSESSMENT & PLAN NOTE
-Pt doing well  -Pt has no complaints  -O2 saturation 97-99% overnight  Most recent O2 saturation - 97%   - continue CPAP

## 2020-07-27 NOTE — PLAN OF CARE
Problem: CARDIOVASCULAR - ADULT  Goal: Maintains optimal cardiac output and hemodynamic stability  Description  INTERVENTIONS:  - Monitor I/O, vital signs and rhythm  - Monitor for S/S and trends of decreased cardiac output  - Administer and titrate ordered vasoactive medications to optimize hemodynamic stability  - Assess quality of pulses, skin color and temperature  - Assess for signs of decreased coronary artery perfusion  - Instruct patient to report change in severity of symptoms  Outcome: Progressing  Goal: Absence of cardiac dysrhythmias or at baseline rhythm  Description  INTERVENTIONS:  - Continuous cardiac monitoring, vital signs, obtain 12 lead EKG if ordered  - Administer antiarrhythmic and heart rate control medications as ordered  - Monitor electrolytes and administer replacement therapy as ordered  Outcome: Progressing     Problem: RESPIRATORY - ADULT  Goal: Achieves optimal ventilation and oxygenation  Description  INTERVENTIONS:  - Assess for changes in respiratory status  - Assess for changes in mentation and behavior  - Position to facilitate oxygenation and minimize respiratory effort  - Oxygen administered by appropriate delivery if ordered  - Initiate smoking cessation education as indicated  - Encourage broncho-pulmonary hygiene including cough, deep breathe, Incentive Spirometry  - Assess the need for suctioning and aspirate as needed  - Assess and instruct to report SOB or any respiratory difficulty  - Respiratory Therapy support as indicated  Outcome: Progressing     Problem: HEMATOLOGIC - ADULT  Goal: Maintains hematologic stability  Description  INTERVENTIONS  - Assess for signs and symptoms of bleeding or hemorrhage  - Monitor labs  - Administer supportive blood products/factors as ordered and appropriate  Outcome: Progressing     Problem: PAIN - ADULT  Goal: Verbalizes/displays adequate comfort level or baseline comfort level  Description  Interventions:  - Encourage patient to monitor pain and request assistance  - Assess pain using appropriate pain scale  - Administer analgesics based on type and severity of pain and evaluate response  - Implement non-pharmacological measures as appropriate and evaluate response  - Consider cultural and social influences on pain and pain management  - Notify physician/advanced practitioner if interventions unsuccessful or patient reports new pain  Outcome: Progressing     Problem: INFECTION - ADULT  Goal: Absence or prevention of progression during hospitalization  Description  INTERVENTIONS:  - Assess and monitor for signs and symptoms of infection  - Monitor lab/diagnostic results  - Monitor all insertion sites, i e  indwelling lines, tubes, and drains  - Monitor endotracheal if appropriate and nasal secretions for changes in amount and color  - [place] appropriate cooling/warming therapies per order  - Administer medications as ordered  - Instruct and encourage patient and family to use good hand hygiene technique  - Identify and instruct in appropriate isolation precautions for identified infection/condition  Outcome: Progressing     Problem: SAFETY ADULT  Goal: Patient will remain free of falls  Description  INTERVENTIONS:  - Assess patient frequently for physical needs  -  Identify cognitive and physical deficits and behaviors that affect risk of falls    -  [place] fall precautions as indicated by assessment   - Educate patient/family on patient safety including physical limitations  - Instruct patient to call for assistance with activity based on assessment  - Modify environment to reduce risk of injury  - Consider OT/PT consult to assist with strengthening/mobility  Outcome: Progressing  Goal: Maintain or return to baseline ADL function  Description  INTERVENTIONS:  -  Assess patient's ability to carry out ADLs; assess patient's baseline for ADL function and identify physical deficits which impact ability to perform ADLs (bathing, care of mouth/teeth, toileting, grooming, dressing, etc )  - Assess/evaluate cause of self-care deficits   - Assess range of motion  - Assess patient's mobility; develop plan if impaired  - Assess patient's need for assistive devices and provide as appropriate  - Encourage maximum independence but intervene and supervise when necessary  - Involve family in performance of ADLs  - Assess for home care needs following discharge   - Consider OT consult to assist with ADL evaluation and planning for discharge  - Provide patient education as appropriate  Outcome: Progressing  Goal: Maintain or return mobility status to optimal level  Description  INTERVENTIONS:  - Assess patient's baseline mobility status (ambulation, transfers, stairs, etc )    - Identify cognitive and physical deficits and behaviors that affect mobility  - Identify mobility aids required to assist with transfers and/or ambulation (gait belt, sit-to-stand, lift, walker, cane, etc )  - [place] fall precautions as indicated by assessment  - Record patient progress and toleration of activity level on Mobility SBAR; progress patient to next Phase/Stage  - Instruct patient to call for assistance with activity based on assessment  - Consider rehabilitation consult to assist with strengthening/weightbearing, etc   Outcome: Progressing     Problem: DISCHARGE PLANNING  Goal: Discharge to home or other facility with appropriate resources  Description  INTERVENTIONS:  - Identify barriers to discharge w/patient and caregiver  - Arrange for needed discharge resources and transportation as appropriate  - Identify discharge learning needs (meds, wound care, etc )  - Arrange for interpretive services to assist at discharge as needed  - Refer to Case Management Department for coordinating discharge planning if the patient needs post-hospital services based on physician/advanced practitioner order or complex needs related to functional status, cognitive ability, or social support system  Outcome: Progressing     Problem: Knowledge Deficit  Goal: Patient/family/caregiver demonstrates understanding of disease process, treatment plan, medications, and discharge instructions  Description  Complete learning assessment and assess knowledge base  Interventions:  - Provide teaching at level of understanding  - Provide teaching via preferred learning methods  Outcome: Progressing     Problem: Potential for Falls  Goal: Patient will remain free of falls  Description  INTERVENTIONS:  - Assess patient frequently for physical needs  -  Identify cognitive and physical deficits and behaviors that affect risk of falls    -  [place] fall precautions as indicated by assessment   - Educate patient/family on patient safety including physical limitations  - Instruct patient to call for assistance with activity based on assessment  - Modify environment to reduce risk of injury  - Consider OT/PT consult to assist with strengthening/mobility  Outcome: Progressing

## 2020-07-27 NOTE — RESTORATIVE TECHNICIAN NOTE
Restorative Specialist Mobility Note       Activity: Ambulate in rossi, Ambulate in room, Bathroom privileges, Chair, Dangle, Stand at bedside(Educated/encouraged pt to ambulate with assistance 3-4 x's/day   Pt callbell, phone/tray within reach )     Assistive Device: None       Jennifer MOLINA, Restorative Technician, [facility]

## 2020-07-27 NOTE — ASSESSMENT & PLAN NOTE
Most recent troponin  0 85 on 07/26 at 03:22 AM  Stopped trending troponin    Troponin trend values: 1 69>2 25>2 1>1 53>0 85  Pt is scheduled for cardiac cath today

## 2020-07-27 NOTE — PROGRESS NOTES
Progress Note - Jesse Pacheco 1980, 47 y o  female MRN: [id number]    [facility] Encounter: [id number]    Primary Care Provider: Celine Ferguson PA-C   Date and time admitted to hospital: 7/25/2020  8:08 AM        * Chest pain  Assessment & Plan  -Pt denies Chest pain , SOB today morning   -Also denies nausea and vomiting       - EKG on 07/27/2020 shows sinus rhythm with premature supraventricular complexes  And ST  And T wave changes in        inferior and anterolateral leads        -Cardiac cath scheduled today (07/27/2020)   -Most recent troponin  0 85 on 07/26 at 03:22 AM  -Stopped trending troponin  -ECHO on 07/26/2020- Ejection fraction 50 %  Systolic function was at the lower limits of normal ,Possible inferior wall hypokinesis   - CXR - negative for cardiopulmonary disease 07/24/20   - Continue monitor on tele  -Continue heparin drip 25K units based on weight        Hypertension  Assessment & Plan  -Pt complains of headaches  -Pt is on tylenol Prn for headaches  -BP not well controlled  -SBP ranging 171-150, DBP 97-86  -Latest BP on 07/27/2020- 150/86  -Pt received  labetalol 10 mg at 20:45 yesterday   -Patient received 2 dose of hydralazine 5 mg IV at 23:42 PM and 00:22 AM 07/26 night   -Pt is on lisinopril 20 mg daily and 25 mg daily   - Continue to monitor renal function with BMP daily  - Continue to monitor BP    - Consider starting hydralazine 5 mg Q 6 PRN for SBP>160 if BP is still not controlled today  - If BP is not adequately controlled, consider investigating secondary causes  PVC (premature ventricular contraction)  Assessment & Plan  EKG on 07/27/2020 shows sinus rhythm with premature supraventricular complexes  And ST  And T wave changes in        inferior and anterolateral leads   PVCs present on recent EKG (07/24/20) performed at [facility] ED    Unchanged from previous EKG - 11/2019   -Pt is scheduled for cardiac cath today     Asthma  Assessment & Plan  -Pt doing well  -Denies SOB  -Wheezing on lung exam heard today    - Albulterol inhaler ordered PRN for  exacerbations  - Continuing at home med - Flonase  Elevated troponin  Assessment & Plan  Most recent troponin  0 85 on 07/26 at 03:22 AM  Stopped trending troponin  Troponin trend values: 1 69>2 25>2 1>1 53>0 85  Pt is scheduled for cardiac cath today       Sleep apnea  Assessment & Plan  -Pt doing well  -Pt has no complaints  -O2 saturation 97-99% overnight  Most recent O2 saturation - 97%   - continue CPAP  Tobacco abuse  Assessment & Plan  Smoker, 1 PPD for past 3 years  Nicotine 21 mg patch ordered  Tobacco counseling prior to discharge  PPX: Heparin  Diet: NPO  Code Status:  FULL-Level-1  Dispo: Inpatient    Plan D/W Dr Wheeler and [facility] Team    Subjective:     Pt complains of headaches  Mild intermittent  Denies Chest pain  SOB, nausea and vomiting  Pt also denies cough  Pt has no other concerns  Review of Systems   Constitutional: Negative for activity change, chills, diaphoresis and fever  HENT: Negative for sore throat  Respiratory: Negative for cough, chest tightness and shortness of breath  Cardiovascular: Negative for chest pain  Gastrointestinal: Negative for abdominal pain, nausea and vomiting  Musculoskeletal: Negative for back pain and neck pain  Skin: Negative for color change and rash  Neurological: Positive for headaches  Negative for dizziness  Psychiatric/Behavioral: Negative for agitation, behavioral problems and confusion  Objective:     Vitals: Blood pressure 150/86, pulse 71, temperature (!) 97 4 °F (36 3 °C), resp  rate 18, height 5' (1 524 m), weight 129 kg (284 lb 2 8 oz), last menstrual period 07/02/2020, SpO2 97 %  ,Body mass index is 55 5 kg/m²        Intake/Output Summary (Last 24 hours) at 7/27/2020 1111  Last data filed at 7/27/2020 1051  Gross per 24 hour   Intake 484 ml   Output --   Net 484 ml       Physical Exam:   Physical Exam Constitutional: She is oriented to person, place, and time  She appears well-developed and well-nourished  HENT:   Head: Normocephalic and atraumatic  Eyes: Conjunctivae are normal    Neck: Normal range of motion  Cardiovascular: Normal rate, regular rhythm and normal heart sounds  No murmur heard  Pulmonary/Chest: Effort normal  No respiratory distress  She has wheezes ( mild-posterior b/l middle lung fields on expirationm)  Abdominal: Bowel sounds are normal  There is no tenderness  Musculoskeletal: She exhibits edema (B/l LE+1)  Neurological: She is alert and oriented to person, place, and time  Skin: Skin is warm and dry  Psychiatric: She has a normal mood and affect  Her behavior is normal    Nursing note and vitals reviewed  Invasive Devices     Peripheral Intravenous Line            Peripheral IV 07/25/20 Left Hand 2 days    Peripheral IV 07/25/20 Right Antecubital 2 days                           Lab and other studies:  I have personally reviewed pertinent reports       Admission on 07/25/2020   Component Date Value    Troponin I 07/25/2020 1 01*    WBC 07/25/2020 5 28     RBC 07/25/2020 4 30     Hemoglobin 07/25/2020 12 5     Hematocrit 07/25/2020 38 6     MCV 07/25/2020 90     MCH 07/25/2020 29 1     MCHC 07/25/2020 32 4     RDW 07/25/2020 14 4     Platelets 52/25/9081 228     MPV 07/25/2020 10 6     PTT 07/25/2020 64*    Protime 07/25/2020 14 2     INR 07/25/2020 1 09     Troponin I 07/25/2020 1 69*    Troponin I 07/25/2020 2 25*    Cholesterol 07/24/2020 154     Triglycerides 07/24/2020 200*    HDL, Direct 07/24/2020 35*    LDL Calculated 07/24/2020 79     Non-HDL-Chol (CHOL-HDL) 07/24/2020 119     PTT 07/25/2020 79*    Hemoglobin A1C 07/25/2020 5 9*    EAG 07/25/2020 123     Troponin I 07/25/2020 2 10*    Troponin I 07/26/2020 1 53*    Troponin I 07/26/2020 0 85*    WBC 07/26/2020 5 36     RBC 07/26/2020 4 30     Hemoglobin 07/26/2020 12 5     Hematocrit 07/26/2020 38 5     MCV 07/26/2020 90     MCH 07/26/2020 29 1     MCHC 07/26/2020 32 5     RDW 07/26/2020 14 3     MPV 07/26/2020 11 1     Platelets 70/81/6282 243     nRBC 07/26/2020 0     Neutrophils Relative 07/26/2020 35*    Immat GRANS % 07/26/2020 0     Lymphocytes Relative 07/26/2020 50*    Monocytes Relative 07/26/2020 9     Eosinophils Relative 07/26/2020 5     Basophils Relative 07/26/2020 1     Neutrophils Absolute 07/26/2020 1 87     Immature Grans Absolute 07/26/2020 0 01     Lymphocytes Absolute 07/26/2020 2 69     Monocytes Absolute 07/26/2020 0 47     Eosinophils Absolute 07/26/2020 0 28     Basophils Absolute 07/26/2020 0 04     Sodium 07/26/2020 139     Potassium 07/26/2020 3 5     Chloride 07/26/2020 107     CO2 07/26/2020 26     ANION GAP 07/26/2020 6     BUN 07/26/2020 6     Creatinine 07/26/2020 0 53*    Glucose 07/26/2020 99     Glucose, Fasting 07/26/2020 99     Calcium 07/26/2020 8 5     eGFR 07/26/2020 121     PTT 07/26/2020 78*    SARS-CoV-2 07/26/2020 Negative     PTT 07/27/2020 57*    Sodium 07/27/2020 136     Potassium 07/27/2020 4 2     Chloride 07/27/2020 105     CO2 07/27/2020 24     ANION GAP 07/27/2020 7     BUN 07/27/2020 7     Creatinine 07/27/2020 0 72     Glucose 07/27/2020 100     Calcium 07/27/2020 10 2*    eGFR 07/27/2020 107     WBC 07/27/2020 5 23     RBC 07/27/2020 4 78     Hemoglobin 07/27/2020 13 7     Hematocrit 07/27/2020 43 1     MCV 07/27/2020 90     MCH 07/27/2020 28 7     MCHC 07/27/2020 31 8     RDW 07/27/2020 14 5     Platelets 71/61/9176 269     MPV 07/27/2020 10 9     Preg, Serum 07/27/2020 Negative     Ventricular Rate 07/27/2020 77     Atrial Rate 07/27/2020 77     NM Interval 07/27/2020 154     QRSD Interval 07/27/2020 76     QT Interval 07/27/2020 386     QTC Interval 07/27/2020 436     P Axis 07/27/2020 79     QRS Axis 07/27/2020 86     T Wave Axis 07/27/2020 256        Recent Results (from the past 24 hour(s))   Novel Coronavirus (Covid-19),PCR [place] [facility]    Collection Time: 07/26/20 12:36 PM   Result Value Ref Range    SARS-CoV-2 Negative Negative   ECG 12 lead    Collection Time: 07/27/20  6:00 AM   Result Value Ref Range    Ventricular Rate 77 BPM    Atrial Rate 77 BPM    NH Interval 154 ms    QRSD Interval 76 ms    QT Interval 386 ms    QTC Interval 436 ms    P Axis 79 degrees    QRS Axis 86 degrees    T Wave Axis 256 degrees   APTT    Collection Time: 07/27/20  9:23 AM   Result Value Ref Range    PTT 57 (H) 23 - 37 seconds   Basic metabolic panel    Collection Time: 07/27/20  9:23 AM   Result Value Ref Range    Sodium 136 136 - 145 mmol/L    Potassium 4 2 3 5 - 5 3 mmol/L    Chloride 105 100 - 108 mmol/L    CO2 24 21 - 32 mmol/L    ANION GAP 7 4 - 13 mmol/L    BUN 7 5 - 25 mg/dL    Creatinine 0 72 0 60 - 1 30 mg/dL    Glucose 100 65 - 140 mg/dL    Calcium 10 2 (H) 8 3 - 10 1 mg/dL    eGFR 107 ml/min/1 73sq m   CBC and Platelet    Collection Time: 07/27/20  9:23 AM   Result Value Ref Range    WBC 5 23 4 31 - 10 16 Thousand/uL    RBC 4 78 3 81 - 5 12 Million/uL    Hemoglobin 13 7 11 5 - 15 4 g/dL    Hematocrit 43 1 34 8 - 46 1 %    MCV 90 82 - 98 fL    MCH 28 7 26 8 - 34 3 pg    MCHC 31 8 31 4 - 37 4 g/dL    RDW 14 5 11 6 - 15 1 %    Platelets 770 477 - 705 Thousands/uL    MPV 10 9 8 9 - 12 7 fL   Pregnancy Test (HCG Qualitative)    Collection Time: 07/27/20  9:23 AM   Result Value Ref Range    Preg, Serum Negative Negative     Blood Culture: No results found for: BLOODCX,   Urinalysis:   Lab Results   Component Value Date    COLORU Red (A) 11/23/2019    COLORU Yellow 04/19/2015    CLARITYU Turbid (A) 11/23/2019    CLARITYU Clear 04/19/2015    SPECGRAV 1 020 11/23/2019    SPECGRAV 1 025 04/19/2015    PHUR 6 0 11/23/2019    PHUR 8 0 01/28/2019    PHUR 7 0 04/19/2015    LEUKOCYTESUR 100 0 (A) 11/23/2019    LEUKOCYTESUR Moderate (A) 04/19/2015    NITRITE Negative 11/23/2019    NITRITE Negative 04/19/2015    PROTEINUA Negative 04/19/2015    GLUCOSEU Negative 11/23/2019    GLUCOSEU Negative 04/19/2015    KETONESU 5 (Trace) (A) 11/23/2019    KETONESU Negative 04/19/2015    BILIRUBINUR Negative 11/23/2019    BILIRUBINUR Negative 04/19/2015    BLOODU 250 0 (A) 11/23/2019    BLOODU Negative 04/19/2015   ,   Urine Culture: No results found for: URINECX,   Wound Culure: No results found for: WOUNDCULT      Imaging:  EKG on 07/27/2020 shows sinus rhythm with premature supraventricular complexes  And ST  And T wave changes in inferior and anterolateral leads             -ECHO on 07/26/2020- Ejection fraction 50 %   Systolic function was at the lower limits of normal ,Possible inferior wall hypokinesis         VTE Pharmacologic Prophylaxis: Heparin  VTE Mechanical Prophylaxis: sequential compression device    Current Facility-Administered Medications   Medication Dose Route Frequency    acetaminophen (TYLENOL) tablet 975 mg  975 mg Oral Q6H PRN    albuterol (PROVENTIL HFA,VENTOLIN HFA) inhaler 2 puff  2 puff Inhalation Q4H PRN    docusate sodium (COLACE) capsule 100 mg  100 mg Oral BID    fluticasone (FLONASE) 50 mcg/act nasal spray 1 spray  1 spray Nasal Daily    heparin (porcine) 25,000 units in 250 mL infusion (premix)  3-20 Units/kg/hr (Order-Specific) Intravenous Titrated    heparin (porcine) injection 2,000 Units  2,000 Units Intravenous Q1H PRN    heparin (porcine) injection 4,000 Units  4,000 Units Intravenous Q1H PRN    hydrochlorothiazide (HYDRODIURIL) tablet 25 mg  25 mg Oral Daily    lisinopril (ZESTRIL) tablet 20 mg  20 mg Oral Daily    nicotine (NICODERM CQ) 21 mg/24 hr TD 24 hr patch 21 mg  21 mg Transdermal Daily    sodium chloride 0 9 % infusion  100 mL/hr Intravenous Continuous       Jack Shea MD  Family Medicine Resident PGY1

## 2020-07-27 NOTE — ASSESSMENT & PLAN NOTE
-Pt doing well  -Denies SOB  -Wheezing on lung exam heard today    - Albulterol inhaler ordered PRN for  exacerbations  - Continuing at home med - Flonase

## 2020-07-27 NOTE — ASSESSMENT & PLAN NOTE
-Pt complains of headaches  -Pt is on tylenol Prn for headaches  -BP not well controlled  -SBP ranging 171-150, DBP 97-86  -Latest BP on 07/27/2020- 150/86  -Pt received  labetalol 10 mg at 20:45 yesterday   -Patient received 2 dose of hydralazine 5 mg IV at 23:42 PM and 00:22 AM 07/26 night   -Pt is on lisinopril 20 mg daily and 25 mg daily   - Continue to monitor renal function with BMP daily  - Continue to monitor BP    - Consider starting hydralazine 5 mg Q 6 PRN for SBP>160 if BP is still not controlled today  - If BP is not adequately controlled, consider investigating secondary causes

## 2020-07-27 NOTE — PROGRESS NOTES
Reached out to On call Cards (Dr Jared Buenrostro), because Pt was complaining of increased pain and nurse noted increased swelling at puncture site  Pt's hematoma did not increase in size in relation to previous markings  Gave PRN tylenol  Picture sent to Dr Christos Jauregui via tiger connect  Per MD site intact, continue to monitor  Vitals stable  Site C/D/I  Will continue to monitor site

## 2020-07-27 NOTE — ASSESSMENT & PLAN NOTE
-Pt denies Chest pain , SOB today morning   -Also denies nausea and vomiting       - EKG on 07/27/2020 shows sinus rhythm with premature supraventricular complexes  And ST  And T wave changes in        inferior and anterolateral leads        -Cardiac cath scheduled today (07/27/2020)   -Most recent troponin  0 85 on 07/26 at 03:22 AM  -Stopped trending troponin  -ECHO on 07/26/2020- Ejection fraction 50 %   Systolic function was at the lower limits of normal ,Possible inferior wall hypokinesis   - CXR - negative for cardiopulmonary disease 07/24/20   - Continue monitor on tele  -Continue heparin drip 25K units based on weight

## 2020-07-28 VITALS
SYSTOLIC BLOOD PRESSURE: 118 MMHG | HEART RATE: 82 BPM | TEMPERATURE: 97.4 F | RESPIRATION RATE: 18 BRPM | BODY MASS INDEX: 55.79 KG/M2 | DIASTOLIC BLOOD PRESSURE: 83 MMHG | HEIGHT: 60 IN | WEIGHT: 284.17 LBS | OXYGEN SATURATION: 96 %

## 2020-07-28 PROBLEM — R77.8 ELEVATED TROPONIN: Status: RESOLVED | Noted: 2020-07-25 | Resolved: 2020-07-28

## 2020-07-28 PROBLEM — R07.9 CHEST PAIN: Status: RESOLVED | Noted: 2020-07-25 | Resolved: 2020-07-28

## 2020-07-28 PROBLEM — R79.89 ELEVATED TROPONIN: Status: RESOLVED | Noted: 2020-07-25 | Resolved: 2020-07-28

## 2020-07-28 LAB
ANION GAP SERPL CALCULATED.3IONS-SCNC: 6 MMOL/L (ref 4–13)
ATRIAL RATE: 76 BPM
BASOPHILS # BLD AUTO: 0.03 THOUSANDS/ΜL (ref 0–0.1)
BASOPHILS NFR BLD AUTO: 1 % (ref 0–1)
BUN SERPL-MCNC: 8 MG/DL (ref 5–25)
CALCIUM SERPL-MCNC: 9.1 MG/DL (ref 8.3–10.1)
CHLORIDE SERPL-SCNC: 102 MMOL/L (ref 100–108)
CO2 SERPL-SCNC: 27 MMOL/L (ref 21–32)
CREAT SERPL-MCNC: 0.71 MG/DL (ref 0.6–1.3)
EOSINOPHIL # BLD AUTO: 0.22 THOUSAND/ΜL (ref 0–0.61)
EOSINOPHIL NFR BLD AUTO: 4 % (ref 0–6)
ERYTHROCYTE [DISTWIDTH] IN BLOOD BY AUTOMATED COUNT: 14.4 % (ref 11.6–15.1)
GFR SERPL CREATININE-BSD FRML MDRD: 108 ML/MIN/1.73SQ M
GLUCOSE SERPL-MCNC: 102 MG/DL (ref 65–140)
HCT VFR BLD AUTO: 41.9 % (ref 34.8–46.1)
HGB BLD-MCNC: 14 G/DL (ref 11.5–15.4)
IMM GRANULOCYTES # BLD AUTO: 0.03 THOUSAND/UL (ref 0–0.2)
IMM GRANULOCYTES NFR BLD AUTO: 1 % (ref 0–2)
LYMPHOCYTES # BLD AUTO: 2.27 THOUSANDS/ΜL (ref 0.6–4.47)
LYMPHOCYTES NFR BLD AUTO: 43 % (ref 14–44)
MCH RBC QN AUTO: 29.7 PG (ref 26.8–34.3)
MCHC RBC AUTO-ENTMCNC: 33.4 G/DL (ref 31.4–37.4)
MCV RBC AUTO: 89 FL (ref 82–98)
MONOCYTES # BLD AUTO: 0.48 THOUSAND/ΜL (ref 0.17–1.22)
MONOCYTES NFR BLD AUTO: 9 % (ref 4–12)
NEUTROPHILS # BLD AUTO: 2.26 THOUSANDS/ΜL (ref 1.85–7.62)
NEUTS SEG NFR BLD AUTO: 42 % (ref 43–75)
NRBC BLD AUTO-RTO: 0 /100 WBCS
P AXIS: 72 DEGREES
PLATELET # BLD AUTO: 257 THOUSANDS/UL (ref 149–390)
PMV BLD AUTO: 10.4 FL (ref 8.9–12.7)
POTASSIUM SERPL-SCNC: 3.8 MMOL/L (ref 3.5–5.3)
PR INTERVAL: 148 MS
QRS AXIS: 73 DEGREES
QRSD INTERVAL: 82 MS
QT INTERVAL: 394 MS
QTC INTERVAL: 443 MS
RBC # BLD AUTO: 4.72 MILLION/UL (ref 3.81–5.12)
SODIUM SERPL-SCNC: 135 MMOL/L (ref 136–145)
T WAVE AXIS: -89 DEGREES
VENTRICULAR RATE: 76 BPM
WBC # BLD AUTO: 5.29 THOUSAND/UL (ref 4.31–10.16)

## 2020-07-28 PROCEDURE — 99232 SBSQ HOSP IP/OBS MODERATE 35: CPT | Performed by: INTERNAL MEDICINE

## 2020-07-28 PROCEDURE — NC001 PR NO CHARGE: Performed by: FAMILY MEDICINE

## 2020-07-28 PROCEDURE — 99238 HOSP IP/OBS DSCHRG MGMT 30/<: CPT | Performed by: FAMILY MEDICINE

## 2020-07-28 PROCEDURE — 85025 COMPLETE CBC W/AUTO DIFF WBC: CPT | Performed by: FAMILY MEDICINE

## 2020-07-28 PROCEDURE — 94760 N-INVAS EAR/PLS OXIMETRY 1: CPT

## 2020-07-28 PROCEDURE — 80048 BASIC METABOLIC PNL TOTAL CA: CPT | Performed by: FAMILY MEDICINE

## 2020-07-28 PROCEDURE — 93010 ELECTROCARDIOGRAM REPORT: CPT | Performed by: INTERNAL MEDICINE

## 2020-07-28 PROCEDURE — 94660 CPAP INITIATION&MGMT: CPT

## 2020-07-28 RX ORDER — LISINOPRIL 20 MG/1
20 TABLET ORAL DAILY
Qty: 30 TABLET | Refills: 1 | Status: SHIPPED | OUTPATIENT
Start: 2020-07-29 | End: 2020-08-28

## 2020-07-28 RX ORDER — HYDROCHLOROTHIAZIDE 25 MG/1
25 TABLET ORAL DAILY
Qty: 30 TABLET | Refills: 1 | Status: SHIPPED | OUTPATIENT
Start: 2020-07-29 | End: 2020-08-28

## 2020-07-28 RX ORDER — NICOTINE 21 MG/24HR
1 PATCH, TRANSDERMAL 24 HOURS TRANSDERMAL DAILY
Qty: 28 PATCH | Refills: 0 | Status: SHIPPED | OUTPATIENT
Start: 2020-07-29

## 2020-07-28 RX ADMIN — ACETAMINOPHEN 650 MG: 325 TABLET, FILM COATED ORAL at 07:39

## 2020-07-28 RX ADMIN — DOCUSATE SODIUM 100 MG: 100 CAPSULE, LIQUID FILLED ORAL at 08:55

## 2020-07-28 RX ADMIN — LISINOPRIL 20 MG: 20 TABLET ORAL at 08:55

## 2020-07-28 RX ADMIN — HYDROCHLOROTHIAZIDE 25 MG: 25 TABLET ORAL at 08:56

## 2020-07-28 RX ADMIN — NICOTINE 21 MG: 21 PATCH TRANSDERMAL at 08:57

## 2020-07-28 NOTE — MALNUTRITION/BMI
This medical record reflects one or more clinical indicators suggestive of  morbid obesity  BMI Findings:  BMI Classifications: Morbid Obesity 50-59 9     Body mass index is 55 5 kg/m²  See Nutrition note dated 07/27/2020  for additional details  Completed nutrition assessment is viewable in the nutrition documentation

## 2020-07-28 NOTE — ASSESSMENT & PLAN NOTE
S/P cardiac cath  Impression: normal coronary arteries  EKG on 07/27/2020 shows sinus rhythm with premature supraventricular complexes   And ST  And T wave changes in        inferior and anterolateral leads

## 2020-07-28 NOTE — DISCHARGE INSTRUCTIONS
DASH Eating Plan   WHAT YOU NEED TO KNOW:   The DASH (Dietary Approaches to Stop Hypertension) Eating Plan is designed to help prevent or lower high blood pressure  It can also help to lower LDL (bad) cholesterol and decrease your risk of heart disease  The plan is low in sodium, sugar, unhealthy fats, and total fat  It is high in potassium, calcium, magnesium, and fiber  These nutrients are added when you eat more fruits, vegetables, and whole grains  DISCHARGE INSTRUCTIONS:   Your sodium limit each day: Your dietitian will tell you how much sodium is safe for you to have each day  People with high blood pressure should have no more than 1,500 to 2,300 mg of sodium in a day  A teaspoon (tsp) of salt has 2,300 mg of sodium  This may seem like a difficult goal, but small changes to the foods you eat can make a big difference  Your healthcare provider or dietitian can help you create a meal plan that follows your sodium limit  How to limit sodium:   · Read food labels  Food labels can help you choose foods that are low in sodium  The amount of sodium is listed in milligrams (mg)  The % Daily Value (DV) column tells you how much of your daily needs are met by 1 serving of the food for each nutrient listed  Choose foods that have less than 5% of the DV of sodium  These foods are considered low in sodium  Foods that have 20% or more of the DV of sodium are considered high in sodium  Avoid foods that have more than 300 mg of sodium in each serving  Choose foods that say low-sodium, reduced-sodium, or no salt added on the food label  · Avoid salt  Do not salt food at the table, and add very little salt to foods during cooking  Use herbs and spices, such as onions, garlic, and salt-free seasonings to add flavor to foods  Try lemon or lime juice or vinegar to give foods a tart flavor  Use hot peppers or a small amount of hot pepper sauce to add a spicy flavor to foods  · Ask about salt substitutes    Ask your healthcare provider if you may use salt substitutes  Some salt substitutes have ingredients that can be harmful if you have certain health conditions  · Choose foods carefully at restaurants  Meals from restaurants, especially fast food restaurants, are often high in sodium  Some restaurants have nutrition information that tells you the amount of sodium in their foods  Ask to have your food prepared with less, or no salt  What you need to know about fats:   · Include healthy fats  Examples are unsaturated fats and omega-3 fatty acids  Unsaturated fats are found in soybean, canola, olive, or sunflower oil, and liquid and soft tub margarines  Omega-3 fatty acids are found in fatty fish, such as salmon, tuna, mackerel, and sardines  It is also found in flaxseed oil and ground flaxseed  · Avoid unhealthy fats  Do not eat unhealthy fats, such as saturated fats and trans fats  Saturated fats are found in foods that contain fat from animals  Examples are fatty meats, whole milk, butter, cream, and other dairy foods  It is also found in shortening, stick margarine, palm oil, and coconut oil  Trans fats are found in fried foods, crackers, chips, and baked goods made with margarine or shortening  Foods to include: With the DASH eating plan, you need to eat a certain number of servings from each food group  This will help you get enough of certain nutrients and limit others  The amount of servings you should eat depends on how many calories you need  Your dietitian can tell you how many calories you need  The number of servings listed next to the food groups below are for people who need about 2,000 calories each day    · Grains:  6 to 8 servings (3 of these servings should be whole-grain foods)    ¨ 1 slice of whole-grain bread     ¨ 1 ounce of dry cereal    ¨ ½ cup of cooked cereal, pasta, or brown rice    · Vegetables and fruits:  4 to 5 servings of fruits and 4 to 5 servings of vegetables    ¨ 1 medium fruit    ¨ ½ cup of frozen, canned (no added salt), or chopped fresh vegetables     ¨ ½ cup of fresh, frozen, dried, or canned fruit (canned in light syrup or fruit juice)    ¨ ½ cup of vegetable or fruit juice    · Dairy:  2 to 3 servings    ¨ 1 cup of nonfat (skim) or 1% milk    ¨ 1½ ounces of fat-free or low-fat cheese    ¨ 6 ounces of nonfat or low-fat yogurt    · Lean meat, poultry, and fish:  6 ounces or less    [employer] (chicken, turkey) with no skin    ¨ Fish (especially fatty fish, such as salmon, fresh tuna, or mackerel)    ¨ Lean beef and pork (loin, round, extra lean hamburger)    ¨ Egg whites and egg substitutes    · Nuts, seeds, and legumes:  4 to 5 servings each week    ¨ ½ cup of cooked beans and peas    ¨ 1½ ounces of unsalted nuts    ¨ 2 tablespoons of peanut butter or seeds    · Sweets and added sugars:  5 or less each week    ¨ 1 tablespoon of sugar, jelly, or jam    ¨ ½ cup of sorbet or gelatin    ¨ 1 cup of lemonade    · Fats:  2 to 3 servings each week    ¨ 1 teaspoon of soft margarine or vegetable oil    ¨ 1 tablespoon of mayonnaise    ¨ 2 tablespoons of salad dressing  Foods to avoid:   · Grains:      [employer], such as doughnuts, pastries, cookies, and biscuits (high in fat and sugar)    ¨ Mixes for cornbread and biscuits, packaged foods, such as bread stuffing, rice and pasta mixes, macaroni and cheese, and instant cereals (high in sodium)    · Fruits and vegetables:      ¨ Regular, canned vegetables (high in sodium)    ¨ Sauerkraut, pickled vegetables, and other foods prepared in brine (high in sodium)    ¨ Fried vegetables or vegetables in butter or high-fat sauces    ¨ Fruit in cream or butter sauce (high in fat)    · Dairy:      ¨ Whole milk, 2% milk, and cream (high in fat)    ¨ Regular cheese and processed cheese (high in fat and sodium)    · Meats and protein foods:      ¨ Smoked or cured meat, such as corned beef, tolbert, ham, hot dogs, and sausage (high in fat and sodium)    ¨ Canned beans and canned meats or spreads, such as potted meats, sardines, anchovies, and imitation seafood (high in sodium)    ¨ Deli or lunch meats, such as bologna, ham, turkey, and roast beef (high in sodium)    ¨ High-fat meat (T-bone steak, regular hamburger, and ribs)    ¨ Whole eggs and egg yolks (high in fat)    · Other:      ¨ Seasonings made with salt, such as garlic salt, celery salt, onion salt, seasoned salt, meat tenderizers, and monosodium glutamate (MSG)    ¨ Miso soup and canned or dried soup mixes (high in sodium)    ¨ Regular soy sauce, barbecue sauce, teriyaki sauce, steak sauce, Worcestershire sauce, and most flavored vinegars (high in sodium)    ¨ Regular condiments, such as mustard, ketchup, and salad dressings (high in sodium)    ¨ Gravy and sauces, such as Toy or cheese sauces (high in sodium and fat)    ¨ Drinks high in sugar, such as soda or fruit drinks    [employer] foods, such as salted chips, popcorn, pretzels, pork rinds, salted crackers, and salted nuts    ¨ Frozen foods, such as dinners, entrees, vegetables with sauces, and breaded meats (high in sodium)  Other guidelines to follow:   · Maintain a healthy weight  Your risk for heart disease is higher if you are overweight  Your healthcare provider may suggest that you lose weight if you are overweight  You can lose weight by eating fewer calories and foods that have added sugars and fat  The DASH meal plan can help you do this  Decrease calories by eating smaller portions at each meal and fewer snacks  Ask your healthcare provider for more information about how to lose weight  · Exercise regularly  Regular exercise can help you reach or maintain a healthy weight  Regular exercise can also help decrease your blood pressure and improve your cholesterol levels  Get 30 minutes or more of moderate exercise each day of the week  To lose weight, get at least 60 minutes of exercise   Talk to your healthcare provider about the best exercise program for you  · Limit alcohol  Women should limit alcohol to 1 drink a day  Men should limit alcohol to 2 drinks a day  A drink of alcohol is 12 ounces of beer, 5 ounces of wine, or 1½ ounces of liquor  © 2017 [address] Information is for End User's use only and may not be sold, redistributed or otherwise used for commercial purposes  All illustrations and images included in CareNotes® are the copyrighted property of [employer]  or David Biggs  The above information is an  only  It is not intended as medical advice for individual conditions or treatments  Talk to your doctor, nurse or pharmacist before following any medical regimen to see if it is safe and effective for you  Chronic Hypertension   WHAT YOU NEED TO KNOW:   Hypertension is high blood pressure (BP)  Your BP is the force of your blood moving against the walls of your arteries  Normal BP is less than 120/80  Prehypertension is between 120/80 and 139/89  Hypertension is 140/90 or higher  Hypertension causes your BP to get so high that your heart has to work much harder than normal  This can damage your heart  Chronic hypertension is a long-term condition that you can control with a healthy lifestyle or medicines  A controlled blood pressure helps protect your organs, such as your heart, lungs, brain, and kidneys  DISCHARGE INSTRUCTIONS:   Call 911 for any of the following:   · You have discomfort in your chest that feels like squeezing, pressure, fullness, or pain  · You become confused or have difficulty speaking  · You suddenly feel lightheaded or have trouble breathing  · You have pain or discomfort in your back, neck, jaw, stomach, or arm  Return to the emergency department if:   · You have a severe headache or vision loss  · You have weakness in an arm or leg  Contact your healthcare provider if:   · You feel faint, dizzy, confused, or drowsy      · You have been taking your BP medicine and your BP is still higher than your healthcare provider says it should be  · You have questions or concerns about your condition or care  Medicines: You may need any of the following:  · Medicine  may be used to help lower your BP  You may need more than one type of medicine  Take the medicine exactly as directed  · Diuretics  help decrease extra fluid that collects in your body  This will help lower your BP  You may urinate more often while you take this medicine  · Cholesterol medicine  helps lower your cholesterol level  A low cholesterol level helps prevent heart disease and makes it easier to control your blood pressure  · Take your medicine as directed  Contact your healthcare provider if you think your medicine is not helping or if you have side effects  Tell him or her if you are allergic to any medicine  Keep a list of the medicines, vitamins, and herbs you take  Include the amounts, and when and why you take them  Bring the list or the pill bottles to follow-up visits  Carry your medicine list with you in case of an emergency  Follow up with your healthcare provider as directed: You will need to return to have your blood pressure checked and to have other lab tests done  Write down your questions so you remember to ask them during your visits  Manage chronic hypertension:  Talk with your healthcare provider about these and other ways to manage hypertension:  · Take your BP at home  Sit and rest for 5 minutes before you take your BP  Extend your arm and support it on a flat surface  Your arm should be at the same level as your heart  Follow the directions that came with your BP monitor  If possible, take at least 2 BP readings each time  Take your BP at least twice a day at the same times each day, such as morning and evening  Keep a record of your BP readings and bring it to your follow-up visits   Ask your healthcare provider what your blood pressure should be  · Limit sodium (salt) as directed  Too much sodium can affect your fluid balance  Check labels to find low-sodium or no-salt-added foods  Some low-sodium foods use potassium salts for flavor  Too much potassium can also cause health problems  Your healthcare provider will tell you how much sodium and potassium are safe for you to have in a day  He or she may recommend that you limit sodium to 2,300 mg a day  · Follow the meal plan recommended by your healthcare provider  A dietitian or your provider can give you more information on low-sodium plans or the DASH (Dietary Approaches to Stop Hypertension) eating plan  The DASH plan is low in sodium, unhealthy fats, and total fat  It is high in potassium, calcium, and fiber  · Exercise to maintain a healthy weight  Exercise at least 30 minutes per day, on most days of the week  This will help decrease your blood pressure  Ask about the best exercise plan for you  · Decrease stress  This may help lower your BP  Learn ways to relax, such as deep breathing or listening to music  · Limit alcohol  Women should limit alcohol to 1 drink a day  Men should limit alcohol to 2 drinks a day  A drink of alcohol is 12 ounces of beer, 5 ounces of wine, or 1½ ounces of liquor  · Do not smoke  Nicotine and other chemicals in cigarettes and cigars can increase your BP and also cause lung damage  Ask your healthcare provider for information if you currently smoke and need help to quit  E-cigarettes or smokeless tobacco still contain nicotine  Talk to your healthcare provider before you use these products  © 2017 [address] [facility] Information is for End User's use only and may not be sold, redistributed or otherwise used for commercial purposes  All illustrations and images included in CareNotes® are the copyrighted property of [employer] M [employer]  or David Biggs  The above information is an  only   It is not intended as medical advice for individual conditions or treatments  Talk to your doctor, nurse or pharmacist before following any medical regimen to see if it is safe and effective for you

## 2020-07-28 NOTE — ASSESSMENT & PLAN NOTE
Status post cardiac catheterization  Patient is stable  Most recent troponin  0 85 on 07/26 at 03:22 AM  Stopped trending troponin    Troponin trend values: 1 69>2 25>2 1>1 53>0 85

## 2020-07-28 NOTE — PROGRESS NOTES
Progress Note - Mauricio Moseley 1980, 47 y o  female MRN: [id number]    [facility] Encounter: [id number]    Primary Care Provider: Leisa Mata PA-C   Date and time admitted to hospital: 7/25/2020  8:08 AM        * Chest pain  Assessment & Plan  -S/P cardiac cath  -Cardiac cath: Impression: Coronary arteries are normal  -Pt is stable  Pt denies Chest pain ,SOB today morning   -Also denies nausea and vomiting       - EKG on 07/27/2020 shows sinus rhythm with premature supraventricular complexes  And ST  And T wave changes in        inferior and anterolateral leads   -Most recent troponin  0 85 on 07/26 at 03:22 AM  -Stopped trending troponin      -ECHO on 07/26/2020- Ejection fraction 50 %  Systolic function was at the lower limits of normal ,Possible inferior wall hypokinesis   - CXR - negative for cardiopulmonary disease 07/24/20   - Continue monitor on tele  -Continue heparin drip 25K units based on weight        Hypertension  Assessment & Plan  -Pt denies headaches  -BP controlled  -SBP ranging overnight 150-120  SBP and   to 80  -Latest BP on 07/28/2020- 118/83   -Pt is on lisinopril 20 mg daily and 25 mg daily   - Continue to monitor renal function with BMP daily  - Continue to monitor BP    - Consider starting hydralazine 5 mg Q 6 PRN for SBP>160 if BP is not controlled   - If BP is not adequately controlled, consider investigating secondary causes     PVC (premature ventricular contraction)  Assessment & Plan  S/P cardiac cath  Impression: normal coronary arteries  EKG on 07/27/2020 shows sinus rhythm with premature supraventricular complexes  And ST  And T wave changes in        inferior and anterolateral leads         Asthma  Assessment & Plan  -Pt doing well  -Denies SOB  -mild wheezing on lung exam heard today    - Albulterol inhaler ordered PRN for  exacerbations  - Continuing at home med - Flonase      Elevated troponin  Assessment & Plan  Status post cardiac catheterization  Patient is stable  Most recent troponin  0 85 on 07/26 at 03:22 AM  Stopped trending troponin  Troponin trend values: 1 69>2 25>2 1>1 53>0 85        Sleep apnea  Assessment & Plan  -Pt doing well  -Pt has no complaints  -O2 saturation 97-99% overnight  Most recent O2 saturation - 96%   - continue CPAP  Tobacco abuse  Assessment & Plan  Smoker, 1 PPD for past 3 years  Nicotine 21 mg patch ordered  Tobacco counseling prior to discharge     - BMI 55 5 in setting of excess calories  - discussed healthy diet, DASH diet in setting HTN  - f/u w PCP      PPX:  Heparin  Diet:  Cardiac diet  Code Status:  Level 1 full code  Dispo: Inpatient    Plan D/W Dr Lindsey Cummins and [facility] Team    Subjective:   Patient is doing well  Status post cardiac catheterization  Patient denies chest pain, SOB, headaches, nausea, vomiting  Patient complains of toothache and associated ear pain  Patient endorses she has mild tooth pain even before admission   Patient says she had a dentist appointment today  Says she will reschedule and follow-up with the dentist   Patient is eating well and slept well  overnight  As per the patient Tylenol helps with the pain  Patient has no other complaints  Review of Systems   Constitutional: Negative for activity change, appetite change, chills, fatigue and fever  HENT: Positive for dental problem (tooth pain)  Negative for sore throat  Respiratory: Negative for cough, shortness of breath and wheezing  Cardiovascular: Negative for chest pain and leg swelling  Gastrointestinal: Negative for abdominal pain  Musculoskeletal: Negative for back pain and neck pain  Neurological: Negative for weakness, light-headedness and headaches  Psychiatric/Behavioral: Negative for agitation and behavioral problems  Objective:     Vitals: Blood pressure 118/83, pulse 82, temperature (!) 97 4 °F (36 3 °C), resp   rate 18, height 5' (1 524 m), weight 129 kg (284 lb 2 8 oz), last menstrual period 07/02/2020, SpO2 96 %  ,Body mass index is 55 5 kg/m²  Intake/Output Summary (Last 24 hours) at 7/28/2020 0932  Last data filed at 7/28/2020 0500  Gross per 24 hour   Intake 485 ml   Output 451 ml   Net 34 ml       Physical Exam:   Physical Exam   Constitutional: She is oriented to person, place, and time  She appears well-developed and well-nourished  No distress  HENT:   Head: Normocephalic and atraumatic  Eyes: Conjunctivae are normal    Neck: Normal range of motion  Cardiovascular: Normal rate, regular rhythm and normal heart sounds  No murmur heard  Pulmonary/Chest: Effort normal  No respiratory distress  She has wheezes (Mild wheezing expiratory)  Abdominal: Soft  She exhibits no distension  Musculoskeletal: Normal range of motion  She exhibits no edema  Neurological: She is alert and oriented to person, place, and time  Skin: Skin is warm and dry  She is not diaphoretic  Psychiatric: She has a normal mood and affect  Her behavior is normal    Nursing note and vitals reviewed  Invasive Devices     Peripheral Intravenous Line            Peripheral IV 07/25/20 Left Hand 3 days    Peripheral IV 07/25/20 Right Antecubital 3 days                           Lab and other studies:  I have personally reviewed pertinent reports       Admission on 07/25/2020   Component Date Value    Troponin I 07/25/2020 1 01*    WBC 07/25/2020 5 28     RBC 07/25/2020 4 30     Hemoglobin 07/25/2020 12 5     Hematocrit 07/25/2020 38 6     MCV 07/25/2020 90     MCH 07/25/2020 29 1     MCHC 07/25/2020 32 4     RDW 07/25/2020 14 4     Platelets 30/89/3414 228     MPV 07/25/2020 10 6     PTT 07/25/2020 64*    Protime 07/25/2020 14 2     INR 07/25/2020 1 09     Troponin I 07/25/2020 1 69*    Troponin I 07/25/2020 2 25*    Cholesterol 07/24/2020 154     Triglycerides 07/24/2020 200*    HDL, Direct 07/24/2020 35*    LDL Calculated 07/24/2020 79     Non-HDL-Chol (CHOL-HDL) 07/24/2020 119     PTT 07/25/2020 79*    Hemoglobin A1C 07/25/2020 5 9*    EAG 07/25/2020 123     Troponin I 07/25/2020 2 10*    Troponin I 07/26/2020 1 53*    Troponin I 07/26/2020 0 85*    WBC 07/26/2020 5 36     RBC 07/26/2020 4 30     Hemoglobin 07/26/2020 12 5     Hematocrit 07/26/2020 38 5     MCV 07/26/2020 90     MCH 07/26/2020 29 1     MCHC 07/26/2020 32 5     RDW 07/26/2020 14 3     MPV 07/26/2020 11 1     Platelets 09/51/9153 243     nRBC 07/26/2020 0     Neutrophils Relative 07/26/2020 35*    Immat GRANS % 07/26/2020 0     Lymphocytes Relative 07/26/2020 50*    Monocytes Relative 07/26/2020 9     Eosinophils Relative 07/26/2020 5     Basophils Relative 07/26/2020 1     Neutrophils Absolute 07/26/2020 1 87     Immature Grans Absolute 07/26/2020 0 01     Lymphocytes Absolute 07/26/2020 2 69     Monocytes Absolute 07/26/2020 0 47     Eosinophils Absolute 07/26/2020 0 28     Basophils Absolute 07/26/2020 0 04     Sodium 07/26/2020 139     Potassium 07/26/2020 3 5     Chloride 07/26/2020 107     CO2 07/26/2020 26     ANION GAP 07/26/2020 6     BUN 07/26/2020 6     Creatinine 07/26/2020 0 53*    Glucose 07/26/2020 99     Glucose, Fasting 07/26/2020 99     Calcium 07/26/2020 8 5     eGFR 07/26/2020 121     PTT 07/26/2020 78*    SARS-CoV-2 07/26/2020 Negative     PTT 07/27/2020 57*    Sodium 07/27/2020 136     Potassium 07/27/2020 4 2     Chloride 07/27/2020 105     CO2 07/27/2020 24     ANION GAP 07/27/2020 7     BUN 07/27/2020 7     Creatinine 07/27/2020 0 72     Glucose 07/27/2020 100     Calcium 07/27/2020 10 2*    eGFR 07/27/2020 107     WBC 07/27/2020 5 23     RBC 07/27/2020 4 78     Hemoglobin 07/27/2020 13 7     Hematocrit 07/27/2020 43 1     MCV 07/27/2020 90     MCH 07/27/2020 28 7     MCHC 07/27/2020 31 8     RDW 07/27/2020 14 5     Platelets 88/91/3984 269     MPV 07/27/2020 10 9     Preg, Serum 07/27/2020 Negative     Ventricular Rate 07/27/2020 77     Atrial Rate 07/27/2020 77     HI Interval 07/27/2020 154     QRSD Interval 07/27/2020 76     QT Interval 07/27/2020 386     QTC Interval 07/27/2020 436     P Axis 07/27/2020 79     QRS Axis 07/27/2020 86     T Wave Axis 07/27/2020 256     Ventricular Rate 07/27/2020 76     Atrial Rate 07/27/2020 76     HI Interval 07/27/2020 148     QRSD Interval 07/27/2020 82     QT Interval 07/27/2020 394     QTC Interval 07/27/2020 443     P Axis 07/27/2020 72     QRS Axis 07/27/2020 73     T Wave Axis 07/27/2020 -89     Sodium 07/28/2020 135*    Potassium 07/28/2020 3 8     Chloride 07/28/2020 102     CO2 07/28/2020 27     ANION GAP 07/28/2020 6     BUN 07/28/2020 8     Creatinine 07/28/2020 0 71     Glucose 07/28/2020 102     Calcium 07/28/2020 9 1     eGFR 07/28/2020 108     WBC 07/28/2020 5 29     RBC 07/28/2020 4 72     Hemoglobin 07/28/2020 14 0     Hematocrit 07/28/2020 41 9     MCV 07/28/2020 89     MCH 07/28/2020 29 7     MCHC 07/28/2020 33 4     RDW 07/28/2020 14 4     MPV 07/28/2020 10 4     Platelets 10/74/6181 257     nRBC 07/28/2020 0     Neutrophils Relative 07/28/2020 42*    Immat GRANS % 07/28/2020 1     Lymphocytes Relative 07/28/2020 43     Monocytes Relative 07/28/2020 9     Eosinophils Relative 07/28/2020 4     Basophils Relative 07/28/2020 1     Neutrophils Absolute 07/28/2020 2 26     Immature Grans Absolute 07/28/2020 0 03     Lymphocytes Absolute 07/28/2020 2 27     Monocytes Absolute 07/28/2020 0 48     Eosinophils Absolute 07/28/2020 0 22     Basophils Absolute 07/28/2020 0 03        Recent Results (from the past 24 hour(s))   ECG 12 lead    Collection Time: 07/27/20  1:58 PM   Result Value Ref Range    Ventricular Rate 76 BPM    Atrial Rate 76 BPM    HI Interval 148 ms    QRSD Interval 82 ms    QT Interval 394 ms    QTC Interval 443 ms    P Clarita 72 degrees    QRS Axis 73 degrees    T Wave Axis -89 degrees   Basic metabolic panel Collection Time: 07/28/20  7:27 AM   Result Value Ref Range    Sodium 135 (L) 136 - 145 mmol/L    Potassium 3 8 3 5 - 5 3 mmol/L    Chloride 102 100 - 108 mmol/L    CO2 27 21 - 32 mmol/L    ANION GAP 6 4 - 13 mmol/L    BUN 8 5 - 25 mg/dL    Creatinine 0 71 0 60 - 1 30 mg/dL    Glucose 102 65 - 140 mg/dL    Calcium 9 1 8 3 - 10 1 mg/dL    eGFR 108 ml/min/1 73sq m   CBC and differential    Collection Time: 07/28/20  7:27 AM   Result Value Ref Range    WBC 5 29 4 31 - 10 16 Thousand/uL    RBC 4 72 3 81 - 5 12 Million/uL    Hemoglobin 14 0 11 5 - 15 4 g/dL    Hematocrit 41 9 34 8 - 46 1 %    MCV 89 82 - 98 fL    MCH 29 7 26 8 - 34 3 pg    MCHC 33 4 31 4 - 37 4 g/dL    RDW 14 4 11 6 - 15 1 %    MPV 10 4 8 9 - 12 7 fL    Platelets 296 110 - 326 Thousands/uL    nRBC 0 /100 WBCs    Neutrophils Relative 42 (L) 43 - 75 %    Immat GRANS % 1 0 - 2 %    Lymphocytes Relative 43 14 - 44 %    Monocytes Relative 9 4 - 12 %    Eosinophils Relative 4 0 - 6 %    Basophils Relative 1 0 - 1 %    Neutrophils Absolute 2 26 1 85 - 7 62 Thousands/µL    Immature Grans Absolute 0 03 0 00 - 0 20 Thousand/uL    Lymphocytes Absolute 2 27 0 60 - 4 47 Thousands/µL    Monocytes Absolute 0 48 0 17 - 1 22 Thousand/µL    Eosinophils Absolute 0 22 0 00 - 0 61 Thousand/µL    Basophils Absolute 0 03 0 00 - 0 10 Thousands/µL     Blood Culture: No results found for: BLOODCX,   Urinalysis:   Lab Results   Component Value Date    COLORU Red (A) 11/23/2019    COLORU Yellow 04/19/2015    CLARITYU Turbid (A) 11/23/2019    CLARITYU Clear 04/19/2015    SPECGRAV 1 020 11/23/2019    SPECGRAV 1 025 04/19/2015    PHUR 6 0 11/23/2019    PHUR 8 0 01/28/2019    PHUR 7 0 04/19/2015    LEUKOCYTESUR 100 0 (A) 11/23/2019    LEUKOCYTESUR Moderate (A) 04/19/2015    NITRITE Negative 11/23/2019    NITRITE Negative 04/19/2015    PROTEINUA Negative 04/19/2015    GLUCOSEU Negative 11/23/2019    GLUCOSEU Negative 04/19/2015    KETONESU 5 (Trace) (A) 11/23/2019    KETONESU Negative 04/19/2015    BILIRUBINUR Negative 11/23/2019    BILIRUBINUR Negative 04/19/2015    BLOODU 250 0 (A) 11/23/2019    BLOODU Negative 04/19/2015   ,   Urine Culture: No results found for: URINECX,   Wound Culure: No results found for: WOUNDCULT      Imaging:  Cardiac catheterization on 07/27/2020  Impression normal coronary arteries       VTE Pharmacologic Prophylaxis: Heparin  VTE Mechanical Prophylaxis: sequential compression device    Current Facility-Administered Medications   Medication Dose Route Frequency    acetaminophen (TYLENOL) tablet 650 mg  650 mg Oral Q4H PRN    albuterol (PROVENTIL HFA,VENTOLIN HFA) inhaler 2 puff  2 puff Inhalation Q4H PRN    docusate sodium (COLACE) capsule 100 mg  100 mg Oral BID    fluticasone (FLONASE) 50 mcg/act nasal spray 1 spray  1 spray Nasal Daily    hydrochlorothiazide (HYDRODIURIL) tablet 25 mg  25 mg Oral Daily    lisinopril (ZESTRIL) tablet 20 mg  20 mg Oral Daily    nicotine (NICODERM CQ) 21 mg/24 hr TD 24 hr patch 21 mg  21 mg Transdermal Daily       Nico Tao MD  Family Medicine Resident PGY1

## 2020-07-28 NOTE — DISCHARGE SUMMARY
Discharge Summary - Hai Roy 47 y o  female MRN: [id number]    [facility]: [id number]    Admission Date:   Admission Orders (From admission, onward)     Ordered        07/26/20 1054  Inpatient Admission  Once         07/25/20 0913  Place in Observation  Once                     Admitting Diagnosis: Chest pain [R07 9]    HPI:   Per Dr Valencia Sweeney &P dated 07/25/2020:  "Hai Roy is a 47 y o  female who presented as transfer to [facility] with for evaluation of chest pain  She has a past medical history of HTN, KEYONA, tobacco abuse, and asthma  She developed chest pain two days ago, worsening yesterday with radiation to upper back and arms, with associated fatigue/weakness  She was evaluated in [facility] ED and found to have mildly elevated troponin 0 16 > 0 38 > 0 52  Her SBP was 190s - improved with Labetalol, and EKG showed ST depressions in inferior leads and second degree AV block which was unchanged from previous studies  Patient received Aspirin 324 mg and was started on Heparin drip  Morphine and Nitro relieved chest pain  CXR negative for acute process  CBC and CMP were unremarkable, Cardiology was consulted and recommended transfer to [facility] for higher level of care  Patient arrived this morning doing well with no acute complaints  Chest pain has not recurred  Denies shortness of breath, nausea, abdominal pain, diaphoresis, leg pain  She reports her asthma has been worsening recently with weather changes and she is using her Albuterol more frequently  "    Procedures Performed:   Orders Placed This Encounter   Procedures    Cardiac catheterization       Summary of Hospital Course:   Nolan Hooker 47 yrd old F presented to ED as a transfer from [facility] to [facility] for the evaluation of chest pain  Past medical Hx of HTN, KEYONA, Tobacco use, asthma  Initial EKG showed St depression in inferior leads and second degre AV block-unchanged form previous studies   Received Aspirin, Morphine, Nitro which resolved chest pain  CXR unremarkable  Troponins initially positive 1 69  Over the course trended down to 0 85  Was started on heparin drip  Over the course of stay no new episodes of chest pain  Cardiac catheterization done on 07/27/2020 with the impression of normal coronary arteries therefore heparin drip was discontinued  Blood pressure initially in the range of 175-196 SBP and 107-113 DBP  Increased the dose of Lisinopril to 20mg and HCTZ to 25mg  BP well controlled now  Counselled the pt regarding tobacco cessation and BMI f/u with PCP  Significant Findings, Care, Treatment and Services Provided: Cardiac catheterization: 07/27/2020  Impression: normal coronary arteries    Complications: NONE    Discharge Diagnosis:     Patient Active Problem List   Diagnosis    Allergic rhinitis    Hypokalemia    PVC (premature ventricular contraction)    Tobacco abuse    Abnormal urinalysis    Sleep apnea    Nonintractable headache    Hypertension    Asthma    BMI 50 0-59 9, adult [facility])       Resolved Problems  Date Reviewed: 12/8/2019          Resolved    * (Principal) Chest pain 7/28/2020     Resolved by  Tutu Davila MD    Elevated troponin 7/28/2020     Resolved by  Tutu Davila MD          Condition at Discharge: stable         Discharge instructions/Information to patient and family:   See after visit summary for information provided to patient and family  Provisions for Follow-Up Care:  See after visit summary for information related to follow-up care and any pertinent home health orders  PCP: Jone Pretty PA-C    Disposition: Home    Planned Readmission: No      Discharge Statement   I spent 30 minutes discharging the patient  This time was spent on the day of discharge  I had direct contact with the patient on the day of discharge  Additional documentation is required if more than 30 minutes were spent on discharge       Discharge Medications:  See after visit summary for reconciled discharge medications provided to patient and family

## 2020-07-28 NOTE — PROGRESS NOTES
Cardiology Progress Note - Delia Henderson 47 y o  female MRN: [id number]    Unit/Bed#: [facility] 728-01 Encounter: [id number]      Assessment:  Principal Problem:    Chest pain  Active Problems:    PVC (premature ventricular contraction)    Tobacco abuse    Sleep apnea    Hypertension    Elevated troponin    Asthma      Plan:  Patient is comfortable this morning  She has no chest pain or significant dyspnea  She is in sinus rhythm on telemetry  Blood pressure is trending downward with current medical therapy  Patient is NPO for cardiac catheterization today  Echocardiogram demonstrated left ventricular ejection fraction of 50% with possible inferior wall hypokinesis and mild left atrial cavity enlargement  There is no significant valvular heart disease  cardiac catheterization yesterday demonstrated normal coronary anatomy  Left ventriculogram demonstrated ejection fraction of 50%  BMP today with potassium of 3 8 and creatinine of 0 71  Patient okay from my point of view for discharge planning  Would discharge on current antihypertensive regimen including lisinopril and hydrochlorothiazide  Subjective:   Patient seen and examined  No significant events overnight   negative  Objective:     Vitals: Blood pressure 118/83, pulse 82, temperature (!) 97 4 °F (36 3 °C), resp  rate 18, height 5' (1 524 m), weight 129 kg (284 lb 2 8 oz), last menstrual period 07/02/2020, SpO2 96 %  , Body mass index is 55 5 kg/m² ,   Orthostatic Blood Pressures      Most Recent Value   Blood Pressure  118/83 filed at 07/28/2020 0757   Patient Position - Orthostatic VS  Lying filed at 07/28/2020 0408      ,      Intake/Output Summary (Last 24 hours) at 7/28/2020 1018  Last data filed at 7/28/2020 0500  Gross per 24 hour   Intake 485 ml   Output 451 ml   Net 34 ml       No significant arrhythmias seen on telemetry review         Physical Exam:    GEN: Delia Henderson appears well, alert and oriented x 3, pleasant and cooperative NECK: supple, no carotid bruits, no JVD or HJR  HEART: normal rate, regular rhythm, normal S1 and S2, no murmurs, clicks, gallops or rubs   LUNGS: clear to auscultation bilaterally; no wheezes, rales, or rhonchi   ABDOMEN: normal bowel sounds, soft, no tenderness, no distention  EXTREMITIES: peripheral pulses normal; no clubbing, cyanosis, or edema  SKIN: warm and well perfused, no suspicious lesions on exposed skin    Labs & Results:    Admission on 07/25/2020   Component Date Value    Troponin I 07/25/2020 1 01*    WBC 07/25/2020 5 28     RBC 07/25/2020 4 30     Hemoglobin 07/25/2020 12 5     Hematocrit 07/25/2020 38 6     MCV 07/25/2020 90     MCH 07/25/2020 29 1     MCHC 07/25/2020 32 4     RDW 07/25/2020 14 4     Platelets 87/82/1355 228     MPV 07/25/2020 10 6     PTT 07/25/2020 64*    Protime 07/25/2020 14 2     INR 07/25/2020 1 09     Troponin I 07/25/2020 1 69*    Troponin I 07/25/2020 2 25*    Cholesterol 07/24/2020 154     Triglycerides 07/24/2020 200*    HDL, Direct 07/24/2020 35*    LDL Calculated 07/24/2020 79     Non-HDL-Chol (CHOL-HDL) 07/24/2020 119     PTT 07/25/2020 79*    Hemoglobin A1C 07/25/2020 5 9*    EAG 07/25/2020 123     Troponin I 07/25/2020 2 10*    Troponin I 07/26/2020 1 53*    Troponin I 07/26/2020 0 85*    WBC 07/26/2020 5 36     RBC 07/26/2020 4 30     Hemoglobin 07/26/2020 12 5     Hematocrit 07/26/2020 38 5     MCV 07/26/2020 90     MCH 07/26/2020 29 1     MCHC 07/26/2020 32 5     RDW 07/26/2020 14 3     MPV 07/26/2020 11 1     Platelets 49/05/1062 243     nRBC 07/26/2020 0     Neutrophils Relative 07/26/2020 35*    Immat GRANS % 07/26/2020 0     Lymphocytes Relative 07/26/2020 50*    Monocytes Relative 07/26/2020 9     Eosinophils Relative 07/26/2020 5     Basophils Relative 07/26/2020 1     Neutrophils Absolute 07/26/2020 1 87     Immature Grans Absolute 07/26/2020 0 01     Lymphocytes Absolute 07/26/2020 2 69     Monocytes Absolute 07/26/2020 0 47     Eosinophils Absolute 07/26/2020 0 28     Basophils Absolute 07/26/2020 0 04     Sodium 07/26/2020 139     Potassium 07/26/2020 3 5     Chloride 07/26/2020 107     CO2 07/26/2020 26     ANION GAP 07/26/2020 6     BUN 07/26/2020 6     Creatinine 07/26/2020 0 53*    Glucose 07/26/2020 99     Glucose, Fasting 07/26/2020 99     Calcium 07/26/2020 8 5     eGFR 07/26/2020 121     PTT 07/26/2020 78*    SARS-CoV-2 07/26/2020 Negative     PTT 07/27/2020 57*    Sodium 07/27/2020 136     Potassium 07/27/2020 4 2     Chloride 07/27/2020 105     CO2 07/27/2020 24     ANION GAP 07/27/2020 7     BUN 07/27/2020 7     Creatinine 07/27/2020 0 72     Glucose 07/27/2020 100     Calcium 07/27/2020 10 2*    eGFR 07/27/2020 107     WBC 07/27/2020 5 23     RBC 07/27/2020 4 78     Hemoglobin 07/27/2020 13 7     Hematocrit 07/27/2020 43 1     MCV 07/27/2020 90     MCH 07/27/2020 28 7     MCHC 07/27/2020 31 8     RDW 07/27/2020 14 5     Platelets 10/35/5211 269     MPV 07/27/2020 10 9     Preg, Serum 07/27/2020 Negative     Ventricular Rate 07/27/2020 77     Atrial Rate 07/27/2020 77     MN Interval 07/27/2020 154     QRSD Interval 07/27/2020 76     QT Interval 07/27/2020 386     QTC Interval 07/27/2020 436     P Axis 07/27/2020 79     QRS Axis 07/27/2020 86     T Wave Axis 07/27/2020 256     Ventricular Rate 07/27/2020 76     Atrial Rate 07/27/2020 76     MN Interval 07/27/2020 148     QRSD Interval 07/27/2020 82     QT Interval 07/27/2020 394     QTC Interval 07/27/2020 443     P Axis 07/27/2020 72     QRS Axis 07/27/2020 73     T Wave Axis 07/27/2020 -89     Sodium 07/28/2020 135*    Potassium 07/28/2020 3 8     Chloride 07/28/2020 102     CO2 07/28/2020 27     ANION GAP 07/28/2020 6     BUN 07/28/2020 8     Creatinine 07/28/2020 0 71     Glucose 07/28/2020 102     Calcium 07/28/2020 9 1     eGFR 07/28/2020 108     WBC 07/28/2020 5 29     RBC 07/28/2020 4 72     Hemoglobin 07/28/2020 14 0     Hematocrit 07/28/2020 41 9     MCV 07/28/2020 89     MCH 07/28/2020 29 7     MCHC 07/28/2020 33 4     RDW 07/28/2020 14 4     MPV 07/28/2020 10 4     Platelets 60/87/1330 257     nRBC 07/28/2020 0     Neutrophils Relative 07/28/2020 42*    Immat GRANS % 07/28/2020 1     Lymphocytes Relative 07/28/2020 43     Monocytes Relative 07/28/2020 9     Eosinophils Relative 07/28/2020 4     Basophils Relative 07/28/2020 1     Neutrophils Absolute 07/28/2020 2 26     Immature Grans Absolute 07/28/2020 0 03     Lymphocytes Absolute 07/28/2020 2 27     Monocytes Absolute 07/28/2020 0 48     Eosinophils Absolute 07/28/2020 0 22     Basophils Absolute 07/28/2020 0 03        Xr Chest 1 View Portable    Result Date: 7/25/2020  Narrative: CHEST INDICATION:   chest pain  COMPARISON:  03/11/2020 EXAM PERFORMED/VIEWS:  XR CHEST PORTABLE 1 image FINDINGS: Cardiomediastinal silhouette appears unremarkable  The lungs are clear  No pneumothorax or pleural effusion  Osseous structures appear within normal limits for patient age  Impression: No acute cardiopulmonary disease  Workstation performed: [id number]       EKG personally reviewed by Scotty Portillo MD      Counseling / Coordination of Care  Total floor / unit time spent today 30 minutes  Greater than 50% of total time was spent with the patient and / or family counseling and / or coordination of care

## 2020-07-28 NOTE — ASSESSMENT & PLAN NOTE
-Pt denies headaches  -BP controlled  -SBP ranging overnight 150-120  SBP and   to 80  -Latest BP on 07/28/2020- 118/83   -Pt is on lisinopril 20 mg daily and 25 mg daily   - Continue to monitor renal function with BMP daily     - Continue to monitor BP    - Consider starting hydralazine 5 mg Q 6 PRN for SBP>160 if BP is not controlled   - If BP is not adequately controlled, consider investigating secondary causes

## 2020-07-28 NOTE — ASSESSMENT & PLAN NOTE
-S/P cardiac cath  -Cardiac cath: Impression: Coronary arteries are normal  -Pt is stable  Pt denies Chest pain ,SOB today morning   -Also denies nausea and vomiting       - EKG on 07/27/2020 shows sinus rhythm with premature supraventricular complexes  And ST  And T wave changes in        inferior and anterolateral leads   -Most recent troponin  0 85 on 07/26 at 03:22 AM  -Stopped trending troponin      -ECHO on 07/26/2020- Ejection fraction 50 %   Systolic function was at the lower limits of normal ,Possible inferior wall hypokinesis   - CXR - negative for cardiopulmonary disease 07/24/20   - Continue monitor on tele  -Continue heparin drip 25K units based on weight

## 2020-07-28 NOTE — ASSESSMENT & PLAN NOTE
-Pt doing well  -Denies SOB  -mild wheezing on lung exam heard today    - Albulterol inhaler ordered PRN for  exacerbations  - Continuing at home med - Flonase

## 2020-07-28 NOTE — RESTORATIVE TECHNICIAN NOTE
Restorative Specialist Mobility Note       Activity: Ambulate in rossi, Ambulate in room, Bathroom privileges, Chair, Dangle, Stand at bedside(Educated/encouraged pt to ambulate with assistance 3-4 x's/day   Pt callbell, phone/tray within reach )     Assistive Device: None          Owen MOLINA, Restorative Technician, [employer] [facility]

## 2020-07-28 NOTE — SOCIAL WORK
Met with pt to discuss the role of CM and to discuss any help pt may need prior to dc  Pt lives with her children in a 2 story home with 5 RACHEL  Pt performed ADL's indptly pta, no use of DME  No hx of HHC or rehab  No hx of mental health or D&A treatment  Pt's preferred pharmacy is [facility]  Contact: Onesimo Garcia (mother) [phone]  No POA or living will  Pt will take a Lyft home at dc  CM reviewed d/c planning process including the following: identifying help at home, patient preference for d/c planning needs, Discharge Lounge, [facility] Meds to Bed program, availability of treatment team to discuss questions or concerns patient and/or family may have regarding understanding medications and recognizing signs and symptoms once discharged  CM also encouraged patient to follow up with all recommended appointments after discharge  Patient advised of importance for patient and family to participate in managing patients medical well being  Patient/caregiver received discharge checklist  Content reviewed  Patient/caregiver encouraged to participate in discharge plan of care prior to discharge home

## 2020-07-28 NOTE — ASSESSMENT & PLAN NOTE
-Pt doing well  -Pt has no complaints  -O2 saturation 97-99% overnight  Most recent O2 saturation - 96%   - continue CPAP

## 2020-07-29 ENCOUNTER — TRANSITIONAL CARE MANAGEMENT (OUTPATIENT)
Dept: FAMILY MEDICINE CLINIC | Facility: CLINIC | Age: 40
End: 2020-07-29

## 2021-03-12 ENCOUNTER — HOSPITAL ENCOUNTER (EMERGENCY)
Facility: HOSPITAL | Age: 41
Discharge: HOME | End: 2021-03-12
Attending: EMERGENCY MEDICINE | Admitting: EMERGENCY MEDICINE
Payer: COMMERCIAL

## 2021-03-12 VITALS
OXYGEN SATURATION: 99 % | DIASTOLIC BLOOD PRESSURE: 96 MMHG | TEMPERATURE: 97.9 F | HEART RATE: 88 BPM | BODY MASS INDEX: 57.07 KG/M2 | SYSTOLIC BLOOD PRESSURE: 175 MMHG | RESPIRATION RATE: 22 BRPM | WEIGHT: 292.2 LBS

## 2021-03-12 DIAGNOSIS — I10 HYPERTENSION, UNSPECIFIED TYPE: ICD-10-CM

## 2021-03-12 DIAGNOSIS — J45.909 ASTHMA: Primary | ICD-10-CM

## 2021-03-12 PROCEDURE — 99283 EMERGENCY DEPT VISIT LOW MDM: CPT

## 2021-03-12 PROCEDURE — 99283 EMERGENCY DEPT VISIT LOW MDM: CPT | Performed by: PHYSICIAN ASSISTANT

## 2021-03-12 PROCEDURE — 94640 AIRWAY INHALATION TREATMENT: CPT

## 2021-03-12 RX ORDER — ALBUTEROL SULFATE 90 UG/1
2 AEROSOL, METERED RESPIRATORY (INHALATION) EVERY 4 HOURS PRN
Qty: 1 INHALER | Refills: 0 | Status: SHIPPED | OUTPATIENT
Start: 2021-03-12

## 2021-03-12 RX ORDER — ALBUTEROL SULFATE 2.5 MG/3ML
2.5 SOLUTION RESPIRATORY (INHALATION) ONCE
Status: COMPLETED | OUTPATIENT
Start: 2021-03-12 | End: 2021-03-12

## 2021-03-12 RX ORDER — ALBUTEROL SULFATE 2.5 MG/3ML
2.5 SOLUTION RESPIRATORY (INHALATION) EVERY 6 HOURS PRN
Qty: 75 ML | Refills: 0 | Status: SHIPPED | OUTPATIENT
Start: 2021-03-12 | End: 2021-03-22

## 2021-03-12 RX ADMIN — IPRATROPIUM BROMIDE 0.5 MG: 0.5 SOLUTION RESPIRATORY (INHALATION) at 08:17

## 2021-03-12 RX ADMIN — ALBUTEROL SULFATE 2.5 MG: 2.5 SOLUTION RESPIRATORY (INHALATION) at 08:17

## 2021-03-12 NOTE — ED PROVIDER NOTES
History  Chief Complaint   Patient presents with    Asthma     asthma exacerbation since yesterday, ran out of meds     Pt with asthma since yesterday, pt ran out of meds for inhaler and for nebulizer           Prior to Admission Medications   Prescriptions Last Dose Informant Patient Reported? Taking?   acetaminophen (TYLENOL) 325 mg tablet   No No   Sig: Take 2 tablets (650 mg total) by mouth every 8 (eight) hours as needed for mild pain or headaches   Patient not taking: Reported on 2020   albuterol (2 5 mg/3 mL) 0 083 % nebulizer solution   No No   Sig: Take 1 vial (2 5 mg total) by nebulization every 6 (six) hours as needed for wheezing or shortness of breath   albuterol (PROVENTIL HFA,VENTOLIN HFA) 90 mcg/act inhaler   No No   Sig: Inhale 2 puffs every 4 (four) hours as needed for wheezing or shortness of breath   fluticasone (FLONASE) 50 mcg/act nasal spray   No No   Si spray into each nostril daily   Patient not taking: Reported on 2019   hydrochlorothiazide (HYDRODIURIL) 25 mg tablet   No No   Sig: Take 1 tablet (25 mg total) by mouth daily   lisinopril (ZESTRIL) 20 mg tablet   No No   Sig: Take 1 tablet (20 mg total) by mouth daily   nicotine (NICODERM CQ) 21 mg/24 hr TD 24 hr patch Not Taking at Unknown time  No No   Sig: Place 1 patch on the skin daily   Patient not taking: Reported on 3/12/2021      Facility-Administered Medications: None       Past Medical History:   Diagnosis Date    Asthma     Hypertension        Past Surgical History:   Procedure Laterality Date    NO PAST SURGERIES         History reviewed  No pertinent family history  I have reviewed and agree with the history as documented  E-Cigarette/Vaping     E-Cigarette/Vaping Substances     Social History     Tobacco Use    Smoking status: Current Every Day Smoker     Packs/day: 1 00     Types: Cigarettes    Smokeless tobacco: Never Used    Tobacco comment: " not for past couple days"      Substance Use Topics    Alcohol use: Not Currently    Drug use: No       Review of Systems   Constitutional: Negative  HENT: Negative  Eyes: Negative  Respiratory: Positive for wheezing  Cardiovascular: Negative  Gastrointestinal: Negative  Endocrine: Negative  Genitourinary: Negative  Musculoskeletal: Negative  Skin: Negative  Allergic/Immunologic: Negative  Neurological: Negative  Hematological: Negative  Psychiatric/Behavioral: Negative  All other systems reviewed and are negative  Physical Exam  Physical Exam  Vitals signs and nursing note reviewed  Constitutional:       Appearance: Normal appearance  She is normal weight  Comments: Pt declines any other testing blood eval ekg chest xray, just wants neb tx,  She says its all because she ran out o rmeds      Post neb  Increase airway cta no rrw  Pt feeling normal, still declines other eval , pt wants to leave    HENT:      Head: Normocephalic and atraumatic  Right Ear: Tympanic membrane, ear canal and external ear normal       Left Ear: Tympanic membrane, ear canal and external ear normal       Nose: Nose normal       Mouth/Throat:      Mouth: Mucous membranes are moist       Pharynx: Oropharynx is clear  Eyes:      Extraocular Movements: Extraocular movements intact  Conjunctiva/sclera: Conjunctivae normal       Pupils: Pupils are equal, round, and reactive to light  Neck:      Musculoskeletal: Normal range of motion and neck supple  Cardiovascular:      Rate and Rhythm: Normal rate and regular rhythm  Pulses: Normal pulses  Heart sounds: Normal heart sounds  Pulmonary:      Effort: Pulmonary effort is normal       Comments: Decreased breath sounds all fields minor wheeze   Abdominal:      General: Abdomen is flat  Bowel sounds are normal       Palpations: Abdomen is soft  Musculoskeletal: Normal range of motion  Skin:     General: Skin is warm        Capillary Refill: Capillary refill takes less than 2 seconds  Neurological:      General: No focal deficit present  Mental Status: She is alert and oriented to person, place, and time  Psychiatric:         Mood and Affect: Mood normal          Behavior: Behavior normal          Vital Signs  ED Triage Vitals [03/12/21 0805]   Temperature Pulse Respirations Blood Pressure SpO2   97 9 °F (36 6 °C) 88 22 (!) 175/96 99 %      Temp Source Heart Rate Source Patient Position - Orthostatic VS BP Location FiO2 (%)   Tympanic Monitor Sitting Left arm --      Pain Score       --           Vitals:    03/12/21 0805   BP: (!) 175/96   Pulse: 88   Patient Position - Orthostatic VS: Sitting         Visual Acuity      ED Medications  Medications   albuterol inhalation solution 2 5 mg (2 5 mg Nebulization Given 3/12/21 0817)   ipratropium (ATROVENT) 0 02 % inhalation solution 0 5 mg (0 5 mg Nebulization Given 3/12/21 0817)       Diagnostic Studies  Results Reviewed     None                 No orders to display              Procedures  Procedures         ED Course                             SBIRT 20yo+      Most Recent Value   SBIRT (24 yo +)   In order to provide better care to our patients, we are screening all of our patients for alcohol and drug use  Would it be okay to ask you these screening questions? Yes Filed at: 03/12/2021 8481   Initial Alcohol Screen: US AUDIT-C    1  How often do you have a drink containing alcohol?  0 Filed at: 03/12/2021 0809   2  How many drinks containing alcohol do you have on a typical day you are drinking? 0 Filed at: 03/12/2021 0809   3b  FEMALE Any Age, or MALE 65+: How often do you have 4 or more drinks on one occassion? 0 Filed at: 03/12/2021 0809   Audit-C Score  0 Filed at: 03/12/2021 0741   VANCE: How many times in the past year have you    Used an illegal drug or used a prescription medication for non-medical reasons?   Never Filed at: 03/12/2021 2528                    [facility]    Disposition  Final diagnoses:   Asthma Hypertension, unspecified type     Time reflects when diagnosis was documented in both MDM as applicable and the Disposition within this note     Time User Action Codes Description Comment    3/12/2021  9:18 AM Jorgeas Benito  Add [J45 909] Asthma     3/12/2021  9:18 AM Aurea Mora  Add [I10] Hypertension, unspecified type       ED Disposition     ED Disposition Condition Date/Time Comment    Discharge Stable Fri Mar 12, 2021  9:18 AM Marisol Ferrara discharge to home/self care  Follow-up Information     Follow up With Specialties Details Why Contact Info    Festus Wells [address]  [phone]            Discharge Medication List as of 3/12/2021  9:19 AM      START taking these medications    Details   !! albuterol (2 5 mg/3 mL) 0 083 % nebulizer solution Take 1 vial (2 5 mg total) by nebulization every 6 (six) hours as needed for wheezing or shortness of breath for up to 10 days, Starting Fri 3/12/2021, Until Mon 3/22/2021, Print      !! albuterol (PROVENTIL HFA,VENTOLIN HFA) 90 mcg/act inhaler Inhale 2 puffs every 4 (four) hours as needed for wheezing, Starting Fri 3/12/2021, Print       !! - Potential duplicate medications found  Please discuss with provider        CONTINUE these medications which have NOT CHANGED    Details   acetaminophen (TYLENOL) 325 mg tablet Take 2 tablets (650 mg total) by mouth every 8 (eight) hours as needed for mild pain or headaches, Starting Fri 12/6/2019, Normal      !! albuterol (2 5 mg/3 mL) 0 083 % nebulizer solution Take 1 vial (2 5 mg total) by nebulization every 6 (six) hours as needed for wheezing or shortness of breath, Starting Fri 12/6/2019, Normal      !! albuterol (PROVENTIL HFA,VENTOLIN HFA) 90 mcg/act inhaler Inhale 2 puffs every 4 (four) hours as needed for wheezing or shortness of breath, Starting Fri 12/6/2019, Normal      fluticasone (FLONASE) 50 mcg/act nasal spray 1 spray into each nostril daily, Starting Wed 6/5/2019, Normal      hydrochlorothiazide (HYDRODIURIL) 25 mg tablet Take 1 tablet (25 mg total) by mouth daily, Starting Wed 7/29/2020, Until Fri 8/28/2020, Normal      lisinopril (ZESTRIL) 20 mg tablet Take 1 tablet (20 mg total) by mouth daily, Starting Wed 7/29/2020, Until Fri 8/28/2020, Normal      nicotine (NICODERM CQ) 21 mg/24 hr TD 24 hr patch Place 1 patch on the skin daily, Starting Wed 7/29/2020, Normal       !! - Potential duplicate medications found  Please discuss with provider  No discharge procedures on file      PDMP Review     None          ED Provider  Electronically Signed by           Nasmia Ayala PA-C  03/12/21 4340

## 2021-05-26 ENCOUNTER — HOSPITAL ENCOUNTER (EMERGENCY)
Facility: HOSPITAL | Age: 41
Discharge: HOME | End: 2021-05-26
Attending: EMERGENCY MEDICINE
Payer: COMMERCIAL

## 2021-05-26 ENCOUNTER — APPOINTMENT (EMERGENCY)
Dept: RADIOLOGY | Facility: HOSPITAL | Age: 41
End: 2021-05-26
Payer: COMMERCIAL

## 2021-05-26 VITALS
OXYGEN SATURATION: 99 % | BODY MASS INDEX: 56.83 KG/M2 | DIASTOLIC BLOOD PRESSURE: 110 MMHG | HEART RATE: 86 BPM | RESPIRATION RATE: 18 BRPM | WEIGHT: 291.01 LBS | SYSTOLIC BLOOD PRESSURE: 201 MMHG | TEMPERATURE: 97.5 F

## 2021-05-26 DIAGNOSIS — J45.901 ASTHMA EXACERBATION: Primary | ICD-10-CM

## 2021-05-26 DIAGNOSIS — I10 HYPERTENSION: ICD-10-CM

## 2021-05-26 DIAGNOSIS — J45.909 ASTHMA: ICD-10-CM

## 2021-05-26 DIAGNOSIS — J45.41 MODERATE PERSISTENT ASTHMA WITH EXACERBATION: ICD-10-CM

## 2021-05-26 PROCEDURE — 99283 EMERGENCY DEPT VISIT LOW MDM: CPT

## 2021-05-26 PROCEDURE — 99284 EMERGENCY DEPT VISIT MOD MDM: CPT | Performed by: EMERGENCY MEDICINE

## 2021-05-26 PROCEDURE — 71045 X-RAY EXAM CHEST 1 VIEW: CPT

## 2021-05-26 PROCEDURE — 94640 AIRWAY INHALATION TREATMENT: CPT

## 2021-05-26 RX ORDER — SODIUM CHLORIDE FOR INHALATION 0.9 %
12 VIAL, NEBULIZER (ML) INHALATION ONCE
Status: COMPLETED | OUTPATIENT
Start: 2021-05-26 | End: 2021-05-26

## 2021-05-26 RX ORDER — DEXAMETHASONE 4 MG/1
12 TABLET ORAL ONCE
Qty: 3 TABLET | Refills: 0 | Status: SHIPPED | OUTPATIENT
Start: 2021-05-26 | End: 2021-05-26

## 2021-05-26 RX ORDER — ALBUTEROL SULFATE 2.5 MG/3ML
2.5 SOLUTION RESPIRATORY (INHALATION) EVERY 6 HOURS PRN
Qty: 75 ML | Refills: 0 | Status: SHIPPED | OUTPATIENT
Start: 2021-05-26

## 2021-05-26 RX ORDER — ALBUTEROL SULFATE 90 UG/1
2 AEROSOL, METERED RESPIRATORY (INHALATION) EVERY 4 HOURS PRN
Qty: 18 G | Refills: 0 | Status: SHIPPED | OUTPATIENT
Start: 2021-05-26

## 2021-05-26 RX ADMIN — ISODIUM CHLORIDE 12 ML: 0.03 SOLUTION RESPIRATORY (INHALATION) at 09:05

## 2021-05-26 RX ADMIN — IPRATROPIUM BROMIDE 1 MG: 0.5 SOLUTION RESPIRATORY (INHALATION) at 09:05

## 2021-05-26 RX ADMIN — DEXAMETHASONE SODIUM PHOSPHATE 10 MG: 10 INJECTION, SOLUTION INTRAMUSCULAR; INTRAVENOUS at 09:05

## 2021-05-26 RX ADMIN — ALBUTEROL SULFATE 10 MG: 2.5 SOLUTION RESPIRATORY (INHALATION) at 09:05

## 2021-05-26 NOTE — Clinical Note
Shanda Dutch was seen and treated in our emergency department on 5/26/2021  Diagnosis:     Amy Patch    She may return on this date: If you have any questions or concerns, please don't hesitate to call        Elias Valle DO    ______________________________           _______________          _______________  Hospital Representative                              Date                                Time

## 2021-05-26 NOTE — ED PROVIDER NOTES
History  Chief Complaint   Patient presents with    Asthma     for several days  home meds not working     61-year-old female with history of asthma  States that she has never been intubated  She states she is having construction work done in her house treat thinks his increased the amount of dust in the air  She has had to use her inhaler machine 2 times daily for the last 2 days  Denies chest pain, denies cough  Denies fevers chills denies sick contacts  Prior to Admission Medications   Prescriptions Last Dose Informant Patient Reported?  Taking?   acetaminophen (TYLENOL) 325 mg tablet   No No   Sig: Take 2 tablets (650 mg total) by mouth every 8 (eight) hours as needed for mild pain or headaches   Patient not taking: Reported on 2020   albuterol (2 5 mg/3 mL) 0 083 % nebulizer solution   No No   Sig: Take 1 vial (2 5 mg total) by nebulization every 6 (six) hours as needed for wheezing or shortness of breath   albuterol (2 5 mg/3 mL) 0 083 % nebulizer solution   No No   Sig: Take 1 vial (2 5 mg total) by nebulization every 6 (six) hours as needed for wheezing or shortness of breath   albuterol (PROVENTIL HFA,VENTOLIN HFA) 90 mcg/act inhaler   No No   Sig: Inhale 2 puffs every 4 (four) hours as needed for wheezing or shortness of breath   albuterol (PROVENTIL HFA,VENTOLIN HFA) 90 mcg/act inhaler   No No   Sig: Inhale 2 puffs every 4 (four) hours as needed for wheezing   albuterol (PROVENTIL HFA,VENTOLIN HFA) 90 mcg/act inhaler   No No   Sig: Inhale 2 puffs every 4 (four) hours as needed for wheezing or shortness of breath   fluticasone (FLONASE) 50 mcg/act nasal spray   No No   Si spray into each nostril daily   Patient not taking: Reported on 2019   hydrochlorothiazide (HYDRODIURIL) 25 mg tablet   No No   Sig: Take 1 tablet (25 mg total) by mouth daily   lisinopril (ZESTRIL) 20 mg tablet   No No   Sig: Take 1 tablet (20 mg total) by mouth daily   nicotine (NICODERM CQ) 21 mg/24 hr TD 24 hr patch   No No   Sig: Place 1 patch on the skin daily   Patient not taking: Reported on 3/12/2021      Facility-Administered Medications: None       Past Medical History:   Diagnosis Date    Asthma     Hypertension     MI, acute, non ST segment elevation ([address] )        Past Surgical History:   Procedure Laterality Date    NO PAST SURGERIES         History reviewed  No pertinent family history  I have reviewed and agree with the history as documented  E-Cigarette/Vaping     E-Cigarette/Vaping Substances     Social History     Tobacco Use    Smoking status: Current Every Day Smoker     Packs/day: 0 50     Types: Cigarettes    Smokeless tobacco: Never Used    Tobacco comment: " not for past couple days"  Substance Use Topics    Alcohol use: Not Currently    Drug use: No       Review of Systems   Constitutional: Negative  Negative for chills and fever  HENT: Negative  Negative for rhinorrhea, sore throat, trouble swallowing and voice change  Eyes: Negative  Negative for pain and visual disturbance  Respiratory: Positive for shortness of breath and wheezing  Negative for cough  Cardiovascular: Negative  Negative for chest pain and palpitations  Gastrointestinal: Negative for abdominal pain, diarrhea, nausea and vomiting  Genitourinary: Negative  Negative for dysuria and frequency  Musculoskeletal: Negative  Negative for neck pain and neck stiffness  Skin: Negative  Negative for rash  Neurological: Negative  Negative for dizziness, speech difficulty, weakness, light-headedness and numbness  Physical Exam  Physical Exam  Vitals signs and nursing note reviewed  Constitutional:       General: She is not in acute distress  Appearance: She is well-developed  HENT:      Head: Normocephalic and atraumatic  Eyes:      Conjunctiva/sclera: Conjunctivae normal       Pupils: Pupils are equal, round, and reactive to light     Neck:      Musculoskeletal: Normal range of motion and neck supple  Trachea: No tracheal deviation  Cardiovascular:      Rate and Rhythm: Normal rate and regular rhythm  Pulmonary:      Effort: Pulmonary effort is normal  No respiratory distress  Breath sounds: Examination of the right-upper field reveals wheezing  Examination of the left-upper field reveals wheezing  Examination of the right-lower field reveals wheezing  Examination of the left-lower field reveals wheezing  Wheezing present  No rales  Abdominal:      General: Bowel sounds are normal  There is no distension  Palpations: Abdomen is soft  Tenderness: There is no abdominal tenderness  There is no guarding or rebound  Musculoskeletal: Normal range of motion  General: No tenderness or deformity  Skin:     General: Skin is warm and dry  Capillary Refill: Capillary refill takes less than 2 seconds  Findings: No rash  Neurological:      Mental Status: She is alert and oriented to person, place, and time     Psychiatric:         Behavior: Behavior normal          Vital Signs  ED Triage Vitals   Temperature Pulse Respirations Blood Pressure SpO2   05/26/21 0824 05/26/21 0827 05/26/21 0827 05/26/21 0827 05/26/21 0827   97 5 °F (36 4 °C) 90 20 160/87 95 %      Temp Source Heart Rate Source Patient Position - Orthostatic VS BP Location FiO2 (%)   05/26/21 0824 -- 05/26/21 0827 05/26/21 0827 --   Tympanic  Sitting Right arm       Pain Score       --                  Vitals:    05/26/21 0827   BP: 160/87   Pulse: 90   Patient Position - Orthostatic VS: Sitting         Visual Acuity      ED Medications  Medications   dexamethasone oral liquid 10 mg 1 mL (10 mg Oral Given 5/26/21 0905)   albuterol inhalation solution 10 mg (10 mg Nebulization Given 5/26/21 0905)   ipratropium (ATROVENT) 0 02 % inhalation solution 1 mg (1 mg Nebulization Given 5/26/21 0905)   sodium chloride 0 9 % inhalation solution 12 mL (12 mL Nebulization Given 5/26/21 0905)       Diagnostic Studies  Results Reviewed     None                 XR chest 1 view portable   ED Interpretation by Melany Shaw DO (05/26 0914)   No acute pna or ptx appreciated  Procedures  Procedures         ED Course                                           MDM  Number of Diagnoses or Management Options  Asthma exacerbation:   Diagnosis management comments: Patient with asthma exacerbation  X-ray subhash  Declined COVID testing in the ER  Afebrile cope loop of acute respiratory distress after breathing treatments  Wheezing has resolved  Patient had her medications refilled by me and will take steroids started 3 days to complete a 1 week course with dexamethasone  Advised the need for follow-up care and strict return precautions were discussed         Amount and/or Complexity of Data Reviewed  Tests in the radiology section of CPT®: ordered and reviewed        Disposition  Final diagnoses:   Asthma exacerbation     Time reflects when diagnosis was documented in both MDM as applicable and the Disposition within this note     Time User Action Codes Description Comment    5/26/2021  9:14 AM Albert Bullocks Add [J45 901] Asthma exacerbation     5/26/2021  9:14 AM Albert Bullocks Add [J45 909] Asthma     5/26/2021  9:14 AM Albert Bullocks Add [I10] Hypertension     5/26/2021  9:14 AM Albert Bullocks Modify [J45 909] Asthma     5/26/2021  9:14 AM Albert Bullocks Modify [I10] Hypertension     5/26/2021  9:14 AM Albert Bullocks Modify [J45 909] Asthma     5/26/2021  9:14 AM Albert Bullocks Modify [I10] Hypertension     5/26/2021  9:14 AM Albert Bullocks Add [J45 41] Moderate persistent asthma with exacerbation     5/26/2021  9:14 AM Albert Bullocks Modify [J45 909] Asthma     5/26/2021  9:14 AM Albert Bullocks Modify [I10] Hypertension     5/26/2021  9:15 AM Albert Bullocks Modify [J45 909] Asthma     5/26/2021  9:15 AM Albert Bullocks Modify [I10] Hypertension       ED Disposition     None Follow-up Information     Follow up With Specialties Details Why [address] [facility] [facility] [address]  [address] [facility]  [address]  [facility] [address] [address]             Patient's Medications   Discharge Prescriptions    DEXAMETHASONE (DECADRON) 4 MG TABLET    Take 3 tablets (12 mg total) by mouth once for 1 dose       Start Date: 5/26/2021 End Date: 5/26/2021       Order Dose: 12 mg       Quantity: 3 tablet    Refills: 0     No discharge procedures on file      PDMP Review     None          ED Provider  Electronically Signed by           Gail Daly DO  05/26/21 3433

## 2021-05-26 NOTE — ED NOTES
Patient is reporting improvement in symptoms  Denies SOB  Lung sounds improved and patient ready for discharge  Provider aware of BP at present  Patient is asymptomatic and has history of htn, did not take prescription medication today  Patient is ok to be discharged per Merline Falco Henrey Hose, RN  05/26/21 1309

## 2021-08-10 ENCOUNTER — APPOINTMENT (EMERGENCY)
Dept: RADIOLOGY | Facility: HOSPITAL | Age: 41
End: 2021-08-10
Payer: COMMERCIAL

## 2021-08-10 ENCOUNTER — HOSPITAL ENCOUNTER (EMERGENCY)
Facility: HOSPITAL | Age: 41
Discharge: HOME | End: 2021-08-10
Attending: EMERGENCY MEDICINE
Payer: COMMERCIAL

## 2021-08-10 VITALS
HEART RATE: 81 BPM | RESPIRATION RATE: 20 BRPM | WEIGHT: 291.6 LBS | DIASTOLIC BLOOD PRESSURE: 102 MMHG | OXYGEN SATURATION: 99 % | SYSTOLIC BLOOD PRESSURE: 182 MMHG | BODY MASS INDEX: 56.95 KG/M2 | TEMPERATURE: 99.1 F

## 2021-08-10 DIAGNOSIS — J45.901 ACUTE ASTHMA EXACERBATION: Primary | ICD-10-CM

## 2021-08-10 LAB — SARS-COV-2 RNA RESP QL NAA+PROBE: NEGATIVE

## 2021-08-10 PROCEDURE — 94640 AIRWAY INHALATION TREATMENT: CPT

## 2021-08-10 PROCEDURE — U0003 INFECTIOUS AGENT DETECTION BY NUCLEIC ACID (DNA OR RNA); SEVERE ACUTE RESPIRATORY SYNDROME CORONAVIRUS 2 (SARS-COV-2) (CORONAVIRUS DISEASE [COVID-19]), AMPLIFIED PROBE TECHNIQUE, MAKING USE OF HIGH THROUGHPUT TECHNOLOGIES AS DESCRIBED BY CMS-2020-01-R: HCPCS | Performed by: PHYSICIAN ASSISTANT

## 2021-08-10 PROCEDURE — 99285 EMERGENCY DEPT VISIT HI MDM: CPT | Performed by: PHYSICIAN ASSISTANT

## 2021-08-10 PROCEDURE — 94760 N-INVAS EAR/PLS OXIMETRY 1: CPT

## 2021-08-10 PROCEDURE — 99284 EMERGENCY DEPT VISIT MOD MDM: CPT

## 2021-08-10 PROCEDURE — U0005 INFEC AGEN DETEC AMPLI PROBE: HCPCS | Performed by: PHYSICIAN ASSISTANT

## 2021-08-10 PROCEDURE — 71045 X-RAY EXAM CHEST 1 VIEW: CPT

## 2021-08-10 PROCEDURE — 94664 DEMO&/EVAL PT USE INHALER: CPT

## 2021-08-10 RX ORDER — ALBUTEROL SULFATE 2.5 MG/3ML
5 SOLUTION RESPIRATORY (INHALATION) ONCE
Status: COMPLETED | OUTPATIENT
Start: 2021-08-10 | End: 2021-08-10

## 2021-08-10 RX ORDER — METHYLPREDNISOLONE 4 MG/1
TABLET ORAL
Qty: 21 TABLET | Refills: 0 | Status: SHIPPED | OUTPATIENT
Start: 2021-08-10

## 2021-08-10 RX ORDER — PREDNISONE 20 MG/1
60 TABLET ORAL ONCE
Status: COMPLETED | OUTPATIENT
Start: 2021-08-10 | End: 2021-08-10

## 2021-08-10 RX ADMIN — ALBUTEROL SULFATE 5 MG: 2.5 SOLUTION RESPIRATORY (INHALATION) at 15:14

## 2021-08-10 RX ADMIN — IPRATROPIUM BROMIDE 0.5 MG: 0.5 SOLUTION RESPIRATORY (INHALATION) at 15:14

## 2021-08-10 RX ADMIN — PREDNISONE 60 MG: 20 TABLET ORAL at 15:14

## 2021-08-10 NOTE — ED PROVIDER NOTES
History  Chief Complaint   Patient presents with    Asthma     Pt reports shortness of breath, wheezing, and productive cough since   Pt used inhaler and nebulizer at home with no relief  This is a 42-year-old female patient with a longstanding history asthma she has been admitted before never intubated  States 5 days ago when she went on vacation to [place] sure her asthma started to flar up she has been using her nebulizer nonstop  States has not been able to smoke since she started feeling ill  Denies fever chills headache blurred vision double vision congestion, sore throat, dry cough no chest pain intermittent shortness of breath no pleuritic pain no nausea vomiting diarrhea abdominal pain no urinary symptoms  Nothing makes it better or worse states that this feels like her asthma flare up  Differential diagnosis includes not limited to asthma exacerbation, pneumonia, COVID less likely, bronchitis          Prior to Admission Medications   Prescriptions Last Dose Informant Patient Reported?  Taking?   acetaminophen (TYLENOL) 325 mg tablet   No No   Sig: Take 2 tablets (650 mg total) by mouth every 8 (eight) hours as needed for mild pain or headaches   Patient not taking: Reported on 2020   albuterol (2 5 mg/3 mL) 0 083 % nebulizer solution   No No   Sig: Take 1 vial (2 5 mg total) by nebulization every 6 (six) hours as needed for wheezing or shortness of breath   albuterol (PROVENTIL HFA,VENTOLIN HFA) 90 mcg/act inhaler   No No   Sig: Inhale 2 puffs every 4 (four) hours as needed for wheezing   albuterol (PROVENTIL HFA,VENTOLIN HFA) 90 mcg/act inhaler   No No   Sig: Inhale 2 puffs every 4 (four) hours as needed for wheezing or shortness of breath   fluticasone (FLONASE) 50 mcg/act nasal spray   No No   Si spray into each nostril daily   Patient not taking: Reported on 2019   hydrochlorothiazide (HYDRODIURIL) 25 mg tablet   No No   Sig: Take 1 tablet (25 mg total) by mouth daily lisinopril (ZESTRIL) 20 mg tablet   No No   Sig: Take 1 tablet (20 mg total) by mouth daily   nicotine (NICODERM CQ) 21 mg/24 hr TD 24 hr patch   No No   Sig: Place 1 patch on the skin daily   Patient not taking: Reported on 3/12/2021      Facility-Administered Medications: None       Past Medical History:   Diagnosis Date    Asthma     Hypertension     MI, acute, non ST segment elevation ([facility])        Past Surgical History:   Procedure Laterality Date    NO PAST SURGERIES         History reviewed  No pertinent family history  I have reviewed and agree with the history as documented  E-Cigarette/Vaping     E-Cigarette/Vaping Substances     Social History     Tobacco Use    Smoking status: Current Every Day Smoker     Packs/day: 0 50     Types: Cigarettes    Smokeless tobacco: Never Used    Tobacco comment: " not for past couple days"  Substance Use Topics    Alcohol use: Not Currently    Drug use: No       Review of Systems   Constitutional: Negative for fatigue and fever  HENT: Negative for congestion and hearing loss  Eyes: Negative for photophobia and pain  Respiratory: Positive for cough, shortness of breath and wheezing  Negative for apnea, choking, chest tightness and stridor  Cardiovascular: Negative for chest pain and leg swelling  Gastrointestinal: Negative for abdominal pain, diarrhea and nausea  Endocrine: Negative for polydipsia and polyphagia  Genitourinary: Negative for dysuria and frequency  Musculoskeletal: Negative for arthralgias and gait problem  Skin: Negative for pallor and rash  Allergic/Immunologic: Negative for environmental allergies and food allergies  Neurological: Negative for dizziness and headaches  Psychiatric/Behavioral: Negative for agitation and confusion  Physical Exam  Physical Exam  Vitals and nursing note reviewed  Constitutional:       General: She is not in acute distress  Appearance: She is well-developed  She is obese  She is not ill-appearing, toxic-appearing or diaphoretic  HENT:      Head: Normocephalic and atraumatic  Right Ear: Tympanic membrane, ear canal and external ear normal       Left Ear: Tympanic membrane, ear canal and external ear normal       Nose: Nose normal       Mouth/Throat:      Mouth: Mucous membranes are moist       Pharynx: Oropharynx is clear  No oropharyngeal exudate or posterior oropharyngeal erythema  Eyes:      Conjunctiva/sclera: Conjunctivae normal       Pupils: Pupils are equal, round, and reactive to light  Cardiovascular:      Rate and Rhythm: Normal rate and regular rhythm  Pulmonary:      Effort: Pulmonary effort is normal  No respiratory distress  Breath sounds: No stridor  Wheezing present  No rhonchi or rales  Chest:      Chest wall: No tenderness  Abdominal:      General: Bowel sounds are normal       Palpations: Abdomen is soft  Tenderness: There is no abdominal tenderness  Musculoskeletal:         General: Normal range of motion  Cervical back: Normal range of motion and neck supple  Right lower leg: No edema  Left lower leg: No edema  Skin:     General: Skin is warm  Capillary Refill: Capillary refill takes less than 2 seconds  Neurological:      General: No focal deficit present  Mental Status: She is alert and oriented to person, place, and time  Mental status is at baseline     Psychiatric:         Behavior: Behavior normal          Vital Signs  ED Triage Vitals [08/10/21 1449]   Temperature Pulse Respirations Blood Pressure SpO2   99 1 °F (37 3 °C) 81 20 (!) 182/102 99 %      Temp Source Heart Rate Source Patient Position - Orthostatic VS BP Location FiO2 (%)   Oral Monitor Sitting Left arm --      Pain Score       --           Vitals:    08/10/21 1449   BP: (!) 182/102   Pulse: 81   Patient Position - Orthostatic VS: Sitting         Visual Acuity      ED Medications  Medications   albuterol inhalation solution 5 mg (5 mg Nebulization Given 8/10/21 1514)   ipratropium (ATROVENT) 0 02 % inhalation solution 0 5 mg (0 5 mg Nebulization Given 8/10/21 1514)   predniSONE tablet 60 mg (60 mg Oral Given 8/10/21 1514)       Diagnostic Studies  Results Reviewed     Procedure Component Value Units Date/Time    Novel Coronavirus (Covid-19),PCR [facility] - 24 Hour Routine [[id number]]  (Normal) Collected: 08/10/21 1515    Lab Status: Final result Specimen: Nares from Nose Updated: 08/10/21 1645     SARS-CoV-2 Negative    Narrative: The specimen collection materials, transport medium, and/or testing methodology utilized in the production of these test results have been proven to be reliable in a limited validation with an abbreviated program under the Emergency Utilization Authorization provided by the FDA  Testing reported as "Presumptive positive" will be confirmed with secondary testing to ensure result accuracy  Clinical caution and judgement should be used with the interpretation of these results with consideration of the clinical impression and other laboratory testing  Testing reported as "Positive" or "Negative" has been proven to be accurate according to standard laboratory validation requirements  All testing is performed with control materials showing appropriate reactivity at standard intervals  XR chest 1 view portable   ED Interpretation by Cyril Rooney PA-C (08/10 1546)   Under penetrated however no acute finding similar previous      Final Result by Anna Valdovinos MD (08/10 1612)      No acute cardiopulmonary disease  Workstation performed: [id number]                    Procedures  Procedures         ED Course  ED Course as of Aug 10 1657   Tue Aug 10, 2021   1552 Patient feels better  Lungs are now clear x-ray is negative  Discussed patient blood pressure in she stated that she not take her blood pressure medication today but does have some at home  SBIRT 22yo+      Most Recent Value   SBIRT (24 yo +)   In order to provide better care to our patients, we are screening all of our patients for alcohol and drug use  Would it be okay to ask you these screening questions? No Filed at: 08/10/2021 1518                    MDM    Disposition  Final diagnoses:   Acute asthma exacerbation     Time reflects when diagnosis was documented in both MDM as applicable and the Disposition within this note     Time User Action Codes Description Comment    8/10/2021  3:53 PM [place], Klaudia N Rajiv Acute asthma exacerbation       ED Disposition     ED Disposition Condition Date/Time Comment    Discharge Stable Tue Aug 10, 2021  3:53 PM Xander Gonsales discharge to home/self care              Follow-up Information     Follow up With Specialties Details Why [address] [facility], [address]  [address] [facility] [address]  [phone]            Discharge Medication List as of 8/10/2021  3:54 PM      START taking these medications    Details   methylPREDNISolone 4 MG tablet therapy pack Use as directed on package start tomorrow, Normal         CONTINUE these medications which have NOT CHANGED    Details   acetaminophen (TYLENOL) 325 mg tablet Take 2 tablets (650 mg total) by mouth every 8 (eight) hours as needed for mild pain or headaches, Starting Fri 12/6/2019, Normal      albuterol (2 5 mg/3 mL) 0 083 % nebulizer solution Take 1 vial (2 5 mg total) by nebulization every 6 (six) hours as needed for wheezing or shortness of breath, Starting Wed 5/26/2021, Normal      !! albuterol (PROVENTIL HFA,VENTOLIN HFA) 90 mcg/act inhaler Inhale 2 puffs every 4 (four) hours as needed for wheezing, Starting Fri 3/12/2021, Print      !! albuterol (PROVENTIL HFA,VENTOLIN HFA) 90 mcg/act inhaler Inhale 2 puffs every 4 (four) hours as needed for wheezing or shortness of breath, Starting Wed 5/26/2021, Normal fluticasone (FLONASE) 50 mcg/act nasal spray 1 spray into each nostril daily, Starting Wed 6/5/2019, Normal      hydrochlorothiazide (HYDRODIURIL) 25 mg tablet Take 1 tablet (25 mg total) by mouth daily, Starting Wed 7/29/2020, Until Fri 8/28/2020, Normal      lisinopril (ZESTRIL) 20 mg tablet Take 1 tablet (20 mg total) by mouth daily, Starting Wed 7/29/2020, Until Fri 8/28/2020, Normal      nicotine (NICODERM CQ) 21 mg/24 hr TD 24 hr patch Place 1 patch on the skin daily, Starting Wed 7/29/2020, Normal       !! - Potential duplicate medications found  Please discuss with provider  No discharge procedures on file      PDMP Review     None          ED Provider  Electronically Signed by           Dana Hernandez PA-C  08/10/21 1930

## 2021-08-10 NOTE — DISCHARGE INSTRUCTIONS
Continue severe nebulizer as directed at home    We will call you with results of your COVID test     Return with any worsening symptoms questions, or concerns

## 2022-05-03 ENCOUNTER — HOSPITAL ENCOUNTER (EMERGENCY)
Facility: HOSPITAL | Age: 42
Discharge: HOME | End: 2022-05-03
Attending: EMERGENCY MEDICINE
Payer: MEDICARE

## 2022-05-03 VITALS
SYSTOLIC BLOOD PRESSURE: 188 MMHG | OXYGEN SATURATION: 96 % | HEART RATE: 83 BPM | TEMPERATURE: 98.6 F | RESPIRATION RATE: 18 BRPM | BODY MASS INDEX: 55.97 KG/M2 | DIASTOLIC BLOOD PRESSURE: 92 MMHG | WEIGHT: 286.6 LBS

## 2022-05-03 DIAGNOSIS — J45.901 ASTHMA EXACERBATION: Primary | ICD-10-CM

## 2022-05-03 PROCEDURE — 99284 EMERGENCY DEPT VISIT MOD MDM: CPT

## 2022-05-03 PROCEDURE — 94644 CONT INHLJ TX 1ST HOUR: CPT

## 2022-05-03 PROCEDURE — 99284 EMERGENCY DEPT VISIT MOD MDM: CPT | Performed by: PHYSICIAN ASSISTANT

## 2022-05-03 PROCEDURE — 94664 DEMO&/EVAL PT USE INHALER: CPT

## 2022-05-03 RX ORDER — ALBUTEROL SULFATE 90 UG/1
2 AEROSOL, METERED RESPIRATORY (INHALATION) EVERY 4 HOURS PRN
Qty: 6.7 G | Refills: 0 | Status: SHIPPED | OUTPATIENT
Start: 2022-05-03

## 2022-05-03 RX ORDER — LORATADINE 10 MG/1
10 TABLET ORAL ONCE
Status: COMPLETED | OUTPATIENT
Start: 2022-05-03 | End: 2022-05-03

## 2022-05-03 RX ORDER — GUAIFENESIN/DEXTROMETHORPHAN 100-10MG/5
5 SYRUP ORAL 3 TIMES DAILY PRN
Qty: 118 ML | Refills: 0 | Status: SHIPPED | OUTPATIENT
Start: 2022-05-03 | End: 2022-05-13

## 2022-05-03 RX ORDER — PREDNISONE 20 MG/1
60 TABLET ORAL DAILY
Qty: 15 TABLET | Refills: 0 | Status: SHIPPED | OUTPATIENT
Start: 2022-05-03 | End: 2022-05-08

## 2022-05-03 RX ORDER — GUAIFENESIN/DEXTROMETHORPHAN 100-10MG/5
10 SYRUP ORAL ONCE
Status: COMPLETED | OUTPATIENT
Start: 2022-05-03 | End: 2022-05-03

## 2022-05-03 RX ORDER — LORATADINE 10 MG/1
10 TABLET ORAL DAILY
Qty: 30 TABLET | Refills: 0 | Status: SHIPPED | OUTPATIENT
Start: 2022-05-03

## 2022-05-03 RX ORDER — PREDNISONE 20 MG/1
60 TABLET ORAL ONCE
Status: COMPLETED | OUTPATIENT
Start: 2022-05-03 | End: 2022-05-03

## 2022-05-03 RX ORDER — SODIUM CHLORIDE FOR INHALATION 0.9 %
3 VIAL, NEBULIZER (ML) INHALATION ONCE
Status: COMPLETED | OUTPATIENT
Start: 2022-05-03 | End: 2022-05-03

## 2022-05-03 RX ADMIN — GUAIFENESIN AND DEXTROMETHORPHAN 10 ML: 100; 10 SYRUP ORAL at 09:59

## 2022-05-03 RX ADMIN — IPRATROPIUM BROMIDE 1 MG: 0.5 SOLUTION RESPIRATORY (INHALATION) at 10:05

## 2022-05-03 RX ADMIN — LORATADINE 10 MG: 10 TABLET ORAL at 09:59

## 2022-05-03 RX ADMIN — PREDNISONE 60 MG: 20 TABLET ORAL at 09:59

## 2022-05-03 RX ADMIN — ISODIUM CHLORIDE 3 ML: 0.03 SOLUTION RESPIRATORY (INHALATION) at 10:04

## 2022-05-03 RX ADMIN — ALBUTEROL SULFATE 10 MG: 2.5 SOLUTION RESPIRATORY (INHALATION) at 10:04

## 2022-05-03 NOTE — Clinical Note
[facility]  Diagnosis:     Alessandra Angel  may return to work on return date  She may return on this date: 05/05/2022         If you have any questions or concerns, please don't hesitate to call        Cheryle Smolder, PA-C    ______________________________           _______________          _______________  Hospital Representative                              Date                                Time

## 2022-05-03 NOTE — ED PROVIDER NOTES
History  Chief Complaint   Patient presents with    Shortness of Breath     pt reports asthma/ trouble breathing since last night       60-year-old female reports past medical history significant for asthma for which she states she has never been admitted or intubated and presents stating she has been having asthma symptoms over the past day  Patient denies any fevers or chills, coughing or congestion, abdominal pain, chest pain, diarrhea or ill contacts  Patient states she does not have asthma very frequently and states she does not use her inhaler on a daily or even monthly basis  Patient reports that she feels as though yearly she gets bad asthma symptoms states this is consistent with her previous presentations  Patient denies using any medications states she does not have an inhaler at home  Patient reports she has been coughing more frequently, short of breath and needing to sit up to breathe reports 1 episode of posttussive emesis  History provided by:  Patient   used: No    Shortness of Breath  Severity:  Moderate  Onset quality:  Gradual  Duration:  1 day  Timing:  Constant  Progression:  Unchanged  Chronicity:  New  Relieved by:  None tried  Worsened by:  Nothing  Ineffective treatments:  None tried  Associated symptoms: wheezing    Associated symptoms: no abdominal pain, no chest pain, no ear pain, no fever, no rash, no sore throat and no vomiting        Prior to Admission Medications   Prescriptions Last Dose Informant Patient Reported?  Taking?   acetaminophen (TYLENOL) 325 mg tablet   No No   Sig: Take 2 tablets (650 mg total) by mouth every 8 (eight) hours as needed for mild pain or headaches   Patient not taking: Reported on 7/25/2020   albuterol (2 5 mg/3 mL) 0 083 % nebulizer solution   No No   Sig: Take 1 vial (2 5 mg total) by nebulization every 6 (six) hours as needed for wheezing or shortness of breath   albuterol (PROVENTIL HFA,VENTOLIN HFA) 90 mcg/act inhaler No No   Sig: Inhale 2 puffs every 4 (four) hours as needed for wheezing   albuterol (PROVENTIL HFA,VENTOLIN HFA) 90 mcg/act inhaler   No No   Sig: Inhale 2 puffs every 4 (four) hours as needed for wheezing or shortness of breath   fluticasone (FLONASE) 50 mcg/act nasal spray   No No   Si spray into each nostril daily   Patient not taking: Reported on 2019   hydrochlorothiazide (HYDRODIURIL) 25 mg tablet   No No   Sig: Take 1 tablet (25 mg total) by mouth daily   lisinopril (ZESTRIL) 20 mg tablet   No No   Sig: Take 1 tablet (20 mg total) by mouth daily   methylPREDNISolone 4 MG tablet therapy pack   No No   Sig: Use as directed on package start tomorrow   nicotine (NICODERM CQ) 21 mg/24 hr TD 24 hr patch   No No   Sig: Place 1 patch on the skin daily   Patient not taking: Reported on 3/12/2021      Facility-Administered Medications: None       Past Medical History:   Diagnosis Date    Asthma     Hypertension     MI, acute, non ST segment elevation (HCC)        Past Surgical History:   Procedure Laterality Date    NO PAST SURGERIES         History reviewed  No pertinent family history  I have reviewed and agree with the history as documented  E-Cigarette/Vaping     E-Cigarette/Vaping Substances     Social History     Tobacco Use    Smoking status: Current Every Day Smoker     Packs/day: 0 50     Types: Cigarettes    Smokeless tobacco: Never Used    Tobacco comment: " not for past couple days"  Substance Use Topics    Alcohol use: Not Currently    Drug use: No       Review of Systems   Constitutional: Negative for chills, fatigue and fever  HENT: Negative for congestion, ear pain, rhinorrhea and sore throat  Eyes: Negative for redness  Respiratory: Positive for chest tightness, shortness of breath and wheezing  Cardiovascular: Negative for chest pain and palpitations  Gastrointestinal: Negative for abdominal pain, nausea and vomiting     Genitourinary: Negative for dysuria and hematuria  Musculoskeletal: Negative  Skin: Negative for rash  Neurological: Negative for dizziness, syncope, light-headedness and numbness  Physical Exam  Physical Exam  Vitals and nursing note reviewed  Constitutional:       Appearance: She is well-developed  HENT:      Head: Normocephalic  Eyes:      General: No scleral icterus  Cardiovascular:      Rate and Rhythm: Normal rate and regular rhythm  Pulmonary:      Effort: Pulmonary effort is normal       Breath sounds: No stridor  Wheezing present  Comments: Patient is sitting up however able to speak in full sentences managing oral secretions without difficulty  Expiratory wheezes noted in all lung fields with decreased lung sounds in the bases  Abdominal:      General: There is no distension  Palpations: Abdomen is soft  Tenderness: There is no abdominal tenderness  Musculoskeletal:         General: Normal range of motion  Skin:     General: Skin is warm and dry  Capillary Refill: Capillary refill takes less than 2 seconds  Neurological:      Mental Status: She is alert and oriented to person, place, and time           Vital Signs  ED Triage Vitals [05/03/22 0936]   Temperature Pulse Respirations Blood Pressure SpO2   98 6 °F (37 °C) 83 18 (!) 188/92 96 %      Temp Source Heart Rate Source Patient Position - Orthostatic VS BP Location FiO2 (%)   Tympanic Monitor Sitting Left arm --      Pain Score       --           Vitals:    05/03/22 0936   BP: (!) 188/92   Pulse: 83   Patient Position - Orthostatic VS: Sitting         Visual Acuity      ED Medications  Medications   albuterol inhalation solution 10 mg (10 mg Nebulization Given 5/3/22 1004)     And   ipratropium (ATROVENT) 0 02 % inhalation solution 1 mg (1 mg Nebulization Given 5/3/22 1005)     And   sodium chloride 0 9 % inhalation solution 3 mL (3 mL Nebulization Given 5/3/22 1004)   predniSONE tablet 60 mg (60 mg Oral Given 5/3/22 0959) dextromethorphan-guaiFENesin (ROBITUSSIN DM) oral syrup 10 mL (10 mL Oral Given 5/3/22 0959)   loratadine (CLARITIN) tablet 10 mg (10 mg Oral Given 5/3/22 0959)       Diagnostic Studies  Results Reviewed     None                 No orders to display              Procedures  Procedures         ED Course  ED Course as of 05/03/22 1048   Tue May 03, 2022   1046 Patient with significant improvement in symptoms at this time is requesting discharge  Lung sounds are clear to auscultation bilaterally at this time  Patient was reexamined at this time and was found to be stable for discharge  Return to emergency department criteria was reviewed with the patient who verbalized understanding and was agreeable to discharge and the treatment plan at this time  SBIRT 22yo+      Most Recent Value   SBIRT (24 yo +)    In order to provide better care to our patients, we are screening all of our patients for alcohol and drug use  Would it be okay to ask you these screening questions? No Filed at: 05/03/2022 1018                    MDM  Number of Diagnoses or Management Options  Diagnosis management comments: All imaging and/or lab testing discussed with patient, strict return to ED precautions discussed  Patient recommended to follow up promptly with appropriate outpatient provider  Patient and/or family members verbalizes understanding and agrees with plan  Patient is stable for discharge      Portions of the record may have been created with voice recognition software  Occasional wrong word or "sound a like" substitutions may have occurred due to the inherent limitations of voice recognition software  Read the chart carefully and recognize, using context, where substitutions have occurred          Disposition  Final diagnoses:   Asthma exacerbation     Time reflects when diagnosis was documented in both MDM as applicable and the Disposition within this note     Time User Action Codes Description Comment    5/3/2022 10:47 AM Cora Valiente [[id number]] Asthma exacerbation       ED Disposition     ED Disposition Condition Date/Time Comment    Discharge Stable Tue May 3, 2022 10:47 AM Fatuma Camacho discharge to home/self care  Follow-up Information     Follow up With Specialties Details Why [address] Margarita Cole PA-C Family Medicine Schedule an appointment as soon as possible for a visit  As needed [address]  [address] [facility]  [address]             Patient's Medications   Discharge Prescriptions    ALBUTEROL (PROVENTIL HFA,VENTOLIN HFA) 90 MCG/ACT INHALER    Inhale 2 puffs every 4 (four) hours as needed for wheezing       Start Date: 5/3/2022  End Date: --       Order Dose: 2 puffs       Quantity: 6 7 g    Refills: 0    DEXTROMETHORPHAN-GUAIFENESIN (ROBITUSSIN DM)  MG/5 ML SYRUP    Take 5 mL by mouth 3 (three) times a day as needed for cough for up to 10 days       Start Date: 5/3/2022  End Date: 5/13/2022       Order Dose: 5 mL       Quantity: 118 mL    Refills: 0    LORATADINE (CLARITIN) 10 MG TABLET    Take 1 tablet (10 mg total) by mouth daily       Start Date: 5/3/2022  End Date: --       Order Dose: 10 mg       Quantity: 30 tablet    Refills: 0    PREDNISONE 20 MG TABLET    Take 3 tablets (60 mg total) by mouth daily for 5 days       Start Date: 5/3/2022  End Date: 5/8/2022       Order Dose: 60 mg       Quantity: 15 tablet    Refills: 0       No discharge procedures on file      PDMP Review     None          ED Provider  Electronically Signed by           Gregory Ortiz PA-C  05/03/22 1048

## 2022-07-19 ENCOUNTER — TELEPHONE (OUTPATIENT)
Dept: FAMILY MEDICINE CLINIC | Facility: CLINIC | Age: 42
End: 2022-07-19

## 2022-07-19 NOTE — TELEPHONE ENCOUNTER
----- Message from [facility] sent at 7/18/2022 11:43 AM EDT -----  Regarding: SCHEDULE APPT  Please  sched 40 MIN appointment with PCP f/u asthma

## 2022-07-19 NOTE — TELEPHONE ENCOUNTER
I have attempted to contact this patient by phone with the following results: Phone is disconnected, will be sending out a letter to contact the office to schedule appt

## 2022-10-26 ENCOUNTER — APPOINTMENT (EMERGENCY)
Dept: RADIOLOGY | Facility: HOSPITAL | Age: 42
End: 2022-10-26
Payer: MEDICARE

## 2022-10-26 ENCOUNTER — HOSPITAL ENCOUNTER (EMERGENCY)
Facility: HOSPITAL | Age: 42
Discharge: HOME | End: 2022-10-26
Attending: INTERNAL MEDICINE
Payer: MEDICARE

## 2022-10-26 VITALS
SYSTOLIC BLOOD PRESSURE: 190 MMHG | RESPIRATION RATE: 18 BRPM | HEART RATE: 71 BPM | WEIGHT: 281.31 LBS | DIASTOLIC BLOOD PRESSURE: 102 MMHG | TEMPERATURE: 98.6 F | OXYGEN SATURATION: 97 % | BODY MASS INDEX: 54.94 KG/M2

## 2022-10-26 DIAGNOSIS — J45.901 EXACERBATION OF ASTHMA, UNSPECIFIED ASTHMA SEVERITY, UNSPECIFIED WHETHER PERSISTENT: Primary | ICD-10-CM

## 2022-10-26 LAB
ATRIAL RATE: 82 BPM
P AXIS: 63 DEGREES
PR INTERVAL: 160 MS
QRS AXIS: 59 DEGREES
QRSD INTERVAL: 82 MS
QT INTERVAL: 366 MS
QTC INTERVAL: 427 MS
T WAVE AXIS: -86 DEGREES
VENTRICULAR RATE: 82 BPM

## 2022-10-26 PROCEDURE — 94640 AIRWAY INHALATION TREATMENT: CPT

## 2022-10-26 PROCEDURE — 94664 DEMO&/EVAL PT USE INHALER: CPT

## 2022-10-26 PROCEDURE — 94644 CONT INHLJ TX 1ST HOUR: CPT

## 2022-10-26 PROCEDURE — 71045 X-RAY EXAM CHEST 1 VIEW: CPT

## 2022-10-26 PROCEDURE — 93005 ELECTROCARDIOGRAM TRACING: CPT

## 2022-10-26 PROCEDURE — 93010 ELECTROCARDIOGRAM REPORT: CPT | Performed by: INTERNAL MEDICINE

## 2022-10-26 PROCEDURE — 94760 N-INVAS EAR/PLS OXIMETRY 1: CPT

## 2022-10-26 RX ORDER — PREDNISONE 50 MG/1
50 TABLET ORAL DAILY
Qty: 4 TABLET | Refills: 0 | Status: SHIPPED | OUTPATIENT
Start: 2022-10-26 | End: 2022-10-30

## 2022-10-26 RX ORDER — SODIUM CHLORIDE FOR INHALATION 0.9 %
3 VIAL, NEBULIZER (ML) INHALATION ONCE
Status: COMPLETED | OUTPATIENT
Start: 2022-10-26 | End: 2022-10-26

## 2022-10-26 RX ADMIN — IPRATROPIUM BROMIDE 1 MG: 0.5 SOLUTION RESPIRATORY (INHALATION) at 12:42

## 2022-10-26 RX ADMIN — ALBUTEROL SULFATE 10 MG: 2.5 SOLUTION RESPIRATORY (INHALATION) at 12:42

## 2022-10-26 RX ADMIN — ISODIUM CHLORIDE 3 ML: 0.03 SOLUTION RESPIRATORY (INHALATION) at 12:43

## 2022-10-26 RX ADMIN — PREDNISONE 50 MG: 20 TABLET ORAL at 11:59

## 2022-10-26 NOTE — ED NOTES
Albuterol and atrovent breathing treatment administered by resp therapist       Joan Jett RN  10/26/22 3565

## 2022-10-26 NOTE — ED PROVIDER NOTES
History  Chief Complaint   Patient presents with   • Asthma     Exacerbation since   Nebs at home have not been helping     Patient is a 29-year-old female coming in for complaint of shortness of breath, and asthma laceration with the last 3 days  Patient was taken nebulizer, including bring into work without significant relief  Patient is no acute distress, no chest pain, no other significant symptoms at this time      History provided by:  Patient   used: No    Asthma  Severity:  Mild  Duration:  3 days  Associated symptoms: no abdominal pain, no chest pain, no diarrhea, no fatigue, no fever, no headaches, no nausea, no shortness of breath, no vomiting and no wheezing        Prior to Admission Medications   Prescriptions Last Dose Informant Patient Reported?  Taking?   acetaminophen (TYLENOL) 325 mg tablet   No No   Sig: Take 2 tablets (650 mg total) by mouth every 8 (eight) hours as needed for mild pain or headaches   Patient not taking: Reported on 2020   albuterol (2 5 mg/3 mL) 0 083 % nebulizer solution   No No   Sig: Take 1 vial (2 5 mg total) by nebulization every 6 (six) hours as needed for wheezing or shortness of breath   albuterol (PROVENTIL HFA,VENTOLIN HFA) 90 mcg/act inhaler   No No   Sig: Inhale 2 puffs every 4 (four) hours as needed for wheezing   albuterol (PROVENTIL HFA,VENTOLIN HFA) 90 mcg/act inhaler   No No   Sig: Inhale 2 puffs every 4 (four) hours as needed for wheezing or shortness of breath   albuterol (PROVENTIL HFA,VENTOLIN HFA) 90 mcg/act inhaler   No No   Sig: Inhale 2 puffs every 4 (four) hours as needed for wheezing   fluticasone (FLONASE) 50 mcg/act nasal spray   No No   Si spray into each nostril daily   Patient not taking: Reported on 2019   hydrochlorothiazide (HYDRODIURIL) 25 mg tablet   No No   Sig: Take 1 tablet (25 mg total) by mouth daily   lisinopril (ZESTRIL) 20 mg tablet   No No   Sig: Take 1 tablet (20 mg total) by mouth daily loratadine (CLARITIN) 10 mg tablet   No No   Sig: Take 1 tablet (10 mg total) by mouth daily   methylPREDNISolone 4 MG tablet therapy pack   No No   Sig: Use as directed on package start tomorrow   nicotine (NICODERM CQ) 21 mg/24 hr TD 24 hr patch   No No   Sig: Place 1 patch on the skin daily   Patient not taking: Reported on 3/12/2021      Facility-Administered Medications: None       Past Medical History:   Diagnosis Date   • Asthma    • Hypertension    • MI, acute, non ST segment elevation ([facility] [address] )        Past Surgical History:   Procedure Laterality Date   • NO PAST SURGERIES         History reviewed  No pertinent family history  I have reviewed and agree with the history as documented  E-Cigarette/Vaping   • E-Cigarette Use Never User      E-Cigarette/Vaping Substances     Social History     Tobacco Use   • Smoking status: Current Every Day Smoker     Packs/day: 0 50     Types: Cigarettes   • Smokeless tobacco: Never Used   • Tobacco comment: " not for past couple days"  Vaping Use   • Vaping Use: Never used   Substance Use Topics   • Alcohol use: Not Currently   • Drug use: No       Review of Systems   Constitutional: Negative  Negative for fatigue and fever  HENT: Negative  Eyes: Negative  Respiratory: Positive for chest tightness  Negative for shortness of breath and wheezing  Cardiovascular: Negative  Negative for chest pain  Gastrointestinal: Negative  Negative for abdominal pain, diarrhea, nausea and vomiting  Genitourinary: Negative  Musculoskeletal: Negative  Skin: Negative  Neurological: Negative  Negative for headaches  Psychiatric/Behavioral: Negative  Physical Exam  Physical Exam  Vitals reviewed  Constitutional:       Appearance: Normal appearance  She is normal weight  HENT:      Head: Normocephalic and atraumatic        Right Ear: External ear normal       Left Ear: External ear normal       Nose: Nose normal    Eyes:      Conjunctiva/sclera: Conjunctivae normal    Cardiovascular:      Rate and Rhythm: Normal rate and regular rhythm  Pulmonary:      Effort: Pulmonary effort is normal       Breath sounds: Normal breath sounds  No wheezing  Musculoskeletal:         General: Normal range of motion  Cervical back: Normal range of motion  Skin:     General: Skin is warm and dry  Neurological:      Mental Status: She is alert  Vital Signs  ED Triage Vitals [10/26/22 1115]   Temperature Pulse Respirations Blood Pressure SpO2   98 6 °F (37 °C) 80 16 (!) 189/111 98 %      Temp Source Heart Rate Source Patient Position - Orthostatic VS BP Location FiO2 (%)   Tympanic Monitor Sitting Left arm --      Pain Score       --           Vitals:    10/26/22 1115 10/26/22 1308   BP: (!) 189/111 (!) 190/102   Pulse: 80 71   Patient Position - Orthostatic VS: Sitting Sitting         Visual Acuity      ED Medications  Medications   albuterol inhalation solution 10 mg (10 mg Nebulization Given 10/26/22 1242)     And   ipratropium (ATROVENT) 0 02 % inhalation solution 1 mg (1 mg Nebulization Given 10/26/22 1242)     And   sodium chloride 0 9 % inhalation solution 3 mL (3 mL Nebulization Given 10/26/22 1243)   predniSONE tablet 50 mg (50 mg Oral Given 10/26/22 1159)       Diagnostic Studies  Results Reviewed     None                 XR chest 1 view portable   ED Interpretation by Emiliano Sands PA-C (10/26 1252)   No acute cardiopulmonary disease      Final Result by Marilu Cruz MD (10/26 1323)      No acute cardiopulmonary disease  Findings are stable            Workstation performed: [id number]                    Procedures  Procedures         ED Course                               SBIRT 22yo+    Flowsheet Row Most Recent Value   SBIRT (23 yo +)    In order to provide better care to our patients, we are screening all of our patients for alcohol and drug use  Would it be okay to ask you these screening questions?  No Filed at: 10/26/2022 46                    [facility]  Number of Diagnoses or Management Options  Exacerbation of asthma, unspecified asthma severity, unspecified whether persistent: new and does not require workup  Diagnosis management comments: Patient comes in for evaluation chest tightness  Patient is no acute distress  Received heart nebulizer, discharged home with prednisone script    Counseling: I had a detailed discussion with the patient and/or guardian regarding: the historical points, exam findings, and any diagnostic results supporting the discharge diagnosis, lab results, radiology results, discharge instructions reviewed with patient and/or family/caregiver and understanding was verbalized  Instructions given to return to the emergency department if symptoms worsen or persist, or if there are any questions or concerns that arise at home       All labs reviewed and utilized in the medical decision making process     All radiology studies independently viewed by me and interpreted by the radiologist     Portions of the record may have been created with voice recognition software   Occasional wrong word or "sound a like" substitutions may have occurred due to the inherent limitations of voice recognition software   Read the chart carefully and recognize, using context, where substitutions have occurred           Amount and/or Complexity of Data Reviewed  Tests in the radiology section of CPT®: ordered and reviewed    Risk of Complications, Morbidity, and/or Mortality  Presenting problems: minimal  Diagnostic procedures: minimal  Management options: minimal    Patient Progress  Patient progress: stable      Disposition  Final diagnoses:   Exacerbation of asthma, unspecified asthma severity, unspecified whether persistent     Time reflects when diagnosis was documented in both MDM as applicable and the Disposition within this note     Time User Action Codes Description Comment    10/26/2022  1:02 PM James Walls Exacerbation of asthma, unspecified asthma severity, unspecified whether persistent       ED Disposition     ED Disposition   Discharge    Condition   Stable    Date/Time   Wed Oct 26, 2022  1:02 PM    Comment   Jeronimo Briseno discharge to home/self care                 Follow-up Information     Follow up With Specialties Details Why Contact Info Additional Information    [facility]   [address]  [address] [facility]  [address]  [address] [facility] [address] Heart Emergency Department Emergency Medicine  As needed, If symptoms worsen [address] [address]  [facility] Emergency Department          Discharge Medication List as of 10/26/2022  1:03 PM      START taking these medications    Details   predniSONE 50 mg tablet Take 1 tablet (50 mg total) by mouth daily for 4 days, Starting Wed 10/26/2022, Until Sun 10/30/2022, Normal         CONTINUE these medications which have NOT CHANGED    Details   acetaminophen (TYLENOL) 325 mg tablet Take 2 tablets (650 mg total) by mouth every 8 (eight) hours as needed for mild pain or headaches, Starting Fri 12/6/2019, Normal      albuterol (2 5 mg/3 mL) 0 083 % nebulizer solution Take 1 vial (2 5 mg total) by nebulization every 6 (six) hours as needed for wheezing or shortness of breath, Starting Wed 5/26/2021, Normal      !! albuterol (PROVENTIL HFA,VENTOLIN HFA) 90 mcg/act inhaler Inhale 2 puffs every 4 (four) hours as needed for wheezing, Starting Fri 3/12/2021, Print      !! albuterol (PROVENTIL HFA,VENTOLIN HFA) 90 mcg/act inhaler Inhale 2 puffs every 4 (four) hours as needed for wheezing or shortness of breath, Starting Wed 5/26/2021, Normal      !! albuterol (PROVENTIL HFA,VENTOLIN HFA) 90 mcg/act inhaler Inhale 2 puffs every 4 (four) hours as needed for wheezing, Starting Tue 5/3/2022, Normal      fluticasone (FLONASE) 50 mcg/act nasal spray 1 spray into each nostril daily, Starting Wed 6/5/2019, Normal      hydrochlorothiazide (HYDRODIURIL) 25 mg tablet Take 1 tablet (25 mg total) by mouth daily, Starting Wed 7/29/2020, Until Fri 8/28/2020, Normal      lisinopril (ZESTRIL) 20 mg tablet Take 1 tablet (20 mg total) by mouth daily, Starting Wed 7/29/2020, Until Fri 8/28/2020, Normal      loratadine (CLARITIN) 10 mg tablet Take 1 tablet (10 mg total) by mouth daily, Starting Tue 5/3/2022, Normal      methylPREDNISolone 4 MG tablet therapy pack Use as directed on package start tomorrow, Normal      nicotine (NICODERM CQ) 21 mg/24 hr TD 24 hr patch Place 1 patch on the skin daily, Starting Wed 7/29/2020, Normal       !! - Potential duplicate medications found  Please discuss with provider  No discharge procedures on file      PDMP Review     None          ED Provider  Electronically Signed by           Hermann Pickering PA-C  10/26/22 0510

## 2022-10-26 NOTE — Clinical Note
Keyana Amrit was seen and treated in our emergency department on 10/26/2022  No restrictions            Diagnosis:     Kalina    She may return on this date: If you have any questions or concerns, please don't hesitate to call        Fortino Gonzáles PA-C    ______________________________           _______________          _______________  Hospital Representative                              Date                                Time

## 2023-01-18 ENCOUNTER — APPOINTMENT (EMERGENCY)
Dept: RADIOLOGY | Facility: HOSPITAL | Age: 43
End: 2023-01-18

## 2023-01-18 ENCOUNTER — HOSPITAL ENCOUNTER (EMERGENCY)
Facility: HOSPITAL | Age: 43
Discharge: HOME | End: 2023-01-18
Attending: EMERGENCY MEDICINE

## 2023-01-18 VITALS
RESPIRATION RATE: 18 BRPM | DIASTOLIC BLOOD PRESSURE: 111 MMHG | TEMPERATURE: 98 F | WEIGHT: 278.3 LBS | HEART RATE: 67 BPM | BODY MASS INDEX: 54.35 KG/M2 | SYSTOLIC BLOOD PRESSURE: 197 MMHG | OXYGEN SATURATION: 97 %

## 2023-01-18 DIAGNOSIS — I10 HYPERTENSION: ICD-10-CM

## 2023-01-18 DIAGNOSIS — R77.8 ELEVATED TROPONIN: ICD-10-CM

## 2023-01-18 DIAGNOSIS — R07.89 ATYPICAL CHEST PAIN: Primary | ICD-10-CM

## 2023-01-18 DIAGNOSIS — R51.9 HEADACHE: ICD-10-CM

## 2023-01-18 DIAGNOSIS — J06.9 URI (UPPER RESPIRATORY INFECTION): ICD-10-CM

## 2023-01-18 LAB
2HR DELTA HS TROPONIN: 6 NG/L
4HR DELTA HS TROPONIN: 4 NG/L
ALBUMIN SERPL BCP-MCNC: 3.9 G/DL (ref 3.5–5)
ALP SERPL-CCNC: 57 U/L (ref 43–122)
ALT SERPL W P-5'-P-CCNC: 23 U/L
ANION GAP SERPL CALCULATED.3IONS-SCNC: 3 MMOL/L (ref 5–14)
AST SERPL W P-5'-P-CCNC: 39 U/L (ref 14–36)
ATRIAL RATE: 76 BPM
BASOPHILS # BLD MANUAL: 0 THOUSAND/UL (ref 0–0.1)
BASOPHILS NFR MAR MANUAL: 0 % (ref 0–1)
BILIRUB SERPL-MCNC: 0.28 MG/DL (ref 0.2–1)
BUN SERPL-MCNC: 5 MG/DL (ref 5–25)
CALCIUM SERPL-MCNC: 8.8 MG/DL (ref 8.4–10.2)
CARDIAC TROPONIN I PNL SERPL HS: 26 NG/L
CARDIAC TROPONIN I PNL SERPL HS: 30 NG/L
CARDIAC TROPONIN I PNL SERPL HS: 32 NG/L
CHLORIDE SERPL-SCNC: 104 MMOL/L (ref 96–108)
CO2 SERPL-SCNC: 29 MMOL/L (ref 21–32)
CREAT SERPL-MCNC: 0.71 MG/DL (ref 0.6–1.2)
EOSINOPHIL # BLD MANUAL: 0.06 THOUSAND/UL (ref 0–0.4)
EOSINOPHIL NFR BLD MANUAL: 2 % (ref 0–6)
ERYTHROCYTE [DISTWIDTH] IN BLOOD BY AUTOMATED COUNT: 15.5 % (ref 11.6–15.1)
EXT PREGNANCY TEST URINE: NEGATIVE
EXT. CONTROL: NORMAL
FLUAV RNA RESP QL NAA+PROBE: NEGATIVE
FLUBV RNA RESP QL NAA+PROBE: NEGATIVE
GFR SERPL CREATININE-BSD FRML MDRD: 106 ML/MIN/1.73SQ M
GLUCOSE SERPL-MCNC: 106 MG/DL (ref 70–99)
HCT VFR BLD AUTO: 38.4 % (ref 34.8–46.1)
HGB BLD-MCNC: 12 G/DL (ref 11.5–15.4)
LIPASE SERPL-CCNC: 54 U/L (ref 23–300)
LYMPHOCYTES # BLD AUTO: 1.12 THOUSAND/UL (ref 0.6–4.47)
LYMPHOCYTES # BLD AUTO: 36 % (ref 14–44)
MCH RBC QN AUTO: 26.6 PG (ref 26.8–34.3)
MCHC RBC AUTO-ENTMCNC: 31.3 G/DL (ref 31.4–37.4)
MCV RBC AUTO: 85 FL (ref 82–98)
MONOCYTES # BLD AUTO: 0.37 THOUSAND/UL (ref 0–1.22)
MONOCYTES NFR BLD: 12 % (ref 4–12)
NEUTROPHILS # BLD MANUAL: 1.52 THOUSAND/UL (ref 1.85–7.62)
NEUTS BAND NFR BLD MANUAL: 2 % (ref 0–8)
NEUTS SEG NFR BLD AUTO: 47 % (ref 43–75)
NT-PROBNP SERPL-MCNC: 98.8 PG/ML (ref 0–299)
P AXIS: 76 DEGREES
PLATELET # BLD AUTO: 226 THOUSANDS/UL (ref 149–390)
PLATELET BLD QL SMEAR: ADEQUATE
PMV BLD AUTO: 9.1 FL (ref 8.9–12.7)
POTASSIUM SERPL-SCNC: 4.4 MMOL/L (ref 3.5–5.3)
PR INTERVAL: 152 MS
PROT SERPL-MCNC: 7.4 G/DL (ref 6.4–8.4)
QRS AXIS: 68 DEGREES
QRSD INTERVAL: 86 MS
QT INTERVAL: 358 MS
QTC INTERVAL: 402 MS
RBC # BLD AUTO: 4.51 MILLION/UL (ref 3.81–5.12)
RBC MORPH BLD: NORMAL
RSV RNA RESP QL NAA+PROBE: NEGATIVE
SARS-COV-2 RNA RESP QL NAA+PROBE: NEGATIVE
SODIUM SERPL-SCNC: 136 MMOL/L (ref 135–147)
T WAVE AXIS: -54 DEGREES
VARIANT LYMPHS # BLD AUTO: 1 %
VENTRICULAR RATE: 76 BPM
WBC # BLD AUTO: 3.1 THOUSAND/UL (ref 4.31–10.16)

## 2023-01-18 RX ORDER — KETOROLAC TROMETHAMINE 30 MG/ML
30 INJECTION, SOLUTION INTRAMUSCULAR; INTRAVENOUS ONCE
Status: COMPLETED | OUTPATIENT
Start: 2023-01-18 | End: 2023-01-18

## 2023-01-18 RX ORDER — LOSARTAN POTASSIUM AND HYDROCHLOROTHIAZIDE 12.5; 5 MG/1; MG/1
1 TABLET ORAL DAILY
Qty: 30 TABLET | Refills: 0 | Status: SHIPPED | OUTPATIENT
Start: 2023-01-18 | End: 2023-02-17

## 2023-01-18 RX ORDER — METOCLOPRAMIDE HYDROCHLORIDE 5 MG/ML
10 INJECTION INTRAMUSCULAR; INTRAVENOUS ONCE
Status: COMPLETED | OUTPATIENT
Start: 2023-01-18 | End: 2023-01-18

## 2023-01-18 RX ORDER — DIPHENHYDRAMINE HYDROCHLORIDE 50 MG/ML
25 INJECTION INTRAMUSCULAR; INTRAVENOUS ONCE
Status: COMPLETED | OUTPATIENT
Start: 2023-01-18 | End: 2023-01-18

## 2023-01-18 RX ORDER — LOSARTAN POTASSIUM 50 MG/1
50 TABLET ORAL DAILY
Status: DISCONTINUED | OUTPATIENT
Start: 2023-01-18 | End: 2023-01-18 | Stop reason: HOSPADM

## 2023-01-18 RX ORDER — HYDROCHLOROTHIAZIDE 25 MG/1
12.5 TABLET ORAL DAILY
Status: DISCONTINUED | OUTPATIENT
Start: 2023-01-18 | End: 2023-01-18 | Stop reason: HOSPADM

## 2023-01-18 RX ADMIN — HYDROCHLOROTHIAZIDE 12.5 MG: 25 TABLET ORAL at 16:38

## 2023-01-18 RX ADMIN — KETOROLAC TROMETHAMINE 30 MG: 30 INJECTION, SOLUTION INTRAMUSCULAR; INTRAVENOUS at 11:45

## 2023-01-18 RX ADMIN — DIPHENHYDRAMINE HYDROCHLORIDE 25 MG: 50 INJECTION, SOLUTION INTRAMUSCULAR; INTRAVENOUS at 11:25

## 2023-01-18 RX ADMIN — METOCLOPRAMIDE 10 MG: 5 INJECTION, SOLUTION INTRAMUSCULAR; INTRAVENOUS at 11:25

## 2023-01-18 RX ADMIN — SODIUM CHLORIDE 1000 ML: 0.9 INJECTION, SOLUTION INTRAVENOUS at 11:21

## 2023-01-18 RX ADMIN — LOSARTAN POTASSIUM 50 MG: 50 TABLET, FILM COATED ORAL at 16:38

## 2023-01-18 NOTE — ED PROVIDER NOTES
History  Chief Complaint   Patient presents with   • Chest Pain     A couple of days - stomach hurts     37 yo morbidly obese female with a history of asthma, HTN, and CAD s/p NSTEMI presents to the ED with multiple vague complaints including chest pain, cough, nasal congestion, headache, upper abdominal discomfort, and generalized fatigue x 3-4 days  The patient reports a poorly localized anterior chest "tightness", nonradiating and nonexertional, worse with cough  No associated shortness of breath  Cough is nonproductive  (+) Nausea but no vomiting  No fevers of chills  She denies LE swelling/pain  No hemoptysis  Headache is consistent with past migraine headaches --> global "throbbing" pain  No dysuria or hematuria  No vaginal bleeding or discharge  No back or flank pain  No other specific complaints  Prior to Admission Medications   Prescriptions Last Dose Informant Patient Reported? Taking? albuterol (2 5 mg/3 mL) 0 083 % nebulizer solution   No No   Sig: Take 1 vial (2 5 mg total) by nebulization every 6 (six) hours as needed for wheezing or shortness of breath   albuterol (PROVENTIL HFA,VENTOLIN HFA) 90 mcg/act inhaler   No No   Sig: Inhale 2 puffs every 4 (four) hours as needed for wheezing   fluticasone (FLONASE) 50 mcg/act nasal spray   No No   Si spray into each nostril daily   Patient not taking: Reported on 2019   hydrochlorothiazide (HYDRODIURIL) 25 mg tablet   No No   Sig: Take 1 tablet (25 mg total) by mouth daily   lisinopril (ZESTRIL) 20 mg tablet   No No   Sig: Take 1 tablet (20 mg total) by mouth daily   loratadine (CLARITIN) 10 mg tablet   No No   Sig: Take 1 tablet (10 mg total) by mouth daily      Facility-Administered Medications: None       Past Medical History:   Diagnosis Date   • Asthma    • Hypertension    • MI, acute, non ST segment elevation (HCC)        Past Surgical History:   Procedure Laterality Date   • NO PAST SURGERIES         History reviewed   No pertinent family history  I have reviewed and agree with the history as documented  E-Cigarette/Vaping   • E-Cigarette Use Never User      E-Cigarette/Vaping Substances     Social History     Tobacco Use   • Smoking status: Every Day     Packs/day: 0 50     Types: Cigarettes   • Smokeless tobacco: Never   • Tobacco comments:     " not for past couple days"  Vaping Use   • Vaping Use: Never used   Substance Use Topics   • Alcohol use: Not Currently   • Drug use: No       Review of Systems   Constitutional: Negative for chills and fever  HENT: Positive for congestion, rhinorrhea and sinus pressure  Negative for sore throat  Eyes: Negative for visual disturbance  Respiratory: Positive for cough and chest tightness  Negative for shortness of breath and wheezing  Cardiovascular: Positive for chest pain  Negative for palpitations and leg swelling  Gastrointestinal: Negative for abdominal pain, diarrhea and vomiting  Endocrine: Negative for cold intolerance and heat intolerance  Genitourinary: Negative for dysuria and frequency  Musculoskeletal: Negative for back pain  Skin: Negative for rash  Allergic/Immunologic: Negative for immunocompromised state  Neurological: Positive for headaches  Negative for dizziness, weakness, light-headedness and numbness  Hematological: Negative for adenopathy  Psychiatric/Behavioral: Negative for self-injury  Physical Exam  Physical Exam  Constitutional:       General: She is not in acute distress  Appearance: She is well-developed  HENT:      Head: Normocephalic and atraumatic  Eyes:      Pupils: Pupils are equal, round, and reactive to light  Cardiovascular:      Rate and Rhythm: Normal rate and regular rhythm  Pulmonary:      Effort: Pulmonary effort is normal       Breath sounds: Normal breath sounds  Abdominal:      General: There is no distension  Palpations: Abdomen is soft  Tenderness: There is no abdominal tenderness  Musculoskeletal:         General: Normal range of motion  Cervical back: Normal range of motion and neck supple  Skin:     General: Skin is warm and dry  Neurological:      Mental Status: She is alert and oriented to person, place, and time  Vital Signs  ED Triage Vitals   Temperature Pulse Respirations Blood Pressure SpO2   01/18/23 1033 01/18/23 1033 01/18/23 1033 01/18/23 1033 01/18/23 1033   98 °F (36 7 °C) 78 18 (!) 168/105 98 %      Temp src Heart Rate Source Patient Position - Orthostatic VS BP Location FiO2 (%)   -- 01/18/23 1323 01/18/23 1323 01/18/23 1323 --    Monitor Lying Left leg       Pain Score       01/18/23 1033       8           Vitals:    01/18/23 1033 01/18/23 1323 01/18/23 1452   BP: (!) 168/105 (!) 179/108 (!) 183/105   Pulse: 78 71 72   Patient Position - Orthostatic VS:  Lying Lying         Visual Acuity      ED Medications  Medications   sodium chloride 0 9 % bolus 1,000 mL (1,000 mL Intravenous New Bag 1/18/23 1121)   metoclopramide (REGLAN) injection 10 mg (10 mg Intravenous Given 1/18/23 1125)   diphenhydrAMINE (BENADRYL) injection 25 mg (25 mg Intravenous Given 1/18/23 1125)   ketorolac (TORADOL) injection 30 mg (30 mg Intravenous Given 1/18/23 1145)       Diagnostic Studies  Results Reviewed     Procedure Component Value Units Date/Time    NT-BNP PRO [[id number]] Collected: 01/18/23 1125    Lab Status:  In process Specimen: Blood from Arm, Right Updated: 01/18/23 1516    HS Troponin I 2hr [[id number]]  (Normal) Collected: 01/18/23 1350    Lab Status: Final result Specimen: Blood from Hand, Right Updated: 01/18/23 1418     hs TnI 2hr 32 ng/L      Delta 2hr hsTnI 6 ng/L     Manual Differential(PHLEBS Do Not Order) [[id number]]  (Abnormal) Collected: 01/18/23 1125    Lab Status: Final result Specimen: Blood from Arm, Right Updated: 01/18/23 1215     Segmented % 47 %      Bands % 2 %      Lymphocytes % 36 %      Monocytes % 12 %      Eosinophils, % 2 %      Basophils % 0 % Atypical Lymphocytes % 1 %      Absolute Neutrophils 1 52 Thousand/uL      Lymphocytes Absolute 1 12 Thousand/uL      Monocytes Absolute 0 37 Thousand/uL      Eosinophils Absolute 0 06 Thousand/uL      Basophils Absolute 0 00 Thousand/uL      Total Counted --     RBC Morphology Normal     Platelet Estimate Adequate    FLU/RSV/COVID - if FLU/RSV clinically relevant [[id number]]  (Normal) Collected: 01/18/23 1125    Lab Status: Final result Specimen: Nares from Nose Updated: 01/18/23 1213     SARS-CoV-2 Negative     INFLUENZA A PCR Negative     INFLUENZA B PCR Negative     RSV PCR Negative    Narrative:      FOR PEDIATRIC PATIENTS - copy/paste COVID Guidelines URL to browser: [email or web address]  [email or web address]    SARS-CoV-2 assay is a Nucleic Acid Amplification assay intended for the  qualitative detection of nucleic acid from SARS-CoV-2 in nasopharyngeal  swabs  Results are for the presumptive identification of SARS-CoV-2 RNA  Positive results are indicative of infection with SARS-CoV-2, the virus  causing COVID-19, but do not rule out bacterial infection or co-infection  with other viruses  Laboratories within the [place] and its  territories are required to report all positive results to the appropriate  public health authorities  Negative results do not preclude SARS-CoV-2  infection and should not be used as the sole basis for treatment or other  patient management decisions  Negative results must be combined with  clinical observations, patient history, and epidemiological information  This test has not been FDA cleared or approved  This test has been authorized by FDA under an Emergency Use Authorization  (EUA)   This test is only authorized for the duration of time the  declaration that circumstances exist justifying the authorization of the  emergency use of an in vitro diagnostic tests for detection of SARS-CoV-2  virus and/or diagnosis of COVID-19 infection under section 564(b)(1) of  the Act, 21 U  S C  979PIB-5(K)(1), unless the authorization is terminated  or revoked sooner  The test has been validated but independent review by FDA  and CLIA is pending  Test performed using [employer] GeneXpert: This RT-PCR assay targets N2,  a region unique to SARS-CoV-2  A conserved region in the E-gene was chosen  for pan-Sarbecovirus detection which includes SARS-CoV-2  According to CMS-2020-01-R, this platform meets the definition of high-throughput technology      HS Troponin I 4hr [[id number]]     Lab Status: No result Specimen: Blood     HS Troponin 0hr (reflex protocol) [[id number]]  (Normal) Collected: 01/18/23 1125    Lab Status: Final result Specimen: Blood from Arm, Right Updated: 01/18/23 1151     hs TnI 0hr 26 ng/L     POCT pregnancy, urine [[id number]]  (Normal) Resulted: 01/18/23 1144    Lab Status: Final result Updated: 01/18/23 1145     EXT Preg Test, Ur Negative     Control Valid    Comprehensive metabolic panel [[id number]]  (Abnormal) Collected: 01/18/23 1125    Lab Status: Final result Specimen: Blood from Arm, Right Updated: 01/18/23 1144     Sodium 136 mmol/L      Potassium 4 4 mmol/L      Chloride 104 mmol/L      CO2 29 mmol/L      ANION GAP 3 mmol/L      BUN 5 mg/dL      Creatinine 0 71 mg/dL      Glucose 106 mg/dL      Calcium 8 8 mg/dL      AST 39 U/L      ALT 23 U/L      Alkaline Phosphatase 57 U/L      Total Protein 7 4 g/dL      Albumin 3 9 g/dL      Total Bilirubin 0 28 mg/dL      eGFR 106 ml/min/1 73sq m     Narrative:      Zofia guidelines for Chronic Kidney Disease (CKD):   •  Stage 1 with normal or high GFR (GFR > 90 mL/min/1 73 square meters)  •  Stage 2 Mild CKD (GFR = 60-89 mL/min/1 73 square meters)  •  Stage 3A Moderate CKD (GFR = 45-59 mL/min/1 73 square meters)  •  Stage 3B Moderate CKD (GFR = 30-44 mL/min/1 73 square meters)  •  Stage 4 Severe CKD (GFR = 15-29 mL/min/1 73 square meters)  •  Stage 5 End Stage CKD (GFR <15 mL/min/1 73 square meters)  Note: GFR calculation is accurate only with a steady state creatinine    Lipase [[id number]]  (Normal) Collected: 01/18/23 1125    Lab Status: Final result Specimen: Blood from Arm, Right Updated: 01/18/23 1142     Lipase 54 u/L     CBC and differential [[id number]]  (Abnormal) Collected: 01/18/23 1125    Lab Status: Final result Specimen: Blood from Arm, Right Updated: 01/18/23 1136     WBC 3 10 Thousand/uL      RBC 4 51 Million/uL      Hemoglobin 12 0 g/dL      Hematocrit 38 4 %      MCV 85 fL      MCH 26 6 pg      MCHC 31 3 g/dL      RDW 15 5 %      MPV 9 1 fL      Platelets 164 Thousands/uL     Narrative: This is an appended report  These results have been appended to a previously verified report  XR chest 2 views   Final Result by Yvan Mohamud MD (01/18 1101)      No acute cardiopulmonary disease                    Workstation performed: [id number]                    Procedures  ECG 12 Lead Documentation Only    Date/Time: 1/18/2023 10:41 AM  Performed by: Gal Torrez MD  Authorized by: Gal Torrez MD     Indications / Diagnosis:  Chest pain  ECG reviewed by me, the ED Provider: yes    Patient location:  ED  Interpretation:     Interpretation: abnormal    Rate:     ECG rate:  76 bpm    ECG rate assessment: normal    Rhythm:     Rhythm: sinus rhythm    Ectopy:     Ectopy: none    QRS:     QRS axis:  Normal    QRS intervals:  Normal  Conduction:     Conduction: normal    ST segments:     ST segments:  Non-specific  T waves:     T waves: non-specific and inverted      Inverted:  II, III, aVF, V4, V5 and V6    CriticalCare Time  Performed by: Gal Torrez MD  Authorized by: Gal Torrez MD     Critical care provider statement:     Critical care time (minutes):  45    Critical care start time:  1/18/2023 2:00 PM    Critical care end time:  1/18/2023 2:45 PM    Critical care time was exclusive of:  Separately billable procedures and treating other patients    Critical care was necessary to treat or prevent imminent or life-threatening deterioration of the following conditions:  Cardiac failure    Critical care was time spent personally by me on the following activities:  Obtaining history from patient or surrogate, development of treatment plan with patient or surrogate, discussions with consultants, evaluation of patient's response to treatment, examination of patient, interpretation of cardiac output measurements, ordering and performing treatments and interventions, ordering and review of laboratory studies, ordering and review of radiographic studies, re-evaluation of patient's condition and review of old charts    I assumed direction of critical care for this patient from another provider in my specialty: no               ED Course  ED Course as of 01/18/23 1526   Wed Jan 18, 2023   1449 hs TnI 2hr: 28   1449 Delta 2hr hsTnI: 6                               SBIRT 22yo+    Flowsheet Row Most Recent Value   SBIRT (25 yo +)    In order to provide better care to our patients, we are screening all of our patients for alcohol and drug use  Would it be okay to ask you these screening questions? No Filed at: 01/18/2023 1151                    Medical Decision Making  The patient is very well appearing with stable vital signs other than a moderately elevated BP  Physical examination is entirely benign  Low clinical suspicion for ACS, TAD, and PE  She is PERC negative  Will check EKG, CXR, basic labs, troponin, and viral panel  IVFs, Reglan, Toradol, and Benadryl administered  Will continue to monitor in the ED  14:40 Repeat troponin is 32 for a delta of 6  Case discussed with Dr Nena Fierro (Cardiology)  He will come to the ED to evaluate the patient  STAT echocardiogram ordered per Dr Corie Rivero request     15:25 Care signed out to Dr Adilene Marshall with echocardiogram and Cardiology consult pending     Disposition per consultant recommendations  Atypical chest pain: acute illness or injury  Elevated troponin: acute illness or injury  Headache: acute illness or injury  URI (upper respiratory infection): acute illness or injury  Amount and/or Complexity of Data Reviewed  Labs: ordered  Decision-making details documented in ED Course  Radiology: ordered  Decision-making details documented in ED Course  ECG/medicine tests: ordered  Risk  Prescription drug management  Critical Care  Total time providing critical care: 30-74 minutes      Disposition  Final diagnoses:   Atypical chest pain   Elevated troponin   URI (upper respiratory infection)   Headache     Time reflects when diagnosis was documented in both MDM as applicable and the Disposition within this note     Time User Action Codes Description Comment    1/18/2023  2:44 PM Gt Jason Add [R07 89] Atypical chest pain     1/18/2023  2:44 PM Gt Jason Modify [R07 89] Atypical chest pain     1/18/2023  3:25 PM Gt Jason Add [R77 8] Elevated troponin     1/18/2023  3:25 PM CheatleJacobs Add [J06 9] URI (upper respiratory infection)     1/18/2023  3:25 PM Jacob Reynolds Add [R51 9] Headache       ED Disposition     None      Follow-up Information    None         Patient's Medications   Discharge Prescriptions    No medications on file       No discharge procedures on file      PDMP Review     None          ED Provider  Electronically Signed by           Denise Ward MD  01/18/23 1940

## 2023-01-18 NOTE — CONSULTS
ENCOUNTER DATE: 01/18/23 5:08 PM  PATIENT NAME: Duarte Galindo   1980    [id number]  Age: 38 y o  Sex: female  [place] PROVIDER & AUTHOR: Pablo Marcelo MD  INPATIENT ATTENDING PHYSICIAN: ; PRIMARYCARE PHYSICIAN: Lucero Louise PA-C  DATE OF ADMISSION: 1/18/2023 10:11 AM; LENGTH OF STAY: 0 days  *-*-*-*-*-*-*-*-*-*-*-*-*-*-*-*-*-*-*-*-*-*-*-*-*-*-*-*-*-*-*-*-*-*-*-*-*-*-*-*-*-*-*-*-*-*-*-*-*-*-*-*-*-*-   REASON FOR CONSULTATION:   Chest pain, elevated troponin    *-*-*-*-*-*-*-*-*-*-*-*-*-*-*-*-*-*-*-*-*-*-*-*-*-*-*-*-*-*-*-*-*-*-*-*-*-*-*-*-*-*-*-*-*-*-*-*-*-*-*-*-*-*-  CARDIAC ASSESSMENT:     1  Uncontrolled hypertension with hypertensive urgency  2  History of MINOCA  3  Chronic abnormal ECG  4  Asymptomatic LV dysfunction  5  Medication noncompliance  6  Tobacco use disorder  7  Obstructive sleep apnea  8  Asthma       Patient Active Problem List   Diagnosis   • Allergic rhinitis   • Hypokalemia   • PVC (premature ventricular contraction)   • Tobacco abuse   • Abnormal urinalysis   • Sleep apnea   • Nonintractable headache   • Hypertension   • Asthma   • BMI 50 0-59 9, adult ([facility] [address] )        Patient's description of symptoms is atypical for angina and more suggest reproducible noncardiac chest pain  She does have uncontrolled hypertension and she is noncompliant with medications  There is no evidence of acute coronary syndrome  She has nonspecific ECG abnormalities  She does have elevated risk for atherosclerotic cardiac and cerebrovascular disease  She was previously on lisinopril and hydrochlorothiazide but has not been taking these medications  She confirms that she cannot be pregnant has she has undergone tubal ligation and she does not intend to become pregnant  On examination there is no evidence of pulmonary or peripheral vascular congestion  Her labs are reviewed    Her NT proBNP level is also normal     CARDIAC PLAN:     --Giving patient a dose of losartan 50 mg daily and HCTZ 12 5 mg daily  If her systolic blood pressure improves to less than 170 mmHg and at that 100 mmHg 1 hour following administration of this dose then she can be discharged home with losartan 50/12 5 mg once daily  If blood pressure is not controlled or if she is having symptoms with ambulation then would be reasonable to admit her under observation status for optimization of antihypertensive medications  -- She is urged to quit smoking as this is her single major risk factor and without being compliant with medications and quitting smoking her risk for developing major health problems is very high  -- She is advised to follow-up with primary care physician within 1 week following discharge from the hospital so that her blood pressure medicines can be adjusted if needed  -- She is encouraged to lose weight through dietary changes and physical activity  Medication recommendations discussed with ER team     *-*-*-*-*-*-*-*-*-*-*-*-*-*-*-*-*-*-*-*-*-*-*-*-*-*-*-*-*-*-*-*-*-*-*-*-*-*-*-*-*-*-*-*-*-*-*-*-*-*-*-*-*-*-  HISTORY OF PRESENT ILLNESS     Patient is a 63-year-old female with medical history significant for:  1  Morbid obesity  2  Asthma  3  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) 2021  4  Medication noncompliance  5  History of tubal ligation  6  Obstructive sleep apnea, on CPAP therapy  7  Tobacco use disorder with chronic smoking dependence    She presented to the emergency room with abdominal and chest discomfort  She reports that her symptoms started over the weekend when she was feeling intermittent sharp pain in her abdomen  discomfort to the chestthis was associated with nausea and one episode of vomiting  This morning 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 where she experienced food she decided to come to the emergency room  In the ER she was noted to have markedly elevated blood pressure of 189/111    Her ECG showed ST-T wave normalities concerning for ischemia  She had normal but slightly elevated high-sensitivity troponin levels with minimal change on serial monitoring   Cardiology is asked to evaluate the patient because she was diagnosed with MI in 2020  Patient reports intermittent headaches but denies shortness of breath or orthopnea or PND or palpitations  She admits that she does not take any medications on a chronic basis  She was diagnosed with hypertension long time back but she does not take medication regularly and she does not follow with doctors  Family history significant for her maternal grandfather having heart disease at a later age  She admits to chronic smoking about 6 cigarettes a day  Reports drinking only occasionally  She lives at home  She has 5 children youngest of whom is 15years of age  She was added [place]  *-*-*-*-*-*-*-*-*-*-*-*-*-*-*-*-*-*-*-*-*-*-*-*-*-*-*-*-*-*-*-*-*-*-*-*-*-*-*-*-*-*-*-*-*-*-*-*-*-*-*-*-*-*  PAST MEDICAL HISTORY:     Past Medical History:   Diagnosis Date   • Asthma    • Hypertension    • MI, acute, non ST segment elevation ([facility] [address] )     PAST SURGICAL HISTORY     Past Surgical History:   Procedure Laterality Date   • NO PAST SURGERIES            FAMILY HISTORY     History reviewed  No pertinent family history  SOCIAL HISTORY     Social History     Tobacco Use   Smoking Status Every Day   • Packs/day: 0 50   • Types: Cigarettes   Smokeless Tobacco Never   Tobacco Comments    " not for past couple days"         Social History     Substance and Sexual Activity   Alcohol Use Not Currently     Social History     Substance and Sexual Activity   Drug Use No    [id number]     *-*-*-*-*-*-*-*-*-*-*-*-*-*-*-*-*-*-*-*-*-*-*-*-*-*-*-*-*-*-*-*-*-*-*-*-*-*-*-*-*-*-*-*-*-*-*-*-*-*-*-*-*-*  ALLERGIES     No Known Allergies  CURRENT SCHEDULED MEDICATIONS       Current Facility-Administered Medications:   •  hydrochlorothiazide (HYDRODIURIL) tablet 12 5 mg, 12 5 mg, Oral, Daily, Raphael Davis MD, 12 5 mg at 01/18/23 1638  •  losartan (COZAAR) tablet 50 mg, 50 mg, Oral, Daily, Raphael Davis MD, 50 mg at 01/18/23 1638    Current Outpatient Medications:   •  albuterol (2 5 mg/3 mL) 0 083 % nebulizer solution, Take 1 vial (2 5 mg total) by nebulization every 6 (six) hours as needed for wheezing or shortness of breath, Disp: 75 mL, Rfl: 0  •  albuterol (PROVENTIL HFA,VENTOLIN HFA) 90 mcg/act inhaler, Inhale 2 puffs every 4 (four) hours as needed for wheezing, Disp: 6 7 g, Rfl: 0  •  fluticasone (FLONASE) 50 mcg/act nasal spray, 1 spray into each nostril daily (Patient not taking: Reported on 12/6/2019), Disp: 16 g, Rfl: 2  •  hydrochlorothiazide (HYDRODIURIL) 25 mg tablet, Take 1 tablet (25 mg total) by mouth daily, Disp: 30 tablet, Rfl: 1  •  lisinopril (ZESTRIL) 20 mg tablet, Take 1 tablet (20 mg total) by mouth daily, Disp: 30 tablet, Rfl: 1  •  loratadine (CLARITIN) 10 mg tablet, Take 1 tablet (10 mg total) by mouth daily, Disp: 30 tablet, Rfl: 0     *-*-*-*-*-*-*-*-*-*-*-*-*-*-*-*-*-*-*-*-*-*-*-*-*-*-*-*-*-*-*-*-*-*-*-*-*-*-*-*-*-*-*-*-*-*-*-*-*-*-*-*-*-*   REVIEW OF SYSTEMS     Positive for: As noted above in HPI  Negative for: All remaining as reviewed below and in HPI   SYSTEM SYMPTOMS REVIEWED:  General--weight change, fever, night sweats  Respiratoryl-- Wheezing, shortness of breath, cough, URI symptoms, sputum, blood  Cardiovascular--chest pain, syncope, dyspnea on exertion, edema, decline in exercise tolerance, claudication   Gastrointestinal--persistent vomiting, diarrhea, abdominal distention, blood in stool   Muscular or skeletal--joint pain or swelling   Neurologic--headaches, syncope, abnormal movement  Hematologic--history of easy bruising and bleeding   Endocrine--thyroid enlargement, heat or cold intolerance, polyuria   Psychiatric--anxiety, depression      *-*-*-*-*-*-*-*-*-*-*-*-*-*-*-*-*-*-*-*-*-*-*-*-*-*-*-*-*-*-*-*-*-*-*-*-*-*-*-*-*-*-*-*-*-*-*-*-*-*-*-*-*-*-   VITAL SIGNS       Vitals: 23 1033 23 1323 23 1452 23 1630   BP: (!) 168/105 (!) 179/108 (!) 183/105 (!) 172/100   BP Location:  Left leg Left arm Left arm   Pulse: 78 71 72 67   Resp:    Temp: 98 °F (36 7 °C)      SpO2: 98% 99% 99% 97%   Weight: 126 kg (278 lb 4 8 oz)        TEMPERATURE HISTORY 24H: Temp (24hrs), Av °F (36 7 °C), Min:98 °F (36 7 °C), Max:98 °F (36 7 °C)     BLOOD PRESSURE HISTORY: Systolic (02PLF), USC:319 , Min:168 , LAJ:344    Diastolic (32NBE), QTA:443, Min:100, Max:108      Weight    23 1033   Weight: 126 kg (278 lb 4 8 oz)      Body mass index is 54 35 kg/m²  Wt Readings from Last 10 Encounters:   23 126 kg (278 lb 4 8 oz)   10/26/22 128 kg (281 lb 4 9 oz)   22 130 kg (286 lb 9 6 oz)   08/10/21 132 kg (291 lb 9 6 oz)   21 132 kg (291 lb 0 1 oz)   21 133 kg (292 lb 3 2 oz)   20 129 kg (284 lb 2 8 oz)   20 130 kg (286 lb)   20 130 kg (286 lb 9 6 oz)   20 131 kg (289 lb 6 4 oz)      Intake/Output Summary (Last 24 hours) at 2023 1708  Last data filed at 2023 1533  Gross per 24 hour   Intake 1000 ml   Output --   Net 1000 ml        *-*-*-*-*-*-*-*-*-*-*-*-*-*-*-*-*-*-*-*-*-*-*-*-*-*-*-*-*-*-*-*-*-*-*-*-*-*-*-*-*-*-*-*-*-*-*-*-*-*-*-*-*-*-   PHYSICAL EXAMINATION:     General Appearance:    Alert, cooperative, no distress, appears stated age, large build, obese   Head, Eyes, ENT:    No obvious abnormality, moist mucous mebranes  Neck:   Supple, no carotid bruit or JVD   Back:     Symmetric, no curvature  Lungs:     Respirations unlabored  Clear to auscultation bilaterally,    Chest wall:     Reproducible tenderness over the left upper anterior chest   No evidence of inflammation   Heart:    Regular rate and rhythm, S1 and S2 normal, no murmur, rub  or gallop     Abdomen:     Soft, non-tender,    Extremities:   Extremities warm, no cyanosis or edema    Skin:   Skin color, texture, turgor normal, no rashes or lesions *-*-*-*-*-*-*-*-*-*-*-*-*-*-*-*-*-*-*-*-*-*-*-*-*-*-*-*-*-*-*-*-*-*-*-*-*-*-*-*-*-*-*-*-*-*-*-*-*-*-*-*-*-*-   LABORATORY DATA:   I have personally reviewed pertinent labs  CMP:  Results from last 7 days   Lab Units 01/18/23  1125   SODIUM mmol/L 136   POTASSIUM mmol/L 4 4   CHLORIDE mmol/L 104   CO2 mmol/L 29   BUN mg/dL 5   CREATININE mg/dL 0 71   CALCIUM mg/dL 8 8   ALK PHOS U/L 57   ALT U/L 23   AST U/L 39*               Cardiac Profile:   Results from last 7 days   Lab Units 01/18/23  1533 01/18/23  1350 01/18/23  1125   HS TNI 0HR ng/L  --   --  26   HS TNI 2HR ng/L  --  32  --    HS TNI 4HR ng/L 30  --   --    NT-PRO BNP pg/mL  --   --  98 8                    Blood Gas Analysis:             CBC:   Results from last 7 days   Lab Units 01/18/23  1125   WBC Thousand/uL 3 10*   HEMOGLOBIN g/dL 12 0   HEMATOCRIT % 38 4   PLATELETS Thousands/uL 226     PT/INR: No results found for: PT, INR, Microbiology:            *-*-*-*-*-*-*-*-*-*-*-*-*-*-*-*-*-*-*-*-*-*-*-*-*-*-*-*-*-*-*-*-*-*-*-*-*-*-*-*-*-*-*-*-*-*-*-*-*-*-*-*-*-*-  TELEMETRY, LAST ECG:  Telemetry reviewed    Currently not on telemetry     Results for orders placed or performed during the hospital encounter of 01/18/23   ECG 12 lead   Result Value    Ventricular Rate 76    Atrial Rate 76    NE Interval 152    QRSD Interval 86    QT Interval 358    QTC Interval 402    P Axis 76    QRS Axis 68    T Wave Axis -54    Narrative    Normal sinus rhythm  ST & T wave abnormality, consider inferior ischemia  Abnormal ECG  When compared with ECG of 26-OCT-2022 11:28,  No significant change was found  Confirmed by Raphael Davis ([id number]) on 1/18/2023 4:13:29 PM        *-*-*-*-*-*-*-*-*-*-*-*-*-*-*-*-*-*-*-*-*-*-*-*-*-*-*-*-*-*-*-*-*-*-*-*-*-*-*-*-*-*-*-*-*-*-*-*-*-*-*-*-*-*-  IMAGING STUDIES REPORTS: Imaging studies results reviewed    No valid procedures specified  XR chest 2 views    Result Date: 1/18/2023  Impression No acute cardiopulmonary disease  Workstation performed: [id number]       *-*-*-*-*-*-*-*-*-*-*-*-*-*-*-*-*-*-*-*-*-*-*-*-*-*-*-*-*-*-*-*-*-*-*-*-*-*-*-*-*-*-*-*-*-*-*-*-*-*-*-*-*-*-  AVAILABLE OLD CARDIAC TESTS REPORTS:   Results for orders placed during the hospital encounter of 20    Echo complete with contrast if indicated    Narrative  [address]  [facility] [place], [address] [facility]  [phone]    Transthoracic Echocardiogram  2D, M-mode, Doppler, and Color Doppler    Study date:  2020    Patient: Melina Orr  MR number: [id number]  Account number: [de-identified]  : 1980  Age: 47 years  Gender: Female  Status: Inpatient  Location: Bedside  Height: 61 in  Weight: 286 4 lb  BP: 172/ 97 mmHg    Indications: Coronary artery disease  Diagnoses: I25 119 - Atherosclerotic heart disease of native coronary artery with unspecified angina pectoris    Sonographer:  Jagdeep Whipple RDCS  Referring Physician:  JOHN Joshi  Group:  [id number]  Interpreting Physician:  Isaac Jimenez DO    SUMMARY    SUMMARY:  Images very limited    LEFT VENTRICLE:  Systolic function was at the lower limits of normal by visual assessment  Ejection fraction was estimated to be 50 %  Possible inferior wall hypokinesis  Doppler parameters were consistent with abnormal left ventricular relaxation (grade 1 diastolic dysfunction)  LEFT ATRIUM:  The atrium was mildly dilated  HISTORY: PRIOR HISTORY: Premature ventricular contractions, hypokalemia, tobacco use, sleep apnea, hypertension, chest pain, elevated troponin levels  PROCEDURE: The procedure was performed at the bedside  This was a routine study  The transthoracic approach was used  The study included complete 2D imaging, M-mode, complete spectral Doppler, and color Doppler  The heart rate was 76 bpm,  at the start of the study   Images were obtained from the parasternal, apical, subcostal, and suprasternal notch acoustic windows  Intravenous contrast ( 0 6 ml Definity in nss) was administered  Echocardiographic views were limited due to  restricted patient mobility and decreased penetration  This was a technically difficult study  LEFT VENTRICLE: Size was normal  Systolic function was at the lower limits of normal by visual assessment  Ejection fraction was estimated to be 50 %  Possible inferior wall hypokinesis DOPPLER: Doppler parameters were consistent with abnormal left ventricular relaxation (grade 1 diastolic dysfunction)  RIGHT VENTRICLE: The size was normal     LEFT ATRIUM: The atrium was mildly dilated  RIGHT ATRIUM: Size was normal     MITRAL VALVE: Valve structure was normal     AORTIC VALVE: The valve was not well visualized  TRICUSPID VALVE: Not well visualized  PULMONIC VALVE: Not well visualized  PERICARDIUM: There was no pericardial effusion  The pericardium was normal in appearance  SYSTEM MEASUREMENT TABLES    2D  LAAs A2C: 24 48 cm2  LAAs A4C: 21 9 cm2  LAESV A-L A2C: 94 85 ml  LAESV A-L A4C: 71 15 ml  LAESV Index (A-L): 38 56 ml/m2  LAESV MOD A2C: 91 23 ml  LAESV MOD A4C: 66 04 ml  LAESV(A-L): 84 83 ml  LAESV(MOD BP): 79 99 ml  LALs A2C: 5 36 cm  LALs A4C: 5 72 cm    PW  E': 0 06 m/s  E/E': 11 01  MV A Jun: 0 77 m/s  MV Dec [place]: 2 97 m/s2  MV DecT: 218 26 ms  MV E Jun: 0 64 m/s  MV E/A Ratio: 0 84  MV PHT: 63 29 ms  MVA By PHT: 3 48 cm2    [facility] Accredited Echocardiography Laboratory    Prepared and electronically signed by    Syd Escamilla DO  Signed 26-Jul-2020 11:49:44    No results found for this or any previous visit  No results found for this or any previous visit  No results found for this or any previous visit  *-*-*-*-*-*-*-*-*-*-*-*-*-*-*-*-*-*-*-*-*-*-*-*-*-*-*-*-*-*-*-*-*-*-*-*-*-*-*-*-*-*-*-*-*-*-*-*-*-*-*-*-*-*-  SIGNATURES:   [unfilled]   Raphael Davis MD     CC:   Lucero Louise PA-C   No ref   provider found

## 2023-01-18 NOTE — Clinical Note
Mona Moseley was seen and treated in our emergency department on 1/18/2023  Diagnosis:     Laqueta Pallas    She may return on this date: 01/21/2023         If you have any questions or concerns, please don't hesitate to call        Jose Vela DO    ______________________________           _______________          _______________  Hospital Representative                              Date                                Time

## 2023-02-27 NOTE — ED PROVIDER NOTES
History  Chief Complaint   Patient presents with    Ankle Pain     Has had pain in the left ankle for the past 3 days  Sudha Kaur, 3 days ago, landed on her left knee  33-year-old female who presents for evaluation of left knee and ankle pain x3 days  Patient states approximately 3 days ago her ankle gave out and she tripped and fell landing on her left knee  She describes a gradual onset of pain to her left ankle and left knee which is sharp and throbbing  The pain is worse with ambulation and bearing weight on her left foot  She rates her pain 8/10  She has been taking naproxen for her symptoms  There was no head injury or loss of consciousness  She denies any paresthesias, numbness, tingling, bruising or swelling  No history of injury in the ankle or knee  Prior to Admission Medications   Prescriptions Last Dose Informant Patient Reported? Taking? HYDROCHLOROTHIAZIDE PO   Yes Yes   Sig: Take by mouth   LISINOPRIL PO   Yes Yes   Sig: Take by mouth   metaxalone (SKELAXIN) 800 mg tablet   Yes Yes   Sig: Take by mouth 3 (three) times a day as needed for muscle spasms      Facility-Administered Medications: None       Past Medical History:   Diagnosis Date    Asthma     Hypertension        Past Surgical History:   Procedure Laterality Date    NO PAST SURGERIES         History reviewed  No pertinent family history  I have reviewed and agree with the history as documented  Social History   Substance Use Topics    Smoking status: Current Every Day Smoker     Packs/day: 1 00     Types: Cigarettes    Smokeless tobacco: Never Used    Alcohol use No        Review of Systems   Musculoskeletal: Positive for arthralgias and gait problem  Negative for joint swelling  Skin: Negative for color change and wound  Neurological: Negative for syncope, weakness and numbness         Physical Exam  ED Triage Vitals [05/09/18 1354]   Temperature Pulse Respirations Blood Pressure SpO2   98 4 °F (36 9 °C) Julian Tobias   Neurology followup    Subjective:   CC/HP  History was obtained from the patient. Patient states that he is doing reasonably well. Whenever he gets very anxious and nervous he gets a tightening in his forehead. Patient had a repeat MRI brain done in October 2022. It again showed very minimal tonsillar ectopia and no other abnormalities except for a mucous retention cyst in the left maxillary sinus. When patient increased his topiramate dose he developed significant numbness all over and so he stopped it. Background history:  Patient was referred for evaluation of pain in the occipital region with spread to the front of the head. He also gets somewhat dizzy and lightheaded with this. He states he has had previous vertigo but no vertigo associated with the symptoms. Symptoms started about 2 years ago. Onset was gradual.  Symptoms are slowly progressive and now he gets them almost on a daily basis. Patient states that he was treated with antibiotics and steroids by ENT which seemed to help some for a few months but then the symptoms came back. Patient has had an MRI brain and CT scans done. Patient quit smoking in 2019. No other focal neurological symptoms  Patient snores during sleep. He does not feel rested when he wakes up in the morning and if daytime sleepiness. He wakes up feeling breathless in the middle of the night. REVIEW OF SYSTEMS    Constitutional:  []   Chills   []  Fatigue   []  Fevers   []  Malaise   []  Weight loss     [x] Denies all of the above    Respiratory:   []  Cough    []  Shortness of breath         [x] Denies all of the above     Cardiovascular:   []  Chest pain    []  Exertional chest pressure/discomfort           [] Palpitations    []  Syncope     [x] Denies all of the above        Past Medical History:   Diagnosis Date    Anxiety     Asthma 01/01/2000    late 20's-early 30s.     Cerebellar tonsillar ectopia ([facility] [address] 05/04/2021    Class 1 obesity due to 83 16 (!) 184/103 99 %      Temp Source Heart Rate Source Patient Position - Orthostatic VS BP Location FiO2 (%)   Oral -- Sitting Right arm --      Pain Score       8           Orthostatic Vital Signs  Vitals:    05/09/18 1354   BP: (!) 184/103   Pulse: 83   Patient Position - Orthostatic VS: Sitting       Physical Exam   Constitutional: She is oriented to person, place, and time  She appears well-developed and well-nourished  Non-toxic appearance  She does not have a sickly appearance  She does not appear ill  No distress  Obese female   HENT:   Head: Normocephalic and atraumatic  Right Ear: External ear normal    Left Ear: External ear normal    Nose: Nose normal    Mouth/Throat: Oropharynx is clear and moist    Eyes: Conjunctivae and EOM are normal  Pupils are equal, round, and reactive to light  Neck: Normal range of motion  Neck supple  Cardiovascular: Normal rate, regular rhythm and normal heart sounds  Exam reveals no gallop and no friction rub  No murmur heard  Pulmonary/Chest: Effort normal and breath sounds normal  No respiratory distress  She has no decreased breath sounds  She has no wheezes  She has no rhonchi  She has no rales  Musculoskeletal: Normal range of motion  Left knee: She exhibits normal range of motion, no swelling, no effusion, no ecchymosis, no deformity, no laceration, no erythema, normal alignment, no LCL laxity, normal patellar mobility and no bony tenderness  Tenderness found  Medial joint line tenderness noted  No lateral joint line and no patellar tendon tenderness noted  Left ankle: She exhibits normal range of motion, no swelling, no ecchymosis, no deformity, no laceration and normal pulse  No tenderness  No lateral malleolus, no medial malleolus, no head of 5th metatarsal and no proximal fibula tenderness found  Achilles tendon exhibits pain  Achilles tendon exhibits no defect          Legs:       Feet:    Neurological: She is alert and oriented to excess calories without serious comorbidity with body mass index (BMI) of 31.0 to 31.9 in adult 09/14/2020    Closed fracture of head of fifth metacarpal     Boxer's fracture    Depression 2015    Former smoker 05/2019    started age 18, quit age 54    Fracture of finger of right hand     And knuckle    GI bleed 06/10/2019    History of blood transfusion     new this admission 6/10/19    Hypertension     Iron deficiency anemia due to chronic blood loss 10/31/2019    DUE TO GI BLEED/ULCERS    Lumbar disc disease 01/01/1994    L4-5 proteuding disc    Peptic ulcer 01/01/2015    EGD 3/2/2020: Distal circumferentialesophageal ulcer and distal Parra's. Gastric body and antrum gastritis    Seasonal allergic rhinitis     Sleep apnea 2023    Tinnitus of both ears 01/01/2017    Loss of hearing in left. Ulcer of esophagus without bleeding 05/05/2020     Family History   Problem Relation Age of Onset    Other Mother         gall bladder    Allergies Mother     Depression Mother         off meds x 10 years    Heart Attack Father 52        x3-4    Coronary Art Dis Father     Alcohol Abuse Father     Allergies Father     Other Brother         congenital calcium deposits n bones, COVID-19@ 48. Allergies Brother     Hearing Loss Brother         congenital    Allergy (Severe) Brother     No Known Problems Brother         in a wreck as teen    Cancer Maternal Grandmother     Diabetes type 2  Maternal Grandmother     Cancer Maternal Grandfather     Asthma Paternal Grandfather     No Known Problems Son     No Known Problems Son     Cancer Maternal Aunt         brain    Other Maternal Aunt         lung issues    Other Maternal Uncle         carotid stenosis    Heart Attack Paternal Aunt 46    Coronary Art Dis Paternal Aunt     Cancer Paternal Aunt         ? lung?     Lung Cancer Paternal Aunt     Lung Cancer Paternal Aunt     Lung Cancer Paternal Uncle      Social History     Socioeconomic History    Marital status: Single Spouse name: None    Number of children: 2    Years of education: 12    Highest education level: High school graduate   Occupational History    Occupation: FACTORY IN PAST     Comment: unemployed 11/2018   Tobacco Use    Smoking status: Former     Packs/day: 1.00     Years: 30.00     Pack years: 30.00     Types: Cigarettes     Start date: 1/1/1988     Quit date: 5/1/2019     Years since quitting: 3.8    Smokeless tobacco: Former     Types: Snuff    Tobacco comments:     started to smoke at 17 / smoked up to 1.5 ppd / smoked for 35 yrs on and off / 1 PPD (QUIT 4219-4302)   Vaping Use    Vaping Use: Never used   Substance and Sexual Activity    Alcohol use: No     Comment: rare alcohol    Drug use: Not Currently     Types: Marijuana Debra Morris)     Comment: was daily. None since 9/2018    Sexual activity: Yes     Partners: Female     Comment: I have recently become sexually active again   Social History Narrative    Exercise - too light headed. 3/26/19. None. 1/15/20. Walks every am about 1.5 mi 7 days/wk. 4/14/20 2.3 mi every day. 5/4/20. Walks 2 mi/day. 7/15/20. Was having anxiety attacks while walking his dog so stopped for 2 months. 10/6/20. Walking 1.5 mi 7 day/wk. 8/24/21. 1.5-1.75 mi/day x7 day/wk. 12/7/21. Also Does 90 min 7 days core body. 3/8/22. Walks 2 mi/day 7 day/wk. 7/11/22.10/17/22.1/16/23. None x 2 wks since death of dog.2/22/23.      Social Determinants of Health     Financial Resource Strain: Unknown    Difficulty of Paying Living Expenses: Patient refused   Food Insecurity: No Food Insecurity    Worried About Running Out of Food in the Last Year: Never true    Ran Out of Food in the Last Year: Never true   Transportation Needs: Unknown    Lack of Transportation (Medical): Patient refused    Lack of Transportation (Non-Medical): No   Housing Stability: Unknown    Unstable Housing in the Last Year: No        Objective:  Exam:  BP (!) 153/93   Pulse (!) 109   Ht 6' (1.829 m)   Wt 220 lb (99.8 kg)   BMI 29.84 person, place, and time  Skin: Skin is warm and dry  Capillary refill takes less than 2 seconds  She is not diaphoretic  Psychiatric: She has a normal mood and affect  Nursing note and vitals reviewed  ED Medications  Medications   acetaminophen (TYLENOL) tablet 650 mg (650 mg Oral Given 5/9/18 1411)       Diagnostic Studies  Results Reviewed     None                 XR knee 4+ vw left injury   ED Interpretation by Susie Saucedo PA-C (05/09 8421)   NO FX      Final Result by Leon Kearney MD (05/09 8440)      No acute osseous abnormality  Workstation performed: [id number]         XR ankle 3+ views LEFT   ED Interpretation by Susie Saucedo PA-C (05/09 2871)   NO FX      Final Result by Leon Kearney MD (05/09 5572)      No acute osseous abnormality  Workstation performed: [id number]                    Procedures  Procedures       Phone Contacts  ED Phone Contact    ED Course                               MDM  Number of Diagnoses or Management Options  Acute left ankle pain: new and requires workup  Acute pain of left knee: new and requires workup  Fall, initial encounter:   Diagnosis management comments:   42 yo F presents with left knee and ankle pain after fall  Xray no fracture  Ace wrap applied to left ankle  Discharge instructions included recommendations to rest, apply ice, take NSAIDs for pain  F/u with PCP or ortho  Amount and/or Complexity of Data Reviewed  Tests in the radiology section of CPT®: reviewed  Independent visualization of images, tracings, or specimens: yes    Patient Progress  Patient progress: stable    CritCare Time    Disposition  Final diagnoses:   Fall, initial encounter   Acute pain of left knee   Acute left ankle pain     Time reflects when diagnosis was documented in both MDM as applicable and the Disposition within this note     Time User Action Codes Description Comment    5/9/2018  3:42 PM Raoul Valverde Add Thierno Reagan  XXXA] Fall, initial encounter     5/9/2018  3:42 PM Francisco Valverde Add [M25 562] Acute pain of left knee     5/9/2018  3:42 PM Jennifer Valverde Add [M25 572] Acute left ankle pain       ED Disposition     ED Disposition Condition Comment    Discharge  Teresa Orozco discharge to home/self care  Condition at discharge: Good        Follow-up Information     Follow up With Specialties Details Why Contact Info Additional Information    Aftab Juares MD Family Medicine Schedule an appointment as soon as possible for a visit  [address]  [address] [address]  [phone]       [address] Orthopedic Surgery Schedule an appointment as soon as possible for a visit If symptoms worsen [facility] [id number] [address] [facility] Emergency Department Emergency Medicine  If symptoms worsen- FALL, injury [address] [facility]  [phone] [address] [place] [address], [address] [place], [place], [address]        Discharge Medication List as of 5/9/2018  3:46 PM      CONTINUE these medications which have NOT CHANGED    Details   HYDROCHLOROTHIAZIDE PO Take by mouth, Historical Med      LISINOPRIL PO Take by mouth, Historical Med      metaxalone (SKELAXIN) 800 mg tablet Take by mouth 3 (three) times a day as needed for muscle spasms, Historical Med           No discharge procedures on file      ED Provider  Electronically Signed by           Johnny Bassett PA-C  05/09/18 4220       Patricia Valverde PA-C  05/09/18 6809 kg/m²   This is a well-nourished patient in no acute distress  Patient is awake, alert and oriented x3. Speech is normal.  Pupils are equal round reacting to light. Extraocular movements intact. Face symmetrical. Tongue midline. Motor exam shows normal symmetrical strength. Deep tendon reflexes normal. Plantar reflexes downgoing. Sensory exam normal. Coordination normal. Gait normal. No carotid bruit. No neck stiffness. Data :  LABS:  General Labs:  CBC:   Lab Results   Component Value Date/Time    WBC 8.8 03/08/2022 08:53 AM    RBC 5.37 03/08/2022 08:53 AM    HGB 15.7 03/08/2022 08:53 AM    HCT 47.1 03/08/2022 08:53 AM    MCV 87.6 03/08/2022 08:53 AM    MCH 29.2 03/08/2022 08:53 AM    MCHC 33.4 03/08/2022 08:53 AM    RDW 13.7 03/08/2022 08:53 AM     03/08/2022 08:53 AM    MPV 8.4 03/08/2022 08:53 AM     BMP:    Lab Results   Component Value Date/Time     03/08/2022 08:53 AM    K 5.2 03/08/2022 08:53 AM    K 3.9 01/07/2020 07:34 AM     03/08/2022 08:53 AM    CO2 24 03/08/2022 08:53 AM    BUN 18 03/08/2022 08:53 AM    LABALBU 4.8 03/08/2022 08:53 AM    CREATININE 1.0 03/08/2022 08:53 AM    CALCIUM 10.0 03/08/2022 08:53 AM    GFRAA >60 03/08/2022 08:53 AM    LABGLOM >60 03/08/2022 08:53 AM    GLUCOSE 97 03/08/2022 08:53 AM     RADIOLOGY REVIEW: MRI brain    Impression :  Mild tonsillar ectopia, probably asymptomatic  Probable obstructive sleep apnea  Migraine headaches, improved  Patient unable to tolerate topiramate because of numbness    Plan :  Discussed with patient at length  Repeat MRI brain again showed minimal tonsillar ectopia. Since he is doing reasonably well from a neurological standpoint I will see him only on a as needed basis. Please note a portion of  this chart was generated using dragon dictation software. Although every effort was made to ensure the accuracy of this automated transcription, some errors in transcription may have occurred.

## 2023-03-23 ENCOUNTER — TELEPHONE (OUTPATIENT)
Dept: FAMILY MEDICINE CLINIC | Facility: CLINIC | Age: 43
End: 2023-03-23

## 2023-03-23 NOTE — TELEPHONE ENCOUNTER
I called and spoke with the pt and scheduled her for a physical but she would like to know if she needs another MMR vaccine she stated her job ir requesting it and she has 2 weeks to get it if she needs it

## 2023-04-26 ENCOUNTER — HOSPITAL ENCOUNTER (EMERGENCY)
Facility: HOSPITAL | Age: 43
Discharge: HOME | End: 2023-04-26
Attending: EMERGENCY MEDICINE | Admitting: EMERGENCY MEDICINE

## 2023-04-26 VITALS
WEIGHT: 279.9 LBS | BODY MASS INDEX: 54.66 KG/M2 | TEMPERATURE: 99 F | SYSTOLIC BLOOD PRESSURE: 189 MMHG | DIASTOLIC BLOOD PRESSURE: 110 MMHG | RESPIRATION RATE: 20 BRPM | HEART RATE: 96 BPM | OXYGEN SATURATION: 99 %

## 2023-04-26 DIAGNOSIS — I10 HYPERTENSION: ICD-10-CM

## 2023-04-26 DIAGNOSIS — J45.901 ASTHMA EXACERBATION: Primary | ICD-10-CM

## 2023-04-26 DIAGNOSIS — J45.909 ASTHMA: ICD-10-CM

## 2023-04-26 RX ORDER — ALBUTEROL SULFATE 90 UG/1
2 AEROSOL, METERED RESPIRATORY (INHALATION) EVERY 4 HOURS PRN
Qty: 6.7 G | Refills: 0 | Status: SHIPPED | OUTPATIENT
Start: 2023-04-26

## 2023-04-26 RX ORDER — LORATADINE 10 MG/1
10 TABLET ORAL DAILY
Qty: 30 TABLET | Refills: 2 | Status: SHIPPED | OUTPATIENT
Start: 2023-04-26

## 2023-04-26 RX ORDER — ALBUTEROL SULFATE 2.5 MG/3ML
2.5 SOLUTION RESPIRATORY (INHALATION) ONCE
Status: COMPLETED | OUTPATIENT
Start: 2023-04-26 | End: 2023-04-26

## 2023-04-26 RX ORDER — PREDNISONE 20 MG/1
60 TABLET ORAL ONCE
Status: COMPLETED | OUTPATIENT
Start: 2023-04-26 | End: 2023-04-26

## 2023-04-26 RX ORDER — ALBUTEROL SULFATE 2.5 MG/3ML
2.5 SOLUTION RESPIRATORY (INHALATION) EVERY 6 HOURS PRN
Qty: 75 ML | Refills: 2 | Status: SHIPPED | OUTPATIENT
Start: 2023-04-26

## 2023-04-26 RX ORDER — PREDNISONE 20 MG/1
60 TABLET ORAL DAILY
Qty: 15 TABLET | Refills: 0 | Status: SHIPPED | OUTPATIENT
Start: 2023-04-26

## 2023-04-26 RX ADMIN — IPRATROPIUM BROMIDE 0.5 MG: 0.5 SOLUTION RESPIRATORY (INHALATION) at 20:30

## 2023-04-26 RX ADMIN — ALBUTEROL SULFATE 2.5 MG: 2.5 SOLUTION RESPIRATORY (INHALATION) at 21:07

## 2023-04-26 RX ADMIN — PREDNISONE 60 MG: 20 TABLET ORAL at 20:30

## 2023-04-26 RX ADMIN — ALBUTEROL SULFATE 2.5 MG: 2.5 SOLUTION RESPIRATORY (INHALATION) at 20:29

## 2023-04-26 NOTE — Clinical Note
Juan Luis Norris was seen and treated in our emergency department on 4/26/2023  Diagnosis:     Sapphire Mota  may return to work on return date  She may return on this date: 04/28/2023         If you have any questions or concerns, please don't hesitate to call        Dayna Santos MD    ______________________________           _______________          _______________  Hospital Representative                              Date                                Time

## 2023-04-27 NOTE — ED PROVIDER NOTES
"History  Chief Complaint   Patient presents with   • Asthma     Pt reports that her asthma started \"acting up\" a few days ago, but got worse today  Reports she used both her nebulizer and inhaler \"and I ran out of everything today  \"     Patient is a 56-year-old female with a history of asthma who presents with a 3 day h/o asthma exacerbation  Bad time of year for her  Last exacerbation 4 months ago  No intubations/hospitalizations  +nonproductive cough  No fever  Still smoking but down to 6-7 cigs/day  Prior to Admission Medications   Prescriptions Last Dose Informant Patient Reported? Taking?    albuterol (2 5 mg/3 mL) 0 083 % nebulizer solution 2023  No Yes   Sig: Take 1 vial (2 5 mg total) by nebulization every 6 (six) hours as needed for wheezing or shortness of breath   albuterol (2 5 mg/3 mL) 0 083 % nebulizer solution   No Yes   Sig: Take 3 mL (2 5 mg total) by nebulization every 6 (six) hours as needed for wheezing or shortness of breath   albuterol (PROVENTIL HFA,VENTOLIN HFA) 90 mcg/act inhaler 2023  No Yes   Sig: Inhale 2 puffs every 4 (four) hours as needed for wheezing   albuterol (PROVENTIL HFA,VENTOLIN HFA) 90 mcg/act inhaler   No Yes   Sig: Inhale 2 puffs every 4 (four) hours as needed for wheezing   fluticasone (FLONASE) 50 mcg/act nasal spray Not Taking  No No   Si spray into each nostril daily   Patient not taking: Reported on 2019   loratadine (CLARITIN) 10 mg tablet   No No   Sig: Take 1 tablet (10 mg total) by mouth daily   loratadine (CLARITIN) 10 mg tablet   No Yes   Sig: Take 1 tablet (10 mg total) by mouth daily   losartan-hydrochlorothiazide (HYZAAR) 50-12 5 mg per tablet   No No   Sig: Take 1 tablet by mouth daily      Facility-Administered Medications: None       Past Medical History:   Diagnosis Date   • Asthma    • Hypertension    • MI, acute, non ST segment elevation (HCC)        Past Surgical History:   Procedure Laterality Date   • NO PAST " "SURGERIES         History reviewed  No pertinent family history  I have reviewed and agree with the history as documented  E-Cigarette/Vaping   • E-Cigarette Use Never User      E-Cigarette/Vaping Substances     Social History     Tobacco Use   • Smoking status: Every Day     Packs/day: 1 00     Types: Cigarettes   • Smokeless tobacco: Never   • Tobacco comments:     \" not for past couple days\"  Vaping Use   • Vaping Use: Never used   Substance Use Topics   • Alcohol use: Not Currently   • Drug use: No       Review of Systems   Constitutional: Negative  HENT: Negative  Eyes: Negative  Respiratory: Positive for cough, shortness of breath and wheezing  Cardiovascular: Negative  Gastrointestinal: Negative  Endocrine: Negative  Genitourinary: Negative  Musculoskeletal: Negative  Skin: Negative  Allergic/Immunologic: Negative  Neurological: Negative  Hematological: Negative  Psychiatric/Behavioral: Negative  All other systems reviewed and are negative  Physical Exam  Physical Exam  Vitals and nursing note reviewed  Constitutional:       Appearance: Normal appearance  She is normal weight  HENT:      Head: Normocephalic and atraumatic  Nose: Nose normal       Mouth/Throat:      Mouth: Mucous membranes are moist       Pharynx: Oropharynx is clear  Cardiovascular:      Rate and Rhythm: Normal rate and regular rhythm  Pulses: Normal pulses  Heart sounds: Normal heart sounds  Pulmonary:      Breath sounds: No stridor  Wheezing present  No rhonchi or rales  Chest:      Chest wall: No tenderness  Abdominal:      General: Bowel sounds are normal    Musculoskeletal:         General: Normal range of motion  Cervical back: Normal range of motion and neck supple  Skin:     General: Skin is warm and dry  Capillary Refill: Capillary refill takes less than 2 seconds  Neurological:      General: No focal deficit present        Mental Status: " She is alert and oriented to person, place, and time  Psychiatric:         Mood and Affect: Mood normal          Behavior: Behavior normal          Vital Signs  ED Triage Vitals   Temperature Pulse Respirations Blood Pressure SpO2   04/26/23 2024 04/26/23 2024 04/26/23 2024 04/26/23 2024 04/26/23 2024   99 °F (37 2 °C) (!) 106 (!) 24 (!) 211/112 94 %      Temp Source Heart Rate Source Patient Position - Orthostatic VS BP Location FiO2 (%)   04/26/23 2024 04/26/23 2024 -- 04/26/23 2124 --   Oral Monitor  Left arm       Pain Score       --                  Vitals:    04/26/23 2024 04/26/23 2035 04/26/23 2124   BP: (!) 211/112  (!) 189/110   Pulse: (!) 106 95 96         Visual Acuity      ED Medications  Medications   predniSONE tablet 60 mg (60 mg Oral Given 4/26/23 2030)   albuterol inhalation solution 2 5 mg (2 5 mg Nebulization Given 4/26/23 2029)   ipratropium (ATROVENT) 0 02 % inhalation solution 0 5 mg (0 5 mg Nebulization Given 4/26/23 2030)   albuterol inhalation solution 2 5 mg (2 5 mg Nebulization Given 4/26/23 2107)       Diagnostic Studies  Results Reviewed     None                 No orders to display              Procedures  Procedures         ED Course  ED Course as of 04/26/23 2135 Wed Apr 26, 2023 2059 Scant wheezing after neb  Asking for additional neb  Will order  2131 Lungs clear post second neb  Will dc  SBIRT 20yo+    Flowsheet Row Most Recent Value   Initial Alcohol Screen: US AUDIT-C     1  How often do you have a drink containing alcohol? 0 Filed at: 04/26/2023 2024   2  How many drinks containing alcohol do you have on a typical day you are drinking? 0 Filed at: 04/26/2023 2024   3a  Male UNDER 65: How often do you have five or more drinks on one occasion? 0 Filed at: 04/26/2023 2024   3b  FEMALE Any Age, or MALE 65+: How often do you have 4 or more drinks on one occassion?  0 Filed at: 04/26/2023 2024   Audit-C Score 0 Filed at: 04/26/2023 2024 VANCE: How many times in the past year have you    Used an illegal drug or used a prescription medication for non-medical reasons? Never Filed at: 04/26/2023 2024                    Medical Decision Making  Asthma exacerbation: acute illness or injury     Details: improved with nebs x 2   no respiratory issues at discharge  meds refilled  Risk  OTC drugs  Prescription drug management  Disposition  Final diagnoses:   Asthma exacerbation   Hypertension   Asthma     Time reflects when diagnosis was documented in both MDM as applicable and the Disposition within this note     Time User Action Codes Description Comment    4/26/2023  9:31 PM Jobie Chelan Add [K88 934] Asthma exacerbation     4/26/2023  9:32 PM Jobie Chelan Add [I10] Hypertension     4/26/2023  9:32 PM Jobie Sherry Add [B48 [id number]] Asthma       ED Disposition     ED Disposition   Discharge    Condition   Stable    Date/Time   Wed Apr 26, 2023  9:31 PM    Comment   Luis Valdovinos discharge to home/self care  Follow-up Information     Follow up With Specialties Details Why [address] [facility], [address] [place] [address]  [address] [facility]  Anuj Bruno   [address]  [facility] [address]             Patient's Medications   Discharge Prescriptions    PREDNISONE 20 MG TABLET    Take 3 tablets (60 mg total) by mouth daily       Start Date: 4/26/2023 End Date: --       Order Dose: 60 mg       Quantity: 15 tablet    Refills: 0       No discharge procedures on file      PDMP Review     None          ED Provider  Electronically Signed by           George Monique MD  04/26/23 2498

## 2023-05-14 ENCOUNTER — HOSPITAL ENCOUNTER (OUTPATIENT)
Facility: HOSPITAL | Age: 43
Setting detail: OBSERVATION
Discharge: HOME | End: 2023-05-16
Attending: EMERGENCY MEDICINE | Admitting: INTERNAL MEDICINE

## 2023-05-14 ENCOUNTER — APPOINTMENT (EMERGENCY)
Dept: RADIOLOGY | Facility: HOSPITAL | Age: 43
End: 2023-05-14

## 2023-05-14 DIAGNOSIS — E87.6 HYPOKALEMIA: ICD-10-CM

## 2023-05-14 DIAGNOSIS — I47.1 SVT (SUPRAVENTRICULAR TACHYCARDIA) (HCC): ICD-10-CM

## 2023-05-14 DIAGNOSIS — R07.9 CHEST PAIN: Primary | ICD-10-CM

## 2023-05-14 DIAGNOSIS — R77.8 ELEVATED TROPONIN: ICD-10-CM

## 2023-05-14 DIAGNOSIS — I42.9 CARDIOMYOPATHY, UNSPECIFIED TYPE (HCC): ICD-10-CM

## 2023-05-14 DIAGNOSIS — I16.0 HYPERTENSIVE URGENCY: ICD-10-CM

## 2023-05-14 PROBLEM — M54.9 BACK PAIN: Status: ACTIVE | Noted: 2023-05-14

## 2023-05-14 LAB
2HR DELTA HS TROPONIN: 4 NG/L
4HR DELTA HS TROPONIN: -8 NG/L
ALBUMIN SERPL BCP-MCNC: 4.3 G/DL (ref 3.5–5)
ALP SERPL-CCNC: 57 U/L (ref 34–104)
ALT SERPL W P-5'-P-CCNC: 18 U/L (ref 7–52)
ANION GAP SERPL CALCULATED.3IONS-SCNC: 7 MMOL/L (ref 4–13)
AST SERPL W P-5'-P-CCNC: 23 U/L (ref 13–39)
ATRIAL RATE: 77 BPM
ATRIAL RATE: 85 BPM
ATRIAL RATE: 89 BPM
BASOPHILS # BLD AUTO: 0.05 THOUSANDS/ÂΜL (ref 0–0.1)
BASOPHILS NFR BLD AUTO: 1 % (ref 0–1)
BILIRUB DIRECT SERPL-MCNC: 0.11 MG/DL (ref 0–0.2)
BILIRUB SERPL-MCNC: 0.58 MG/DL (ref 0.2–1)
BUN SERPL-MCNC: 8 MG/DL (ref 5–25)
CALCIUM SERPL-MCNC: 9.3 MG/DL (ref 8.4–10.2)
CARDIAC TROPONIN I PNL SERPL HS: 65 NG/L
CARDIAC TROPONIN I PNL SERPL HS: 73 NG/L
CARDIAC TROPONIN I PNL SERPL HS: 77 NG/L
CHLORIDE SERPL-SCNC: 103 MMOL/L (ref 96–108)
CO2 SERPL-SCNC: 27 MMOL/L (ref 21–32)
CREAT SERPL-MCNC: 0.75 MG/DL (ref 0.6–1.3)
EOSINOPHIL # BLD AUTO: 0.19 THOUSAND/ÂΜL (ref 0–0.61)
EOSINOPHIL NFR BLD AUTO: 2 % (ref 0–6)
ERYTHROCYTE [DISTWIDTH] IN BLOOD BY AUTOMATED COUNT: 16.9 % (ref 11.6–15.1)
GFR SERPL CREATININE-BSD FRML MDRD: 99 ML/MIN/1.73SQ M
GLUCOSE SERPL-MCNC: 108 MG/DL (ref 65–140)
HCT VFR BLD AUTO: 36.7 % (ref 34.8–46.1)
HGB BLD-MCNC: 11.6 G/DL (ref 11.5–15.4)
IMM GRANULOCYTES # BLD AUTO: 0.06 THOUSAND/UL (ref 0–0.2)
IMM GRANULOCYTES NFR BLD AUTO: 1 % (ref 0–2)
LIPASE SERPL-CCNC: 12 U/L (ref 11–82)
LYMPHOCYTES # BLD AUTO: 2.89 THOUSANDS/ÂΜL (ref 0.6–4.47)
LYMPHOCYTES NFR BLD AUTO: 30 % (ref 14–44)
MAGNESIUM SERPL-MCNC: 1.8 MG/DL (ref 1.9–2.7)
MCH RBC QN AUTO: 26.4 PG (ref 26.8–34.3)
MCHC RBC AUTO-ENTMCNC: 31.6 G/DL (ref 31.4–37.4)
MCV RBC AUTO: 84 FL (ref 82–98)
MONOCYTES # BLD AUTO: 0.74 THOUSAND/ÂΜL (ref 0.17–1.22)
MONOCYTES NFR BLD AUTO: 8 % (ref 4–12)
NEUTROPHILS # BLD AUTO: 5.74 THOUSANDS/ÂΜL (ref 1.85–7.62)
NEUTS SEG NFR BLD AUTO: 58 % (ref 43–75)
NRBC BLD AUTO-RTO: 0 /100 WBCS
P AXIS: 67 DEGREES
P AXIS: 69 DEGREES
P AXIS: 70 DEGREES
PLATELET # BLD AUTO: 350 THOUSANDS/UL (ref 149–390)
PMV BLD AUTO: 9.1 FL (ref 8.9–12.7)
POTASSIUM SERPL-SCNC: 3.1 MMOL/L (ref 3.5–5.3)
PR INTERVAL: 152 MS
PR INTERVAL: 154 MS
PR INTERVAL: 158 MS
PROT SERPL-MCNC: 7.6 G/DL (ref 6.4–8.4)
QRS AXIS: 66 DEGREES
QRS AXIS: 77 DEGREES
QRS AXIS: 99 DEGREES
QRSD INTERVAL: 78 MS
QRSD INTERVAL: 80 MS
QRSD INTERVAL: 90 MS
QT INTERVAL: 342 MS
QT INTERVAL: 366 MS
QT INTERVAL: 382 MS
QTC INTERVAL: 414 MS
QTC INTERVAL: 416 MS
QTC INTERVAL: 454 MS
RBC # BLD AUTO: 4.39 MILLION/UL (ref 3.81–5.12)
SODIUM SERPL-SCNC: 137 MMOL/L (ref 135–147)
T WAVE AXIS: -41 DEGREES
T WAVE AXIS: -74 DEGREES
T WAVE AXIS: 250 DEGREES
VENTRICULAR RATE: 77 BPM
VENTRICULAR RATE: 85 BPM
VENTRICULAR RATE: 89 BPM
WBC # BLD AUTO: 9.67 THOUSAND/UL (ref 4.31–10.16)

## 2023-05-14 RX ORDER — ASPIRIN 81 MG/1
324 TABLET, CHEWABLE ORAL ONCE
Status: COMPLETED | OUTPATIENT
Start: 2023-05-14 | End: 2023-05-14

## 2023-05-14 RX ORDER — ALBUTEROL SULFATE 2.5 MG/3ML
2.5 SOLUTION RESPIRATORY (INHALATION) EVERY 6 HOURS PRN
Status: DISCONTINUED | OUTPATIENT
Start: 2023-05-14 | End: 2023-05-16 | Stop reason: HOSPADM

## 2023-05-14 RX ORDER — MAGNESIUM SULFATE HEPTAHYDRATE 40 MG/ML
2 INJECTION, SOLUTION INTRAVENOUS ONCE
Status: COMPLETED | OUTPATIENT
Start: 2023-05-14 | End: 2023-05-14

## 2023-05-14 RX ORDER — ACETAMINOPHEN 325 MG/1
975 TABLET ORAL EVERY 8 HOURS SCHEDULED
Status: DISCONTINUED | OUTPATIENT
Start: 2023-05-14 | End: 2023-05-16 | Stop reason: HOSPADM

## 2023-05-14 RX ORDER — ONDANSETRON 2 MG/ML
4 INJECTION INTRAMUSCULAR; INTRAVENOUS EVERY 6 HOURS PRN
Status: DISCONTINUED | OUTPATIENT
Start: 2023-05-14 | End: 2023-05-16 | Stop reason: HOSPADM

## 2023-05-14 RX ORDER — ENOXAPARIN SODIUM 100 MG/ML
40 INJECTION SUBCUTANEOUS DAILY
Status: DISCONTINUED | OUTPATIENT
Start: 2023-05-15 | End: 2023-05-16 | Stop reason: HOSPADM

## 2023-05-14 RX ORDER — LABETALOL HYDROCHLORIDE 5 MG/ML
10 INJECTION, SOLUTION INTRAVENOUS ONCE
Status: COMPLETED | OUTPATIENT
Start: 2023-05-14 | End: 2023-05-14

## 2023-05-14 RX ORDER — OXYCODONE HYDROCHLORIDE 5 MG/1
5 TABLET ORAL EVERY 6 HOURS PRN
Status: DISCONTINUED | OUTPATIENT
Start: 2023-05-14 | End: 2023-05-16 | Stop reason: HOSPADM

## 2023-05-14 RX ORDER — LABETALOL HYDROCHLORIDE 5 MG/ML
10 INJECTION, SOLUTION INTRAVENOUS EVERY 4 HOURS PRN
Status: DISCONTINUED | OUTPATIENT
Start: 2023-05-14 | End: 2023-05-16 | Stop reason: HOSPADM

## 2023-05-14 RX ORDER — MUSCLE RUB CREAM 100; 150 MG/G; MG/G
1 CREAM TOPICAL 4 TIMES DAILY PRN
Status: DISCONTINUED | OUTPATIENT
Start: 2023-05-14 | End: 2023-05-16 | Stop reason: HOSPADM

## 2023-05-14 RX ORDER — KETOROLAC TROMETHAMINE 30 MG/ML
15 INJECTION, SOLUTION INTRAMUSCULAR; INTRAVENOUS ONCE
Status: COMPLETED | OUTPATIENT
Start: 2023-05-14 | End: 2023-05-14

## 2023-05-14 RX ORDER — POTASSIUM CHLORIDE 20 MEQ/1
20 TABLET, EXTENDED RELEASE ORAL ONCE
Status: COMPLETED | OUTPATIENT
Start: 2023-05-14 | End: 2023-05-14

## 2023-05-14 RX ORDER — MAGNESIUM HYDROXIDE/ALUMINUM HYDROXICE/SIMETHICONE 120; 1200; 1200 MG/30ML; MG/30ML; MG/30ML
30 SUSPENSION ORAL EVERY 6 HOURS PRN
Status: DISCONTINUED | OUTPATIENT
Start: 2023-05-14 | End: 2023-05-16 | Stop reason: HOSPADM

## 2023-05-14 RX ORDER — NICOTINE 21 MG/24HR
1 PATCH, TRANSDERMAL 24 HOURS TRANSDERMAL DAILY
Status: DISCONTINUED | OUTPATIENT
Start: 2023-05-15 | End: 2023-05-16 | Stop reason: HOSPADM

## 2023-05-14 RX ORDER — HYDROCHLOROTHIAZIDE 12.5 MG/1
12.5 TABLET ORAL DAILY
Status: DISCONTINUED | OUTPATIENT
Start: 2023-05-14 | End: 2023-05-15

## 2023-05-14 RX ORDER — LOSARTAN POTASSIUM 50 MG/1
50 TABLET ORAL ONCE
Status: COMPLETED | OUTPATIENT
Start: 2023-05-15 | End: 2023-05-15

## 2023-05-14 RX ORDER — POTASSIUM CHLORIDE 20 MEQ/1
40 TABLET, EXTENDED RELEASE ORAL ONCE
Status: COMPLETED | OUTPATIENT
Start: 2023-05-14 | End: 2023-05-14

## 2023-05-14 RX ORDER — LOSARTAN POTASSIUM 50 MG/1
50 TABLET ORAL ONCE
Status: COMPLETED | OUTPATIENT
Start: 2023-05-14 | End: 2023-05-14

## 2023-05-14 RX ORDER — LORATADINE 10 MG/1
10 TABLET ORAL DAILY
Status: DISCONTINUED | OUTPATIENT
Start: 2023-05-15 | End: 2023-05-16 | Stop reason: HOSPADM

## 2023-05-14 RX ADMIN — LABETALOL HYDROCHLORIDE 10 MG: 5 INJECTION, SOLUTION INTRAVENOUS at 16:33

## 2023-05-14 RX ADMIN — OXYCODONE 5 MG: 5 TABLET ORAL at 18:38

## 2023-05-14 RX ADMIN — HYDROCHLOROTHIAZIDE 12.5 MG: 12.5 TABLET ORAL at 15:32

## 2023-05-14 RX ADMIN — ASPIRIN 324 MG: 81 TABLET, CHEWABLE ORAL at 17:22

## 2023-05-14 RX ADMIN — ACETAMINOPHEN 325MG 975 MG: 325 TABLET ORAL at 21:10

## 2023-05-14 RX ADMIN — POTASSIUM CHLORIDE 20 MEQ: 1500 TABLET, EXTENDED RELEASE ORAL at 18:38

## 2023-05-14 RX ADMIN — LOSARTAN POTASSIUM 50 MG: 50 TABLET, FILM COATED ORAL at 15:32

## 2023-05-14 RX ADMIN — POTASSIUM CHLORIDE 40 MEQ: 1500 TABLET, EXTENDED RELEASE ORAL at 16:33

## 2023-05-14 RX ADMIN — MAGNESIUM SULFATE HEPTAHYDRATE 2 G: 40 INJECTION, SOLUTION INTRAVENOUS at 18:38

## 2023-05-14 RX ADMIN — KETOROLAC TROMETHAMINE 15 MG: 30 INJECTION, SOLUTION INTRAMUSCULAR; INTRAVENOUS at 15:33

## 2023-05-14 RX ADMIN — LABETALOL HYDROCHLORIDE 10 MG: 5 INJECTION, SOLUTION INTRAVENOUS at 19:53

## 2023-05-14 NOTE — ED PROVIDER NOTES
History  Chief Complaint   Patient presents with   • Chest Pain     Pt arrived via ems with c/o left side chest pain and headache x4 days  Hx of MI reports symptoms feeling the same  42 YO female presents with Left sided chest pain for the last 4 days  Patient states pain has been constant, aching, radiating to the jaw and to the Left arm  She states the pain is improved with raising the Left arm  Patient states she had similar pain when she had an MI  She denies shortness of breath, no nausea or lightheadedness  She has been taking acetaminophen without relief  Patient does smoke cigarettes  Pt denies SOB/F/C/N/V/D/C, no dysuria, burning on urination or blood in urine  History provided by:  Patient   used: No        Prior to Admission Medications   Prescriptions Last Dose Informant Patient Reported? Taking? albuterol (2 5 mg/3 mL) 0 083 % nebulizer solution   No Yes   Sig: Take 3 mL (2 5 mg total) by nebulization every 6 (six) hours as needed for wheezing or shortness of breath   albuterol (PROVENTIL HFA,VENTOLIN HFA) 90 mcg/act inhaler   No Yes   Sig: Inhale 2 puffs every 4 (four) hours as needed for wheezing   loratadine (CLARITIN) 10 mg tablet   No Yes   Sig: Take 1 tablet (10 mg total) by mouth daily   losartan-hydrochlorothiazide (HYZAAR) 50-12 5 mg per tablet   No Yes   Sig: Take 1 tablet by mouth daily      Facility-Administered Medications: None       Past Medical History:   Diagnosis Date   • Asthma    • Hypertension    • NSTEMI (non-ST elevated myocardial infarction) [facility])        Past Surgical History:   Procedure Laterality Date   • NO PAST SURGERIES         Family History   Problem Relation Age of Onset   • Heart disease Maternal Grandfather      I have reviewed and agree with the history as documented      E-Cigarette/Vaping   • E-Cigarette Use Never User      E-Cigarette/Vaping Substances     Social History     Tobacco Use   • Smoking status: Every Day     Packs/day: 0 50     Types: Cigarettes     Start date: 2005   • Smokeless tobacco: Never   Vaping Use   • Vaping Use: Never used   Substance Use Topics   • Alcohol use: Not Currently   • Drug use: No       Review of Systems   Constitutional: Negative for chills, fatigue and fever  HENT: Negative for dental problem  Eyes: Negative for visual disturbance  Respiratory: Negative for shortness of breath  Cardiovascular: Positive for chest pain  Gastrointestinal: Negative for abdominal pain, diarrhea and vomiting  Genitourinary: Negative for dysuria and frequency  Musculoskeletal: Negative for arthralgias  Skin: Negative for rash  Neurological: Negative for dizziness, weakness and light-headedness  Psychiatric/Behavioral: Negative for agitation, behavioral problems and confusion  All other systems reviewed and are negative  Physical Exam  Physical Exam  Vitals and nursing note reviewed  Constitutional:       Appearance: Normal appearance  HENT:      Head: Normocephalic and atraumatic  Eyes:      Extraocular Movements: Extraocular movements intact  Conjunctiva/sclera: Conjunctivae normal    Cardiovascular:      Rate and Rhythm: Normal rate and regular rhythm  Pulses: Normal pulses  Heart sounds: Normal heart sounds  Pulmonary:      Effort: Pulmonary effort is normal       Breath sounds: Normal breath sounds  Comments: Tender to palpation over the Left chest wall  Abdominal:      General: There is no distension  Musculoskeletal:         General: Normal range of motion  Cervical back: Normal range of motion  Skin:     Findings: No rash  Neurological:      General: No focal deficit present  Mental Status: She is alert  Cranial Nerves: No cranial nerve deficit     Psychiatric:         Mood and Affect: Mood normal          Vital Signs  ED Triage Vitals   Temperature Pulse Respirations Blood Pressure SpO2   05/14/23 1515 05/14/23 1515 05/14/23 1515 05/14/23 1515 05/14/23 1515   98 9 °F (37 2 °C) 86 18 (!) 208/111 99 %      Temp Source Heart Rate Source Patient Position - Orthostatic VS BP Location FiO2 (%)   05/14/23 1515 05/14/23 1515 05/14/23 1515 05/14/23 1515 --   Oral Monitor Lying Right arm       Pain Score       05/14/23 1533       8           Vitals:    05/15/23 2300 05/16/23 0257 05/16/23 0721 05/16/23 1135   BP: 155/98 159/93 168/97 156/90   Pulse: 71 64 66 65   Patient Position - Orthostatic VS:   Lying Lying         Visual Acuity      ED Medications  Medications   ketorolac (TORADOL) injection 15 mg (15 mg Intravenous Given 5/14/23 1533)   losartan (COZAAR) tablet 50 mg (50 mg Oral Given 5/14/23 1532)   potassium chloride (K-DUR,KLOR-CON) CR tablet 40 mEq (40 mEq Oral Given 5/14/23 1633)   labetalol (NORMODYNE) injection 10 mg (10 mg Intravenous Given 5/14/23 1633)   aspirin chewable tablet 324 mg (324 mg Oral Given 5/14/23 1722)   losartan (COZAAR) tablet 50 mg (50 mg Oral Given 5/15/23 0027)   magnesium sulfate 2 g/50 mL IVPB (premix) 2 g (2 g Intravenous New Bag 5/14/23 1838)   potassium chloride (K-DUR,KLOR-CON) CR tablet 20 mEq (20 mEq Oral Given 5/14/23 1838)   magnesium sulfate 2 g/50 mL IVPB (premix) 2 g (2 g Intravenous New Bag 5/15/23 0307)   potassium chloride (K-DUR,KLOR-CON) CR tablet 40 mEq (40 mEq Oral Given 5/15/23 0308)   perflutren lipid microsphere (DEFINITY) injection (0 4 mL/min Intravenous Given 5/15/23 1149)       Diagnostic Studies  Results Reviewed     Procedure Component Value Units Date/Time    HS Troponin I 4hr [[id number]]  (Abnormal) Collected: 05/14/23 1947    Lab Status: Final result Specimen: Blood from Hand, Left Updated: 05/14/23 2030     hs TnI 4hr 65 ng/L      Delta 4hr hsTnI -8 ng/L     HS Troponin I 2hr [[id number]]  (Abnormal) Collected: 05/14/23 1722    Lab Status: Final result Specimen: Blood from Arm, Right Updated: 05/14/23 2019     hs TnI 2hr 77 ng/L      Delta 2hr hsTnI 4 ng/L     Basic metabolic panel [[id number]] (Abnormal) Collected: 05/14/23 1532    Lab Status: Final result Specimen: Blood from Arm, Left Updated: 05/14/23 1616     Sodium 137 mmol/L      Potassium 3 1 mmol/L      Chloride 103 mmol/L      CO2 27 mmol/L      ANION GAP 7 mmol/L      BUN 8 mg/dL      Creatinine 0 75 mg/dL      Glucose 108 mg/dL      Calcium 9 3 mg/dL      eGFR 99 ml/min/1 73sq m     Narrative:      [place] guidelines for Chronic Kidney Disease (CKD):   •  Stage 1 with normal or high GFR (GFR > 90 mL/min/1 73 square meters)  •  Stage 2 Mild CKD (GFR = 60-89 mL/min/1 73 square meters)  •  Stage 3A Moderate CKD (GFR = 45-59 mL/min/1 73 square meters)  •  Stage 3B Moderate CKD (GFR = 30-44 mL/min/1 73 square meters)  •  Stage 4 Severe CKD (GFR = 15-29 mL/min/1 73 square meters)  •  Stage 5 End Stage CKD (GFR <15 mL/min/1 73 square meters)  Note: GFR calculation is accurate only with a steady state creatinine    Hepatic function panel [[id number]]  (Normal) Collected: 05/14/23 1532    Lab Status: Final result Specimen: Blood from Arm, Left Updated: 05/14/23 1616     Total Bilirubin 0 58 mg/dL      Bilirubin, Direct 0 11 mg/dL      Alkaline Phosphatase 57 U/L      AST 23 U/L      ALT 18 U/L      Total Protein 7 6 g/dL      Albumin 4 3 g/dL     Magnesium [[id number]]  (Abnormal) Collected: 05/14/23 1532    Lab Status: Final result Specimen: Blood from Arm, Left Updated: 05/14/23 1616     Magnesium 1 8 mg/dL     Lipase [[id number]]  (Normal) Collected: 05/14/23 1532    Lab Status: Final result Specimen: Blood from Arm, Left Updated: 05/14/23 1616     Lipase 12 u/L     HS Troponin 0hr (reflex protocol) [[id number]]  (Abnormal) Collected: 05/14/23 1532    Lab Status: Final result Specimen: Blood from Arm, Left Updated: 05/14/23 1611     hs TnI 0hr 73 ng/L     CBC and differential [[id number]]  (Abnormal) Collected: 05/14/23 1532    Lab Status: Final result Specimen: Blood from Arm, Left Updated: 05/14/23 1541     WBC 9 67 Thousand/uL      RBC 4 39 Million/uL      Hemoglobin 11 6 g/dL      Hematocrit 36 7 %      MCV 84 fL      MCH 26 4 pg      MCHC 31 6 g/dL      RDW 16 9 %      MPV 9 1 fL      Platelets 010 Thousands/uL      nRBC 0 /100 WBCs      Neutrophils Relative 58 %      Immat GRANS % 1 %      Lymphocytes Relative 30 %      Monocytes Relative 8 %      Eosinophils Relative 2 %      Basophils Relative 1 %      Neutrophils Absolute 5 74 Thousands/µL      Immature Grans Absolute 0 06 Thousand/uL      Lymphocytes Absolute 2 89 Thousands/µL      Monocytes Absolute 0 74 Thousand/µL      Eosinophils Absolute 0 19 Thousand/µL      Basophils Absolute 0 05 Thousands/µL                  XR chest 2 views   ED Interpretation by Hari Barry MD (05/14 [id number])   No Pneumonia      Final Result by Sophy Rios MD (05/15 [id number])      No acute cardiopulmonary disease  Findings are stable            Workstation performed: [id number]                    Procedures  ECG 12 Lead Documentation Only    Date/Time: 5/14/2023 4:29 PM  Performed by: Hari Barry MD  Authorized by: Hari Barry MD     ECG reviewed by me, the ED Provider: yes    Patient location:  ED  Previous ECG:     Previous ECG:  Compared to current    Comparison ECG info:  1/18/2023  Interpretation:     Interpretation: normal    Rate:     ECG rate:  89    ECG rate assessment: normal    Rhythm:     Rhythm: sinus rhythm    QRS:     QRS axis:  Normal    QRS intervals:  Normal  Conduction:     Conduction: normal    ST segments:     ST segments:  Normal  T waves:     T waves: normal               ED Course  ED Course as of 05/18/23 1321   Sun May 14, 2023   1528 Blood Pressure(!): 208/111  Patient states she has not been compliant with her home blood pressure medications  Will give home dose of losartan HCTZ 50/12 5mg   1618 hs TnI 0hr(!): 73  26 three months ago  26 Chest x-ray evaluated by me, interpretation:  No pneumonia               HEART Risk Score Flowsheet Row Most Recent Value   Heart Score Risk Calculator    History 1 Filed at: 05/14/2023 1623   ECG 1 Filed at: 05/14/2023 1623   Age 0 Filed at: 05/14/2023 1623   Risk Factors 2 Filed at: 05/14/2023 1623   Troponin 2 Filed at: 05/14/2023 1623   HEART Score 6 Filed at: 05/14/2023 1623                        SBIRT 22yo+    Flowsheet Row Most Recent Value   Initial Alcohol Screen: US AUDIT-C     1  How often do you have a drink containing alcohol? 0 Filed at: 05/14/2023 1540   2  How many drinks containing alcohol do you have on a typical day you are drinking? 0 Filed at: 05/14/2023 1540   3a  Male UNDER 65: How often do you have five or more drinks on one occasion? 0 Filed at: 05/14/2023 1540   3b  FEMALE Any Age, or MALE 65+: How often do you have 4 or more drinks on one occassion? 0 Filed at: 05/14/2023 1540   Audit-C Score 0 Filed at: 05/14/2023 1540   VANCE: How many times in the past year have you    Used an illegal drug or used a prescription medication for non-medical reasons? Never Filed at: 05/14/2023 1540        VERONIKA Risk Score    Flowsheet Row Most Recent Value   Age >= 72 0 Filed at: 05/14/2023 1843   Known CAD (stenosis >= 50%) 0 Filed at: 05/14/2023 1843   Recent (<=24 hrs) Service Angina 0 Filed at: 05/14/2023 1843   ST Deviation >= 0 5 mm 0 Filed at: 05/14/2023 1843   3+ CAD Risk Factors (FHx, HTN, HLP, DM, Smoker) 1 Filed at: 05/14/2023 1843   Aspirin Use Past 7 Days 0 Filed at: 05/14/2023 1843   Elevated Cardiac Markers 1 Filed at: 05/14/2023 1843   VERONIKA Risk Score (Calculated) 2 Filed at: 05/14/2023 1843                  Medical Decision Making  1  Chest pain - Patient with constant chest pain for the last 4 days, significantly elevated blood pressure and non-compliant with medications  Will check ECG and troponin to rule out acute MI, CBC for anemia and leukocytosis, metabolic panel for electrolyte abnormalities and dehydration, CXR   Will give NSAIDs for pain, ASA, home BP medications  Chest pain: undiagnosed new problem with uncertain prognosis  Elevated troponin: undiagnosed new problem with uncertain prognosis  Hypertensive urgency: acute illness or injury  Hypokalemia: acute illness or injury  Amount and/or Complexity of Data Reviewed  External Data Reviewed: labs and ECG  Labs: ordered  Decision-making details documented in ED Course  Radiology: ordered and independent interpretation performed  Decision-making details documented in ED Course  ECG/medicine tests: ordered and independent interpretation performed  Decision-making details documented in ED Course  Risk  OTC drugs  Prescription drug management  Decision regarding hospitalization  Disposition  Final diagnoses:   Chest pain   Elevated troponin   Hypertensive urgency   Hypokalemia     Time reflects when diagnosis was documented in both MDM as applicable and the Disposition within this note     Time User Action Codes Description Comment    5/14/2023  5:09 PM Omayra Brookings E Add [R07 9] Chest pain     5/14/2023  5:09 PM Omayra Brookings E Add [R77 8] Elevated troponin     5/14/2023  5:09 PM Rabia Babcock, [address] [I16 0] Hypertensive urgency     5/14/2023  5:09 PM Omayra Brookings E Add [E87 6] Hypokalemia     5/15/2023  7:25 AM Eufemia Roberts Add [I47 1] SVT (supraventricular tachycardia) ([facility] [address] )     5/16/2023 11:26 AM Eufemia Roberts Add [I42 9] Cardiomyopathy, unspecified type [facility])       ED Disposition     ED Disposition   Admit    Condition   Stable    Date/Time   Sun May 14, 2023  5:09 PM    Comment   Case was discussed with SAYDA and the patient's admission status was agreed to be Admission Status: observation status to the service of Dr Ydai Hewitt              Follow-up Information    None         Discharge Medication List as of 5/16/2023 12:43 PM      START taking these medications    Details   carvedilol (COREG) 12 5 mg tablet Take 1 tablet (12 5 mg total) by mouth 2 (two) times a day with meals, Starting Tue 5/16/2023, Normal      sacubitril-valsartan (Entresto) 24-26 MG TABS Take 1 tablet by mouth 2 (two) times a day, Starting Mon 5/15/2023, Normal      spironolactone (ALDACTONE) 25 mg tablet Take 1 tablet (25 mg total) by mouth daily Do not start before May 17, 2023 , Starting Wed 5/17/2023, Normal         CONTINUE these medications which have NOT CHANGED    Details   albuterol (2 5 mg/3 mL) 0 083 % nebulizer solution Take 3 mL (2 5 mg total) by nebulization every 6 (six) hours as needed for wheezing or shortness of breath, Starting Wed 4/26/2023, Normal      albuterol (PROVENTIL HFA,VENTOLIN HFA) 90 mcg/act inhaler Inhale 2 puffs every 4 (four) hours as needed for wheezing, Starting Wed 4/26/2023, Normal      loratadine (CLARITIN) 10 mg tablet Take 1 tablet (10 mg total) by mouth daily, Starting Wed 4/26/2023, Normal         STOP taking these medications       losartan-hydrochlorothiazide (HYZAAR) 50-12 5 mg per tablet Comments:   Reason for Stopping:               Outpatient Discharge Orders   Discharge Diet     Activity as tolerated     Call provider for:  persistent nausea or vomiting     Call provider for:  severe uncontrolled pain     Call provider for: active or persistent bleeding     Call provider for:  difficulty breathing, headache or visual disturbances     Call provider for:  hives     Call provider for:  persistent dizziness or light-headedness     Call provider for:  extreme fatigue       PDMP Review     None          ED Provider  Electronically Signed by           Steph Dinh MD  05/18/23 [phone]

## 2023-05-14 NOTE — PLAN OF CARE
Problem: PAIN - ADULT  Goal: Verbalizes/displays adequate comfort level or baseline comfort level  Description: Interventions:  - Encourage patient to monitor pain and request assistance  - Assess pain using appropriate pain scale  - Administer analgesics based on type and severity of pain and evaluate response  - Implement non-pharmacological measures as appropriate and evaluate response  - Consider cultural and social influences on pain and pain management  - Notify physician/advanced practitioner if interventions unsuccessful or patient reports new pain  Outcome: Progressing     Problem: INFECTION - ADULT  Goal: Absence or prevention of progression during hospitalization  Description: INTERVENTIONS:  - Assess and monitor for signs and symptoms of infection  - Monitor lab/diagnostic results  - Monitor all insertion sites, i e  indwelling lines, tubes, and drains  - Monitor endotracheal if appropriate and nasal secretions for changes in amount and color  - [place] appropriate cooling/warming therapies per order  - Administer medications as ordered  - Instruct and encourage patient and family to use good hand hygiene technique  - Identify and instruct in appropriate isolation precautions for identified infection/condition  Outcome: Progressing  Goal: Absence of fever/infection during neutropenic period  Description: INTERVENTIONS:  - Monitor WBC    Outcome: Progressing     Problem: SAFETY ADULT  Goal: Patient will remain free of falls  Description: INTERVENTIONS:  - Educate patient/family on patient safety including physical limitations  - Instruct patient to call for assistance with activity   - Consult OT/PT to assist with strengthening/mobility   - Keep Call bell within reach  - Keep bed low and locked with side rails adjusted as appropriate  - Keep care items and personal belongings within reach  - Initiate and maintain comfort rounds  - Make Fall Risk Sign visible to staff  - Offer Toileting every  Hours, in advance of need  - Initiate/Maintain alarm  - Obtain necessary fall risk management equipment:   - Apply yellow socks and bracelet for high fall risk patients  - Consider moving patient to room near nurses station  Outcome: Progressing  Goal: Maintain or return to baseline ADL function  Description: INTERVENTIONS:  -  Assess patient's ability to carry out ADLs; assess patient's baseline for ADL function and identify physical deficits which impact ability to perform ADLs (bathing, care of mouth/teeth, toileting, grooming, dressing, etc )  - Assess/evaluate cause of self-care deficits   - Assess range of motion  - Assess patient's mobility; develop plan if impaired  - Assess patient's need for assistive devices and provide as appropriate  - Encourage maximum independence but intervene and supervise when necessary  - Involve family in performance of ADLs  - Assess for home care needs following discharge   - Consider OT consult to assist with ADL evaluation and planning for discharge  - Provide patient education as appropriate  Outcome: Progressing  Goal: Maintains/Returns to pre admission functional level  Description: INTERVENTIONS:  - Perform BMAT or MOVE assessment daily    - Set and communicate daily mobility goal to care team and patient/family/caregiver  - Collaborate with rehabilitation services on mobility goals if consulted  - Perform Range of Motion  times a day  - Reposition patient every hours    - Dangle patient  times a day  - Stand patient  times a day  - Ambulate patient  times a day  - Out of bed to chair  times a day   - Out of bed for meals  times a day  - Out of bed for toileting  - Record patient progress and toleration of activity level   Outcome: Progressing     Problem: DISCHARGE PLANNING  Goal: Discharge to home or other facility with appropriate resources  Description: INTERVENTIONS:  - Identify barriers to discharge w/patient and caregiver  - Arrange for needed discharge resources and transportation as appropriate  - Identify discharge learning needs (meds, wound care, etc )  - Arrange for interpretive services to assist at discharge as needed  - Refer to Case Management Department for coordinating discharge planning if the patient needs post-hospital services based on physician/advanced practitioner order or complex needs related to functional status, cognitive ability, or social support system  Outcome: Progressing     Problem: Knowledge Deficit  Goal: Patient/family/caregiver demonstrates understanding of disease process, treatment plan, medications, and discharge instructions  Description: Complete learning assessment and assess knowledge base    Interventions:  - Provide teaching at level of understanding  - Provide teaching via preferred learning methods  Outcome: Progressing

## 2023-05-14 NOTE — ASSESSMENT & PLAN NOTE
Patient presents with substernal, left shoulder, left scapular pain as constant and radiates in the left arm  EKG nonspecific  Troponin elevated likely related to hypertensive urgency  Atypical for cardiac pain, worse with palpation and with movement    More likely musculoskeletal  Continue to trend troponins, monitor telemetry

## 2023-05-14 NOTE — ASSESSMENT & PLAN NOTE
Scented with hypertensive urgency  Has not taken her medications for several days  Resume hydrochlorothiazide, losartan  Labetalol as needed SBP greater than 170

## 2023-05-14 NOTE — H&P
[address] [place] [address]  H&P  Name: Kaz Nguyen 41 y o  female I MRN: [id number]  Unit/Bed#: E4 -01 I Date of Admission: 5/14/2023   Date of Service: 5/14/2023 I Hospital Day: 0      Assessment/Plan   * Chest pain  Assessment & Plan  Patient presents with substernal, left shoulder, left scapular pain as constant and radiates in the left arm  EKG nonspecific  Troponin elevated likely related to hypertensive urgency  Atypical for cardiac pain, worse with palpation and with movement  More likely musculoskeletal  Continue to trend troponins, monitor telemetry    Back pain  Assessment & Plan  Presents with skeletal back pain/spasm  Denies any trauma or inciting incident  Tenderness to palpation of paraspinal thoracic spinal musculature, range of motion of left shoulder  Multimodal pain regimen    Asthma  Assessment & Plan  Patient is not in acute exacerbation  Continue albuterol as needed    Hypertension  Assessment & Plan  Scented with hypertensive urgency  Has not taken her medications for several days  Resume hydrochlorothiazide, losartan  Labetalol as needed SBP greater than 170    Hypokalemia  Assessment & Plan  Replete magnesium, potassium         VTE Prophylaxis: Lovenox  Code Status: Level 1 full code    Anticipated Length of Stay:  Patient will be admitted on an Observation basis with an anticipated length of stay of less than 2 midnights  Justification for Hospital Stay: ACS rule out    Total Time for Visit, including Counseling / Coordination of Care: 60 minutes  Greater than 50% of this total time spent on direct patient counseling and coordination of care  Chief Complaint:   Chest pain    History of Present Illness:    Kaz Nguyen is a 41 y o  female Past medical history of hypertension, KEYONA, tobacco use, asthma who presents to the hospital with chest pain  She reports 4-day history of chest pain aching radiating to jaw, back, left arm  Improving with lasing left arm  Similar pain to when she had an MI  Denies any shortness of breath, diaphoresis, lightheadedness, palpitation  She did taking as needed medications at home without relief  Review of Systems:    Review of Systems   Constitutional: Negative for chills and fever  HENT: Negative for sore throat and trouble swallowing  Eyes: Negative for photophobia and visual disturbance  Respiratory: Negative for shortness of breath and wheezing  Cardiovascular: Positive for chest pain  Negative for palpitations  Gastrointestinal: Negative for constipation, diarrhea, nausea and vomiting  Genitourinary: Negative for difficulty urinating and dysuria  Musculoskeletal: Positive for arthralgias, back pain and myalgias  Skin: Negative for rash and wound  Neurological: Negative for dizziness, light-headedness and headaches  Past Medical and Surgical History:     Past Medical History:   Diagnosis Date   • Asthma    • Hypertension    • MI, acute, non ST segment elevation ([address] )        Past Surgical History:   Procedure Laterality Date   • NO PAST SURGERIES         Meds/Allergies:    Prior to Admission medications    Medication Sig Start Date End Date Taking?  Authorizing Provider   albuterol (2 5 mg/3 mL) 0 083 % nebulizer solution Take 3 mL (2 5 mg total) by nebulization every 6 (six) hours as needed for wheezing or shortness of breath 4/26/23  Yes Kenrick Salinas MD   albuterol (PROVENTIL HFA,VENTOLIN HFA) 90 mcg/act inhaler Inhale 2 puffs every 4 (four) hours as needed for wheezing 4/26/23  Yes Kenrick Salinas MD   loratadine (CLARITIN) 10 mg tablet Take 1 tablet (10 mg total) by mouth daily 4/26/23  Yes Kenrick Salinas MD   losartan-hydrochlorothiazide [facility]) 50-12 5 mg per tablet Take 1 tablet by mouth daily 1/18/23 5/14/23 Yes Tyler Morales,    fluticasone (FLONASE) 50 mcg/act nasal spray 1 spray into each nostril daily  Patient not taking: Reported on 12/6/2019 6/5/19 5/14/23  Margot Ta "GARCIA Louise PA-C   predniSONE 20 mg tablet Take 3 tablets (60 mg total) by mouth daily  Patient not taking: Reported on 5/14/2023 4/26/23 5/14/23  Richard Abdullahi MD       Allergies: No Known Allergies    Social History:     Marital Status: /Civil Union   Substance Use History:   Social History     Substance and Sexual Activity   Alcohol Use Not Currently     Social History     Tobacco Use   Smoking Status Every Day   • Packs/day: 0 50   • Types: Cigarettes   Smokeless Tobacco Never   Tobacco Comments    \" not for past couple days\"  Social History     Substance and Sexual Activity   Drug Use No       Family History:    Pertinent family history reviewed    Physical Exam:     Vitals:   Blood Pressure: (!) 180/100 (05/14/23 1812)  Pulse: 85 (05/14/23 1812)  Temperature: 98 1 °F (36 7 °C) (05/14/23 1812)  Temp Source: Temporal (05/14/23 1812)  Respirations: 18 (05/14/23 1812)  Height: 5' (152 4 cm) (05/14/23 1812)  Weight - Scale: 125 kg (275 lb 12 7 oz) (05/14/23 1812)  SpO2: 94 % (05/14/23 1812)    Physical Exam  Vitals reviewed  Constitutional:       General: She is not in acute distress  Appearance: She is well-developed  She is not ill-appearing, toxic-appearing or diaphoretic  HENT:      Head: Normocephalic and atraumatic  Mouth/Throat:      Mouth: Mucous membranes are moist       Pharynx: No oropharyngeal exudate  Eyes:      General: No scleral icterus  Extraocular Movements: Extraocular movements intact  Conjunctiva/sclera: Conjunctivae normal    Cardiovascular:      Rate and Rhythm: Normal rate and regular rhythm  Heart sounds: Normal heart sounds  Pulmonary:      Effort: Pulmonary effort is normal  No respiratory distress  Breath sounds: Normal breath sounds  No wheezing or rales  Abdominal:      General: There is no distension  Palpations: Abdomen is soft  Tenderness: There is no abdominal tenderness  There is no guarding or rebound   " Musculoskeletal:         General: No swelling, tenderness or deformity  Cervical back: Normal range of motion  Comments: Tenderness to palpation of thoracic paraspinal musculature, cervical paraspinal musculature, left shoulder, chest wall   Skin:     General: Skin is warm and dry  Neurological:      General: No focal deficit present  Mental Status: She is alert  Mental status is at baseline  Psychiatric:         Mood and Affect: Mood normal          Behavior: Behavior normal          Thought Content: Thought content normal          Judgment: Judgment normal           Additional Data:     Lab Results: I have reviewed pertinent results     Results from last 7 days   Lab Units 05/14/23  1532   WBC Thousand/uL 9 67   HEMOGLOBIN g/dL 11 6   HEMATOCRIT % 36 7   PLATELETS Thousands/uL 350   NEUTROS PCT % 58   LYMPHS PCT % 30   MONOS PCT % 8   EOS PCT % 2     Results from last 7 days   Lab Units 05/14/23  1532   SODIUM mmol/L 137   POTASSIUM mmol/L 3 1*   CHLORIDE mmol/L 103   CO2 mmol/L 27   BUN mg/dL 8   CREATININE mg/dL 0 75   ANION GAP mmol/L 7   CALCIUM mg/dL 9 3   ALBUMIN g/dL 4 3   TOTAL BILIRUBIN mg/dL 0 58   ALK PHOS U/L 57   ALT U/L 18   AST U/L 23   GLUCOSE RANDOM mg/dL 108                       Imaging: I have reviewed pertinent imaging     XR chest 2 views   ED Interpretation by Lashonda Piña MD (05/14 1755)   No Pneumonia          EKG, Pathology, and Other Studies Reviewed on Admission:   · EKG: Reviewed     Allscripts / Epic Records Reviewed    ** Please Note: This note has been constructed using a voice recognition system   **

## 2023-05-14 NOTE — ASSESSMENT & PLAN NOTE
Presents with skeletal back pain/spasm  Denies any trauma or inciting incident  Tenderness to palpation of paraspinal thoracic spinal musculature, range of motion of left shoulder  Multimodal pain regimen

## 2023-05-15 ENCOUNTER — APPOINTMENT (OUTPATIENT)
Dept: NON INVASIVE DIAGNOSTICS | Facility: HOSPITAL | Age: 43
End: 2023-05-15

## 2023-05-15 DIAGNOSIS — I42.9 CARDIOMYOPATHY, UNSPECIFIED TYPE (HCC): Primary | ICD-10-CM

## 2023-05-15 PROBLEM — E87.6 HYPOKALEMIA: Status: RESOLVED | Noted: 2019-11-23 | Resolved: 2023-05-15

## 2023-05-15 LAB
ANION GAP SERPL CALCULATED.3IONS-SCNC: 5 MMOL/L (ref 4–13)
AORTIC ROOT: 2.6 CM
APICAL FOUR CHAMBER EJECTION FRACTION: 41 %
BUN SERPL-MCNC: 9 MG/DL (ref 5–25)
CALCIUM SERPL-MCNC: 8.6 MG/DL (ref 8.4–10.2)
CHLORIDE SERPL-SCNC: 105 MMOL/L (ref 96–108)
CO2 SERPL-SCNC: 26 MMOL/L (ref 21–32)
CREAT SERPL-MCNC: 0.69 MG/DL (ref 0.6–1.3)
E WAVE DECELERATION TIME: 144 MS
ERYTHROCYTE [DISTWIDTH] IN BLOOD BY AUTOMATED COUNT: 17.1 % (ref 11.6–15.1)
FRACTIONAL SHORTENING: 25 (ref 28–44)
GFR SERPL CREATININE-BSD FRML MDRD: 108 ML/MIN/1.73SQ M
GLUCOSE P FAST SERPL-MCNC: 99 MG/DL (ref 65–99)
GLUCOSE SERPL-MCNC: 99 MG/DL (ref 65–140)
HCT VFR BLD AUTO: 34.8 % (ref 34.8–46.1)
HGB BLD-MCNC: 10.9 G/DL (ref 11.5–15.4)
INTERVENTRICULAR SEPTUM IN DIASTOLE (PARASTERNAL SHORT AXIS VIEW): 1.3 CM
INTERVENTRICULAR SEPTUM: 1.3 CM (ref 0.6–1.1)
LAAS-AP2: 19.6 CM2
LAAS-AP4: 19.7 CM2
LEFT ATRIUM SIZE: 4.7 CM
LEFT INTERNAL DIMENSION IN SYSTOLE: 4.4 CM (ref 2.1–4)
LEFT VENTRICULAR INTERNAL DIMENSION IN DIASTOLE: 5.9 CM (ref 3.5–6)
LEFT VENTRICULAR POSTERIOR WALL IN END DIASTOLE: 1.4 CM
LEFT VENTRICULAR STROKE VOLUME: 85 ML
LVSV (TEICH): 85 ML
MAGNESIUM SERPL-MCNC: 2.8 MG/DL (ref 1.9–2.7)
MCH RBC QN AUTO: 26.3 PG (ref 26.8–34.3)
MCHC RBC AUTO-ENTMCNC: 31.3 G/DL (ref 31.4–37.4)
MCV RBC AUTO: 84 FL (ref 82–98)
MV E'TISSUE VEL-SEP: 6 CM/S
MV PEAK A VEL: 0.54 M/S
MV PEAK E VEL: 93 CM/S
MV STENOSIS PRESSURE HALF TIME: 42 MS
MV VALVE AREA P 1/2 METHOD: 5.24
PLATELET # BLD AUTO: 319 THOUSANDS/UL (ref 149–390)
PMV BLD AUTO: 9.4 FL (ref 8.9–12.7)
POTASSIUM SERPL-SCNC: 4.3 MMOL/L (ref 3.5–5.3)
RBC # BLD AUTO: 4.15 MILLION/UL (ref 3.81–5.12)
RIGHT ATRIUM AREA SYSTOLE A4C: 12.7 CM2
RIGHT VENTRICLE ID DIMENSION: 4.3 CM
SL CV LEFT ATRIUM LENGTH A2C: 5.1 CM
SL CV LV EF: 35
SL CV PED ECHO LEFT VENTRICLE DIASTOLIC VOLUME (MOD BIPLANE) 2D: 173 ML
SL CV PED ECHO LEFT VENTRICLE SYSTOLIC VOLUME (MOD BIPLANE) 2D: 88 ML
SODIUM SERPL-SCNC: 136 MMOL/L (ref 135–147)
TRICUSPID ANNULAR PLANE SYSTOLIC EXCURSION: 1.8 CM
WBC # BLD AUTO: 6.98 THOUSAND/UL (ref 4.31–10.16)

## 2023-05-15 RX ORDER — LOSARTAN POTASSIUM 50 MG/1
50 TABLET ORAL DAILY
Status: DISCONTINUED | OUTPATIENT
Start: 2023-05-16 | End: 2023-05-16 | Stop reason: HOSPADM

## 2023-05-15 RX ORDER — MAGNESIUM SULFATE HEPTAHYDRATE 40 MG/ML
2 INJECTION, SOLUTION INTRAVENOUS ONCE
Status: COMPLETED | OUTPATIENT
Start: 2023-05-15 | End: 2023-05-15

## 2023-05-15 RX ORDER — SACUBITRIL AND VALSARTAN 24; 26 MG/1; MG/1
1 TABLET, FILM COATED ORAL 2 TIMES DAILY
Qty: 30 TABLET | Refills: 0 | Status: SHIPPED | OUTPATIENT
Start: 2023-05-15 | End: 2023-05-22

## 2023-05-15 RX ORDER — CARVEDILOL 6.25 MG/1
6.25 TABLET ORAL 2 TIMES DAILY WITH MEALS
Status: DISCONTINUED | OUTPATIENT
Start: 2023-05-15 | End: 2023-05-15

## 2023-05-15 RX ORDER — SPIRONOLACTONE 25 MG/1
25 TABLET ORAL DAILY
Status: DISCONTINUED | OUTPATIENT
Start: 2023-05-16 | End: 2023-05-16 | Stop reason: HOSPADM

## 2023-05-15 RX ORDER — POTASSIUM CHLORIDE 20 MEQ/1
40 TABLET, EXTENDED RELEASE ORAL ONCE
Status: COMPLETED | OUTPATIENT
Start: 2023-05-15 | End: 2023-05-15

## 2023-05-15 RX ORDER — CARVEDILOL 12.5 MG/1
12.5 TABLET ORAL 2 TIMES DAILY WITH MEALS
Status: DISCONTINUED | OUTPATIENT
Start: 2023-05-15 | End: 2023-05-16 | Stop reason: HOSPADM

## 2023-05-15 RX ADMIN — POTASSIUM CHLORIDE 40 MEQ: 1500 TABLET, EXTENDED RELEASE ORAL at 03:08

## 2023-05-15 RX ADMIN — ACETAMINOPHEN 325MG 975 MG: 325 TABLET ORAL at 14:42

## 2023-05-15 RX ADMIN — CARVEDILOL 12.5 MG: 12.5 TABLET, FILM COATED ORAL at 12:38

## 2023-05-15 RX ADMIN — OXYCODONE 5 MG: 5 TABLET ORAL at 18:38

## 2023-05-15 RX ADMIN — ACETAMINOPHEN 325MG 975 MG: 325 TABLET ORAL at 21:10

## 2023-05-15 RX ADMIN — CARVEDILOL 12.5 MG: 12.5 TABLET, FILM COATED ORAL at 21:10

## 2023-05-15 RX ADMIN — Medication 1 PATCH: at 08:29

## 2023-05-15 RX ADMIN — PERFLUTREN 0.4 ML/MIN: 6.52 INJECTION, SUSPENSION INTRAVENOUS at 11:49

## 2023-05-15 RX ADMIN — OXYCODONE 5 MG: 5 TABLET ORAL at 00:29

## 2023-05-15 RX ADMIN — MENTHOL, UNSPECIFIED FORM AND METHYL SALICYLATE 1 APPLICATION.: 10; 150 CREAM TOPICAL at 12:40

## 2023-05-15 RX ADMIN — LOSARTAN POTASSIUM 50 MG: 50 TABLET, FILM COATED ORAL at 00:27

## 2023-05-15 RX ADMIN — MAGNESIUM SULFATE HEPTAHYDRATE 2 G: 40 INJECTION, SOLUTION INTRAVENOUS at 03:07

## 2023-05-15 RX ADMIN — NICOTINE POLACRILEX 2 MG: 2 GUM, CHEWING ORAL at 12:38

## 2023-05-15 RX ADMIN — ACETAMINOPHEN 325MG 975 MG: 325 TABLET ORAL at 05:52

## 2023-05-15 RX ADMIN — LORATADINE 10 MG: 10 TABLET ORAL at 08:29

## 2023-05-15 RX ADMIN — HYDROCHLOROTHIAZIDE 12.5 MG: 12.5 TABLET ORAL at 08:29

## 2023-05-15 RX ADMIN — ENOXAPARIN SODIUM 40 MG: 100 INJECTION SUBCUTANEOUS at 08:28

## 2023-05-15 NOTE — PROGRESS NOTES
[address] [place] [address]  Progress Note  Name: Nicolle Bundy  MRN: [id number]  Unit/Bed#: E4 -01 I Date of Admission: 5/14/2023   Date of Service: 5/15/2023 I Hospital Day: 0    Assessment/Plan   * Chest pain  Assessment & Plan  Patient presents with substernal, left shoulder, left scapular pain as constant and radiates in the left arm  · EKG nonspecific  · Troponin elevated likely related to hypertensive urgency  · Atypical for cardiac pain, worse with palpation and with movement  More likely musculoskeletal  · Patient with episode of NSVT overnight  · Cardiology then consulted  · Initiated on Coreg 12 5 mg twice daily  · Reading, aldosterone, metanephrine levels ordered  · Echocardiogram pending    Back pain  Assessment & Plan  Presents with skeletal back pain/spasm  · Denies any trauma or inciting incident  · Tenderness to palpation of paraspinal thoracic spinal musculature, range of motion of left shoulder  · Multimodal pain regimen    Asthma  Assessment & Plan  Patient is not in acute exacerbation  · Continue albuterol as needed    Hypertension  Assessment & Plan  With hypertensive urgency on arrival to the ED  · Not taking medications at home  · Maintained on losartan 50 mg daily, HCTZ 12 5 mg daily which were restarted  · Initiated on carvedilol 12 5 mg twice daily for episode of NSVT    Hypokalemia-resolved as of 5/15/2023  Assessment & Plan  Repleted       VTE Pharmacologic Prophylaxis: VTE Score: 4 Moderate Risk (Score 3-4) - Pharmacological DVT Prophylaxis Ordered: enoxaparin (Lovenox)  Patient Centered Rounds: I performed bedside rounds with nursing staff today  Discussions with Specialists or Other Care Team Provider: Cardiology    Education and Discussions with Family / Patient: Updated  (daughter) at bedside      Total Time Spent on Date of Encounter in care of patient: 35 minutes This time was spent on one or more of the following: performing physical exam; counseling and coordination of care; obtaining or reviewing history; documenting in the medical record; reviewing/ordering tests, medications or procedures; communicating with other healthcare professionals and discussing with patient's family/caregivers  Current Length of Stay: 0 day(s)  Current Patient Status: Observation   Certification Statement: The patient will continue to require additional inpatient hospital stay due to Echo, hypertension  Discharge Plan: Anticipate discharge tomorrow to home  Code Status: Level 1 - Full Code    Subjective:   Patient is seen resting comfortably in bed, she offers no complaints at this time, states that her chest pain has subsided  She states that most of her pain is solely in her neck and is alleviated when she rubs it  Denies any shortness of breath, palpitations, was unaware that she went into NSVT last evening    Objective:     Vitals:   Temp (24hrs), Av °F (36 7 °C), Min:97 5 °F (36 4 °C), Max:98 9 °F (37 2 °C)    Temp:  [97 5 °F (36 4 °C)-98 9 °F (37 2 °C)] 98 °F (36 7 °C)  HR:  [72-86] 86  Resp:  [18] 18  BP: (144-208)/() 174/82  SpO2:  [92 %-100 %] 99 %  Body mass index is 53 71 kg/m²  Input and Output Summary (last 24 hours): Intake/Output Summary (Last 24 hours) at 5/15/2023 1345  Last data filed at 5/15/2023 0700  Gross per 24 hour   Intake 240 ml   Output --   Net 240 ml       Physical Exam:   Physical Exam  Vitals and nursing note reviewed  Constitutional:       General: She is not in acute distress  Appearance: Normal appearance  She is obese  She is not ill-appearing, toxic-appearing or diaphoretic  HENT:      Head: Normocephalic and atraumatic  Cardiovascular:      Rate and Rhythm: Normal rate and regular rhythm  Heart sounds: No murmur heard  No friction rub  No gallop  Pulmonary:      Effort: Pulmonary effort is normal  No respiratory distress  Breath sounds: Normal breath sounds   No wheezing, rhonchi or rales    Abdominal:      General: Abdomen is flat  Bowel sounds are normal  There is no distension  Palpations: Abdomen is soft  Tenderness: There is no abdominal tenderness  Musculoskeletal:      Right lower leg: No edema  Left lower leg: No edema  Skin:     General: Skin is warm and dry  Coloration: Skin is not jaundiced or pale  Neurological:      General: No focal deficit present  Mental Status: She is alert  Mental status is at baseline  Additional Data:     Labs:  Results from last 7 days   Lab Units 05/15/23  0556 05/14/23  1532   WBC Thousand/uL 6 98 9 67   HEMOGLOBIN g/dL 10 9* 11 6   HEMATOCRIT % 34 8 36 7   PLATELETS Thousands/uL 319 350   NEUTROS PCT %  --  58   LYMPHS PCT %  --  30   MONOS PCT %  --  8   EOS PCT %  --  2     Results from last 7 days   Lab Units 05/15/23  0556 05/14/23  1532   SODIUM mmol/L 136 137   POTASSIUM mmol/L 4 3 3 1*   CHLORIDE mmol/L 105 103   CO2 mmol/L 26 27   BUN mg/dL 9 8   CREATININE mg/dL 0 69 0 75   ANION GAP mmol/L 5 7   CALCIUM mg/dL 8 6 9 3   ALBUMIN g/dL  --  4 3   TOTAL BILIRUBIN mg/dL  --  0 58   ALK PHOS U/L  --  57   ALT U/L  --  18   AST U/L  --  23   GLUCOSE RANDOM mg/dL 99 108                       Lines/Drains:  Invasive Devices     Peripheral Intravenous Line  Duration           Peripheral IV 05/14/23 Right Antecubital <1 day                  Telemetry:  Telemetry Orders (From admission, onward)             24 Hour Telemetry Monitoring  Continuous x 24 Hours (Telem)        Question:  Reason for 24 Hour Telemetry  Answer:  PCI/EP study (including pacer and ICD implementation), Cardiac surgery, MI, abnormal cardiac cath, and chest pain- rule out MI                 Telemetry Reviewed: Normal Sinus Rhythm  Indication for Continued Telemetry Use: Arrthymias requiring medical therapy             Imaging: No pertinent imaging reviewed      Recent Cultures (last 7 days):         Last 24 Hours Medication List:   Current Facility-Administered Medications   Medication Dose Route Frequency Provider Last Rate   • acetaminophen  975 mg Oral [facility] Lafonda Boehringer, DO     • albuterol  2 5 mg Nebulization Q6H PRN Lafonda Boehringer, DO     • aluminum-magnesium hydroxide-simethicone  30 mL Oral Q6H PRN Lafonda Boehringer, DO     • carvedilol  12 5 mg Oral BID With Meals JOHN Light     • enoxaparin  40 mg Subcutaneous Daily Lafonda Boehringer, DO     • hydrochlorothiazide  12 5 mg Oral Daily Lafonda Boehringer, DO     • labetalol  10 mg Intravenous Q4H PRN Lafonda Boehringer, DO     • loratadine  10 mg Oral Daily Lafonda Boehringer, DO     • [START ON 5/16/2023] losartan  50 mg Oral Daily JOHN Light     • menthol-methyl salicylate  1 application  Apply externally 4x Daily PRN Lafonda Boehringer, DO     • nicotine  1 patch Transdermal Daily Lafonda Boehringer, DO     • nicotine polacrilex  2 mg Oral Q2H PRN Merla Hard, DO     • ondansetron  4 mg Intravenous Q6H PRN Lafonda Boehringer, DO     • oxyCODONE  5 mg Oral Q6H PRN Lafonda Boehringer, DO          Today, Patient Was Seen By: Colton Huddleston PA-C    **Please Note: This note may have been constructed using a voice recognition system  **

## 2023-05-15 NOTE — CONSULTS
Consult - Cardiology   Leticia Bamberger 41 y o  female MRN: [id number]  Unit/Bed#: E4 -01 Encounter: [id number]        Reason For Consult: Chest discomfort with mild troponin elevation               ASSESSMENT:  · Chest discomfort with mild troponin elevation   - with HS troponin trend of 73, 77, 65    - TTE 7/26/20: LVEF 50%, possible inferior wall hypokinesis, grade 1 diastolic dysfunction  · Uncontrolled hypertension with hypertensive urgency   - outpatient Rx, losartan-hydrochlorothiazide 50-12 5 mg daily  · Nonsustained ventricular tachycardia (39 beats via telemetry, 5/2023)  · Hypokalemia   · History of MINOCA (myocardial infarction with nonocclusive coronary artery disease)   - [facility] 7/27/20: No CAD  · History of tubal ligation  · Obstructive sleep apnea  · Active tobacco use  · Asthma   · Obesity  · Medication noncompliance    PLAN/ DISCUSSION:     · Telemetry reviewed, 39 beat run of nonsustained ventricular tachycardia  Given possible RVOT ventricular tachycardia, case discussed with EP  · As below, will initiate beta-blocker  · Continue to monitor closely on telemetry  · Patient had been administered losartan 50 mg upon arrival and overnight + hydrochlorothiazide 12 5 mg daily  · Continue losartan 50 mg daily + hydrochlorothiazide 12 5 mg daily  Patient did present with hypokalemia, however was not taking her antihypertensives prior to this  If patient remains persistently hypokalemic, will adjust regiment  · Initiate carvedilol 12 5 mg twice daily  · Check renin, aldosterone, metanephrine levels  · Echocardiogram ordered to assess cardiac structure and function  · Recommendations forthcoming pending echocardiogram results  · Monitor volume status closely with strict intake/output, daily weight  · Monitor renal function and electrolytes; maintain potassium > 4, magnesium > 2       History Of Present Illness:  46-year-old female presented to [facility] with complaints of chest discomfort, shoulder discomfort  Per patient, she develops intermittent chest discomfort and shoulder discomfort  She states that she feels like it is a muscle pain  She endorses intermittent shortness of breath, however it was contributing that to her underlying asthma  She states that she is able to ambulate, exert herself, walk up steps without chest discomfort or shortness of breath  Patient denies any prior palpitations, syncope, near syncope  Per patient, she has not taken her outpatient antihypertensives for at least 1 week  Upon assessment and evaluation, patient is resting comfortably in the bed  At this time, patient denies shortness of breath, chest pain, dizziness, lightheadedness, palpitations  Patient was seen by Dr Naveed Lozano at the [facility] in January of this year  Patient had presented to the emergency department with abdominal and chest discomfort  While in the emergency department she was noted to have a markedly elevated blood pressure of 189/111  Her ECG revealed ST-T wave abnormalities concerning for ischemia  She had normal but slightly elevated high-sensitivity troponin levels  Patient's description of symptoms is atypical for angina more suggestive of reproducible noncardiac chest pain  She presented with uncontrolled hypertension as she was noncompliant with her outpatient medications  Past Medical History:        Past Medical History:   Diagnosis Date   • Asthma    • Hypertension    • MI, acute, non ST segment elevation ([facility])       Past Surgical History:   Procedure Laterality Date   • NO PAST SURGERIES          Allergy:        No Known Allergies    Medications:       Prior to Admission medications    Medication Sig Start Date End Date Taking?  Authorizing Provider   albuterol (2 5 mg/3 mL) 0 083 % nebulizer solution Take 3 mL (2 5 mg total) by nebulization every 6 (six) hours as needed for wheezing or shortness of breath 4/26/23  Yes Jose Restrepo MD "  albuterol (PROVENTIL HFA,VENTOLIN HFA) 90 mcg/act inhaler Inhale 2 puffs every 4 (four) hours as needed for wheezing 4/26/23  Yes Vianey Amanda MD   loratadine (CLARITIN) 10 mg tablet Take 1 tablet (10 mg total) by mouth daily 4/26/23  Yes Vianey Amanda MD   losartan-hydrochlorothiazide [facility]) 50-12 5 mg per tablet Take 1 tablet by mouth daily 1/18/23 5/14/23 Yes [address] [place] [address],        Family History:     History reviewed  No pertinent family history  Social History:       Social History     Socioeconomic History   • Marital status: /Civil Union     Spouse name: None   • Number of children: None   • Years of education: None   • Highest education level: None   Occupational History   • None   Tobacco Use   • Smoking status: Every Day     Packs/day: 0 50     Types: Cigarettes   • Smokeless tobacco: Never   • Tobacco comments:     \" not for past couple days\"  Vaping Use   • Vaping Use: Never used   Substance and Sexual Activity   • Alcohol use: Not Currently   • Drug use: No   • Sexual activity: None   Other Topics Concern   • None   Social History Narrative   • None     Social Determinants of Health     Financial Resource Strain: Not on file   Food Insecurity: Not on file   Transportation Needs: Not on file   Physical Activity: Not on file   Stress: Not on file   Social Connections: Not on file   Intimate Partner Violence: Not on file   Housing Stability: Not on file       ROS:  Negative except otherwise aforementioned above  Remainder review of systems is negative    Exam:  General:  alert, oriented and in no distress, cooperative  Head: Normocephalic, atraumatic  Eyes:  EOMI  Pupils - equal, round, reactive to accomodation  No icterus  Normal Conjunctiva     Oropharynx: moist and normal-appearing mucosa  Neck: supple, symmetrical, trachea midline   Heart: Rate with controlled rhythm  Respiratory effort / Chest Inspection: unlabored  Lungs:  normal air entry, lungs clear to " auscultation and no rales, rhonchi or wheezing   Abdomen: Obese, rounded, normoactive bowel sounds  Lower Limbs:  no pitting edema  Musculoskeletal: ROM grossly normal      DATA:      ECG:                       Telemetry: Sinus rhythm on telemetry with VT as above          Echocardiogram completed on 7/26/20:         LEFT VENTRICLE: Size was normal  Systolic function was at the lower limits of normal by visual assessment  Ejection fraction was estimated to be 50 %  Possible inferior wall hypokinesis DOPPLER: Doppler parameters were consistent with abnormal left ventricular relaxation (grade 1 diastolic dysfunction)      RIGHT VENTRICLE: The size was normal      LEFT ATRIUM: The atrium was mildly dilated      RIGHT ATRIUM: Size was normal      MITRAL VALVE: Valve structure was normal      AORTIC VALVE: The valve was not well visualized      TRICUSPID VALVE: Not well visualized      PULMONIC VALVE: Not well visualized      PERICARDIUM: There was no pericardial effusion  The pericardium was normal in appearance  Cardiac catheterization completed on 7/27/20:  CORONARY VESSELS:   --  The coronary circulation is right dominant  --  Left main: Normal   --  LAD: Normal   --  Circumflex: Normal   --  RCA: Normal          Weights: Wt Readings from Last 3 Encounters:   05/14/23 125 kg (275 lb 12 7 oz)   04/26/23 127 kg (279 lb 14 4 oz)   01/18/23 126 kg (278 lb 4 8 oz)   , Body mass index is 53 86 kg/m²           Lab Studies:             Results from last 7 days   Lab Units 05/15/23  0556 05/14/23  1532   WBC Thousand/uL 6 98 9 67   HEMOGLOBIN g/dL 10 9* 11 6   HEMATOCRIT % 34 8 36 7   PLATELETS Thousands/uL 319 350   ,   Results from last 7 days   Lab Units 05/15/23  0556 05/14/23  1532   POTASSIUM mmol/L 4 3 3 1*   CHLORIDE mmol/L 105 103   CO2 mmol/L 26 27   BUN mg/dL 9 8   CREATININE mg/dL 0 69 0 75   CALCIUM mg/dL 8 6 9 3   ALK PHOS U/L  --  57   ALT U/L  --  18   AST U/L  --  23

## 2023-05-15 NOTE — ASSESSMENT & PLAN NOTE
Patient presents with substernal, left shoulder, left scapular pain as constant and radiates in the left arm  · EKG nonspecific  · Troponin elevated likely related to hypertensive urgency  · Atypical for cardiac pain, worse with palpation and with movement    More likely musculoskeletal  · Patient with episode of NSVT overnight  · Cardiology then consulted  · Initiated on Coreg 12 5 mg twice daily  · Reading, aldosterone, metanephrine levels ordered  · Echocardiogram pending

## 2023-05-15 NOTE — CASE MANAGEMENT
Case Management Discharge Planning Note    Patient name Richelle Angeles  Location East 4 /E4 [facility]-* MRN [id number]  : 1980 Date 5/15/2023       Current Admission Date: 2023  Current Admission Diagnosis:Chest pain   Patient Active Problem List    Diagnosis Date Noted   • Back pain 2023   • BMI 50 0-59 9, adult ([address] ) 2020   • Chest pain 2020   • Asthma 2020   • Nonintractable headache 2019   • Hypertension 2019   • Sleep apnea 2019   • PVC (premature ventricular contraction) 2019   • Tobacco abuse 2019   • Abnormal urinalysis 2019   • Allergic rhinitis 06/10/2019      LOS (days): 0  Geometric Mean LOS (GMLOS) (days):   Days to GMLOS:     OBJECTIVE:        Current admission status: Observation   Preferred Pharmacy:   [address] [place] [address],[address], [address]  [address]  Phone: [phone] Fax: 054 [facility] [address] ,  [address]  Phone: [phone] Fax: [phone]    Primary Care Provider: Josi Cam PA-C    Primary Insurance: [address]  Secondary Insurance:     DISCHARGE DETAILS:  JOSHUA Tran informed this CM that the pt will be needing a Life Vest   CM called [facility] liaison, they did receive the paperwork from the Cardiologist and will be out today around 5PM to fit the pt  CM notified Cardiology and pt's RN  Pt will also need enTresto on discharge  CM called [address] for a price check  Cost is $0 00 however they do not have any in stock at the moment  Should get it in tomorrow around 12PM   JOSHUA Tran notified

## 2023-05-15 NOTE — ASSESSMENT & PLAN NOTE
With hypertensive urgency on arrival to the ED  · Not taking medications at home  · Maintained on losartan 50 mg daily, HCTZ 12 5 mg daily which were restarted  · Initiated on carvedilol 12 5 mg twice daily for episode of NSVT

## 2023-05-15 NOTE — ASSESSMENT & PLAN NOTE
Presents with skeletal back pain/spasm  · Denies any trauma or inciting incident  · Tenderness to palpation of paraspinal thoracic spinal musculature, range of motion of left shoulder  · Multimodal pain regimen

## 2023-05-15 NOTE — UTILIZATION REVIEW
Initial Clinical Review    Admission: Date/Time/Statement:   Admission Orders (From admission, onward)     Ordered        05/14/23 1712  Place in Observation  Once                      Orders Placed This Encounter   Procedures   • Place in Observation     Standing Status:   Standing     Number of Occurrences:   1     Order Specific Question:   Level of Care     Answer:   Med Surg [16]     ED Arrival Information     Expected   -    Arrival   5/14/2023 15:09    Acuity   Emergent            Means of arrival   Ambulance    Escorted by   [facility] ([address])    Service   Hospitalist    Admission type   Emergency            Arrival complaint   chest pain           Chief Complaint   Patient presents with   • Chest Pain     Pt arrived via ems with c/o left side chest pain and headache x4 days  Hx of MI reports symptoms feeling the same  Initial Presentation: 41 y o  female ***    Date: ***   Day 2: ***    ED Triage Vitals   Temperature Pulse Respirations Blood Pressure SpO2   05/14/23 1515 05/14/23 1515 05/14/23 1515 05/14/23 1515 05/14/23 1515   98 9 °F (37 2 °C) 86 18 (!) 208/111 99 %      Temp Source Heart Rate Source Patient Position - Orthostatic VS BP Location FiO2 (%)   05/14/23 1515 05/14/23 1515 05/14/23 1515 05/14/23 1515 --   Oral Monitor Lying Right arm       Pain Score       05/14/23 1533       8          Wt Readings from Last 1 Encounters:   05/15/23 125 kg (275 lb)     Additional Vital Signs: ***  Pertinent Labs/Diagnostic Test Results:   XR chest 2 views   ED Interpretation by Sierra Crawford MD (05/14 1636)   No Pneumonia      Final Result by Lita Nava MD (05/15 1984)      No acute cardiopulmonary disease        Findings are stable            Workstation performed: [id number]               Results from last 7 days   Lab Units 05/15/23  0556 05/14/23  1532   WBC Thousand/uL 6 98 9 67   HEMOGLOBIN g/dL 10 9* 11 6   HEMATOCRIT % 34 8 36 7   PLATELETS Thousands/uL 319 350 NEUTROS ABS Thousands/µL  --  5 74         Results from last 7 days   Lab Units 05/15/23  0556 05/14/23  1532   SODIUM mmol/L 136 137   POTASSIUM mmol/L 4 3 3 1*   CHLORIDE mmol/L 105 103   CO2 mmol/L 26 27   ANION GAP mmol/L 5 7   BUN mg/dL 9 8   CREATININE mg/dL 0 69 0 75   EGFR ml/min/1 73sq m 108 99   CALCIUM mg/dL 8 6 9 3   MAGNESIUM mg/dL 2 8* 1 8*     Results from last 7 days   Lab Units 05/14/23  1532   AST U/L 23   ALT U/L 18   ALK PHOS U/L 57   TOTAL PROTEIN g/dL 7 6   ALBUMIN g/dL 4 3   TOTAL BILIRUBIN mg/dL 0 58   BILIRUBIN DIRECT mg/dL 0 11         Results from last 7 days   Lab Units 05/15/23  0556 05/14/23  1532   GLUCOSE RANDOM mg/dL 99 108             No results found for: BETA-HYDROXYBUTYRATE                   Results from last 7 days   Lab Units 05/14/23  1947 05/14/23  1722 05/14/23  1532   HS TNI 0HR ng/L  --   --  73*   HS TNI 2HR ng/L  --  77*  --    HSTNI D2 ng/L  --  4  --    HS TNI 4HR ng/L 65*  --   --    HSTNI D4 ng/L -8  --   --                                                      Results from last 7 days   Lab Units 05/14/23  1532   LIPASE u/L 12                                                               ED Treatment:   Medication Administration from 05/14/2023 1509 to 05/14/2023 1805       Date/Time Order Dose Route Action Action by Comments     05/14/2023 1533 EDT ketorolac (TORADOL) injection 15 mg 15 mg Intravenous Given Last Carlton, RN --     05/14/2023 1532 EDT losartan (COZAAR) tablet 50 mg 50 mg Oral Given Last Carlton RN --     05/14/2023 1532 EDT hydrochlorothiazide (HYDRODIURIL) tablet 12 5 mg 12 5 mg Oral Given Last Carlton, CLAIR --     05/14/2023 1633 EDT potassium chloride (K-DUR,KLOR-CON) CR tablet 40 mEq 40 mEq Oral Given Last Carlton, CLAIR --     05/14/2023 1633 EDT labetalol (NORMODYNE) injection 10 mg 10 mg Intravenous Given Last Carlton RN --     05/14/2023 1722 EDT aspirin chewable tablet 324 mg 324 mg Oral Given Last Carlton RN --        Past Medical History:   Diagnosis Date   • Asthma    • Hypertension    • MI, acute, non ST segment elevation (HCC)      Present on Admission:  • (Resolved) Hypokalemia  • Sleep apnea  • Hypertension  • Asthma      Admitting Diagnosis: Hypokalemia [E87 6]  Chest pain [R07 9]  Elevated troponin [R77 8]  Hypertensive urgency [I16 0]  Age/Sex: 41 y o  female  Admission Orders:  Scheduled Medications:  acetaminophen, 975 mg, Oral, Q8H [address]  carvedilol, 12 5 mg, Oral, BID With Meals  enoxaparin, 40 mg, Subcutaneous, Daily  loratadine, 10 mg, Oral, Daily  [START ON 5/16/2023] losartan, 50 mg, Oral, Daily  nicotine, 1 patch, Transdermal, Daily  [START ON 5/16/2023] spironolactone, 25 mg, Oral, Daily      Continuous IV Infusions:     PRN Meds:  albuterol, 2 5 mg, Nebulization, Q6H PRN  aluminum-magnesium hydroxide-simethicone, 30 mL, Oral, Q6H PRN  labetalol, 10 mg, Intravenous, Q4H PRN  menthol-methyl salicylate, 1 application  , Apply externally, 4x Daily PRN  nicotine polacrilex, 2 mg, Oral, Q2H PRN  ondansetron, 4 mg, Intravenous, Q6H PRN  oxyCODONE, 5 mg, Oral, Q6H PRN        IP CONSULT TO CARDIOLOGY    Network Utilization Review Department  ATTENTION: Please call with any questions or concerns to [phone] and carefully listen to the prompts so that you are directed to the right person  All voicemails are confidential   Umm Wolf all requests for admission clinical reviews, approved or denied determinations and any other requests to dedicated fax number below belonging to the campus where the patient is receiving treatment   List of dedicated fax numbers for the Facilities:  [address] DENIALS (Administrative/Medical Necessity) [phone]   [address] [place] [address] (Maternity/NICU/Pediatrics) Katerina Bragg [address] [phone]   [facility] [phone]   [place] [phone]   [facility] [address] [facility] [place] [place] [address] [facility] [place] [address] [facility] [address] [phone]

## 2023-05-15 NOTE — PLAN OF CARE
Problem: PAIN - ADULT  Goal: Verbalizes/displays adequate comfort level or baseline comfort level  Description: Interventions:  - Encourage patient to monitor pain and request assistance  - Assess pain using appropriate pain scale  - Administer analgesics based on type and severity of pain and evaluate response  - Implement non-pharmacological measures as appropriate and evaluate response  - Consider cultural and social influences on pain and pain management  - Notify physician/advanced practitioner if interventions unsuccessful or patient reports new pain  Outcome: Progressing     Problem: INFECTION - ADULT  Goal: Absence or prevention of progression during hospitalization  Description: INTERVENTIONS:  - Assess and monitor for signs and symptoms of infection  - Monitor lab/diagnostic results  - Monitor all insertion sites, i e  indwelling lines, tubes, and drains  - Monitor endotracheal if appropriate and nasal secretions for changes in amount and color  - [place] appropriate cooling/warming therapies per order  - Administer medications as ordered  - Instruct and encourage patient and family to use good hand hygiene technique  - Identify and instruct in appropriate isolation precautions for identified infection/condition  Outcome: Progressing  Goal: Absence of fever/infection during neutropenic period  Description: INTERVENTIONS:  - Monitor WBC    Outcome: Progressing     Problem: SAFETY ADULT  Goal: Patient will remain free of falls  Description: INTERVENTIONS:  - Educate patient/family on patient safety including physical limitations  - Instruct patient to call for assistance with activity   - Consult OT/PT to assist with strengthening/mobility   - Keep Call bell within reach  - Keep bed low and locked with side rails adjusted as appropriate  - Keep care items and personal belongings within reach  - Initiate and maintain comfort rounds  Outcome: Progressing  Goal: Maintain or return to baseline ADL function  Description: INTERVENTIONS:  -  Assess patient's ability to carry out ADLs; assess patient's baseline for ADL function and identify physical deficits which impact ability to perform ADLs (bathing, care of mouth/teeth, toileting, grooming, dressing, etc )  - Assess/evaluate cause of self-care deficits   - Assess range of motion  - Assess patient's mobility; develop plan if impaired  - Assess patient's need for assistive devices and provide as appropriate  - Encourage maximum independence but intervene and supervise when necessary  - Involve family in performance of ADLs  - Assess for home care needs following discharge   - Consider OT consult to assist with ADL evaluation and planning for discharge  - Provide patient education as appropriate  Outcome: Progressing  Goal: Maintains/Returns to pre admission functional level  Description: INTERVENTIONS:  - Perform BMAT or MOVE assessment daily    - Set and communicate daily mobility goal to care team and patient/family/caregiver     - Collaborate with rehabilitation services on mobility goals if consulted  - Out of bed for toileting  - Record patient progress and toleration of activity level   Outcome: Progressing     Problem: DISCHARGE PLANNING  Goal: Discharge to home or other facility with appropriate resources  Description: INTERVENTIONS:  - Identify barriers to discharge w/patient and caregiver  - Arrange for needed discharge resources and transportation as appropriate  - Identify discharge learning needs (meds, wound care, etc )  - Arrange for interpretive services to assist at discharge as needed  - Refer to Case Management Department for coordinating discharge planning if the patient needs post-hospital services based on physician/advanced practitioner order or complex needs related to functional status, cognitive ability, or social support system  Outcome: Progressing     Problem: Knowledge Deficit  Goal: Patient/family/caregiver demonstrates understanding of disease process, treatment plan, medications, and discharge instructions  Description: Complete learning assessment and assess knowledge base    Interventions:  - Provide teaching at level of understanding  - Provide teaching via preferred learning methods  Outcome: Progressing     Problem: CARDIOVASCULAR - ADULT  Goal: Maintains optimal cardiac output and hemodynamic stability  Description: INTERVENTIONS:  - Monitor I/O, vital signs and rhythm  - Monitor for S/S and trends of decreased cardiac output  - Administer and titrate ordered vasoactive medications to optimize hemodynamic stability  - Assess quality of pulses, skin color and temperature  - Assess for signs of decreased coronary artery perfusion  - Instruct patient to report change in severity of symptoms  Outcome: Progressing  Goal: Absence of cardiac dysrhythmias or at baseline rhythm  Description: INTERVENTIONS:  - Continuous cardiac monitoring, vital signs, obtain 12 lead EKG if ordered  - Administer antiarrhythmic and heart rate control medications as ordered  - Monitor electrolytes and administer replacement therapy as ordered  Outcome: Progressing     Problem: MUSCULOSKELETAL - ADULT  Goal: Maintain or return mobility to safest level of function  Description: INTERVENTIONS:  - Assess patient's ability to carry out ADLs; assess patient's baseline for ADL function and identify physical deficits which impact ability to perform ADLs (bathing, care of mouth/teeth, toileting, grooming, dressing, etc )  - Assess/evaluate cause of self-care deficits   - Assess range of motion  - Assess patient's mobility  - Assess patient's need for assistive devices and provide as appropriate  - Encourage maximum independence but intervene and supervise when necessary  - Involve family in performance of ADLs  - Assess for home care needs following discharge   - Consider OT consult to assist with ADL evaluation and planning for discharge  - Provide patient education as appropriate  Outcome: Progressing  Goal: Maintain proper alignment of affected body part  Description: INTERVENTIONS:  - Support, maintain and protect limb and body alignment  - Provide patient/ family with appropriate education  Outcome: Progressing

## 2023-05-15 NOTE — PLAN OF CARE
Problem: PAIN - ADULT  Goal: Verbalizes/displays adequate comfort level or baseline comfort level  Description: Interventions:  - Encourage patient to monitor pain and request assistance  - Assess pain using appropriate pain scale  - Administer analgesics based on type and severity of pain and evaluate response  - Implement non-pharmacological measures as appropriate and evaluate response  - Consider cultural and social influences on pain and pain management  - Notify physician/advanced practitioner if interventions unsuccessful or patient reports new pain  Outcome: Progressing     Problem: INFECTION - ADULT  Goal: Absence or prevention of progression during hospitalization  Description: INTERVENTIONS:  - Assess and monitor for signs and symptoms of infection  - Monitor lab/diagnostic results  - Monitor all insertion sites, i e  indwelling lines, tubes, and drains  - Monitor endotracheal if appropriate and nasal secretions for changes in amount and color  - [place] appropriate cooling/warming therapies per order  - Administer medications as ordered  - Instruct and encourage patient and family to use good hand hygiene technique  - Identify and instruct in appropriate isolation precautions for identified infection/condition  Outcome: Progressing  Goal: Absence of fever/infection during neutropenic period  Description: INTERVENTIONS:  - Monitor WBC    Outcome: Progressing     Problem: SAFETY ADULT  Goal: Patient will remain free of falls  Description: INTERVENTIONS:  - Educate patient/family on patient safety including physical limitations  - Instruct patient to call for assistance with activity   - Consult OT/PT to assist with strengthening/mobility   - Keep Call bell within reach  - Keep bed low and locked with side rails adjusted as appropriate  - Keep care items and personal belongings within reach  - Initiate and maintain comfort rounds  - Make Fall Risk Sign visible to staff  - Offer Toileting every  Hours, in advance of need  - Initiate/Maintain alarm  - Obtain necessary fall risk management equipment:   - Apply yellow socks and bracelet for high fall risk patients  - Consider moving patient to room near nurses station  Outcome: Progressing  Goal: Maintain or return to baseline ADL function  Description: INTERVENTIONS:  -  Assess patient's ability to carry out ADLs; assess patient's baseline for ADL function and identify physical deficits which impact ability to perform ADLs (bathing, care of mouth/teeth, toileting, grooming, dressing, etc )  - Assess/evaluate cause of self-care deficits   - Assess range of motion  - Assess patient's mobility; develop plan if impaired  - Assess patient's need for assistive devices and provide as appropriate  - Encourage maximum independence but intervene and supervise when necessary  - Involve family in performance of ADLs  - Assess for home care needs following discharge   - Consider OT consult to assist with ADL evaluation and planning for discharge  - Provide patient education as appropriate  Outcome: Progressing  Goal: Maintains/Returns to pre admission functional level  Description: INTERVENTIONS:  - Perform BMAT or MOVE assessment daily    - Set and communicate daily mobility goal to care team and patient/family/caregiver  - Collaborate with rehabilitation services on mobility goals if consulted  - Perform Range of Motion  times a day  - Reposition patient every  hours    - Dangle patient  times a day  - Stand patient  times a day  - Ambulate patient  times a day  - Out of bed to chair  times a day   - Out of bed for meal times a day  - Out of bed for toileting  - Record patient progress and toleration of activity level   Outcome: Progressing     Problem: DISCHARGE PLANNING  Goal: Discharge to home or other facility with appropriate resources  Description: INTERVENTIONS:  - Identify barriers to discharge w/patient and caregiver  - Arrange for needed discharge resources and transportation as appropriate  - Identify discharge learning needs (meds, wound care, etc )  - Arrange for interpretive services to assist at discharge as needed  - Refer to Case Management Department for coordinating discharge planning if the patient needs post-hospital services based on physician/advanced practitioner order or complex needs related to functional status, cognitive ability, or social support system  Outcome: Progressing     Problem: Knowledge Deficit  Goal: Patient/family/caregiver demonstrates understanding of disease process, treatment plan, medications, and discharge instructions  Description: Complete learning assessment and assess knowledge base    Interventions:  - Provide teaching at level of understanding  - Provide teaching via preferred learning methods  Outcome: Progressing     Problem: CARDIOVASCULAR - ADULT  Goal: Maintains optimal cardiac output and hemodynamic stability  Description: INTERVENTIONS:  - Monitor I/O, vital signs and rhythm  - Monitor for S/S and trends of decreased cardiac output  - Administer and titrate ordered vasoactive medications to optimize hemodynamic stability  - Assess quality of pulses, skin color and temperature  - Assess for signs of decreased coronary artery perfusion  - Instruct patient to report change in severity of symptoms  Outcome: Progressing  Goal: Absence of cardiac dysrhythmias or at baseline rhythm  Description: INTERVENTIONS:  - Continuous cardiac monitoring, vital signs, obtain 12 lead EKG if ordered  - Administer antiarrhythmic and heart rate control medications as ordered  - Monitor electrolytes and administer replacement therapy as ordered  Outcome: Progressing     Problem: MUSCULOSKELETAL - ADULT  Goal: Maintain or return mobility to safest level of function  Description: INTERVENTIONS:  - Assess patient's ability to carry out ADLs; assess patient's baseline for ADL function and identify physical deficits which impact ability to perform ADLs (bathing, care of mouth/teeth, toileting, grooming, dressing, etc )  - Assess/evaluate cause of self-care deficits   - Assess range of motion  - Assess patient's mobility  - Assess patient's need for assistive devices and provide as appropriate  - Encourage maximum independence but intervene and supervise when necessary  - Involve family in performance of ADLs  - Assess for home care needs following discharge   - Consider OT consult to assist with ADL evaluation and planning for discharge  - Provide patient education as appropriate  Outcome: Progressing  Goal: Maintain proper alignment of affected body part  Description: INTERVENTIONS:  - Support, maintain and protect limb and body alignment  - Provide patient/ family with appropriate education  Outcome: Progressing

## 2023-05-16 VITALS
DIASTOLIC BLOOD PRESSURE: 90 MMHG | SYSTOLIC BLOOD PRESSURE: 156 MMHG | OXYGEN SATURATION: 98 % | HEART RATE: 65 BPM | HEIGHT: 60 IN | WEIGHT: 275 LBS | RESPIRATION RATE: 18 BRPM | BODY MASS INDEX: 53.99 KG/M2 | TEMPERATURE: 98.4 F

## 2023-05-16 PROBLEM — I42.9 CARDIOMYOPATHY (HCC): Status: ACTIVE | Noted: 2023-05-16

## 2023-05-16 LAB
ANION GAP SERPL CALCULATED.3IONS-SCNC: 6 MMOL/L (ref 4–13)
BUN SERPL-MCNC: 9 MG/DL (ref 5–25)
CALCIUM SERPL-MCNC: 8.7 MG/DL (ref 8.4–10.2)
CHLORIDE SERPL-SCNC: 103 MMOL/L (ref 96–108)
CO2 SERPL-SCNC: 26 MMOL/L (ref 21–32)
CREAT SERPL-MCNC: 0.64 MG/DL (ref 0.6–1.3)
ERYTHROCYTE [DISTWIDTH] IN BLOOD BY AUTOMATED COUNT: 17.1 % (ref 11.6–15.1)
GFR SERPL CREATININE-BSD FRML MDRD: 111 ML/MIN/1.73SQ M
GLUCOSE P FAST SERPL-MCNC: 105 MG/DL (ref 65–99)
GLUCOSE SERPL-MCNC: 105 MG/DL (ref 65–140)
HCT VFR BLD AUTO: 36.1 % (ref 34.8–46.1)
HGB BLD-MCNC: 11.4 G/DL (ref 11.5–15.4)
MCH RBC QN AUTO: 26.6 PG (ref 26.8–34.3)
MCHC RBC AUTO-ENTMCNC: 31.6 G/DL (ref 31.4–37.4)
MCV RBC AUTO: 84 FL (ref 82–98)
PLATELET # BLD AUTO: 319 THOUSANDS/UL (ref 149–390)
PMV BLD AUTO: 9.4 FL (ref 8.9–12.7)
POTASSIUM SERPL-SCNC: 4 MMOL/L (ref 3.5–5.3)
RBC # BLD AUTO: 4.29 MILLION/UL (ref 3.81–5.12)
SODIUM SERPL-SCNC: 135 MMOL/L (ref 135–147)
WBC # BLD AUTO: 6.52 THOUSAND/UL (ref 4.31–10.16)

## 2023-05-16 RX ORDER — SPIRONOLACTONE 25 MG/1
25 TABLET ORAL DAILY
Qty: 30 TABLET | Refills: 0 | Status: SHIPPED | OUTPATIENT
Start: 2023-05-17

## 2023-05-16 RX ORDER — CARVEDILOL 12.5 MG/1
12.5 TABLET ORAL 2 TIMES DAILY WITH MEALS
Qty: 60 TABLET | Refills: 0 | Status: SHIPPED | OUTPATIENT
Start: 2023-05-16

## 2023-05-16 RX ADMIN — SPIRONOLACTONE 25 MG: 25 TABLET, FILM COATED ORAL at 08:24

## 2023-05-16 RX ADMIN — OXYCODONE 5 MG: 5 TABLET ORAL at 01:22

## 2023-05-16 RX ADMIN — LORATADINE 10 MG: 10 TABLET ORAL at 08:24

## 2023-05-16 RX ADMIN — ACETAMINOPHEN 325MG 975 MG: 325 TABLET ORAL at 04:56

## 2023-05-16 RX ADMIN — CARVEDILOL 12.5 MG: 12.5 TABLET, FILM COATED ORAL at 08:23

## 2023-05-16 RX ADMIN — Medication 1 PATCH: at 08:24

## 2023-05-16 RX ADMIN — ENOXAPARIN SODIUM 40 MG: 100 INJECTION SUBCUTANEOUS at 08:24

## 2023-05-16 RX ADMIN — LOSARTAN POTASSIUM 50 MG: 50 TABLET, FILM COATED ORAL at 08:23

## 2023-05-16 NOTE — ASSESSMENT & PLAN NOTE
Patient obtained echocardiogram yesterday revealing mildly reduced ejection fraction 35%  · In light of patient's NSVT noted on telemetry overnight, patient will be discharged with a LifeVest  · Initiated on [place], sent to pharmacy and price checked  · Continue beta-blocker  · Outpatient echocardiogram to be obtained after optimization of medications  · Plasma metanephrines, renin, aldosterone still pending and to be followed up by cardiology  · Outpatient cardiac MRI to be obtained

## 2023-05-16 NOTE — ASSESSMENT & PLAN NOTE
Patient presents with substernal, left shoulder, left scapular pain as constant and radiates in the left arm  · EKG nonspecific  · Troponin elevated likely related to hypertensive urgency  · Atypical for cardiac pain, worse with palpation and with movement    More likely musculoskeletal  · Patient with episode of NSVT overnight  · Cardiology then consulted  · Initiated on Coreg 12 5 mg twice daily  · Reading, aldosterone, metanephrine levels pending, will be followed up by the cardiology team  · Echocardiogram revealing an EF of 35%  · Initiated on Entresto, sent to pharmacy  · In light of patient's nonsustained V  tach on telemetry, LifeVest ordered

## 2023-05-16 NOTE — DISCHARGE SUMMARY
[facility]  Discharge- Marky Maine 1980, 41 y o  female MRN: [id number]  Unit/Bed#: E4 -01 Encounter: [id number]  Primary Care Provider: Paloma Freeman PA-C   Date and time admitted to hospital: 5/14/2023  3:10 PM    * Chest pain  Assessment & Plan  Patient presents with substernal, left shoulder, left scapular pain as constant and radiates in the left arm  · EKG nonspecific  · Troponin elevated likely related to hypertensive urgency  · Atypical for cardiac pain, worse with palpation and with movement    More likely musculoskeletal  · Patient with episode of NSVT overnight  · Cardiology then consulted  · Initiated on Coreg 12 5 mg twice daily  · Reading, aldosterone, metanephrine levels pending, will be followed up by the cardiology team  · Echocardiogram revealing an EF of 35%  · Initiated on Entresto, sent to pharmacy  · In light of patient's nonsustained V  tach on telemetry, LifeVest ordered    Cardiomyopathy [facility])  Assessment & Plan  Patient obtained echocardiogram yesterday revealing mildly reduced ejection fraction 35%  · In light of patient's NSVT noted on telemetry overnight, patient will be discharged with a LifeVest  · Initiated on Cite [place], sent to pharmacy and price checked  · Continue beta-blocker  · Outpatient echocardiogram to be obtained after optimization of medications  · Plasma metanephrines, renin, aldosterone still pending and to be followed up by cardiology  · Outpatient cardiac MRI to be obtained    Back pain  Assessment & Plan  Presents with skeletal back pain/spasm  · Denies any trauma or inciting incident  · Tenderness to palpation of paraspinal thoracic spinal musculature, range of motion of left shoulder  · Multimodal pain regimen    BMI 50 0-59 9, adult (HCC)  Assessment & Plan  BMI 53 71    Asthma  Assessment & Plan  Patient is not in acute exacerbation  · Continue albuterol as needed    Hypertension  Assessment & Plan  With hypertensive urgency on arrival to the ED  · Not taking medications at home  · Maintained on losartan 50 mg daily, HCTZ 12 5 mg daily   · Initiated on carvedilol 12 5 mg twice daily for episode of NSVT  · Started on spironolactone  · Started on Cite [place]      Medical Problems     Resolved Problems  Date Reviewed: 5/15/2023          Resolved    Hypokalemia 5/15/2023     Resolved by  Rimma Jacobo PA-C        Discharging Physician / Practitioner: Rimma Jacobo PA-C  PCP: Anu Bass PA-C  Admission Date:   Admission Orders (From admission, onward)     Ordered        05/14/23 1712  Place in Observation  Once                      Discharge Date: 05/16/23    Consultations During Hospital Stay:  · Cardiology    Procedures Performed:   · None    Significant Findings / Test Results:   XR chest 2 views  Result Date: 5/15/2023    Impression: No acute cardiopulmonary disease  Findings are stable     Echo complete w/ contrast if indicated  Result Date: 5/15/2023    Narrative: •  Left Ventricle: Left ventricular cavity size is dilated  Wall thickness is mildly increased  The left ventricular ejection fraction is 35%  Systolic function is moderately reduced  There is severe global hypokinesis  •  Right Ventricle: Right ventricular cavity size is mildly dilated  Compared to prior echocardiogram dated 7/26/2020, there appears to be a reduction in left ventricular systolic function  Incidental Findings:   · none    Test Results Pending at Discharge (will require follow up): · Renin, aldosterone, metanephrine     Outpatient Tests Requested:  · Outpatient cardiac MRI    Complications: None    Reason for Admission: Chest pain    Hospital Course:   Nestor Encarnacion is a 41 y o  female patient with a past with history of hypertension, KEY[place], tobacco use, asthma who originally presented to the hospital on 5/14/2023 due to chest pain    According to the patient, she had approximately 4-day history of chest pain radiation into her jaw, back, and left arm  In the ED, troponin t was mildly elevated however patient was significantly hypertensive  Patient was admitted for ACS rule out    During her hospital stay, she was noted to have a 39 beat run of nonsustained ventricular tachycardia on telemetry  The patient was asymptomatic at the time  Echocardiogram was obtained and cardiology was consulted  Echocardiogram did reveal new cardiomyopathy with a EF of 35%  In light of the patient's episode on telemetry, LifeVest was recommended  LifeVest was fitted to the patient and patient was discharged with LifeVest   Additionally, patient was medically optimized with the blocker as well as Entresto on discharge and will follow-up in the outpatient setting with cardiology  She will obtain an outpatient cardiac MRI for further evaluation  Please see above list of diagnoses and related plan for additional information  Condition at Discharge: stable    Discharge Day Visit / Exam:   Subjective: The patient is seen resting comfortably in bed, she offers no complaints at this time  Denies any lightheadedness or dizziness  Denies any chest pain, shortness of breath, she understands the importance of her LifeVest, she states that she was fitted for it yesterday  Return precautions were discussed with the patient at length and she will follow-up in the outpatient setting with cardiology  Vitals: Blood Pressure: 156/90 (05/16/23 1135)  Pulse: 65 (05/16/23 1135)  Temperature: 98 4 °F (36 9 °C) (05/16/23 1135)  Temp Source: Tympanic (05/16/23 1135)  Respirations: 18 (05/16/23 1135)  Height: 5' (152 4 cm) (05/15/23 1138)  Weight - Scale: 125 kg (275 lb) (05/15/23 1138)  SpO2: 98 % (05/16/23 1135)  Exam:   Physical Exam  Vitals and nursing note reviewed  Constitutional:       General: She is not in acute distress  Appearance: Normal appearance  She is obese  She is not ill-appearing, toxic-appearing or diaphoretic     HENT:      Head: Normocephalic and atraumatic  Cardiovascular:      Rate and Rhythm: Normal rate and regular rhythm  Heart sounds: No murmur heard  No friction rub  No gallop  Pulmonary:      Effort: Pulmonary effort is normal  No respiratory distress  Breath sounds: Normal breath sounds  No wheezing, rhonchi or rales  Abdominal:      General: Abdomen is flat  Bowel sounds are normal  There is no distension  Palpations: Abdomen is soft  Tenderness: There is no abdominal tenderness  Musculoskeletal:      Right lower leg: No edema  Left lower leg: No edema  Skin:     General: Skin is warm and dry  Coloration: Skin is not jaundiced or pale  Neurological:      General: No focal deficit present  Mental Status: She is alert  Mental status is at baseline  Discussion with Family: Updated  (daughter) at bedside  Discharge instructions/Information to patient and family:   See after visit summary for information provided to patient and family  Provisions for Follow-Up Care:  See after visit summary for information related to follow-up care and any pertinent home health orders  Disposition:   Home    Planned Readmission: n/a     Discharge Statement:  I spent 40 minutes discharging the patient  This time was spent on the day of discharge  I had direct contact with the patient on the day of discharge  Greater than 50% of the total time was spent examining patient, answering all patient questions, arranging and discussing plan of care with patient as well as directly providing post-discharge instructions  Additional time then spent on discharge activities  Discharge Medications:  See after visit summary for reconciled discharge medications provided to patient and/or family        **Please Note: This note may have been constructed using a voice recognition system**

## 2023-05-16 NOTE — PLAN OF CARE
Problem: PAIN - ADULT  Goal: Verbalizes/displays adequate comfort level or baseline comfort level  Description: Interventions:  - Encourage patient to monitor pain and request assistance  - Assess pain using appropriate pain scale  - Administer analgesics based on type and severity of pain and evaluate response  - Implement non-pharmacological measures as appropriate and evaluate response  - Consider cultural and social influences on pain and pain management  - Notify physician/advanced practitioner if interventions unsuccessful or patient reports new pain  Outcome: Progressing     Problem: SAFETY ADULT  Goal: Patient will remain free of falls  Description: INTERVENTIONS:  - Educate patient/family on patient safety including physical limitations  - Instruct patient to call for assistance with activity   - Consult OT/PT to assist with strengthening/mobility   - Keep Call bell within reach  - Keep bed low and locked with side rails adjusted as appropriate  - Keep care items and personal belongings within reach  - Initiate and maintain comfort rounds  - Make Fall Risk Sign visible to staff  - Offer Toileting every Hours, in advance of need  - Initiate/Maintain alarm  - Obtain necessary fall risk management equipment:   - Apply yellow socks and bracelet for high fall risk patients  - Consider moving patient to room near nurses station  Outcome: Progressing  Goal: Maintain or return to baseline ADL function  Description: INTERVENTIONS:  -  Assess patient's ability to carry out ADLs; assess patient's baseline for ADL function and identify physical deficits which impact ability to perform ADLs (bathing, care of mouth/teeth, toileting, grooming, dressing, etc )  - Assess/evaluate cause of self-care deficits   - Assess range of motion  - Assess patient's mobility; develop plan if impaired  - Assess patient's need for assistive devices and provide as appropriate  - Encourage maximum independence but intervene and supervise when necessary  - Involve family in performance of ADLs  - Assess for home care needs following discharge   - Consider OT consult to assist with ADL evaluation and planning for discharge  - Provide patient education as appropriate  Outcome: Progressing  Goal: Maintains/Returns to pre admission functional level  Description: INTERVENTIONS:  - Perform BMAT or MOVE assessment daily    - Set and communicate daily mobility goal to care team and patient/family/caregiver     - Collaborate with rehabilitation services on mobility goals if consulted    - Record patient progress and toleration of activity level   Outcome: Progressing

## 2023-05-16 NOTE — PLAN OF CARE
Problem: PAIN - ADULT  Goal: Verbalizes/displays adequate comfort level or baseline comfort level  Description: Interventions:  - Encourage patient to monitor pain and request assistance  - Assess pain using appropriate pain scale  - Administer analgesics based on type and severity of pain and evaluate response  - Implement non-pharmacological measures as appropriate and evaluate response  - Consider cultural and social influences on pain and pain management  - Notify physician/advanced practitioner if interventions unsuccessful or patient reports new pain  Outcome: Progressing     Problem: INFECTION - ADULT  Goal: Absence or prevention of progression during hospitalization  Description: INTERVENTIONS:  - Assess and monitor for signs and symptoms of infection  - Monitor lab/diagnostic results  - Monitor all insertion sites, i e  indwelling lines, tubes, and drains  - Monitor endotracheal if appropriate and nasal secretions for changes in amount and color  - [place] appropriate cooling/warming therapies per order  - Administer medications as ordered  - Instruct and encourage patient and family to use good hand hygiene technique  - Identify and instruct in appropriate isolation precautions for identified infection/condition  Outcome: Progressing  Goal: Absence of fever/infection during neutropenic period  Description: INTERVENTIONS:  - Monitor WBC    Outcome: Progressing     Problem: SAFETY ADULT  Goal: Patient will remain free of falls  Description: INTERVENTIONS:  - Educate patient/family on patient safety including physical limitations  - Instruct patient to call for assistance with activity   - Consult OT/PT to assist with strengthening/mobility   - Keep Call bell within reach  - Keep bed low and locked with side rails adjusted as appropriate  - Keep care items and personal belongings within reach  - Initiate and maintain comfort rounds  Outcome: Progressing  Goal: Maintain or return to baseline ADL function  Description: INTERVENTIONS:  -  Assess patient's ability to carry out ADLs; assess patient's baseline for ADL function and identify physical deficits which impact ability to perform ADLs (bathing, care of mouth/teeth, toileting, grooming, dressing, etc )  - Assess/evaluate cause of self-care deficits   - Assess range of motion  - Assess patient's mobility; develop plan if impaired  - Assess patient's need for assistive devices and provide as appropriate  - Encourage maximum independence but intervene and supervise when necessary  - Involve family in performance of ADLs  - Assess for home care needs following discharge   - Consider OT consult to assist with ADL evaluation and planning for discharge  - Provide patient education as appropriate  Outcome: Progressing  Goal: Maintains/Returns to pre admission functional level  Description: INTERVENTIONS:  - Perform BMAT or MOVE assessment daily    - Set and communicate daily mobility goal to care team and patient/family/caregiver     - Collaborate with rehabilitation services on mobility goals if consulted  - Out of bed for toileting  - Record patient progress and toleration of activity level   Outcome: Progressing     Problem: DISCHARGE PLANNING  Goal: Discharge to home or other facility with appropriate resources  Description: INTERVENTIONS:  - Identify barriers to discharge w/patient and caregiver  - Arrange for needed discharge resources and transportation as appropriate  - Identify discharge learning needs (meds, wound care, etc )  - Arrange for interpretive services to assist at discharge as needed  - Refer to Case Management Department for coordinating discharge planning if the patient needs post-hospital services based on physician/advanced practitioner order or complex needs related to functional status, cognitive ability, or social support system  Outcome: Progressing     Problem: Knowledge Deficit  Goal: Patient/family/caregiver demonstrates understanding of disease process, treatment plan, medications, and discharge instructions  Description: Complete learning assessment and assess knowledge base    Interventions:  - Provide teaching at level of understanding  - Provide teaching via preferred learning methods  Outcome: Progressing     Problem: CARDIOVASCULAR - ADULT  Goal: Maintains optimal cardiac output and hemodynamic stability  Description: INTERVENTIONS:  - Monitor I/O, vital signs and rhythm  - Monitor for S/S and trends of decreased cardiac output  - Administer and titrate ordered vasoactive medications to optimize hemodynamic stability  - Assess quality of pulses, skin color and temperature  - Assess for signs of decreased coronary artery perfusion  - Instruct patient to report change in severity of symptoms  Outcome: Progressing  Goal: Absence of cardiac dysrhythmias or at baseline rhythm  Description: INTERVENTIONS:  - Continuous cardiac monitoring, vital signs, obtain 12 lead EKG if ordered  - Administer antiarrhythmic and heart rate control medications as ordered  - Monitor electrolytes and administer replacement therapy as ordered  Outcome: Progressing     Problem: MUSCULOSKELETAL - ADULT  Goal: Maintain or return mobility to safest level of function  Description: INTERVENTIONS:  - Assess patient's ability to carry out ADLs; assess patient's baseline for ADL function and identify physical deficits which impact ability to perform ADLs (bathing, care of mouth/teeth, toileting, grooming, dressing, etc )  - Assess/evaluate cause of self-care deficits   - Assess range of motion  - Assess patient's mobility  - Assess patient's need for assistive devices and provide as appropriate  - Encourage maximum independence but intervene and supervise when necessary  - Involve family in performance of ADLs  - Assess for home care needs following discharge   - Consider OT consult to assist with ADL evaluation and planning for discharge  - Provide patient education as appropriate  Outcome: Progressing  Goal: Maintain proper alignment of affected body part  Description: INTERVENTIONS:  - Support, maintain and protect limb and body alignment  - Provide patient/ family with appropriate education  Outcome: Progressing

## 2023-05-16 NOTE — ASSESSMENT & PLAN NOTE
With hypertensive urgency on arrival to the ED  · Not taking medications at home  · Maintained on losartan 50 mg daily, HCTZ 12 5 mg daily   · Initiated on carvedilol 12 5 mg twice daily for episode of NSVT  · Started on spironolactone  · Started on [place]

## 2023-05-16 NOTE — PROGRESS NOTES
Cardiology         Progress Note - Cardiology   Salo Greco 41 y o  female MRN: [id number]  Unit/Bed#: E4 -01 Encounter: [id number]          Assessment/Recommendations/Discussion:   1  Noncardiac CP  2  Moderate to severe cardiomyopathy, LVEF ~ 35%  3  NSVT, 39 beats, monomorphic, RVOT morphology  4  [address] history 7/27/2020--?myocarditis  5  Morbid obesity  6  OSAS        · Very low suspicion of anginal chest pain, clearly reproducible with palpation, improved  · Echocardiogram yesterday with mildly reduced left ventricle ejection fraction, estimated 35%, although images are poor due to body habitus  Prior echocardiogram was reviewed from 2020 and I suspect ejection fraction at that time was a bit overestimated  Recommend outpatient cardiac MRI to better evaluate left ventricular systolic function and to evaluate for certain etiologies of nonischemic cardiomyopathy  · Patient tolerating spironolactone in place of HCTZ  Continue carvedilol  Start Entresto, seems to be affordable  · Patient already fitted for a LifeVest  · 39 beat run of nonsustained ventricular tachycardia noted on telemetry overnight yesterday  None since then  Continue beta-blocker and LifeVest support  Recommend repeating echocardiogram after optimization of medications in outpatient setting  Plasma metanephrines, renin, aldosterone STILL PENDING  · Okay for discharge today from cardiac standpoint  Will arrange f/u (office tasked)                  Subjective: Patient seen and examined, feels well, no complaints                Physical Exam:  GEN:  NAD  HEENT:  MMM, NCAT, pink conjunctiva, EOMI, nonicteric sclera  CV:  NO JVD/HJR, RR, NO M/R/G, +S1/S2, NO PARASTERNAL HEAVE/THRILL, NO LE EDEMA, NO HEPATIC SYSTOLIC PULSATION, WARM EXTREMITIES  RESP:  CTAB/L  ABD:  SOFT, NT, NO GROSS ORGANOMEGALY        Vitals:   /97 (BP Location: Left arm)   Pulse 66   Temp 98 5 °F (36 9 °C) (Tympanic)   Resp 18   Ht 5' (1 524 m) Wt 125 kg (275 lb)   LMP 05/05/2023   SpO2 98%   BMI 53 71 kg/m²   Vitals:    05/14/23 1812 05/15/23 1138   Weight: 125 kg (275 lb 12 7 oz) 125 kg (275 lb)       Intake/Output Summary (Last 24 hours) at 5/16/2023 1115  Last data filed at 5/16/2023 0601  Gross per 24 hour   Intake 960 ml   Output --   Net 960 ml       TELEMETRY: NSVT  Lab Results:  Results from last 7 days   Lab Units 05/16/23  0459   WBC Thousand/uL 6 52   HEMOGLOBIN g/dL 11 4*   HEMATOCRIT % 36 1   PLATELETS Thousands/uL 319     Results from last 7 days   Lab Units 05/16/23  0459 05/15/23  0556 05/14/23  1532   POTASSIUM mmol/L 4 0   < > 3 1*   CHLORIDE mmol/L 103   < > 103   CO2 mmol/L 26   < > 27   BUN mg/dL 9   < > 8   CREATININE mg/dL 0 64   < > 0 75   CALCIUM mg/dL 8 7   < > 9 3   ALK PHOS U/L  --   --  57   ALT U/L  --   --  18   AST U/L  --   --  23    < > = values in this interval not displayed       Results from last 7 days   Lab Units 05/16/23  0459   POTASSIUM mmol/L 4 0   CHLORIDE mmol/L 103   CO2 mmol/L 26   BUN mg/dL 9   CREATININE mg/dL 0 64   CALCIUM mg/dL 8 7           Medications:    Current Facility-Administered Medications:   •  acetaminophen (TYLENOL) tablet 975 mg, 975 mg, Oral, Q8H [address], George Valencia DO, 975 mg at 05/16/23 0456  •  albuterol inhalation solution 2 5 mg, 2 5 mg, Nebulization, Q6H PRN, George Valencia DO  •  aluminum-magnesium hydroxide-simethicone (MYLANTA) oral suspension 30 mL, 30 mL, Oral, Q6H PRN, George Valencia DO  •  carvedilol (COREG) tablet 12 5 mg, 12 5 mg, Oral, BID With Meals, JOHN Light, 12 5 mg at 05/16/23 2100  •  enoxaparin (LOVENOX) subcutaneous injection 40 mg, 40 mg, Subcutaneous, Daily, George Valencia DO, 40 mg at 05/16/23 3567  •  labetalol (NORMODYNE) injection 10 mg, 10 mg, Intravenous, Q4H PRN, George Valencia DO, 10 mg at 05/14/23 1953  •  loratadine (CLARITIN) tablet 10 mg, 10 mg, Oral, Daily, George Valencia DO, 10 mg at 05/16/23 0468  •  losartan (COZAAR) tablet 50 mg, 50 mg, Oral, Daily, JOHN Light, 50 mg at 05/16/23 0823  •  menthol-methyl salicylate (BENGAY) 52-63 % cream 1 application  , 1 application  , Apply externally, 4x Daily PRN, Waylan How, DO, 1 application  at 05/15/23 1240  •  nicotine (NICODERM CQ) 21 mg/24 hr TD 24 hr patch 1 patch, 1 patch, Transdermal, Daily, Waylan How, DO, 1 patch at 05/16/23 1311  •  nicotine polacrilex (NICORETTE) gum 2 mg, 2 mg, Oral, Q2H PRN, Naomia Evelio, DO, 2 mg at 05/15/23 1238  •  ondansetron (ZOFRAN) injection 4 mg, 4 mg, Intravenous, Q6H PRN, Chuylan How, DO  •  oxyCODONE (ROXICODONE) IR tablet 5 mg, 5 mg, Oral, Q6H PRN, Chuylan How, DO, 5 mg at 05/16/23 0122  •  spironolactone (ALDACTONE) tablet 25 mg, 25 mg, Oral, Daily, JOHN Light, 25 mg at 05/16/23 9102    This note was completed in part utilizing [employer] Fluency Direct Software  Grammatical errors, random word insertions, spelling mistakes, and incomplete sentences may be an occasional consequence of this system secondary to software limitations, ambient noise, and hardware issues  If you have any questions or concerns about the content, text, or information contained within the body of this dictation, please contact the provider for clarification  Oxybutynin Pregnancy And Lactation Text: This medication is Pregnancy Category B and is considered safe during pregnancy. It is unknown if it is excreted in breast milk.

## 2023-05-17 ENCOUNTER — TELEPHONE (OUTPATIENT)
Dept: FAMILY MEDICINE CLINIC | Facility: CLINIC | Age: 43
End: 2023-05-17

## 2023-05-17 ENCOUNTER — TRANSITIONAL CARE MANAGEMENT (OUTPATIENT)
Dept: FAMILY MEDICINE CLINIC | Facility: CLINIC | Age: 43
End: 2023-05-17

## 2023-05-17 NOTE — TELEPHONE ENCOUNTER
Please schedule TCM appointment  Verify how patient is feeling and if they are in need of anything before their visit  Please send appt date and patient questions/concerns to WARREN to complete TCM

## 2023-05-17 NOTE — PROGRESS NOTES
"Advanced Heart Failure / Pulmonary Hypertension Outpatient Consultation    Mickle Crigler 41 y o  female   MRN: [id number]  Encounter: [id number]    Assessment:  Patient Active Problem List    Diagnosis Date Noted   • Cardiomyopathy ([facility] ) 05/16/2023   • Back pain 05/14/2023   • BMI 50 0-59 9, adult ([facility] ) 07/28/2020   • Chest pain 07/25/2020   • Asthma 07/25/2020   • Nonintractable headache 12/08/2019   • Hypertension 12/08/2019   • Sleep apnea 11/24/2019   • PVC (premature ventricular contraction) 11/23/2019   • Tobacco abuse 11/23/2019   • Abnormal urinalysis 11/23/2019   • Allergic rhinitis 06/10/2019       Today's Plan:  • Increase Entresto to 49-51 mg BID  • Check BMP, TSH, iron panel, lipid panel, and hemoglobin A1c  • Will price check Jardiance  Consider adding at next visit if affordable  • cMRI ordered; patient advised to schedule  PRN Xanax x1 in imaging prescribed  • If cMRI suggestive of ischemia, would recommend repeating LHC (last completed in 07/2020 when LVEF 50%) given multiple RF for CAD and strong family history of heart disease  • Plan to reassess LVEF with limited TTE after 3-4 months of uninterrupted and optimzed HF GDMT (earliest mid August 2023)  • Referrals sent to cardiac rehab, sleep medicine, and tobacco cessation  • Prescription for home BP cuff sent to pharmacy  • Provided patient with \"Living With Heart Failure\" booklet  • Work note provided in office today  Plan:  Newly diagnosed HFrEF; LVEF 35%; LVIDd 5 9 cm; NYHA II; ACC/AHA Stage C   Etiology: unclear, presumed nonischemic based on LHC from 07/2020  Possible HTN? Plasma metanephrines and aldosterone WNL  No recent lipid panel, HgA1c, TSH, iron panel, or UDS in EMR  Multiple family members with HTN/DM/unspecified heart disease  Mother with possible stenting? Maternal aunt passed suddenly in her sleep at age 28  TTE 07/26/2020: LVEF 50%  Grade 1 DD  \"Possible\" inferior wall hypokinesis  Normal RV   Valves not " "well visualized  [facility] 07/27/2020: normal coronaries; no obstructive disease  LVEF by contrast ventriculography 50%  TTE 05/15/2023: LVEF 35%  LVIDd 5 9 cm  Severe global hypokinesis  No valvular disease noted  Weight of 275 lbs on 05/15 (day before discharge)  Today, weighs 272 lbs  Most recent BMP from 05/16/2023: sodium 135; potassium 4 0; BUN 9; creatinine 0 64; eGFR 111  Pharmacotherapies / Neurohormonal Blockade:  --Beta Blocker: carvedilol 12 5 mg q12 hours  --ARNi / ACEi / ARB: Entresto 49-51 mg BID  --Aldosterone Antagonist: spironolactone 25 mg daily  --SGLT2 Inhibitor: No    --Diuretic: None  Sudden Cardiac Death Risk Reduction:  --LVEF 35%  Wearing LifeVest    --Plan to reassess LVEF with limited TTE after 3-4 months of uninterrupted and optimzed HF GDMT (earliest mid August 2023)  Electrical Resynchronization:  --Candidacy for BiV device: narrow QRS  Advanced Therapies: Will continue to monitor  Hypertension   BP of 150/86 mmHg in office today  Continue on medications as above  History of type 2 MI +/- MINOCA   Occurring in 07/2020: presented with chest pain and found to have elevated troponins with \"possible\" inferior wall hypokinesis on TTE and ST/T waves changes suggestive of inferolateral ischemia on EKG in ED  [facility] 07/27/2020: normal coronaries; no obstructive disease  LVEF 50% by contrast ventriculography  Impression: possible type 2 MI in setting of accelerated hypertension vs  MINOCA type picture (possible coronary vasospasm/microvascular dysfunction/myocarditis?)  No inflammatory markers checked at time of diagnosis  Nonsustained ventricular tachycardia  Prediabetes  Obstructive sleep apnea  Asthma  Tobacco abuse: on the verge of quitting; smoking 2-3 pulls of cigarette in past week   Ready to quit; referral sent to tobacco cessation program      HPI:   Bonifacio Corrales is a 82-year-old woman with a PMH as above who presents for an initial visit " "with HF team and to establish with outpatient practice  Admitted to [facility] from 05/14 to 05/16/2023 after presenting with 4 day of intermittent chest pain and shoulder pain  Notably hypertensive in ED (had been off anti-HTN meds for ~1 week)  Troponins flat in 60-70s, and EKG without ischemic changes  Cardiology consulted  Had low suspicion for angina/ACS as chest pain was reproducible with palpation of chest wall  TTE with new LVEF drop to 35%  Cardiology did not order any ischemic evaluation, rather ordered an outpatient cMRI for presumed NICM  NSVT (39 beats per notes) noted on telemetry on 05/15, and patient was started on carvedilol  Home losartan and HCTZ were continued  HCTZ stopped on 05/15  Started on MRA on day of discharge  Was switched from losartan to [facility] on day of discharge, but NO doses were given inpatient to monitor response  Did not require inpatient diuresis  Daily weights not kept  Discharged wearing LifeVest      05/22/2023: Patient presents for initial visit with HF team  Feeling well overall  Has yet to take AM medications today  Continues with ESCALANTE with heavier exertion (mainly climbing stairs)  Continues with left shoulder musculoskeletal pain  Denies SOB at rest, PND, LE swelling, and orthopnea  Does report decreased appetite and has been modifying habits to cut back on sodium intake (using Mrs  Dash)  Reviewed recent hospital course and new HFrEF diagnosis in detail  Reviewed patient's medical, family, and social history; EMR updated as appropriate  Wearing LifeVest; denies alarms or shocks  Provided patient with \"Living With Heart Failure\" booklet      Past Medical History:   Diagnosis Date   • Asthma    • Chronic HFrEF (heart failure with reduced ejection fraction) ([facility] ) 05/15/2023   • Hypertension    • NSVT (nonsustained ventricular tachycardia) ([facility] ) 05/2023   • Obstructive sleep apnea    • Prediabetes 07/2020   • Tobacco abuse    • Type 2 myocardial infarction +/- MINOCA " 07/2020       Review of Systems   Constitutional: Positive for appetite change (decreased)  Negative for activity change, fatigue and unexpected weight change  HENT: Negative for congestion, rhinorrhea and sneezing  Eyes: Negative  Respiratory: Positive for shortness of breath (with stairs)  Negative for cough and chest tightness  Cardiovascular: Negative for chest pain, palpitations and leg swelling  Gastrointestinal: Negative for abdominal distention, abdominal pain, diarrhea and nausea  Endocrine: Negative  Genitourinary: Negative for decreased urine volume, difficulty urinating, dysuria and urgency  Musculoskeletal: Positive for myalgias  Skin: Negative  Allergic/Immunologic: Negative  Neurological: Negative for dizziness, syncope, weakness and light-headedness  Psychiatric/Behavioral: Negative for confusion and sleep disturbance  The patient is not nervous/anxious  12-point ROS completed and negative except as stated above and/or in the HPI        No Known Allergies    Current Outpatient Medications:   •  albuterol (2 5 mg/3 mL) 0 083 % nebulizer solution, Take 3 mL (2 5 mg total) by nebulization every 6 (six) hours as needed for wheezing or shortness of breath, Disp: 75 mL, Rfl: 2  •  albuterol (PROVENTIL HFA,VENTOLIN HFA) 90 mcg/act inhaler, Inhale 2 puffs every 4 (four) hours as needed for wheezing, Disp: 6 7 g, Rfl: 0  •  ALPRAZolam (XANAX) 0 5 mg tablet, Take 1 tablet (0 5 mg total) by mouth once in imaging for anxiety for up to 1 dose, Disp: 1 tablet, Rfl: 0  •  Blood Pressure Monitoring (Adult Blood Pressure Cuff Lg) KIT, Use daily in the early morning, Disp: 1 kit, Rfl: 0  •  carvedilol (COREG) 12 5 mg tablet, Take 1 tablet (12 5 mg total) by mouth 2 (two) times a day with meals, Disp: 60 tablet, Rfl: 0  •  Diclofenac Sodium (VOLTAREN) 1 %, Apply 2 g topically 4 (four) times a day as needed (shoulder pain), Disp: 150 g, Rfl: 0  •  loratadine (CLARITIN) 10 mg tablet, Take 1 tablet (10 mg total) by mouth daily, Disp: 30 tablet, Rfl: 2  •  methocarbamol (ROBAXIN) 500 mg tablet, Take 1 tablet (500 mg total) by mouth 2 (two) times a day as needed for muscle spasms, Disp: 30 tablet, Rfl: 0  •  sacubitril-valsartan (Entresto) 49-51 MG TABS, Take 1 tablet by mouth 2 (two) times a day, Disp: 60 tablet, Rfl: 1  •  spironolactone (ALDACTONE) 25 mg tablet, Take 1 tablet (25 mg total) by mouth daily Do not start before May 17, 2023 , Disp: 30 tablet, Rfl: 0    Social History     Socioeconomic History   • Marital status: /Civil Union     Spouse name: Not on file   • Number of children: 5   • Years of education: Not on file   • Highest education level: 11th grade   Occupational History   • Not on file   Tobacco Use   • Smoking status: Every Day     Packs/day: 0 50     Types: Cigarettes     Start date: 2005   • Smokeless tobacco: Never   • Tobacco comments:     Currently smoking 2-3 pulls of a cigarette weekly (Updated 05/22/2023)  Vaping Use   • Vaping Use: Never used   Substance and Sexual Activity   • Alcohol use: Not Currently   • Drug use: Not Currently     Types: Marijuana     Comment: Denies curernt use (Updated 05/22/2023)  • Sexual activity: Not on file   Other Topics Concern   • Not on file   Social History Narrative   • Not on file     Social Determinants of Health     Financial Resource Strain: Not on file   Food Insecurity: Not on file   Transportation Needs: Not on file   Physical Activity: Not on file   Stress: Not on file   Social Connections: Not on file   Intimate Partner Violence: Not on file   Housing Stability: Not on file     Family History   Problem Relation Age of Onset   • Hypertension Mother    • Diabetes Mother    • Heart failure Mother         possible stenting?    • Hypertension Father    • Diabetes Father    • No Known Problems Sister    • Hypertension Sister    • Hypertension Brother    • Asthma Brother    • Kidney disease Brother    • Hypertension Maternal Grandmother    • Kidney failure Maternal Grandmother         in HD   • Heart disease Maternal Grandfather    • Diabetes Maternal Grandfather    • Hypertension Maternal Grandfather    • No Known Problems Paternal Grandmother    • No Known Problems Paternal Grandfather    • Sudden death Maternal Aunt          in her sleep; family told it was natural causes       Vitals:  Blood pressure 150/86, pulse 72, weight 123 kg (272 lb), last menstrual period 2023  Body mass index is 53 12 kg/m²  Wt Readings from Last 10 Encounters:   23 123 kg (272 lb)   23 123 kg (272 lb)   05/15/23 125 kg (275 lb)   23 127 kg (279 lb 14 4 oz)   23 126 kg (278 lb 4 8 oz)   10/26/22 128 kg (281 lb 4 9 oz)   22 130 kg (286 lb 9 6 oz)   08/10/21 132 kg (291 lb 9 6 oz)   21 132 kg (291 lb 0 1 oz)   21 133 kg (292 lb 3 2 oz)     Vitals:    23 0802   BP: 150/86   BP Location: Right arm   Patient Position: Sitting   Cuff Size: Extra-Large   Pulse: 72   Weight: 123 kg (272 lb)       Physical Exam  Vitals reviewed  Constitutional:       General: She is awake  She is not in acute distress  Appearance: Normal appearance  She is well-developed and overweight  She is not toxic-appearing or diaphoretic  HENT:      Head: Normocephalic  Nose: Nose normal       Mouth/Throat:      Mouth: Mucous membranes are moist    Eyes:      General: No scleral icterus  Conjunctiva/sclera: Conjunctivae normal    Neck:      Vascular: No JVD  Trachea: No tracheal deviation  Cardiovascular:      Rate and Rhythm: Normal rate and regular rhythm  No extrasystoles are present  Heart sounds: No murmur heard  Comments: Wearing LifeVest  Pulmonary:      Effort: Pulmonary effort is normal  No tachypnea, bradypnea or respiratory distress  Breath sounds: Normal air entry  No decreased air movement  No decreased breath sounds or wheezing     Abdominal:      General: Bowel sounds are normal  There is no distension  Palpations: Abdomen is soft  Tenderness: There is no abdominal tenderness  Musculoskeletal:      Cervical back: Neck supple  Right lower leg: No edema  Left lower leg: No edema  Skin:     General: Skin is warm and dry  Coloration: Skin is not jaundiced or pale  Neurological:      General: No focal deficit present  Mental Status: She is alert and oriented to person, place, and time  Psychiatric:         Attention and Perception: Attention normal          Mood and Affect: Mood and affect normal          Speech: Speech normal          Behavior: Behavior normal  Behavior is cooperative  Thought Content:  Thought content normal        Labs & Results:  Lab Results   Component Value Date    WBC 6 52 05/16/2023    HGB 11 4 (L) 05/16/2023    HCT 36 1 05/16/2023    MCV 84 05/16/2023     05/16/2023     Lab Results   Component Value Date    SODIUM 135 05/16/2023    K 4 0 05/16/2023     05/16/2023    CO2 26 05/16/2023    BUN 9 05/16/2023    CREATININE 0 64 05/16/2023    GLUC 105 05/16/2023    CALCIUM 8 7 05/16/2023     Lab Results   Component Value Date    INR 1 09 07/25/2020    INR 1 05 07/25/2020    INR 1 12 04/11/2016    PROTIME 14 2 07/25/2020    PROTIME 13 8 07/25/2020    PROTIME 14 4 (H) 04/11/2016     No results found for: BNP     Lab Results   Component Value Date    NTBNP 98 8 01/18/2023        Samira Monroy PA-C

## 2023-05-18 ENCOUNTER — OFFICE VISIT (OUTPATIENT)
Dept: FAMILY MEDICINE CLINIC | Facility: CLINIC | Age: 43
End: 2023-05-18

## 2023-05-18 VITALS
WEIGHT: 272 LBS | HEIGHT: 60 IN | TEMPERATURE: 96.8 F | OXYGEN SATURATION: 96 % | SYSTOLIC BLOOD PRESSURE: 160 MMHG | HEART RATE: 81 BPM | DIASTOLIC BLOOD PRESSURE: 90 MMHG | BODY MASS INDEX: 53.4 KG/M2 | RESPIRATION RATE: 16 BRPM

## 2023-05-18 DIAGNOSIS — M25.512 ACUTE PAIN OF LEFT SHOULDER: ICD-10-CM

## 2023-05-18 DIAGNOSIS — I42.9 CARDIOMYOPATHY, UNSPECIFIED TYPE (HCC): ICD-10-CM

## 2023-05-18 DIAGNOSIS — Z76.89 ENCOUNTER FOR SUPPORT AND COORDINATION OF TRANSITION OF CARE: Primary | ICD-10-CM

## 2023-05-18 DIAGNOSIS — I47.29 NSVT (NONSUSTAINED VENTRICULAR TACHYCARDIA) (HCC): ICD-10-CM

## 2023-05-18 LAB — ALDOST SERPL-MCNC: 7.9 NG/DL (ref 0–30)

## 2023-05-18 RX ORDER — METHOCARBAMOL 500 MG/1
500 TABLET, FILM COATED ORAL 2 TIMES DAILY PRN
Qty: 30 TABLET | Refills: 0 | Status: SHIPPED | OUTPATIENT
Start: 2023-05-18

## 2023-05-18 NOTE — PROGRESS NOTES
TCM Call     Date and time call was made  2023  3:51 PM    Hospital care reviewed  Records reviewed    Patient was hospitialized at  [facility]    Date of Admission  23    Date of discharge  23    Diagnosis  chest pain    Disposition  Home    Were the patients medications reviewed and updated  No    Current Symptoms  None    Headache pain severity  Severe    Headache pain onset  Progressive    Episode pattern  Constant    Headache pain level  7      TCM Call     Scheduled for follow up? Yes    Did you obtain your prescribed medications  Yes    I have advised the patient to call PCP with any new or worsening symptoms  [facility] members    Support System  None    Counseling  Patient            Name: Regi Hardwick      : 1980      MRN: [id number]  Encounter Provider: JOHN Hollis  Encounter Date: 2023   Encounter department: [facility]    Assessment & Plan     1  Encounter for support and coordination of transition of care  Assessment & Plan:  - Reviewed hospital follow up appts, specialist referrals, recommended testing, and current medications with pt  Pt stated understanding of all    - Advised pt to remain out of work at minimum until seen by Cardiology, with additional restrictions to be determined at that time  Work note provided  2  Cardiomyopathy, unspecified type [facility])  Assessment & Plan:  No SOB, increase in fatigue, or fluid retention since hospital discharge  Tolerating medications well  - Continue current medications  - Reviewed basic heart healthy dietary guidance  - Follow up with Cardiology as scheduled  3  NSVT (nonsustained ventricular tachycardia) ([facility] )  Assessment & Plan:  - Continue Life Vest    - Follow up with Cardiology as scheduled         4  Acute pain of left shoulder  Assessment & Plan:  Left neck/shoulder/arm pain one week in duration   - Continue Tylenol PRN  No more than 1000 mg per dose, max 3000 mg per day  - Trial methocarbamol 500 mg BID PRN, diclofenac gel QID PRN  Can use ice or heating pad  - Follow up if no improvement in pain  Orders:  -     methocarbamol (ROBAXIN) 500 mg tablet; Take 1 tablet (500 mg total) by mouth 2 (two) times a day as needed for muscle spasms  -     Diclofenac Sodium (VOLTAREN) 1 %; Apply 2 g topically 4 (four) times a day as needed (shoulder pain)         Subjective     HPI     Jevon Jaime presents to the office for a TCM visit after hospitalization from 5/14 through 5/16/23, initially admitted for chest pain/ACS rule out  According to the patient, she had approximately 4 days of chest pain radiating into her jaw, back, and left arm  In the ED, troponin was mildly elevated however patient was significantly hypertensive  During her hospital stay, she was noted to have a 39 beat run of nonsustained ventricular tachycardia on telemetry  The patient was asymptomatic at the time  Echocardiogram was obtained and cardiology was consulted  Echocardiogram did reveal new cardiomyopathy with a EF of 35%  In light of the patient's episode on telemetry, patient was discharged with a LifeVest    Pt states she had a similar problem 1-2 years ago and was told she had a minor heart attack  Today, pt denies chest pain, palpitations, or SOB  She reports having changed her diet, is eating mostly yogurt and bananas, isn't sure what she should eat  Pt reports continued pain in her neck, left shoulder, and arm, with intermittent numbness in pointer and middle fingers  There was no injury, states pain started suddenly about one week ago  She has never had this problem before  Pt works in a  but states she does not  the heavier children and lifts correctly  She rates the pain 8/10, constant, with no exacerbating or alleviating factors  Pt is treating with Tylenol  Pt would like to know when she can return to work         Review of Systems   Constitutional: Negative for fatigue, fever and unexpected weight change  Respiratory: Negative for cough, chest tightness, shortness of breath and wheezing  Cardiovascular: Negative for chest pain, palpitations and leg swelling  Gastrointestinal: Negative for abdominal pain, nausea and vomiting  Musculoskeletal: Positive for arthralgias, myalgias and neck pain  Neurological: Positive for numbness  Negative for dizziness and headaches  All other systems reviewed and are negative  Past Medical History:   Diagnosis Date   • Asthma    • Chronic HFrEF (heart failure with reduced ejection fraction) ([place] [id number] ) 05/15/2023   • Hypertension    • NSVT (nonsustained ventricular tachycardia) ([place] [id number] ) 2023   • Obstructive sleep apnea    • Prediabetes 2020   • Tobacco abuse    • Type 2 myocardial infarction +/- Mattie Brandon 2020     Past Surgical History:   Procedure Laterality Date   • NO PAST SURGERIES       Family History   Problem Relation Age of Onset   • Hypertension Mother    • Diabetes Mother    • Heart failure Mother         possible stenting?    • Hypertension Father    • Diabetes Father    • No Known Problems Sister    • Hypertension Sister    • Hypertension Brother    • Asthma Brother    • Kidney disease Brother    • Hypertension Maternal Grandmother    • Kidney failure Maternal Grandmother         in HD   • Heart disease Maternal Grandfather    • Diabetes Maternal Grandfather    • Hypertension Maternal Grandfather    • No Known Problems Paternal Grandmother    • No Known Problems Paternal Grandfather    • Sudden death Maternal Aunt          in her sleep; family told it was natural causes     Social History     Socioeconomic History   • Marital status: /Civil Union     Spouse name: None   • Number of children: 5   • Years of education: None   • Highest education level: 11th grade   Occupational History   • None   Tobacco Use   • Smoking status: Every Day     Packs/day: 0 50     Types: Cigarettes     Start date: 2005   • Smokeless tobacco: Never   • Tobacco comments:     Currently smoking 2-3 pulls of a cigarette weekly (Updated 05/22/2023)  Vaping Use   • Vaping Use: Never used   Substance and Sexual Activity   • Alcohol use: Not Currently   • Drug use: Not Currently     Types: Marijuana     Comment: Denies curernt use (Updated 05/22/2023)  • Sexual activity: None   Other Topics Concern   • None   Social History Narrative   • None     Social Determinants of Health     Financial Resource Strain: Not on file   Food Insecurity: Not on file   Transportation Needs: Not on file   Physical Activity: Not on file   Stress: Not on file   Social Connections: Not on file   Intimate Partner Violence: Not on file   Housing Stability: Not on file     Current Outpatient Medications on File Prior to Visit   Medication Sig   • albuterol (2 5 mg/3 mL) 0 083 % nebulizer solution Take 3 mL (2 5 mg total) by nebulization every 6 (six) hours as needed for wheezing or shortness of breath   • albuterol (PROVENTIL HFA,VENTOLIN HFA) 90 mcg/act inhaler Inhale 2 puffs every 4 (four) hours as needed for wheezing   • carvedilol (COREG) 12 5 mg tablet Take 1 tablet (12 5 mg total) by mouth 2 (two) times a day with meals   • loratadine (CLARITIN) 10 mg tablet Take 1 tablet (10 mg total) by mouth daily   • spironolactone (ALDACTONE) 25 mg tablet Take 1 tablet (25 mg total) by mouth daily Do not start before May 17, 2023       No Known Allergies  Immunization History   Administered Date(s) Administered   • INFLUENZA 09/29/2016, 09/29/2016, 09/29/2016, 10/27/2017, 10/27/2017, 10/27/2017   • Influenza, injectable, quadrivalent, preservative free 0 5 mL 11/23/2019   • MMR 10/27/2017, 10/27/2017   • Pneumococcal Polysaccharide PPV23 09/29/2016, 09/29/2016   • Tdap 07/14/2017, 07/14/2017   • Tuberculin Skin Test-PPD Intradermal 09/17/2014, 09/17/2014       Objective     /90 (BP Location: Left arm, Patient Position: Sitting, Cuff Size: Extra-Large)   Pulse 81   Temp (!) 96 8 °F (36 °C) (Temporal)   Resp 16   Ht 5' (1 524 m)   Wt 123 kg (272 lb)   LMP 05/05/2023 (Approximate)   SpO2 96%   BMI 53 12 kg/m²     Physical Exam  Vitals reviewed  Constitutional:       General: She is not in acute distress  Appearance: She is morbidly obese  She is not ill-appearing or diaphoretic  HENT:      Head: Normocephalic and atraumatic  Cardiovascular:      Rate and Rhythm: Normal rate and regular rhythm  Pulses: Normal pulses  Heart sounds: Normal heart sounds  No murmur heard  Pulmonary:      Effort: Pulmonary effort is normal  No tachypnea  Breath sounds: Normal breath sounds  No decreased breath sounds, wheezing or rales  Musculoskeletal:      Right shoulder: Normal       Left shoulder: Tenderness present  No swelling, deformity, bony tenderness or crepitus  Decreased range of motion  Left hand: Normal strength  Normal sensation  Normal capillary refill  Normal pulse  Cervical back: Muscular tenderness (left) present  Right lower leg: No edema  Left lower leg: No edema  Comments: Left trapezius very TTP  Skin:     General: Skin is warm and dry  Neurological:      Mental Status: She is alert and oriented to person, place, and time  Psychiatric:         Attention and Perception: Attention normal          Mood and Affect: Mood and affect normal          Speech: Speech normal          Behavior: Behavior normal          Thought Content:  Thought content normal        JOHN Mccord

## 2023-05-18 NOTE — PATIENT INSTRUCTIONS
Upper Back Exercises   WHAT YOU NEED TO KNOW:   What do I need to know about upper back exercises? Upper back exercises help heal and strengthen your back muscles and prevent another injury  Ask your healthcare provider if you need to see a physical therapist for more advanced exercises  What do I need to know about exercise safety? Do the exercises on a mat or firm surface (not on a bed)  A firm surface will support your spine and prevent upper back pain  Move slowly and smoothly  Avoid fast or jerky motions  Breathe normally  Do not hold your breath  Stop if you feel pain  It is normal to feel some discomfort at first, but you should not feel pain  Regular exercise will help decrease your discomfort over time  How do I perform upper back exercises safely? Ask your healthcare provider which of the following exercises are best for you and how often to do them  Head rolls:  Sit in a chair or stand  Bring your chin toward your chest and roll your head to the right  Your ear should be over your shoulder  Hold this position for 5 seconds  Roll your head back toward your chest and to the left  Your ear should be over your left shoulder  Hold this position for 5 seconds  Next, roll your head back slowly in a clockwise [employer] and repeat 3 times  Do 3 sets of head rolls  Scapular squeeze:  Sit or stand with your arms at your sides  Squeeze your shoulder blades together and hold for 3 seconds  Relax and repeat 3 times  Pectoralis stretch:   a doorway  Lift your hands and place them on each side of the door frame or wall slightly higher than your head  Lean forward slowly until you feel a gentle stretch  Hold for 15 seconds  Repeat 3 times, or as directed  Cat and camel exercise:  Place your hands and knees on the floor  Arch your back upward toward the ceiling and lower your head  Round out your spine as much as you can  Hold for 5 seconds   Lift your head upward and push your chest downward toward the floor  Hold for 5 seconds  Do 3 sets or as directed  Bird dog:  Place your hands and knees on the floor  Keep your wrists directly below your shoulders and your knees directly below your hips  Pull your belly button in toward your spine  Do not flatten or arch your back  Tighten your abdominal muscles  Raise one arm straight out so that it is aligned with your head  Next, raise the leg opposite your arm  Hold this position for 15 seconds  Lower your arm and leg slowly and change sides  Do 5 sets  When should I seek immediate care? You have severe pain that prevents you from moving  When should I call my doctor? Your pain becomes worse  You have new pain  You have questions or concerns about your condition, care, or exercise program     CARE AGREEMENT:   You have the right to help plan your care  Learn about your health condition and how it may be treated  Discuss treatment options with your healthcare providers to decide what care you want to receive  You always have the right to refuse treatment  The above information is an  only  It is not intended as medical advice for individual conditions or treatments  Talk to your doctor, nurse or pharmacist before following any medical regimen to see if it is safe and effective for you  © [employer] 2022 Information is for End User's use only and may not be sold, redistributed or otherwise used for commercial purposes

## 2023-05-18 NOTE — LETTER
May 18, 2023     Patient: Hanny Delgado  YOB: 1980  Date of Visit: 5/18/2023      To Whom it May Concern:    Hilaria Singhsammy is under my professional care  Leah Coronado was seen in my office on 5/18/2023  Please excuse Leah Coronado from work until she is seen by Cardiology on 5/22/23, with additional restrictions to be determined at that time  If you have any questions or concerns, please don't hesitate to call           Sincerely,            JOHN Ye        CC: No Recipients

## 2023-05-20 LAB
METANEPH FREE SERPL-MCNC: 26.2 PG/ML (ref 0–88)
NORMETANEPHRINE SERPL-MCNC: 186 PG/ML (ref 0–218.9)

## 2023-05-22 ENCOUNTER — OFFICE VISIT (OUTPATIENT)
Dept: CARDIOLOGY CLINIC | Facility: CLINIC | Age: 43
End: 2023-05-22

## 2023-05-22 VITALS
HEART RATE: 72 BPM | WEIGHT: 272 LBS | BODY MASS INDEX: 53.12 KG/M2 | DIASTOLIC BLOOD PRESSURE: 86 MMHG | SYSTOLIC BLOOD PRESSURE: 150 MMHG

## 2023-05-22 DIAGNOSIS — F41.9 ANXIETY: ICD-10-CM

## 2023-05-22 DIAGNOSIS — Z09 HOSPITAL DISCHARGE FOLLOW-UP: ICD-10-CM

## 2023-05-22 DIAGNOSIS — I50.22 CHRONIC HFREF (HEART FAILURE WITH REDUCED EJECTION FRACTION) (HCC): Primary | ICD-10-CM

## 2023-05-22 DIAGNOSIS — G47.33 OBSTRUCTIVE SLEEP APNEA: ICD-10-CM

## 2023-05-22 DIAGNOSIS — I10 HYPERTENSION, UNSPECIFIED TYPE: ICD-10-CM

## 2023-05-22 DIAGNOSIS — Z72.0 TOBACCO ABUSE: ICD-10-CM

## 2023-05-22 LAB — RENIN PLAS-CCNC: <0.167 NG/ML/HR (ref 0.17–5.38)

## 2023-05-22 RX ORDER — ALPRAZOLAM 0.5 MG/1
0.5 TABLET ORAL
Qty: 1 TABLET | Refills: 0 | Status: SHIPPED | OUTPATIENT
Start: 2023-05-22

## 2023-05-22 RX ORDER — NEBULIZER AND COMPRESSOR
EACH MISCELLANEOUS
Qty: 1 KIT | Refills: 0 | Status: SHIPPED | OUTPATIENT
Start: 2023-05-22

## 2023-05-22 RX ORDER — SACUBITRIL AND VALSARTAN 49; 51 MG/1; MG/1
1 TABLET, FILM COATED ORAL 2 TIMES DAILY
Qty: 60 TABLET | Refills: 1 | Status: SHIPPED | OUTPATIENT
Start: 2023-05-22

## 2023-05-22 NOTE — PATIENT INSTRUCTIONS
Increase Entresto to 2 tablets twice a day  Once you run out of current pills, new prescription will have more concentrated pill and will go back to 1 tablet twice a day  Schedule cardiac MRI  Complete blood work in 2-3 weeks  Referrals sent cardiac rehab, sleep medicine, and the tobacco cessation experts  Please weigh yourself every day (after emptying your bladder) and keep a detailed log of weights  Contact the Heart Failure program at [phone] if you gain 3 lbs overnight or 5 lbs in 5-7 days  Limit daily sodium/salt intake to 2000 mg daily to prevent fluid retention  Avoid canned foods, fast food/Chinese food, and processed meats (hot dogs, lunch meat, and sausage etc )  Caution with condiments  Limit fluid intake to 2000 mL or 2 liters (about 60-65 ounces) daily  Avoid electrolyte replacement drinks (such as Gatorade, Pedialyte, Propel, Liquid IV, etc )  Bring complete list of medications and log of daily weights to your follow-up appointment

## 2023-05-22 NOTE — LETTER
May 22, 2023     Patient: Nestor Encarnacion  YOB: 1980  Date of Visit: 5/22/2023      To Whom it May Concern:    Wily Tineo is under my professional care  Brown Alisa was seen in my office on 5/22/2023  Brown Cuellar may return to work on a to be determined date  Will continue to closely follow patient over the next few weeks, and continue to monitor and discuss fitness to return to work  If you have any questions or concerns, please don't hesitate to call           Sincerely,          Kay Huertas PA-C        CC: No Recipients

## 2023-05-24 ENCOUNTER — PATIENT OUTREACH (OUTPATIENT)
Dept: OTHER | Facility: CLINIC | Age: 43
End: 2023-05-24

## 2023-05-24 PROBLEM — I47.29 NSVT (NONSUSTAINED VENTRICULAR TACHYCARDIA) (HCC): Status: ACTIVE | Noted: 2023-05-24

## 2023-05-24 PROBLEM — M25.512 ACUTE PAIN OF LEFT SHOULDER: Status: ACTIVE | Noted: 2023-05-24

## 2023-05-25 ENCOUNTER — TELEPHONE (OUTPATIENT)
Dept: CARDIOLOGY CLINIC | Facility: CLINIC | Age: 43
End: 2023-05-25

## 2023-05-27 PROBLEM — Z76.89 ENCOUNTER FOR SUPPORT AND COORDINATION OF TRANSITION OF CARE: Status: ACTIVE | Noted: 2023-05-27

## 2023-05-28 NOTE — ASSESSMENT & PLAN NOTE
- Reviewed hospital follow up appts, specialist referrals, recommended testing, and current medications with pt  Pt stated understanding of all    - Advised pt to remain out of work at minimum until seen by Cardiology, with additional restrictions to be determined at that time  Work note provided

## 2023-05-28 NOTE — ASSESSMENT & PLAN NOTE
No SOB, increase in fatigue, or fluid retention since hospital discharge  Tolerating medications well  - Continue current medications  - Reviewed basic heart healthy dietary guidance  - Follow up with Cardiology as scheduled

## 2023-05-28 NOTE — ASSESSMENT & PLAN NOTE
Left neck/shoulder/arm pain one week in duration   - Continue Tylenol PRN  No more than 1000 mg per dose, max 3000 mg per day  - Trial methocarbamol 500 mg BID PRN, diclofenac gel QID PRN  Can use ice or heating pad  - Follow up if no improvement in pain

## 2023-06-09 ENCOUNTER — HOSPITAL ENCOUNTER (EMERGENCY)
Facility: HOSPITAL | Age: 43
Discharge: HOME | End: 2023-06-09
Attending: EMERGENCY MEDICINE
Payer: MEDICARE

## 2023-06-09 ENCOUNTER — HOSPITAL ENCOUNTER (EMERGENCY)
Facility: HOSPITAL | Age: 43
Discharge: HOME | End: 2023-06-10
Attending: EMERGENCY MEDICINE
Payer: MEDICARE

## 2023-06-09 VITALS
RESPIRATION RATE: 20 BRPM | TEMPERATURE: 97.4 F | HEART RATE: 70 BPM | OXYGEN SATURATION: 95 % | WEIGHT: 277.78 LBS | DIASTOLIC BLOOD PRESSURE: 89 MMHG | SYSTOLIC BLOOD PRESSURE: 187 MMHG | BODY MASS INDEX: 54.25 KG/M2

## 2023-06-09 VITALS
WEIGHT: 279.32 LBS | OXYGEN SATURATION: 98 % | TEMPERATURE: 99.1 F | RESPIRATION RATE: 19 BRPM | DIASTOLIC BLOOD PRESSURE: 100 MMHG | HEART RATE: 74 BPM | SYSTOLIC BLOOD PRESSURE: 206 MMHG | BODY MASS INDEX: 54.55 KG/M2

## 2023-06-09 DIAGNOSIS — K04.7 DENTAL INFECTION: Primary | ICD-10-CM

## 2023-06-09 DIAGNOSIS — K08.89 DENTALGIA: Primary | ICD-10-CM

## 2023-06-09 DIAGNOSIS — S02.5XXA TOOTH FRACTURE: ICD-10-CM

## 2023-06-09 DIAGNOSIS — K02.9 DENTAL CARIES: ICD-10-CM

## 2023-06-09 PROCEDURE — 99284 EMERGENCY DEPT VISIT MOD MDM: CPT

## 2023-06-09 PROCEDURE — 99283 EMERGENCY DEPT VISIT LOW MDM: CPT

## 2023-06-09 PROCEDURE — 96372 THER/PROPH/DIAG INJ SC/IM: CPT

## 2023-06-09 RX ORDER — AMOXICILLIN 500 MG/1
500 CAPSULE ORAL EVERY 12 HOURS SCHEDULED
Qty: 14 CAPSULE | Refills: 0 | Status: SHIPPED | OUTPATIENT
Start: 2023-06-09 | End: 2023-06-16

## 2023-06-09 RX ORDER — IBUPROFEN 600 MG/1
600 TABLET ORAL EVERY 6 HOURS PRN
Qty: 30 TABLET | Refills: 0 | Status: SHIPPED | OUTPATIENT
Start: 2023-06-09 | End: 2023-06-21 | Stop reason: CLARIF

## 2023-06-09 RX ORDER — KETOROLAC TROMETHAMINE 30 MG/ML
30 INJECTION, SOLUTION INTRAMUSCULAR; INTRAVENOUS ONCE
Status: COMPLETED | OUTPATIENT
Start: 2023-06-09 | End: 2023-06-09

## 2023-06-09 RX ORDER — AMOXICILLIN 500 MG/1
500 CAPSULE ORAL ONCE
Status: COMPLETED | OUTPATIENT
Start: 2023-06-09 | End: 2023-06-09

## 2023-06-09 RX ADMIN — KETOROLAC TROMETHAMINE 30 MG: 30 INJECTION, SOLUTION INTRAMUSCULAR; INTRAVENOUS at 13:31

## 2023-06-09 RX ADMIN — AMOXICILLIN 500 MG: 500 CAPSULE ORAL at 13:31

## 2023-06-09 NOTE — ED PROVIDER NOTES
"History  Chief Complaint   Patient presents with   • Dental Pain     Pt arrives c/o \"tooth broken last night \"  report lower mouth pain     40 yo F presenting the emergency department with left lower tooth pain that started last night  Notes that her dental clinic was closed today  Denies fever chills cough congestion rhinorrhea  Denies nausea vomiting P denies difficulty swallowing or breathing  Prior to Admission Medications   Prescriptions Last Dose Informant Patient Reported? Taking? ALPRAZolam (XANAX) 0 5 mg tablet   No No   Sig: Take 1 tablet (0 5 mg total) by mouth once in imaging for anxiety for up to 1 dose   Blood Pressure Monitoring (Adult Blood Pressure Cuff Lg) KIT   No No   Sig: Use daily in the early morning   Diclofenac Sodium (VOLTAREN) 1 %   No No   Sig: Apply 2 g topically 4 (four) times a day as needed (shoulder pain)   albuterol (2 5 mg/3 mL) 0 083 % nebulizer solution   No No   Sig: Take 3 mL (2 5 mg total) by nebulization every 6 (six) hours as needed for wheezing or shortness of breath   albuterol (PROVENTIL HFA,VENTOLIN HFA) 90 mcg/act inhaler   No No   Sig: Inhale 2 puffs every 4 (four) hours as needed for wheezing   carvedilol (COREG) 12 5 mg tablet   No No   Sig: Take 1 tablet (12 5 mg total) by mouth 2 (two) times a day with meals   loratadine (CLARITIN) 10 mg tablet   No No   Sig: Take 1 tablet (10 mg total) by mouth daily   methocarbamol (ROBAXIN) 500 mg tablet   No No   Sig: Take 1 tablet (500 mg total) by mouth 2 (two) times a day as needed for muscle spasms   sacubitril-valsartan (Entresto) 49-51 MG TABS   No No   Sig: Take 1 tablet by mouth 2 (two) times a day   spironolactone (ALDACTONE) 25 mg tablet   No No   Sig: Take 1 tablet (25 mg total) by mouth daily Do not start before May 17, 2023        Facility-Administered Medications: None       Past Medical History:   Diagnosis Date   • Asthma    • Chronic HFrEF (heart failure with reduced ejection fraction) ([facility] [address] ) " 05/15/2023   • Hypertension    • NSVT (nonsustained ventricular tachycardia) ([facility] [address] ) 2023   • Obstructive sleep apnea    • Prediabetes 2020   • Tobacco abuse    • Type 2 myocardial infarction +/- Starashley Chanel 2020       Past Surgical History:   Procedure Laterality Date   • NO PAST SURGERIES         Family History   Problem Relation Age of Onset   • Hypertension Mother    • Diabetes Mother    • Heart failure Mother         possible stenting? • Hypertension Father    • Diabetes Father    • No Known Problems Sister    • Hypertension Sister    • Hypertension Brother    • Asthma Brother    • Kidney disease Brother    • Hypertension Maternal Grandmother    • Kidney failure Maternal Grandmother         in HD   • Heart disease Maternal Grandfather    • Diabetes Maternal Grandfather    • Hypertension Maternal Grandfather    • No Known Problems Paternal Grandmother    • No Known Problems Paternal Grandfather    • Sudden death Maternal Aunt          in her sleep; family told it was natural causes     I have reviewed and agree with the history as documented  E-Cigarette/Vaping   • E-Cigarette Use Never User      E-Cigarette/Vaping Substances     Social History     Tobacco Use   • Smoking status: Every Day     Packs/day: 0 50     Types: Cigarettes     Start date:    • Smokeless tobacco: Never   • Tobacco comments:     Currently smoking 2-3 pulls of a cigarette weekly (Updated 2023)  Vaping Use   • Vaping Use: Never used   Substance Use Topics   • Alcohol use: Not Currently   • Drug use: Not Currently     Types: Marijuana     Comment: Denies curernt use (Updated 2023)  Review of Systems   Constitutional: Negative for chills and fever  HENT: Positive for dental problem  Negative for ear pain and sore throat  Eyes: Negative for pain and visual disturbance  Respiratory: Negative for cough and shortness of breath  Cardiovascular: Negative for chest pain and palpitations  Gastrointestinal: Negative for abdominal pain and vomiting  Genitourinary: Negative for dysuria and hematuria  Musculoskeletal: Negative for arthralgias and back pain  Skin: Negative for color change and rash  Neurological: Negative for seizures and syncope  All other systems reviewed and are negative  Physical Exam  Physical Exam  Vitals and nursing note reviewed  Constitutional:       General: She is not in acute distress  Appearance: She is well-developed  HENT:      Head: Normocephalic and atraumatic  Mouth/Throat:      Comments: Multiple fractured teeth on the left mandible with cavities  Eyes:      Conjunctiva/sclera: Conjunctivae normal    Cardiovascular:      Rate and Rhythm: Normal rate and regular rhythm  Heart sounds: No murmur heard  Pulmonary:      Effort: Pulmonary effort is normal  No respiratory distress  Breath sounds: Normal breath sounds  Abdominal:      Palpations: Abdomen is soft  Tenderness: There is no abdominal tenderness  Musculoskeletal:         General: No swelling  Cervical back: Neck supple  Skin:     General: Skin is warm and dry  Capillary Refill: Capillary refill takes less than 2 seconds  Neurological:      Mental Status: She is alert     Psychiatric:         Mood and Affect: Mood normal          Vital Signs  ED Triage Vitals   Temperature Pulse Respirations Blood Pressure SpO2   06/09/23 1251 06/09/23 1251 06/09/23 1251 06/09/23 1251 06/09/23 1251   (!) 97 4 °F (36 3 °C) 70 20 (!) 187/89 95 %      Temp Source Heart Rate Source Patient Position - Orthostatic VS BP Location FiO2 (%)   06/09/23 1251 06/09/23 1251 06/09/23 1251 06/09/23 1251 --   Tympanic Monitor Sitting Left arm       Pain Score       06/09/23 1331       10 - Worst Possible Pain           Vitals:    06/09/23 1251   BP: (!) 187/89   Pulse: 70   Patient Position - Orthostatic VS: Sitting         Visual Acuity      ED Medications  Medications   ketorolac (TORADOL) injection 30 mg (30 mg Intramuscular Given 6/9/23 1331)   amoxicillin (AMOXIL) capsule 500 mg (500 mg Oral Given 6/9/23 1331)       Diagnostic Studies  Results Reviewed     None                 No orders to display              Procedures  Procedures         ED Course                               SBIRT 22yo+    Flowsheet Row Most Recent Value   Initial Alcohol Screen: US AUDIT-C     1  How often do you have a drink containing alcohol? 0 Filed at: 06/09/2023 1251   2  How many drinks containing alcohol do you have on a typical day you are drinking? 0 Filed at: 06/09/2023 1251   3b  FEMALE Any Age, or MALE 65+: How often do you have 4 or more drinks on one occassion? 0 Filed at: 06/09/2023 1251   Audit-C Score 0 Filed at: 06/09/2023 1251   VANCE: How many times in the past year have you    Used an illegal drug or used a prescription medication for non-medical reasons? Never Filed at: 06/09/2023 1251                    Medical Decision Making  82-year-old female with left pain  Differential includes dental infection, dental fracture, dental abscess  Likely pain due to fracture, will treat with amoxicillin and ibuprofen, dental follow-up  Risk  Prescription drug management  Disposition  Final diagnoses:   Dental infection     Time reflects when diagnosis was documented in both MDM as applicable and the Disposition within this note     Time User Action Codes Description Comment    6/9/2023  2:00 PM Milena Snowball Add [K04 7] Dental infection       ED Disposition     ED Disposition   Discharge    Condition   Stable    Date/Time   Fri Jun 9, 2023 [facility] discharge to home/self care                 Follow-up Information     Follow up With Specialties Details Why [address] [address] [address]  [place] [id number]  [phone]          Patient's Medications   Discharge Prescriptions    AMOXICILLIN (AMOXIL) 500 MG CAPSULE Take 1 capsule (500 mg total) by mouth every 12 (twelve) hours for 7 days       Start Date: 6/9/2023  End Date: 6/16/2023       Order Dose: 500 mg       Quantity: 14 capsule    Refills: 0    IBUPROFEN (MOTRIN) 600 MG TABLET    Take 1 tablet (600 mg total) by mouth every 6 (six) hours as needed for mild pain       Start Date: 6/9/2023  End Date: --       Order Dose: 600 mg       Quantity: 30 tablet    Refills: 0       No discharge procedures on file      PDMP Review       Value Time User    PDMP Reviewed  Yes 5/22/2023  8:42 AM Orlando Rojas PA-C          ED Provider  Electronically Signed by           Lex Lopez MD  06/09/23 [id number]

## 2023-06-09 NOTE — Clinical Note
Emiliano Mcmahan was seen and treated in our emergency department on 6/9/2023  Diagnosis:     Yoselin Nava  may return to work on return date  She may return on this date: 06/12/2023         If you have any questions or concerns, please don't hesitate to call        Lynda Zhao MD    ______________________________           _______________          _______________  Hospital Representative                              Date                                Time

## 2023-06-10 RX ORDER — LIDOCAINE HYDROCHLORIDE 20 MG/ML
15 SOLUTION OROPHARYNGEAL ONCE
Status: COMPLETED | OUTPATIENT
Start: 2023-06-10 | End: 2023-06-10

## 2023-06-10 RX ORDER — LIDOCAINE HYDROCHLORIDE 10 MG/ML
5 INJECTION, SOLUTION EPIDURAL; INFILTRATION; INTRACAUDAL; PERINEURAL ONCE
Status: COMPLETED | OUTPATIENT
Start: 2023-06-10 | End: 2023-06-10

## 2023-06-10 RX ADMIN — Medication 15 ML: at 00:12

## 2023-06-10 RX ADMIN — LIDOCAINE HYDROCHLORIDE 5 ML: 10 INJECTION, SOLUTION EPIDURAL; INFILTRATION; INTRACAUDAL; PERINEURAL at 00:13

## 2023-06-10 NOTE — ED PROVIDER NOTES
History  Chief Complaint   Patient presents with   • Dental Pain     Broken tooth on bottom left side, seen at [place] earlier for same     28-year-old female presents with left lower dental pain  States she was chewing something yesterday when she felt her tooth break  Seen at [address] [place] [address] earlier today, started on amoxicillin and given Toradol IM plus Rx for Motrin 600s  Also taking Tylenol in addition to Motrin  Returns now with recurrent pain  Prior to Admission Medications   Prescriptions Last Dose Informant Patient Reported? Taking?    ALPRAZolam (XANAX) 0 5 mg tablet   No No   Sig: Take 1 tablet (0 5 mg total) by mouth once in imaging for anxiety for up to 1 dose   Blood Pressure Monitoring (Adult Blood Pressure Cuff Lg) KIT   No No   Sig: Use daily in the early morning   Diclofenac Sodium (VOLTAREN) 1 %   No No   Sig: Apply 2 g topically 4 (four) times a day as needed (shoulder pain)   albuterol (2 5 mg/3 mL) 0 083 % nebulizer solution   No No   Sig: Take 3 mL (2 5 mg total) by nebulization every 6 (six) hours as needed for wheezing or shortness of breath   albuterol (PROVENTIL HFA,VENTOLIN HFA) 90 mcg/act inhaler   No No   Sig: Inhale 2 puffs every 4 (four) hours as needed for wheezing   amoxicillin (AMOXIL) 500 mg capsule   No No   Sig: Take 1 capsule (500 mg total) by mouth every 12 (twelve) hours for 7 days   carvedilol (COREG) 12 5 mg tablet   No No   Sig: Take 1 tablet (12 5 mg total) by mouth 2 (two) times a day with meals   ibuprofen (MOTRIN) 600 mg tablet   No No   Sig: Take 1 tablet (600 mg total) by mouth every 6 (six) hours as needed for mild pain   loratadine (CLARITIN) 10 mg tablet   No No   Sig: Take 1 tablet (10 mg total) by mouth daily   methocarbamol (ROBAXIN) 500 mg tablet   No No   Sig: Take 1 tablet (500 mg total) by mouth 2 (two) times a day as needed for muscle spasms   sacubitril-valsartan (Entresto) 49-51 MG TABS   No No   Sig: Take 1 tablet by mouth 2 (two) times a day   spironolactone (ALDACTONE) 25 mg tablet   No No   Sig: Take 1 tablet (25 mg total) by mouth daily Do not start before May 17, 2023  Facility-Administered Medications: None       Past Medical History:   Diagnosis Date   • Asthma    • Chronic HFrEF (heart failure with reduced ejection fraction) ([facility] ) 05/15/2023   • Hypertension    • NSVT (nonsustained ventricular tachycardia) ([place] [address] ) 2023   • Obstructive sleep apnea    • Prediabetes 2020   • Tobacco abuse    • Type 2 myocardial infarction +/- Wendy Viviane 2020       Past Surgical History:   Procedure Laterality Date   • NO PAST SURGERIES         Family History   Problem Relation Age of Onset   • Hypertension Mother    • Diabetes Mother    • Heart failure Mother         possible stenting? • Hypertension Father    • Diabetes Father    • No Known Problems Sister    • Hypertension Sister    • Hypertension Brother    • Asthma Brother    • Kidney disease Brother    • Hypertension Maternal Grandmother    • Kidney failure Maternal Grandmother         in HD   • Heart disease Maternal Grandfather    • Diabetes Maternal Grandfather    • Hypertension Maternal Grandfather    • No Known Problems Paternal Grandmother    • No Known Problems Paternal Grandfather    • Sudden death Maternal Aunt          in her sleep; family told it was natural causes     I have reviewed and agree with the history as documented  E-Cigarette/Vaping   • E-Cigarette Use Never User      E-Cigarette/Vaping Substances     Social History     Tobacco Use   • Smoking status: Every Day     Packs/day: 0 50     Types: Cigarettes     Start date:    • Smokeless tobacco: Never   • Tobacco comments:     Currently smoking 2-3 pulls of a cigarette weekly (Updated 2023)  Vaping Use   • Vaping Use: Never used   Substance Use Topics   • Alcohol use: Not Currently   • Drug use: Not Currently     Types: Marijuana     Comment: Denies curernt use (Updated 2023)         Review of Systems Constitutional: Negative for chills and fever  HENT: Positive for dental problem  Negative for ear pain and sore throat  Eyes: Negative for pain and visual disturbance  Respiratory: Negative for cough and shortness of breath  Cardiovascular: Negative for chest pain and palpitations  Gastrointestinal: Negative for abdominal pain and vomiting  Genitourinary: Negative for dysuria and hematuria  Musculoskeletal: Negative for arthralgias and back pain  Skin: Negative for color change and rash  Neurological: Negative for seizures and syncope  All other systems reviewed and are negative  Physical Exam  Physical Exam  Vitals and nursing note reviewed  Constitutional:       General: She is not in acute distress  Appearance: She is well-developed  HENT:      Head: Normocephalic and atraumatic  Mouth/Throat:      Dentition: Abnormal dentition  Gingival swelling and dental caries present  Eyes:      Conjunctiva/sclera: Conjunctivae normal    Cardiovascular:      Rate and Rhythm: Normal rate and regular rhythm  Heart sounds: No murmur heard  Pulmonary:      Effort: Pulmonary effort is normal  No respiratory distress  Breath sounds: Normal breath sounds  Abdominal:      Palpations: Abdomen is soft  Tenderness: There is no abdominal tenderness  Musculoskeletal:         General: No swelling  Cervical back: Neck supple  Skin:     General: Skin is warm and dry  Capillary Refill: Capillary refill takes less than 2 seconds  Neurological:      Mental Status: She is alert     Psychiatric:         Mood and Affect: Mood normal          Vital Signs  ED Triage Vitals [06/09/23 2339]   Temperature Pulse Respirations Blood Pressure SpO2   99 1 °F (37 3 °C) 74 19 (!) 206/100 98 %      Temp Source Heart Rate Source Patient Position - Orthostatic VS BP Location FiO2 (%)   Oral Monitor Sitting Right arm --      Pain Score       --           Vitals:    06/09/23 1820 BP: (!) 206/100   Pulse: 74   Patient Position - Orthostatic VS: Sitting         Visual Acuity      ED Medications  Medications   lidocaine (PF) (XYLOCAINE-MPF) 1 % injection 5 mL (5 mL Infiltration Given by Other 6/10/23 0013)   Lidocaine Viscous HCl (XYLOCAINE) 2 % mucosal solution 15 mL (15 mL Swish & Spit Given 6/10/23 0012)       Diagnostic Studies  Results Reviewed     None                 No orders to display              Procedures  Nerve block    Date/Time: 6/10/2023 12:25 AM    Performed by: Eli Del Rio PA-C  Authorized by: Eli Del Rio PA-C    Patient location:  ED  [place] Protocol:  Consent: Verbal consent obtained  Risks and benefits: risks, benefits and alternatives were discussed  Patient understanding: patient states understanding of the procedure being performed  Patient identity confirmed: verbally with patient      Indications:     Indications:  Pain relief  Location:     Body area:  Head    Head nerve blocked: Inferior alveolar  Laterality:  Left  Skin anesthesia (see MAR for exact dosages):     Skin anesthesia method:  Topical application    Topical anesthetic:  Lidocaine gel  Procedure details (see MAR for exact dosages): Block needle gauge:  25 G    Anesthetic injected:  Lidocaine 1% w/o epi    Injection procedure:  Anatomic landmarks identified, anatomic landmarks palpated and negative aspiration for blood  Post-procedure details:     Patient tolerance of procedure: Tolerated well, no immediate complications             ED Course                                             Medical Decision Making  61-year-old female presents with dental pain  Seen at [facility] earlier for same  Exam: Multiple cavities on multiple teeth, primarily in left lower dental region; fractured tooth apparent  Will provide short-term analgesia with dental block as Motrin/Tylenol not helping significantly    We will reemphasize importance of following up with dentistry as soon as possible for definitive management  Recommend continue Motrin and Tylenol alternating every 3 hours as needed  Patient expresses understanding of the condition, treatment plan, follow-up instructions, and return precautions  Risk  Prescription drug management  Disposition  Final diagnoses:   Dentalgia   Dental caries   Tooth fracture     Time reflects when diagnosis was documented in both MDM as applicable and the Disposition within this note     Time User Action Codes Description Comment    6/10/2023 12:20 AM Arya Likes Add [K08 89] [id number] [address]     6/10/2023 12:20 AM Arya Likes Add [K02 9] Dental caries     6/10/2023 12:21 AM Arya Likes Add [S02  5XXA] Tooth fracture       ED Disposition     ED Disposition   Discharge    Condition   Stable    Date/Time   Sat Leonel 10, 2023 12:20 AM    Comment   Mable Funes discharge to home/self care  Follow-up Information     Follow up With Specialties Details Why Radha  Schedule an appointment as soon as possible for a visit   [address]  [address]  [phone]    [address] [place] [address]  Schedule an appointment as soon as possible for a visit   [address] [place] [address]  [phone]          Patient's Medications   Discharge Prescriptions    No medications on file       No discharge procedures on file      PDMP Review       Value Time User    PDMP Reviewed  Yes 5/22/2023  8:42 AM Lilli Izaguirre PA-C          ED Provider  Electronically Signed by           Pablo Armas PA-C  06/10/23 5292

## 2023-06-10 NOTE — DISCHARGE INSTRUCTIONS
Please continue taking Motrin and Tylenol alternating every 3 hours and follow-up with dentistry as soon as possible for definitive management

## 2023-06-15 DIAGNOSIS — I42.9 CARDIOMYOPATHY, UNSPECIFIED TYPE (HCC): ICD-10-CM

## 2023-06-15 RX ORDER — SPIRONOLACTONE 25 MG/1
25 TABLET ORAL DAILY
Qty: 30 TABLET | Refills: 3 | Status: SHIPPED | OUTPATIENT
Start: 2023-06-15

## 2023-06-15 RX ORDER — CARVEDILOL 12.5 MG/1
12.5 TABLET ORAL 2 TIMES DAILY WITH MEALS
Qty: 60 TABLET | Refills: 3 | Status: SHIPPED | OUTPATIENT
Start: 2023-06-15

## 2023-06-20 NOTE — PROGRESS NOTES
Cardiology Follow Up    Rommel Alvarenga  1980  [id number]  [address]  [phone]    1  Chronic HFrEF (heart failure with reduced ejection fraction) (HCC)  Empagliflozin (JARDIANCE) 10 MG TABS tablet      2  Cardiomyopathy, unspecified type (HCC)  Empagliflozin (JARDIANCE) 10 MG TABS tablet      3  Class 3 severe obesity with serious comorbidity and body mass index (BMI) of 50 0 to 59 9 in adult, unspecified obesity type ([address] )        4  Tobacco abuse            Interval History:   Ms Yassine Anderson presents to our office for a follow up visit  She was seen by Lauryn Nixon in [facility] on 5/22/23 for a hospital follow up for new onset CM and HFrEF  She remained off diuretics  Entresto increased to 49-51mg BID  BMP, TSH, iron panel, Lipid panel and HgbA1C to be checked  Jardiance price to be checked  Cardiac MRI in the near future  She was referred to cardiac rehabilitation, sleep medicine and tobacco cessation  Ángel Frederick was instructed to purchase BP monitor for home BP monitoring  Weight 272 pounds  Ángel Frederick presents to our office for a follow up visit  She offers no complaints  She denies shortness of breath, lightheadedness or dizziness  Ángel Frederick is wearing the life vest no therapies delivered  She did not undergo lab testing due to busy lifestyle with her children graduating  She does not have a home scale  Weight in the office 275 pounds  Medical History  Primary Cardiologist  Chronic HFrEF LVEF 35% LIVDd 5 9cm, severe global hypokinesis RV systolic function normal  No sig valvular disease,  by TTE on 5/15/23   Hypertension  NSVT  Pre DM  KEYONA  Asthma   Tobacco abuse   Strong family hx of heart disease  Admitted to [facility] on 5/14 - 5/16/23 with CP and shoulder pain  Hypertensive in ED  Cardiology consulted  TTE LVEF 35%    NSVT on monitor  No [facility]  Cardiac MRI to be done  Discharged with on  a life vest       7/27/20 [facility] no CAD     7/26/20 TTE LVEF 50%, grade 1 DD  Patient Active Problem List   Diagnosis   • Allergic rhinitis   • PVC (premature ventricular contraction)   • Tobacco abuse   • Abnormal urinalysis   • Sleep apnea   • Nonintractable headache   • Hypertension   • Chest pain   • Asthma   • BMI 50 0-59 9, adult ([facility])   • Back pain   • Cardiomyopathy ([facility])   • NSVT (nonsustained ventricular tachycardia) ([facility])   • Acute pain of left shoulder   • Encounter for support and coordination of transition of care     Past Medical History:   Diagnosis Date   • Asthma    • Chronic HFrEF (heart failure with reduced ejection fraction) ([facility] ) 05/15/2023   • Hypertension    • NSVT (nonsustained ventricular tachycardia) ([facility] ) 05/2023   • Obstructive sleep apnea    • Prediabetes 07/2020   • Tobacco abuse    • Type 2 myocardial infarction +/- Fleeta Sauce 07/2020     Social History     Socioeconomic History   • Marital status: /Civil Union     Spouse name: Not on file   • Number of children: 5   • Years of education: Not on file   • Highest education level: 11th grade   Occupational History   • Not on file   Tobacco Use   • Smoking status: Every Day     Packs/day: 0 50     Types: Cigarettes     Start date: 2005   • Smokeless tobacco: Never   • Tobacco comments:     Currently smoking 2-3 pulls of a cigarette weekly (Updated 05/22/2023)  Vaping Use   • Vaping Use: Never used   Substance and Sexual Activity   • Alcohol use: Not Currently   • Drug use: Not Currently     Types: Marijuana     Comment: Denies curernt use (Updated 05/22/2023)     • Sexual activity: Not on file   Other Topics Concern   • Not on file   Social History Narrative   • Not on file     Social Determinants of Health     Financial Resource Strain: Not on file   Food Insecurity: Not on file   Transportation Needs: Not on file   Physical Activity: Not on file   Stress: Not on file Social Connections: Not on file   Intimate Partner Violence: Not on file   Housing Stability: Not on file      Family History   Problem Relation Age of Onset   • Hypertension Mother    • Diabetes Mother    • Heart failure Mother         possible stenting?    • Hypertension Father    • Diabetes Father    • No Known Problems Sister    • Hypertension Sister    • Hypertension Brother    • Asthma Brother    • Kidney disease Brother    • Hypertension Maternal Grandmother    • Kidney failure Maternal Grandmother         in HD   • Heart disease Maternal Grandfather    • Diabetes Maternal Grandfather    • Hypertension Maternal Grandfather    • No Known Problems Paternal Grandmother    • No Known Problems Paternal Grandfather    • Sudden death Maternal Aunt          in her sleep; family told it was natural causes     Past Surgical History:   Procedure Laterality Date   • NO PAST SURGERIES         Current Outpatient Medications:   •  albuterol (2 5 mg/3 mL) 0 083 % nebulizer solution, Take 3 mL (2 5 mg total) by nebulization every 6 (six) hours as needed for wheezing or shortness of breath, Disp: 75 mL, Rfl: 2  •  albuterol (PROVENTIL HFA,VENTOLIN HFA) 90 mcg/act inhaler, Inhale 2 puffs every 4 (four) hours as needed for wheezing, Disp: 6 7 g, Rfl: 0  •  ALPRAZolam (XANAX) 0 5 mg tablet, Take 1 tablet (0 5 mg total) by mouth once in imaging for anxiety for up to 1 dose, Disp: 1 tablet, Rfl: 0  •  Blood Pressure Monitoring (Adult Blood Pressure Cuff Lg) KIT, Use daily in the early morning, Disp: 1 kit, Rfl: 0  •  carvedilol (COREG) 12 5 mg tablet, Take 1 tablet (12 5 mg total) by mouth 2 (two) times a day with meals, Disp: 60 tablet, Rfl: 3  •  Diclofenac Sodium (VOLTAREN) 1 %, Apply 2 g topically 4 (four) times a day as needed (shoulder pain), Disp: 150 g, Rfl: 0  •  ibuprofen (MOTRIN) 600 mg tablet, Take 1 tablet (600 mg total) by mouth every 6 (six) hours as needed for mild pain, Disp: 30 tablet, Rfl: 0  •  loratadine (CLARITIN) 10 mg tablet, Take 1 tablet (10 mg total) by mouth daily, Disp: 30 tablet, Rfl: 2  •  methocarbamol (ROBAXIN) 500 mg tablet, Take 1 tablet (500 mg total) by mouth 2 (two) times a day as needed for muscle spasms, Disp: 30 tablet, Rfl: 0  •  sacubitril-valsartan (Entresto) 49-51 MG TABS, Take 1 tablet by mouth 2 (two) times a day, Disp: 60 tablet, Rfl: 1  •  spironolactone (ALDACTONE) 25 mg tablet, Take 1 tablet (25 mg total) by mouth daily, Disp: 30 tablet, Rfl: 3  No Known Allergies    Labs:  Admission on 05/14/2023, Discharged on 05/16/2023   Component Date Value   • WBC 05/14/2023 9 67    • RBC 05/14/2023 4 39    • Hemoglobin 05/14/2023 11 6    • Hematocrit 05/14/2023 36 7    • MCV 05/14/2023 84    • MCH 05/14/2023 26 4 (L)    • MCHC 05/14/2023 31 6    • RDW 05/14/2023 16 9 (H)    • MPV 05/14/2023 9 1    • Platelets 82/97/9196 350    • nRBC 05/14/2023 0    • Neutrophils Relative 05/14/2023 58    • Immat GRANS % 05/14/2023 1    • Lymphocytes Relative 05/14/2023 30    • Monocytes Relative 05/14/2023 8    • Eosinophils Relative 05/14/2023 2    • Basophils Relative 05/14/2023 1    • Neutrophils Absolute 05/14/2023 5 74    • Immature Grans Absolute 05/14/2023 0 06    • Lymphocytes Absolute 05/14/2023 2 89    • Monocytes Absolute 05/14/2023 0 74    • Eosinophils Absolute 05/14/2023 0 19    • Basophils Absolute 05/14/2023 0 05    • Sodium 05/14/2023 137    • Potassium 05/14/2023 3 1 (L)    • Chloride 05/14/2023 103    • CO2 05/14/2023 27    • ANION GAP 05/14/2023 7    • BUN 05/14/2023 8    • Creatinine 05/14/2023 0 75    • Glucose 05/14/2023 108    • Calcium 05/14/2023 9 3    • eGFR 05/14/2023 99    • Total Bilirubin 05/14/2023 0 58    • Bilirubin, Direct 05/14/2023 0 11    • Alkaline Phosphatase 05/14/2023 57    • AST 05/14/2023 23    • ALT 05/14/2023 18    • Total Protein 05/14/2023 7 6    • Albumin 05/14/2023 4 3    • Magnesium 05/14/2023 1 8 (L)    • Lipase 05/14/2023 12    • hs TnI 0hr 05/14/2023 73 (H) • Ventricular Rate 05/14/2023 89    • Atrial Rate 05/14/2023 89    • WA Interval 05/14/2023 158    • QRSD Interval 05/14/2023 80    • QT Interval 05/14/2023 342    • QTC Interval 05/14/2023 416    • P Axis 05/14/2023 67    • QRS Axis 05/14/2023 99    • T Wave Axis 05/14/2023 -41    • hs TnI 2hr 05/14/2023 77 (H)    • Delta 2hr hsTnI 05/14/2023 4    • hs TnI 4hr 05/14/2023 65 (H)    • Delta 4hr hsTnI 05/14/2023 -8    • Ventricular Rate 05/14/2023 77    • Atrial Rate 05/14/2023 77    • WA Interval 05/14/2023 152    • QRSD Interval 05/14/2023 78    • QT Interval 05/14/2023 366    • QTC Interval 05/14/2023 414    • P Axis 05/14/2023 69    • QRS Axis 05/14/2023 77    • T Wave Axis 05/14/2023 250    • Ventricular Rate 05/14/2023 85    • Atrial Rate 05/14/2023 85    • WA Interval 05/14/2023 154    • QRSD Interval 05/14/2023 90    • QT Interval 05/14/2023 382    • QTC Interval 05/14/2023 454    • P Axis 05/14/2023 70    • QRS Axis 05/14/2023 66    • T Wave Axis 05/14/2023 -74    • Sodium 05/15/2023 136    • Potassium 05/15/2023 4 3    • Chloride 05/15/2023 105    • CO2 05/15/2023 26    • ANION GAP 05/15/2023 5    • BUN 05/15/2023 9    • Creatinine 05/15/2023 0 69    • Glucose 05/15/2023 99    • Glucose, Fasting 05/15/2023 99    • Calcium 05/15/2023 8 6    • eGFR 05/15/2023 108    • WBC 05/15/2023 6 98    • RBC 05/15/2023 4 15    • Hemoglobin 05/15/2023 10 9 (L)    • Hematocrit 05/15/2023 34 8    • MCV 05/15/2023 84    • MCH 05/15/2023 26 3 (L)    • MCHC 05/15/2023 31 3 (L)    • RDW 05/15/2023 17 1 (H)    • Platelets 45/86/6554 319    • MPV 05/15/2023 9 4    • Magnesium 05/15/2023 2 8 (H)    • A4C EF 05/15/2023 41    • LVIDd 05/15/2023 5 90    • LVIDS 05/15/2023 4 40    • IVSd 05/15/2023 1 30    • LVPWd 05/15/2023 1 40    • FS 05/15/2023 25    • MV E' Tissue Velocity Se* 05/15/2023 6    • E wave deceleration time 05/15/2023 144    • MV Peak E Jun 05/15/2023 93    • MV Peak A Jun 05/15/2023 0 54    • RVID d 05/15/2023 4 3 • Tricuspid annular plane * 05/15/2023 1 80    • LA size 05/15/2023 4 7    • LA length (A2C) 05/15/2023 5 10    • RAA A4C 05/15/2023 12 7    • MV stenosis pressure 1/2* 05/15/2023 42    • MV valve area p 1/2 meth* 05/15/2023 5 24    • Ao root 05/15/2023 2 60    • Left ventricular stroke * 05/15/2023 85 00    • IVS 05/15/2023 1 3    • LEFT VENTRICLE SYSTOLIC * 23/43/8141 88    • LV DIASTOLIC VOLUME (MOD* 15/79/9173 173    • Left Atrium Area-systoli* 05/15/2023 19 7    • Left Atrium Area-systoli* 05/15/2023 19 6    • LVSV, 2D 05/15/2023 85    • LV EF 05/15/2023 35    • Renin 05/15/2023 <0 167 (L)    • Aldosterone 05/15/2023 7 9    • Normetanephrine, Free 05/16/2023 186 0    • Metanephrine, Free 05/16/2023 26 2    • WBC 05/16/2023 6 52    • RBC 05/16/2023 4 29    • Hemoglobin 05/16/2023 11 4 (L)    • Hematocrit 05/16/2023 36 1    • MCV 05/16/2023 84    • MCH 05/16/2023 26 6 (L)    • MCHC 05/16/2023 31 6    • RDW 05/16/2023 17 1 (H)    • Platelets 40/00/9774 319    • MPV 05/16/2023 9 4    • Sodium 05/16/2023 135    • Potassium 05/16/2023 4 0    • Chloride 05/16/2023 103    • CO2 05/16/2023 26    • ANION GAP 05/16/2023 6    • BUN 05/16/2023 9    • Creatinine 05/16/2023 0 64    • Glucose 05/16/2023 105    • Glucose, Fasting 05/16/2023 105 (H)    • Calcium 05/16/2023 8 7    • eGFR 05/16/2023 111      Imaging: No results found  Review of Systems:  Review of Systems   All other systems reviewed and are negative  Physical Exam:  Physical Exam  Vitals reviewed  Constitutional:       Appearance: She is obese  Neck:      Comments: No JVD   Cardiovascular:      Rate and Rhythm: Normal rate and regular rhythm  Pulses: Normal pulses  Heart sounds: Normal heart sounds  Pulmonary:      Effort: Pulmonary effort is normal       Breath sounds: Normal breath sounds  Musculoskeletal:      Cervical back: Normal range of motion and neck supple  Right lower leg: No edema  Left lower leg: No edema  Skin:     General: Skin is warm and dry  Capillary Refill: Capillary refill takes less than 2 seconds  Neurological:      General: No focal deficit present  Mental Status: She is alert and oriented to person, place, and time  Psychiatric:         Mood and Affect: Mood normal          Behavior: Behavior normal          Discussion/Summary:  # Chronic HFrEF LVEF 35% NYHA class II stage C- weight in the office 275 pounds  Tory Wakefield is not weighting herself at home  She does not have a scale  I have provided her with a scale during this office visit  She is taking all her medications as prescribed  On PE appears eu volemic and compensated  Continue on COreg 12 5mg BID, Entresto 49-51mg BID, Aldactone 25mg daily, Start Jardiance 10mg daily,  Continue on a 2gm sodium diet 1500 cc fluid restriction  She was instructed to undergo pre ordered lab studies  Call cardiac rehabilitation to start therapy  # CM LVEF 35% LIVDd 5 9cm, severe global hypokinesis, unclear etiology, presumed non ischemic,  cardiac MRI to be done in the near future, continue on GDMT  Jardiance 10mg daily started today  She was instructed to undergo previously ordered lab studies  Continues to wear Life Vest no therapies delivered  She will continue to wear life vest for total of 3 months or until LVEF >35%    Follow up TTE in August   This will be ordered by follow up cardiologist     #  Tobacco abuse encouraged smoking cessation   #  Obesity BMI 53 88 referred to sleep medicine R/O KEYONA

## 2023-06-21 ENCOUNTER — OFFICE VISIT (OUTPATIENT)
Dept: CARDIOLOGY CLINIC | Facility: CLINIC | Age: 43
End: 2023-06-21
Payer: MEDICARE

## 2023-06-21 VITALS
SYSTOLIC BLOOD PRESSURE: 118 MMHG | BODY MASS INDEX: 54.17 KG/M2 | WEIGHT: 275.9 LBS | OXYGEN SATURATION: 98 % | HEIGHT: 60 IN | HEART RATE: 66 BPM | DIASTOLIC BLOOD PRESSURE: 80 MMHG

## 2023-06-21 DIAGNOSIS — Z72.0 TOBACCO ABUSE: ICD-10-CM

## 2023-06-21 DIAGNOSIS — I42.9 CARDIOMYOPATHY, UNSPECIFIED TYPE (HCC): ICD-10-CM

## 2023-06-21 DIAGNOSIS — I50.22 CHRONIC HFREF (HEART FAILURE WITH REDUCED EJECTION FRACTION) (HCC): Primary | ICD-10-CM

## 2023-06-21 DIAGNOSIS — E66.01 CLASS 3 SEVERE OBESITY WITH SERIOUS COMORBIDITY AND BODY MASS INDEX (BMI) OF 50.0 TO 59.9 IN ADULT, UNSPECIFIED OBESITY TYPE (HCC): ICD-10-CM

## 2023-06-21 PROCEDURE — 99215 OFFICE O/P EST HI 40 MIN: CPT | Performed by: NURSE PRACTITIONER

## 2023-06-21 NOTE — PATIENT INSTRUCTIONS
Maintain a 2 gram daily sodium diet and 1500 ml daily fluid restriction  Check daily weights  If you gained 3 pounds in one day, 5 pounds in one week, or experience worsening shortness of breath or increasing lower leg swelling  Please call the heart failure office at [phone]    Please bring a  list of your current medications and daily weights to the office visit     Start Jardiance 10mg daily

## 2023-06-22 ENCOUNTER — OFFICE VISIT (OUTPATIENT)
Dept: DENTISTRY | Facility: CLINIC | Age: 43
End: 2023-06-22

## 2023-06-22 VITALS — HEART RATE: 73 BPM | DIASTOLIC BLOOD PRESSURE: 117 MMHG | SYSTOLIC BLOOD PRESSURE: 170 MMHG

## 2023-06-22 DIAGNOSIS — K08.3 RETAINED DENTAL ROOT: ICD-10-CM

## 2023-06-22 DIAGNOSIS — K08.89 TOOTH PAIN: Primary | ICD-10-CM

## 2023-06-22 PROCEDURE — D0220 INTRAORAL - PERIAPICAL FIRST RADIOGRAPHIC IMAGE: HCPCS

## 2023-06-22 PROCEDURE — D0230 INTRAORAL - PERIAPICAL EACH ADDITIONAL RADIOGRAPHIC IMAGE: HCPCS

## 2023-06-22 PROCEDURE — D0140 LIMITED ORAL EVALUATION - PROBLEM FOCUSED: HCPCS

## 2023-06-22 NOTE — PROGRESS NOTES
"Limited Focus Examination: Broken Tooth/Pain LL    Kalina Ortega is a 43yo afro-am female who presented to [facility] for tooth pain  Hx: LLQ started hurting 2-3 alfonso ago  Was eating something and tooth broke  Went to ER on 6/9/23  Given \"shot in my mouth (Toradol), Ibuprofen, and antibiotics (Amox 500mg)   but my doctor said I can't take Motrin because I have heart failure  \" Pt reports having taken all abx and Tylenol which has helped with reducing pain  PMHR, significant for:  · Heart failure (per pt report)  · HTN  · Tobacco abuse (on Nicotine patch)  · BMI (50-59  9)  · Asthma    ASA 3  Pain = 9/10  BP :  1st = 194/100mmHg (pt reports having not taken her BP meds because she \"just woke up\" when the dental office called and asked her to come in)  2nd = 170/117mmHg    Clinical  Deep cavitated decay and fractured crown going subgingival in some areas for #19 and 20    Radiographic  (2) PAs (#19, 20)  19-deep decay into pulp  Significant amount of bone loss circumferencing the tooth root with significant loss of tooth structure  #20- deep decay into pulp  Excavation would leave root tip  Dg: Broken #19, 20-non-restorable    RX: none   ED RX Amox 500 6/9/23    Referral: OMS Ext #19, 20    NV: Comp exam after OMS ext    "

## 2023-06-23 ENCOUNTER — TELEPHONE (OUTPATIENT)
Dept: DENTISTRY | Facility: CLINIC | Age: 43
End: 2023-06-23

## 2023-07-24 ENCOUNTER — TELEPHONE (OUTPATIENT)
Dept: CARDIOLOGY CLINIC | Facility: CLINIC | Age: 43
End: 2023-07-24

## 2023-07-24 NOTE — TELEPHONE ENCOUNTER
P/C would like to have us fill out a form for medical assistant for [employer]. Would this be ok to complete ?     Please advise

## 2023-07-24 NOTE — TELEPHONE ENCOUNTER
Called [employer] and requested to fax over medical cert form got transferred and then disconnected,     Called back and they will return call due to call volume being very high    Received a call back will fax over medical form

## 2023-07-24 NOTE — TELEPHONE ENCOUNTER
Tried to called [employer] to have form faxed to office for medical assist not able to get through    Called pt and advised to call [employer] and have them fax the form to our office att Ruthie Figueroa and we can complete.      Verbally understood

## 2023-07-25 NOTE — TELEPHONE ENCOUNTER
Completed [employer] form and faxed to [employer] at [phone]    Pt notified that form for [employer] has been filled out and sent pt verbally understood

## 2023-07-26 ENCOUNTER — OFFICE VISIT (OUTPATIENT)
Dept: CARDIOLOGY CLINIC | Facility: CLINIC | Age: 43
End: 2023-07-26
Payer: MEDICARE

## 2023-07-26 VITALS
HEIGHT: 60 IN | HEART RATE: 60 BPM | DIASTOLIC BLOOD PRESSURE: 82 MMHG | SYSTOLIC BLOOD PRESSURE: 126 MMHG | WEIGHT: 273.3 LBS | BODY MASS INDEX: 53.66 KG/M2 | OXYGEN SATURATION: 98 %

## 2023-07-26 DIAGNOSIS — I50.22 CHRONIC HFREF (HEART FAILURE WITH REDUCED EJECTION FRACTION) (HCC): Primary | ICD-10-CM

## 2023-07-26 DIAGNOSIS — I42.9 CARDIOMYOPATHY, UNSPECIFIED TYPE (HCC): ICD-10-CM

## 2023-07-26 DIAGNOSIS — I10 HYPERTENSION, UNSPECIFIED TYPE: ICD-10-CM

## 2023-07-26 DIAGNOSIS — I50.22 CHRONIC HFREF (HEART FAILURE WITH REDUCED EJECTION FRACTION) (HCC): ICD-10-CM

## 2023-07-26 DIAGNOSIS — E26.9 HYPERALDOSTERONISM (HCC): ICD-10-CM

## 2023-07-26 DIAGNOSIS — F41.9 ANXIETY: ICD-10-CM

## 2023-07-26 PROCEDURE — 99214 OFFICE O/P EST MOD 30 MIN: CPT | Performed by: STUDENT IN AN ORGANIZED HEALTH CARE EDUCATION/TRAINING PROGRAM

## 2023-07-26 RX ORDER — SACUBITRIL AND VALSARTAN 49; 51 MG/1; MG/1
2 TABLET, FILM COATED ORAL 2 TIMES DAILY
Qty: 360 TABLET | Refills: 5 | Status: SHIPPED | OUTPATIENT
Start: 2023-07-26 | End: 2023-07-26 | Stop reason: ALTCHOICE

## 2023-07-26 RX ORDER — SACUBITRIL AND VALSARTAN 97; 103 MG/1; MG/1
1 TABLET, FILM COATED ORAL 2 TIMES DAILY
Qty: 180 TABLET | Refills: 5 | Status: SHIPPED | OUTPATIENT
Start: 2023-07-26 | End: 2025-01-16

## 2023-07-26 NOTE — PROGRESS NOTES
Advanced Heart Failure/Pulmonary Hypertension Outpatient Note - Alexander Gale 41 y.o. female MRN: [id number]    @ Encounter: [id number]    Assessment:  41 y.o. female Hx per chart p/w HF fu. NICM, HFrEF, LVEF 35% (low-nl 50% in 2020), LVIDD 5.9cm  Newly discovered low EF 5/2023 7/27/20 [facility] no CAD with EF 50% in 2020  MINOCA in 2020  presented with chest pain and found to have elevated troponins with possible inferior wall hypokinesis on TTE  [facility] 07/27/2020: normal coronaries. LVEF 50% by contrast ventriculography. Super morbidly obese  Tobacco smoker  Hypertension, labile, hard to control  NSVT  KEYONA  Asthma   Strong family hx of heart disease and SCD      Plasma metanephrines WNL in 5/2023  Plasma kurt 7.9, renin activity low <0.167, ratio elevated  Cr 0.6  2019 tsh wnl    TODAY'S PLAN:  I am meeting patient for the first time today  Warm, euvolemic, obese  Feels well, no new cardiac complaints  Wearing lifevest    BP labile  No renal artery abnl mentioned 2018 CT AP w con  Secondary HTN unrevealing so far except the below:  Plasma kurt 7.9, renin activity low <0.167, ratio elevated    Endo cx for possible hyperaldosteronism  2018 CT AP w con: unremarkable adrenals  No prior chest imaging I can see, outside of CXR    Recheck tsh    She is working on cardiac rehab transportation now    CMR - not yet scheduled but ordered - she will schedule now    Ordered genetic testing today    keyona screen ordered already - pending    gdmt below  Up entresto today  Fu BMP in 1 week  Repeat TTE for EF around 9/1/2023  Narrow QRS    Pharmacotherapies / Neurohormonal Blockade:  --Beta Blocker: carvedilol 12.5 mg q12 hours. --ARNi / ACEi / ARB: Entresto 49-51 mg BID>97/103 bid   --Aldosterone Antagonist: spironolactone 25 mg daily. --SGLT2 Inhibitor: jardiance 10 qd    --Diuretic: None. Long discussion about evidence of worsening HF, when to call us for diuretic     Sudden Cardiac Death Risk Reduction:  --LVEF 35%.  Wearing LifeVest.   Electrical Resynchronization:  --Candidacy for BiV device: narrow QRS. Advanced Therapies: Will continue to monitor. Studies:  I have reviewed all pertinent patient data/labs/imaging where available, including but not limited to the below studies. Selected results may be displayed here but comprehensive listing is omitted for note clarity and can be found in the epic chart. ECG. Echo. Stress. Cath. HPI:   41 y.o. female Hx per chart p/w HF fu.  No new CP/SOB/dizziness/palpitations/syncope. No new fatigue. No new unintentional weight changes. No new leg swelling, PND, pillow orthopnea. No new fevers, chills, cough, nausea, vomiting, diarrhea, dysuria. Interval History:   As noted in 'plan' section above and prior epic chart notes.       Past Medical History:   Diagnosis Date   • Asthma    • Chronic HFrEF (heart failure with reduced ejection fraction) ([address]) 05/15/2023   • Hypertension    • NSVT (nonsustained ventricular tachycardia) ([address]) 05/2023   • Obstructive sleep apnea    • Prediabetes 07/2020   • Tobacco abuse    • Type 2 myocardial infarction +/- Drusilla Fears 07/2020     Patient Active Problem List    Diagnosis Date Noted   • Encounter for support and coordination of transition of care 05/27/2023   • NSVT (nonsustained ventricular tachycardia) ([address]) 05/24/2023   • Acute pain of left shoulder 05/24/2023   • Cardiomyopathy ([address]) 05/16/2023   • Back pain 05/14/2023   • BMI 50.0-59.9, adult ([address]) 07/28/2020   • Chest pain 07/25/2020   • Asthma 07/25/2020   • Nonintractable headache 12/08/2019   • Hypertension 12/08/2019   • Sleep apnea 11/24/2019   • PVC (premature ventricular contraction) 11/23/2019   • Tobacco abuse 11/23/2019   • Abnormal urinalysis 11/23/2019   • Allergic rhinitis 06/10/2019       ROS:  10 point ROS negative except as specified in HPI/interval history    No Known Allergies    Current Outpatient Medications   Medication Instructions   • albuterol (PROVENTIL HFA,VENTOLIN HFA) 90 mcg/act inhaler 2 puffs, Inhalation, Every 4 hours PRN   • albuterol 2.5 mg, Nebulization, Every 6 hours PRN   • ALPRAZolam (XANAX) 0.5 mg, Oral, Once in imaging   • Blood Pressure Monitoring (Adult Blood Pressure Cuff Lg) KIT Does not apply, Daily (early morning)   • carvedilol (COREG) 12.5 mg, Oral, 2 times daily with meals   • Diclofenac Sodium (VOLTAREN) 2 g, Topical, 4 times daily PRN   • Empagliflozin (JARDIANCE) 10 mg, Oral, Every morning   • loratadine (CLARITIN) 10 mg, Oral, Daily   • methocarbamol (ROBAXIN) 500 mg, Oral, 2 times daily PRN   • sacubitril-valsartan (Entresto) 49-51 MG TABS 2 tablets, Oral, 2 times daily   • spironolactone (ALDACTONE) 25 mg, Oral, Daily        Social History     Socioeconomic History   • Marital status: /Civil Union     Spouse name: Not on file   • Number of children: 5   • Years of education: Not on file   • Highest education level: 11th grade   Occupational History   • Not on file   Tobacco Use   • Smoking status: Some Days     Packs/day: 0.50     Types: Cigarettes     Start date: 2005   • Smokeless tobacco: Never   • Tobacco comments:     Currently smoking 2-3 pulls of a cigarette weekly (Updated 05/22/2023). Vaping Use   • Vaping Use: Never used   Substance and Sexual Activity   • Alcohol use: Not Currently   • Drug use: Not Currently     Types: Marijuana     Comment: Denies curernt use (Updated 05/22/2023).    • Sexual activity: Not on file   Other Topics Concern   • Not on file   Social History Narrative   • Not on file     Social Determinants of Health     Financial Resource Strain: Not on file   Food Insecurity: Not on file   Transportation Needs: Not on file   Physical Activity: Not on file   Stress: Not on file   Social Connections: Not on file   Intimate Partner Violence: Not on file   Housing Stability: Not on file       Family History   Problem Relation Age of Onset   • Hypertension Mother    • Diabetes Mother    • Heart failure Mother         possible stenting?    • Hypertension Father    • Diabetes Father    • No Known Problems Sister    • Hypertension Sister    • Hypertension Brother    • Asthma Brother    • Kidney disease Brother    • Hypertension Maternal Grandmother    • Kidney failure Maternal Grandmother         in HD   • Heart disease Maternal Grandfather    • Diabetes Maternal Grandfather    • Hypertension Maternal Grandfather    • No Known Problems Paternal Grandmother    • No Known Problems Paternal Grandfather    • Sudden death Maternal Aunt          in her sleep; family told it was natural causes       Physical Exam:  Vitals:    23 1507   BP: 126/82   BP Location: Left arm   Patient Position: Sitting   Cuff Size: Large   Pulse: 60   SpO2: 98%   Weight: 124 kg (273 lb 4.8 oz)   Height: 5' (1.524 m)     Constitutional: NAD, non toxic  Ears/nose/mouth/throat: atraumatic  CV: RRR, nl S1S2, no murmurs/rubs/gallups, no JVD, no HJR  Resp: ctabl  GI: Soft, NTND  MSK: no swollen joints in exposed areas  Extr: No edema, warm LE  Pysche: Normal affect  Neuro: appropriate in conversation  Skin: dry and intact in exposed areas    Labs & Results:    Lab Results   Component Value Date    SODIUM 135 2023    K 4.0 2023     2023    CO2 26 2023    BUN 9 2023    CREATININE 0.64 2023    GLUC 105 2023    CALCIUM 8.7 2023     Lab Results   Component Value Date    WBC 6.52 2023    HGB 11.4 (L) 2023    HCT 36.1 2023    MCV 84 2023     2023     No results found for: "BNP"   Lab Results   Component Value Date    CHOLESTEROL 154 2020    CHOLESTEROL 146 2019     Lab Results   Component Value Date    HDL 35 (L) 2020    HDL 45 2019     Lab Results   Component Value Date    TRIG 200 (H) 2020    TRIG 96 2019     Lab Results   Component Value Date    NONHDLC 119 2020    [address] 101 2019       Counseling / Coordination of Care  Time spent today 27 minutes. Greater than 50% of total time was spent with the patient and / or family counseling and / or coordination of care. We discussed diagnoses, most recent studies, tests and any changes in treatment plan. Thank you for the opportunity to participate in the care of this patient.     Wil Kaur MD  Attending Physician  Advanced Heart Failure and Transplant Cardiology  [address] [facility]

## 2023-07-27 RX ORDER — SACUBITRIL AND VALSARTAN 97; 103 MG/1; MG/1
TABLET, FILM COATED ORAL
Qty: 360 TABLET | Refills: 4 | OUTPATIENT
Start: 2023-07-27

## 2023-07-28 ENCOUNTER — DOCUMENTATION (OUTPATIENT)
Dept: CARDIOLOGY CLINIC | Facility: CLINIC | Age: 43
End: 2023-07-28

## 2023-08-14 ENCOUNTER — OFFICE VISIT (OUTPATIENT)
Dept: FAMILY MEDICINE CLINIC | Facility: CLINIC | Age: 43
End: 2023-08-14

## 2023-08-14 ENCOUNTER — APPOINTMENT (OUTPATIENT)
Dept: RADIOLOGY | Facility: HOSPITAL | Age: 43
End: 2023-08-14
Payer: MEDICARE

## 2023-08-14 ENCOUNTER — APPOINTMENT (OUTPATIENT)
Dept: LAB | Facility: HOSPITAL | Age: 43
End: 2023-08-14
Payer: MEDICARE

## 2023-08-14 VITALS
OXYGEN SATURATION: 99 % | HEIGHT: 60 IN | HEART RATE: 65 BPM | DIASTOLIC BLOOD PRESSURE: 100 MMHG | SYSTOLIC BLOOD PRESSURE: 150 MMHG | BODY MASS INDEX: 53.4 KG/M2 | TEMPERATURE: 96.6 F | WEIGHT: 272 LBS | RESPIRATION RATE: 16 BRPM

## 2023-08-14 DIAGNOSIS — I10 HYPERTENSION: ICD-10-CM

## 2023-08-14 DIAGNOSIS — I50.22 CHRONIC HFREF (HEART FAILURE WITH REDUCED EJECTION FRACTION) (HCC): ICD-10-CM

## 2023-08-14 DIAGNOSIS — J45.909 ASTHMA: ICD-10-CM

## 2023-08-14 DIAGNOSIS — E66.01 CLASS 3 SEVERE OBESITY DUE TO EXCESS CALORIES WITHOUT SERIOUS COMORBIDITY WITH BODY MASS INDEX (BMI) OF 50.0 TO 59.9 IN ADULT (HCC): ICD-10-CM

## 2023-08-14 DIAGNOSIS — Z12.4 CERVICAL CANCER SCREENING: ICD-10-CM

## 2023-08-14 DIAGNOSIS — J45.901 ASTHMA EXACERBATION: ICD-10-CM

## 2023-08-14 DIAGNOSIS — Z12.31 BREAST CANCER SCREENING BY MAMMOGRAM: ICD-10-CM

## 2023-08-14 DIAGNOSIS — Z00.00 ANNUAL PHYSICAL EXAM: Primary | ICD-10-CM

## 2023-08-14 LAB
ANION GAP SERPL CALCULATED.3IONS-SCNC: 9 MMOL/L
BUN SERPL-MCNC: 5 MG/DL (ref 5–25)
CALCIUM SERPL-MCNC: 9.4 MG/DL (ref 8.4–10.2)
CHLORIDE SERPL-SCNC: 102 MMOL/L (ref 96–108)
CO2 SERPL-SCNC: 28 MMOL/L (ref 21–32)
CREAT SERPL-MCNC: 0.77 MG/DL (ref 0.6–1.3)
FERRITIN SERPL-MCNC: 5 NG/ML (ref 11–307)
GFR SERPL CREATININE-BSD FRML MDRD: 96 ML/MIN/1.73SQ M
GLUCOSE P FAST SERPL-MCNC: 108 MG/DL (ref 65–99)
IRON SATN MFR SERPL: 16 % (ref 15–50)
IRON SERPL-MCNC: 59 UG/DL (ref 50–170)
POTASSIUM SERPL-SCNC: 4.1 MMOL/L (ref 3.5–5.3)
SODIUM SERPL-SCNC: 139 MMOL/L (ref 135–147)
TIBC SERPL-MCNC: 367 UG/DL (ref 250–450)
TSH SERPL DL<=0.05 MIU/L-ACNC: 3.05 UIU/ML (ref 0.45–4.5)

## 2023-08-14 PROCEDURE — 99214 OFFICE O/P EST MOD 30 MIN: CPT | Performed by: PHYSICIAN ASSISTANT

## 2023-08-14 PROCEDURE — 99396 PREV VISIT EST AGE 40-64: CPT | Performed by: PHYSICIAN ASSISTANT

## 2023-08-14 PROCEDURE — 80048 BASIC METABOLIC PNL TOTAL CA: CPT

## 2023-08-14 PROCEDURE — 83540 ASSAY OF IRON: CPT

## 2023-08-14 PROCEDURE — 82728 ASSAY OF FERRITIN: CPT

## 2023-08-14 PROCEDURE — 36415 COLL VENOUS BLD VENIPUNCTURE: CPT

## 2023-08-14 PROCEDURE — 83550 IRON BINDING TEST: CPT

## 2023-08-14 RX ORDER — ALBUTEROL SULFATE 90 UG/1
2 AEROSOL, METERED RESPIRATORY (INHALATION) EVERY 4 HOURS PRN
Qty: 6.7 G | Refills: 2 | Status: SHIPPED | OUTPATIENT
Start: 2023-08-14

## 2023-08-14 RX ORDER — FLUTICASONE PROPIONATE 44 UG/1
2 AEROSOL, METERED RESPIRATORY (INHALATION) 2 TIMES DAILY
Qty: 10.6 G | Refills: 2 | Status: SHIPPED | OUTPATIENT
Start: 2023-08-14

## 2023-08-14 RX ORDER — ALBUTEROL SULFATE 2.5 MG/3ML
2.5 SOLUTION RESPIRATORY (INHALATION) EVERY 6 HOURS PRN
Qty: 75 ML | Refills: 2 | Status: SHIPPED | OUTPATIENT
Start: 2023-08-14

## 2023-08-14 NOTE — PROGRESS NOTES
[facility]    NAME: Melecio Dougherty  AGE: 41 y.o. SEX: female  : 1980     DATE: 2023     Assessment and Plan:     Problem List Items Addressed This Visit        Respiratory    Asthma     - Stable. Continue Flovent twice daily, albuterol inhaler as needed, nebulizer as needed. Relevant Medications    albuterol (2.5 mg/3 mL) 0.083 % nebulizer solution    albuterol (PROVENTIL HFA,VENTOLIN HFA) 90 mcg/act inhaler    fluticasone (Flovent HFA) 44 mcg/act inhaler       Cardiovascular and Mediastinum    Hypertension     - Blood pressure slightly elevated today, however patient reports home blood pressure readings have been within normal range. - Continue carvedilol 12.5 mg twice daily, spironolactone 25 mg daily.  - Reviewed BP target goal with patient. - Continue to maintain healthy balanced diet with focus on low salt intake. Limit alcohol intake. - Advised to exercise at least 30 minutes a day for at least 5 days out of the week. Chronic HFrEF (heart failure with reduced ejection fraction) (HCC)     - Continue LifeVest.  -Continue Entresto, Jardiance, spironolactone, carvedilol as prescribed by cardiology. - Continue follow-up with cardiology as scheduled.         Other Visit Diagnoses     Annual physical exam    -  Primary    Asthma exacerbation        Relevant Medications    albuterol (2.5 mg/3 mL) 0.083 % nebulizer solution    albuterol (PROVENTIL HFA,VENTOLIN HFA) 90 mcg/act inhaler    fluticasone (Flovent HFA) 44 mcg/act inhaler    Breast cancer screening by mammogram        Relevant Orders    Mammo screening bilateral w 3d & cad    Cervical cancer screening        Relevant Orders    Ambulatory Referral to Obstetrics / Gynecology    Class 3 severe obesity due to excess calories without serious comorbidity with body mass index (BMI) of 50.0 to 59.9 in adult [facility]) Immunizations and preventive care screenings were discussed with patient today. Appropriate education was printed on patient's after visit summary. Counseling:  Alcohol/drug use: discussed moderation in alcohol intake, the recommendations for healthy alcohol use, and avoidance of illicit drug use. Dental Health: discussed importance of regular tooth brushing, flossing, and dental visits. Injury prevention: discussed safety/seat belts, safety helmets, smoke detectors, carbon dioxide detectors, and smoking near bedding or upholstery. Sexual health: discussed sexually transmitted diseases, partner selection, use of condoms, avoidance of unintended pregnancy, and contraceptive alternatives. Exercise: the importance of regular exercise/physical activity was discussed. Recommend exercise 3-5 times per week for at least 30 minutes. BMI Counseling: Body mass index is 53.12 kg/m². The BMI is above normal. Nutrition recommendations include decreasing portion sizes, encouraging healthy choices of fruits and vegetables, consuming healthier snacks, limiting drinks that contain sugar, moderation in carbohydrate intake, increasing intake of lean protein and reducing intake of cholesterol. Exercise recommendations include moderate physical activity 150 minutes/week. No pharmacotherapy was ordered. Rationale for BMI follow-up plan is due to patient being overweight or obese. Tobacco Cessation Counseling: Tobacco cessation counseling was provided. The patient is sincerely urged to quit consumption of tobacco. She is not ready to quit tobacco. Medication options and side effects of medication discussed. Patient refused medication. Return in about 3 months (around 11/14/2023) for Next scheduled follow up HTN.      Chief Complaint:     Chief Complaint   Patient presents with   • Physical Exam      History of Present Illness:     Adult Annual Physical   Patient here for a comprehensive physical exam.     Diet and Physical Activity  Diet/Nutrition: average, reports she stopped drinking soda. Exercise: walking. General Health  Sleep: Average. Patient notes she previously had CPAP but was not using it. Patient notes she is now scheduled for another appointment with sleep medicine. Tami Inman Hearing: normal - bilateral.  Vision: goes for regular eye exams and wears glasses. Dental: Follows regularly with dentist.  Reports was scheduled to have teeth pulled but was scared so did not show to the appointment. Tami Inman /GYN Health  Last menstrual period: 8/2/23; regular     Review of Systems:     Review of Systems   Constitutional: Negative for fatigue, fever and unexpected weight change. HENT: Negative for congestion and sore throat. Respiratory: Negative for cough, chest tightness, shortness of breath and wheezing. Cardiovascular: Negative for chest pain, palpitations and leg swelling. Gastrointestinal: Negative for abdominal pain, nausea and vomiting. Neurological: Negative for dizziness, numbness and headaches. Psychiatric/Behavioral: The patient is not nervous/anxious. All other systems reviewed and are negative.      Past Medical History:     Past Medical History:   Diagnosis Date   • Asthma    • Chronic HFrEF (heart failure with reduced ejection fraction) ([address]) 05/15/2023   • Hypertension    • NSVT (nonsustained ventricular tachycardia) ([address]) 05/2023   • Obstructive sleep apnea    • Prediabetes 07/2020   • Tobacco abuse    • Type 2 myocardial infarction +/- Rivas Handsome 07/2020      Past Surgical History:     Past Surgical History:   Procedure Laterality Date   • NO PAST SURGERIES        Social History:     Social History     Socioeconomic History   • Marital status: /Civil Union     Spouse name: None   • Number of children: 5   • Years of education: None   • Highest education level: 11th grade   Occupational History   • None   Tobacco Use   • Smoking status: Some Days     Packs/day: 0.50     Types: Cigarettes     Start date: 2005   • Smokeless tobacco: Never   • Tobacco comments:     Currently smoking 2-3 pulls of a cigarette weekly (Updated 05/22/2023). Vaping Use   • Vaping Use: Never used   Substance and Sexual Activity   • Alcohol use: Not Currently   • Drug use: Not Currently     Types: Marijuana     Comment: Denies curernt use (Updated 05/22/2023). • Sexual activity: None   Other Topics Concern   • None   Social History Narrative   • None     Social Determinants of Health     Financial Resource Strain: Low Risk  (8/14/2023)    Overall Financial Resource Strain (CARDIA)    • Difficulty of Paying Living Expenses: Not very hard   Food Insecurity: No Food Insecurity (8/14/2023)    Hunger Vital Sign    • Worried About Running Out of Food in the Last Year: Never true    • Ran Out of Food in the Last Year: Never true   Transportation Needs: No Transportation Needs (8/14/2023)    PRAPARE - Transportation    • Lack of Transportation (Medical): No    • Lack of Transportation (Non-Medical): No   Recent Concern: Transportation Needs - Unmet Transportation Needs (8/13/2023)    PRAPARE - Transportation    • Lack of Transportation (Medical):  Yes    • Lack of Transportation (Non-Medical): Yes   Physical Activity: Not on file   Stress: Not on file   Social Connections: Not on file   Intimate Partner Violence: Not on file   Housing Stability: Not on file       Social Determinants of Health     Tobacco Use: High Risk (8/17/2023)    Patient History    • Smoking Tobacco Use: Some Days    • Smokeless Tobacco Use: Never    • Passive Exposure: Not on file   Alcohol Use: Not on file   Financial Resource Strain: Low Risk  (8/14/2023)    Overall Financial Resource Strain (CARDIA)    • Difficulty of Paying Living Expenses: Not very hard   Food Insecurity: No Food Insecurity (8/14/2023)    Hunger Vital Sign    • Worried About Running Out of Food in the Last Year: Never true    • Ran Out of Food in the Last Year: Never true Transportation Needs: No Transportation Needs (2023)    PRAPARE - Transportation    • Lack of Transportation (Medical): No    • Lack of Transportation (Non-Medical): No   Recent Concern: Transportation Needs - Unmet Transportation Needs (2023)    PRAPARE - Transportation    • Lack of Transportation (Medical): Yes    • Lack of Transportation (Non-Medical): Yes   Physical Activity: Not on file   Stress: Not on file   Social Connections: Not on file   Intimate Partner Violence: Not on file   Depression: Not at risk (2023)    PHQ-2    • PHQ-2 Score: 0   Housing Stability: Not on file        Family History:     Family History   Problem Relation Age of Onset   • Hypertension Mother    • Diabetes Mother    • Heart failure Mother         possible stenting? • Hypertension Father    • Diabetes Father    • No Known Problems Sister    • Hypertension Sister    • Hypertension Brother    • Asthma Brother    • Kidney disease Brother    • Hypertension Maternal Grandmother    • Kidney failure Maternal Grandmother         in HD   • Heart disease Maternal Grandfather    • Diabetes Maternal Grandfather    • Hypertension Maternal Grandfather    • No Known Problems Paternal Grandmother    • No Known Problems Paternal Grandfather    • Sudden death Maternal Aunt          in her sleep; family told it was natural causes      Current Medications:     Current Outpatient Medications   Medication Sig Dispense Refill   • albuterol (2.5 mg/3 mL) 0.083 % nebulizer solution Take 3 mL (2.5 mg total) by nebulization every 6 (six) hours as needed for wheezing or shortness of breath 75 mL 2   • albuterol (PROVENTIL HFA,VENTOLIN HFA) 90 mcg/act inhaler Inhale 2 puffs every 4 (four) hours as needed for wheezing or shortness of breath 6.7 g 2   • fluticasone (Flovent HFA) 44 mcg/act inhaler Inhale 2 puffs 2 (two) times a day Rinse mouth after use.  10.6 g 2   • ALPRAZolam (XANAX) 0.5 mg tablet Take 1 tablet (0.5 mg total) by mouth once in imaging for anxiety for up to 1 dose (Patient not taking: Reported on 6/21/2023) 1 tablet 0   • Blood Pressure Monitoring (Adult Blood Pressure Cuff Lg) KIT Use daily in the early morning 1 kit 0   • carvedilol (COREG) 12.5 mg tablet Take 1 tablet (12.5 mg total) by mouth 2 (two) times a day with meals 60 tablet 3   • Diclofenac Sodium (VOLTAREN) 1 % Apply 2 g topically 4 (four) times a day as needed (shoulder pain) 150 g 0   • Empagliflozin (JARDIANCE) 10 MG TABS tablet Take 1 tablet (10 mg total) by mouth every morning 30 tablet 3   • loratadine (CLARITIN) 10 mg tablet Take 1 tablet (10 mg total) by mouth daily 30 tablet 2   • methocarbamol (ROBAXIN) 500 mg tablet Take 1 tablet (500 mg total) by mouth 2 (two) times a day as needed for muscle spasms 30 tablet 0   • sacubitril-valsartan (Entresto)  MG TABS Take 1 tablet by mouth 2 (two) times a day 180 tablet 5   • spironolactone (ALDACTONE) 25 mg tablet Take 1 tablet (25 mg total) by mouth daily 30 tablet 3     No current facility-administered medications for this visit. Allergies:     No Known Allergies   Physical Exam:     /100 (BP Location: Left arm, Patient Position: Sitting, Cuff Size: Large)   Pulse 65   Temp (!) 96.6 °F (35.9 °C) (Temporal)   Resp 16   Ht 5' (1.524 m)   Wt 123 kg (272 lb)   LMP 08/02/2023 (Approximate)   SpO2 99%   BMI 53.12 kg/m²     Physical Exam  Vitals and nursing note reviewed. Constitutional:       General: She is not in acute distress. Appearance: She is well-developed. She is obese. HENT:      Head: Normocephalic and atraumatic. Right Ear: External ear normal.      Left Ear: External ear normal.      Nose: Nose normal.   Eyes:      Conjunctiva/sclera: Conjunctivae normal.   Cardiovascular:      Rate and Rhythm: Normal rate and regular rhythm. Pulses: Normal pulses. Heart sounds: Normal heart sounds.       Comments: Wearing LifeVest.  Pulmonary:      Effort: Pulmonary effort is normal. No respiratory distress. Breath sounds: Normal breath sounds. No wheezing. Abdominal:      General: Bowel sounds are normal.      Palpations: Abdomen is soft. Tenderness: There is no abdominal tenderness. Musculoskeletal:      Cervical back: Normal range of motion and neck supple. Skin:     General: Skin is warm and dry. Neurological:      Mental Status: She is alert and oriented to person, place, and time.    Psychiatric:         Behavior: Behavior normal.         Lucero Louise PA-C   [address] [facility]

## 2023-08-17 PROBLEM — I50.22 CHRONIC HFREF (HEART FAILURE WITH REDUCED EJECTION FRACTION) (HCC): Status: ACTIVE | Noted: 2023-08-17

## 2023-08-17 NOTE — ASSESSMENT & PLAN NOTE
- Blood pressure slightly elevated today, however patient reports home blood pressure readings have been within normal range. - Continue carvedilol 12.5 mg twice daily, spironolactone 25 mg daily.  - Reviewed BP target goal with patient. - Continue to maintain healthy balanced diet with focus on low salt intake. Limit alcohol intake. - Advised to exercise at least 30 minutes a day for at least 5 days out of the week.

## 2023-08-17 NOTE — ASSESSMENT & PLAN NOTE
- Continue LifeVest.  -Continue Entresto, Jardiance, spironolactone, carvedilol as prescribed by cardiology. - Continue follow-up with cardiology as scheduled.

## 2023-09-05 ENCOUNTER — TELEPHONE (OUTPATIENT)
Dept: CARDIOLOGY CLINIC | Facility: CLINIC | Age: 43
End: 2023-09-05

## 2023-09-05 NOTE — TELEPHONE ENCOUNTER
Tried calling patient no answer, could not leave a voicemail.        ----- Message from Mack Gomez MD sent at 9/5/2023  2:19 PM EDT -----  Regarding: Please notify pt of the below  She was planned to get a cardiac MRI and fu echo before tomorrow's visit, neither of which are done. If she feels ok, please tell her to schedule the cardiac MRI asap and push back tomorrow's visit until a week after it's done. She can hold on the echo now if she gets the MRI    She also needs to schedule with the endo team, please reaffirm that she has done so.     Thanks!    - Pamela Malik

## 2023-09-07 DIAGNOSIS — I42.9 CARDIOMYOPATHY, UNSPECIFIED TYPE (HCC): ICD-10-CM

## 2023-09-07 RX ORDER — SPIRONOLACTONE 25 MG/1
25 TABLET ORAL DAILY
Qty: 30 TABLET | Refills: 2 | Status: SHIPPED | OUTPATIENT
Start: 2023-09-07

## 2023-09-07 RX ORDER — CARVEDILOL 12.5 MG/1
12.5 TABLET ORAL 2 TIMES DAILY WITH MEALS
Qty: 60 TABLET | Refills: 2 | Status: SHIPPED | OUTPATIENT
Start: 2023-09-07

## 2023-09-13 DIAGNOSIS — I42.9 CARDIOMYOPATHY, UNSPECIFIED TYPE (HCC): ICD-10-CM

## 2023-09-13 DIAGNOSIS — I50.22 CHRONIC HFREF (HEART FAILURE WITH REDUCED EJECTION FRACTION) (HCC): ICD-10-CM

## 2023-09-14 RX ORDER — EMPAGLIFLOZIN 10 MG/1
10 TABLET, FILM COATED ORAL EVERY MORNING
Qty: 30 TABLET | Refills: 2 | Status: SHIPPED | OUTPATIENT
Start: 2023-09-14

## 2023-09-15 ENCOUNTER — TELEPHONE (OUTPATIENT)
Dept: CARDIOLOGY CLINIC | Facility: CLINIC | Age: 43
End: 2023-09-15
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
## 2023-10-07 ENCOUNTER — HOSPITAL ENCOUNTER (EMERGENCY)
Facility: HOSPITAL | Age: 43
Discharge: HOME | End: 2023-10-08
Attending: EMERGENCY MEDICINE
Payer: MEDICARE

## 2023-10-07 ENCOUNTER — APPOINTMENT (EMERGENCY)
Dept: RADIOLOGY | Facility: HOSPITAL | Age: 43
End: 2023-10-07
Payer: MEDICARE

## 2023-10-07 DIAGNOSIS — J45.901 ASTHMA EXACERBATION: ICD-10-CM

## 2023-10-07 DIAGNOSIS — R06.02 SHORTNESS OF BREATH: Primary | ICD-10-CM

## 2023-10-07 PROCEDURE — 99284 EMERGENCY DEPT VISIT MOD MDM: CPT

## 2023-10-07 PROCEDURE — 99285 EMERGENCY DEPT VISIT HI MDM: CPT

## 2023-10-07 PROCEDURE — 71046 X-RAY EXAM CHEST 2 VIEWS: CPT

## 2023-10-07 PROCEDURE — 93005 ELECTROCARDIOGRAM TRACING: CPT

## 2023-10-07 RX ORDER — IPRATROPIUM BROMIDE AND ALBUTEROL SULFATE 2.5; .5 MG/3ML; MG/3ML
3 SOLUTION RESPIRATORY (INHALATION) ONCE
Status: COMPLETED | OUTPATIENT
Start: 2023-10-07 | End: 2023-10-07

## 2023-10-07 RX ADMIN — IPRATROPIUM BROMIDE AND ALBUTEROL SULFATE 3 ML: 2.5; .5 SOLUTION RESPIRATORY (INHALATION) at 23:53

## 2023-10-08 VITALS
TEMPERATURE: 98.3 F | DIASTOLIC BLOOD PRESSURE: 109 MMHG | BODY MASS INDEX: 54.35 KG/M2 | SYSTOLIC BLOOD PRESSURE: 213 MMHG | RESPIRATION RATE: 18 BRPM | WEIGHT: 278.3 LBS | OXYGEN SATURATION: 99 % | HEART RATE: 65 BPM

## 2023-10-08 LAB
2HR DELTA HS TROPONIN: -1 NG/L
ALBUMIN SERPL BCP-MCNC: 4.3 G/DL (ref 3.5–5)
ALP SERPL-CCNC: 51 U/L (ref 34–104)
ALT SERPL W P-5'-P-CCNC: 13 U/L (ref 7–52)
ANION GAP SERPL CALCULATED.3IONS-SCNC: 7 MMOL/L
AST SERPL W P-5'-P-CCNC: 15 U/L (ref 13–39)
ATRIAL RATE: 66 BPM
BASOPHILS # BLD AUTO: 0.05 THOUSANDS/ÂΜL (ref 0–0.1)
BASOPHILS NFR BLD AUTO: 1 % (ref 0–1)
BILIRUB SERPL-MCNC: 0.37 MG/DL (ref 0.2–1)
BNP SERPL-MCNC: 31 PG/ML (ref 0–100)
BUN SERPL-MCNC: 13 MG/DL (ref 5–25)
CALCIUM SERPL-MCNC: 9.2 MG/DL (ref 8.4–10.2)
CARDIAC TROPONIN I PNL SERPL HS: 16 NG/L
CARDIAC TROPONIN I PNL SERPL HS: 17 NG/L
CHLORIDE SERPL-SCNC: 102 MMOL/L (ref 96–108)
CO2 SERPL-SCNC: 29 MMOL/L (ref 21–32)
CREAT SERPL-MCNC: 0.84 MG/DL (ref 0.6–1.3)
EOSINOPHIL # BLD AUTO: 0.32 THOUSAND/ÂΜL (ref 0–0.61)
EOSINOPHIL NFR BLD AUTO: 4 % (ref 0–6)
ERYTHROCYTE [DISTWIDTH] IN BLOOD BY AUTOMATED COUNT: 14.6 % (ref 11.6–15.1)
GFR SERPL CREATININE-BSD FRML MDRD: 86 ML/MIN/1.73SQ M
GLUCOSE SERPL-MCNC: 107 MG/DL (ref 65–140)
HCT VFR BLD AUTO: 38.2 % (ref 34.8–46.1)
HGB BLD-MCNC: 12.2 G/DL (ref 11.5–15.4)
IMM GRANULOCYTES # BLD AUTO: 0.03 THOUSAND/UL (ref 0–0.2)
IMM GRANULOCYTES NFR BLD AUTO: 0 % (ref 0–2)
LYMPHOCYTES # BLD AUTO: 3.29 THOUSANDS/ÂΜL (ref 0.6–4.47)
LYMPHOCYTES NFR BLD AUTO: 44 % (ref 14–44)
MCH RBC QN AUTO: 28 PG (ref 26.8–34.3)
MCHC RBC AUTO-ENTMCNC: 31.9 G/DL (ref 31.4–37.4)
MCV RBC AUTO: 88 FL (ref 82–98)
MONOCYTES # BLD AUTO: 0.56 THOUSAND/ÂΜL (ref 0.17–1.22)
MONOCYTES NFR BLD AUTO: 8 % (ref 4–12)
NEUTROPHILS # BLD AUTO: 3.23 THOUSANDS/ÂΜL (ref 1.85–7.62)
NEUTS SEG NFR BLD AUTO: 43 % (ref 43–75)
NRBC BLD AUTO-RTO: 0 /100 WBCS
P AXIS: 65 DEGREES
PLATELET # BLD AUTO: 333 THOUSANDS/UL (ref 149–390)
PMV BLD AUTO: 9.6 FL (ref 8.9–12.7)
POTASSIUM SERPL-SCNC: 3.4 MMOL/L (ref 3.5–5.3)
PR INTERVAL: 136 MS
PROT SERPL-MCNC: 7.3 G/DL (ref 6.4–8.4)
QRS AXIS: 72 DEGREES
QRSD INTERVAL: 84 MS
QT INTERVAL: 400 MS
QTC INTERVAL: 419 MS
RBC # BLD AUTO: 4.36 MILLION/UL (ref 3.81–5.12)
SODIUM SERPL-SCNC: 138 MMOL/L (ref 135–147)
T WAVE AXIS: 259 DEGREES
VENTRICULAR RATE: 66 BPM
WBC # BLD AUTO: 7.48 THOUSAND/UL (ref 4.31–10.16)

## 2023-10-08 PROCEDURE — 96374 THER/PROPH/DIAG INJ IV PUSH: CPT

## 2023-10-08 PROCEDURE — 36415 COLL VENOUS BLD VENIPUNCTURE: CPT

## 2023-10-08 PROCEDURE — 80053 COMPREHEN METABOLIC PANEL: CPT

## 2023-10-08 PROCEDURE — 93010 ELECTROCARDIOGRAM REPORT: CPT | Performed by: INTERNAL MEDICINE

## 2023-10-08 PROCEDURE — 83880 ASSAY OF NATRIURETIC PEPTIDE: CPT

## 2023-10-08 PROCEDURE — 84484 ASSAY OF TROPONIN QUANT: CPT

## 2023-10-08 PROCEDURE — 85025 COMPLETE CBC W/AUTO DIFF WBC: CPT

## 2023-10-08 RX ORDER — ALBUTEROL SULFATE 90 UG/1
2 AEROSOL, METERED RESPIRATORY (INHALATION) EVERY 6 HOURS PRN
Qty: 8.5 G | Refills: 0 | Status: SHIPPED | OUTPATIENT
Start: 2023-10-08

## 2023-10-08 RX ORDER — DEXAMETHASONE SODIUM PHOSPHATE 10 MG/ML
10 INJECTION, SOLUTION INTRAMUSCULAR; INTRAVENOUS ONCE
Status: COMPLETED | OUTPATIENT
Start: 2023-10-08 | End: 2023-10-08

## 2023-10-08 RX ADMIN — DEXAMETHASONE SODIUM PHOSPHATE 10 MG: 10 INJECTION, SOLUTION INTRAMUSCULAR; INTRAVENOUS at 02:49

## 2023-10-08 NOTE — ED PROVIDER NOTES
History  Chief Complaint   Patient presents with    Asthma     C/o of asthma for past 3 days, has no inhaler or nebulizer. The patient is a 65-year-old female with a past medical history of asthma, KEYONA, prior MI, cardiomyopathy, and chronic heart failure, who presents for evaluation of shortness of breath. She reports 3 days of persistently worsening shortness of breath and wheezing. No associated chest pain, palpitations, diaphoresis, abdominal pain, nausea, vomiting, cold-like symptoms, or F/C. No known sick contacts. The patient states that the change of weather typically exacerbates her asthma. She tried a nebulizer treatment at home without significant relief. She reports compliance with her medications including her lasix, and denies worsening leg swelling or weight gain. Prior to Admission Medications   Prescriptions Last Dose Informant Patient Reported? Taking?    ALPRAZolam (XANAX) 0.5 mg tablet  Self No No   Sig: Take 1 tablet (0.5 mg total) by mouth once in imaging for anxiety for up to 1 dose   Patient not taking: Reported on 6/21/2023   Blood Pressure Monitoring (Adult Blood Pressure Cuff Lg) KIT  Self No No   Sig: Use daily in the early morning   Diclofenac Sodium (VOLTAREN) 1 %  Self No No   Sig: Apply 2 g topically 4 (four) times a day as needed (shoulder pain)   Jardiance 10 MG TABS tablet   No No   Sig: TAKE 1 TABLET (10 MG TOTAL) BY MOUTH EVERY MORNING   albuterol (2.5 mg/3 mL) 0.083 % nebulizer solution   No No   Sig: Take 3 mL (2.5 mg total) by nebulization every 6 (six) hours as needed for wheezing or shortness of breath   albuterol (PROVENTIL HFA,VENTOLIN HFA) 90 mcg/act inhaler   No No   Sig: Inhale 2 puffs every 4 (four) hours as needed for wheezing or shortness of breath   carvedilol (COREG) 12.5 mg tablet   No No   Sig: TAKE 1 TABLET (12.5 MG TOTAL) BY MOUTH 2 (TWO) TIMES A DAY WITH MEALS   fluticasone (Flovent HFA) 44 mcg/act inhaler   No No   Sig: Inhale 2 puffs 2 (two) times a day Rinse mouth after use.   loratadine (CLARITIN) 10 mg tablet  Self No No   Sig: Take 1 tablet (10 mg total) by mouth daily   methocarbamol (ROBAXIN) 500 mg tablet  Self No No   Sig: Take 1 tablet (500 mg total) by mouth 2 (two) times a day as needed for muscle spasms   sacubitril-valsartan (Entresto)  MG TABS   No No   Sig: Take 1 tablet by mouth 2 (two) times a day   spironolactone (ALDACTONE) 25 mg tablet   No No   Sig: TAKE 1 TABLET (25 MG TOTAL) BY MOUTH DAILY      Facility-Administered Medications: None       Past Medical History:   Diagnosis Date    Asthma     Chronic HFrEF (heart failure with reduced ejection fraction) ([address]) 05/15/2023    Hypertension     NSVT (nonsustained ventricular tachycardia) ([address]) 2023    Obstructive sleep apnea     Prediabetes 2020    Tobacco abuse     Type 2 myocardial infarction +/- [facility] 2020       Past Surgical History:   Procedure Laterality Date    NO PAST SURGERIES         Family History   Problem Relation Age of Onset    Hypertension Mother     Diabetes Mother     Heart failure Mother         possible stenting? Hypertension Father     Diabetes Father     No Known Problems Sister     Hypertension Sister     Hypertension Brother     Asthma Brother     Kidney disease Brother     Hypertension Maternal Grandmother     Kidney failure Maternal Grandmother         in HD    Heart disease Maternal Grandfather     Diabetes Maternal Grandfather     Hypertension Maternal Grandfather     No Known Problems Paternal Grandmother     No Known Problems Paternal Grandfather     Sudden death Maternal Aunt          in her sleep; family told it was natural causes     I have reviewed and agree with the history as documented.     E-Cigarette/Vaping    E-Cigarette Use Never User      E-Cigarette/Vaping Substances     Social History     Tobacco Use    Smoking status: Some Days     Packs/day: 0.50     Types: Cigarettes     Start date:     Smokeless tobacco: Never Tobacco comments:     Currently smoking 2-3 pulls of a cigarette weekly (Updated 05/22/2023). Vaping Use    Vaping Use: Never used   Substance Use Topics    Alcohol use: Not Currently    Drug use: Not Currently     Types: Marijuana     Comment: Denies curernt use (Updated 05/22/2023). Review of Systems   Constitutional:  Negative for chills, diaphoresis, fever and unexpected weight change. HENT:  Negative for congestion, ear pain, rhinorrhea and sore throat. Eyes:  Negative for pain and visual disturbance. Respiratory:  Positive for shortness of breath and wheezing. Negative for cough. Cardiovascular:  Negative for chest pain, palpitations and leg swelling. Gastrointestinal:  Negative for abdominal pain, diarrhea, nausea and vomiting. Genitourinary:  Negative for dysuria and hematuria. Musculoskeletal:  Negative for arthralgias, back pain and myalgias. Skin:  Negative for color change and rash. Neurological:  Negative for seizures, syncope, light-headedness and headaches. All other systems reviewed and are negative. Physical Exam  Physical Exam  Vitals and nursing note reviewed. Constitutional:       General: She is awake. Appearance: She is well-developed. She is morbidly obese. She is not toxic-appearing or diaphoretic. HENT:      Head: Normocephalic and atraumatic. Right Ear: Tympanic membrane, ear canal and external ear normal.      Left Ear: Tympanic membrane, ear canal and external ear normal.      Nose: Nose normal. No congestion or rhinorrhea. Mouth/Throat:      Lips: Pink. Mouth: Mucous membranes are moist.      Pharynx: Oropharynx is clear. Uvula midline. Eyes:      General: Lids are normal. Vision grossly intact. Gaze aligned appropriately. Conjunctiva/sclera: Conjunctivae normal.      Pupils: Pupils are equal, round, and reactive to light. Cardiovascular:      Rate and Rhythm: Normal rate and regular rhythm.       Heart sounds: S1 normal and S2 normal. No murmur heard. No friction rub. No gallop. Pulmonary:      Effort: Tachypnea present. No accessory muscle usage or retractions. Breath sounds: No stridor or decreased air movement. Examination of the right-upper field reveals wheezing. Examination of the left-upper field reveals wheezing. Wheezing (Mild) present. No decreased breath sounds, rhonchi or rales. Abdominal:      General: Abdomen is flat. Palpations: Abdomen is soft. Tenderness: There is no abdominal tenderness. There is no guarding or rebound. Musculoskeletal:      Cervical back: Normal, full passive range of motion without pain and neck supple. No rigidity or crepitus. No spinous process tenderness or muscular tenderness. Thoracic back: Normal. No spasms, tenderness or bony tenderness. Lumbar back: Normal. No spasms, tenderness or bony tenderness. Right lower le+ Edema present. Left lower le+ Edema present. Skin:     General: Skin is warm and dry. Capillary Refill: Capillary refill takes less than 2 seconds. Coloration: Skin is not cyanotic, jaundiced or pale. Findings: No erythema, lesion, petechiae, rash or wound. Neurological:      Mental Status: She is alert and oriented to person, place, and time. Psychiatric:         Attention and Perception: Attention normal.         Mood and Affect: Mood normal.         Behavior: Behavior normal. Behavior is cooperative.          Vital Signs  ED Triage Vitals [10/07/23 2308]   Temperature Pulse Respirations Blood Pressure SpO2   98.3 °F (36.8 °C) 83 (!) 24 (!) 213/109 98 %      Temp Source Heart Rate Source Patient Position - Orthostatic VS BP Location FiO2 (%)   Oral Monitor Sitting Right arm --      Pain Score       --           Vitals:    10/07/23 2308 10/08/23 0211   BP: (!) 213/109    Pulse: 83 65   Patient Position - Orthostatic VS: Sitting        ED Medications  Medications   ipratropium-albuterol (DUO-NEB) 0.5-2.5 mg/3 mL inhalation solution 3 mL (3 mL Nebulization Given 10/7/23 5839)   dexamethasone (PF) (DECADRON) injection 10 mg (10 mg Intravenous Given 10/8/23 0249)       Diagnostic Studies  Results Reviewed       Procedure Component Value Units Date/Time    HS Troponin I 2hr [[id number]]  (Normal) Collected: 10/08/23 0210    Lab Status: Final result Specimen: Blood from Arm, Left Updated: 10/08/23 0238     hs TnI 2hr 16 ng/L      Delta 2hr hsTnI -1 ng/L     HS Troponin 0hr (reflex protocol) [[id number]]  (Normal) Collected: 10/08/23 0009    Lab Status: Final result Specimen: Blood from Arm, Left Updated: 10/08/23 0036     hs TnI 0hr 17 ng/L     B-Type Natriuretic Peptide(BNP) [[id number]]  (Normal) Collected: 10/08/23 0009    Lab Status: Final result Specimen: Blood from Arm, Left Updated: 10/08/23 0036     BNP 31 pg/mL     Comprehensive metabolic panel [[id number]]  (Abnormal) Collected: 10/08/23 0009    Lab Status: Final result Specimen: Blood from Arm, Left Updated: 10/08/23 0031     Sodium 138 mmol/L      Potassium 3.4 mmol/L      Chloride 102 mmol/L      CO2 29 mmol/L      ANION GAP 7 mmol/L      BUN 13 mg/dL      Creatinine 0.84 mg/dL      Glucose 107 mg/dL      Calcium 9.2 mg/dL      AST 15 U/L      ALT 13 U/L      Alkaline Phosphatase 51 U/L      Total Protein 7.3 g/dL      Albumin 4.3 g/dL      Total Bilirubin 0.37 mg/dL      eGFR 86 ml/min/1.73sq m     Narrative:      [place] guidelines for Chronic Kidney Disease (CKD):     Stage 1 with normal or high GFR (GFR > 90 mL/min/1.73 square meters)    Stage 2 Mild CKD (GFR = 60-89 mL/min/1.73 square meters)    Stage 3A Moderate CKD (GFR = 45-59 mL/min/1.73 square meters)    Stage 3B Moderate CKD (GFR = 30-44 mL/min/1.73 square meters)    Stage 4 Severe CKD (GFR = 15-29 mL/min/1.73 square meters)    Stage 5 End Stage CKD (GFR <15 mL/min/1.73 square meters)  Note: GFR calculation is accurate only with a steady state creatinine    CBC and differential [[id number]] Collected: 10/08/23 0009    Lab Status: Final result Specimen: Blood from Arm, Left Updated: 10/08/23 0014     WBC 7.48 Thousand/uL      RBC 4.36 Million/uL      Hemoglobin 12.2 g/dL      Hematocrit 38.2 %      MCV 88 fL      MCH 28.0 pg      MCHC 31.9 g/dL      RDW 14.6 %      MPV 9.6 fL      Platelets 371 Thousands/uL      nRBC 0 /100 WBCs      Neutrophils Relative 43 %      Immat GRANS % 0 %      Lymphocytes Relative 44 %      Monocytes Relative 8 %      Eosinophils Relative 4 %      Basophils Relative 1 %      Neutrophils Absolute 3.23 Thousands/µL      Immature Grans Absolute 0.03 Thousand/uL      Lymphocytes Absolute 3.29 Thousands/µL      Monocytes Absolute 0.56 Thousand/µL      Eosinophils Absolute 0.32 Thousand/µL      Basophils Absolute 0.05 Thousands/µL                    XR chest 2 views   ED Interpretation by [facility] ELEAZAR Lin PA-C (10/08 0248)   No acute cardiopulmonary disease.                  Procedures  ECG 12 Lead Documentation Only    Date/Time: 10/7/2023 11:58 PM    Performed by: Sunil Sung PA-C  Authorized by: Sunil Sung PA-C    ECG reviewed by me, the ED Provider: yes    Patient location:  ED  Previous ECG:     Previous ECG:  Compared to current    Similarity:  Changes noted (New T wave inversion in V3)    Comparison to cardiac monitor: Yes    Interpretation:     Interpretation: abnormal    Rate:     ECG rate:  66    ECG rate assessment: normal    Rhythm:     Rhythm: sinus rhythm    Ectopy:     Ectopy: none    QRS:     QRS axis:  Normal    QRS intervals:  Normal  Conduction:     Conduction: normal    ST segments:     ST segments:  Non-specific  T waves:     T waves: inverted      Inverted:  V3, V4, V5 and V6  Comments:      IN interval: 136ms  QRS duration: 84ms  QT/QTc: 400/419ms       ED Course  ED Course as of 10/08/23 0654   Sun Oct 08, 2023   0050 BNP: 31   0050 hs TnI 0hr: 17   0051 Comprehensive metabolic panel(!)  Very mild hypokalemia, but otherwise unremarkable. 0134 Patient reports improvement in her shortness of breath after a DuoNeb. Plan is for discharge after a delta troponin. HEART score:  History 0=Slightly or non-suspicious   ECG 1=Nonspecific repolarization disturbance   Age 0= < 45 years   Risk Factors 2= > 3 risk factors, or history of atherosclerotic disease   Troponin 1= Between 13-35 ng/L   Total 4       PERC Rule for PE      Flowsheet Row Most Recent Value   PERC Rule for PE    Age >=50 0 Filed at: 10/07/2023 2350   HR >=100 0 Filed at: 10/07/2023 2350   O2 Sat on room air < 95% 0 Filed at: 10/07/2023 2350   History of PE or DVT 0 Filed at: 10/07/2023 2350   Recent trauma or surgery 0 Filed at: 10/07/2023 2350   Hemoptysis 0 Filed at: 10/07/2023 2350   Exogenous estrogen 0 Filed at: 10/07/2023 2350   Unilateral leg swelling 0 Filed at: 10/07/2023 2350   PERC Rule for PE Results 0 Filed at: 10/07/2023 2350              Medical Decision Making  Patient presents for shortness of breath. She is hemodynamically stable and in no acute respiratory distress. She is mildly tachypneic and wheezing is auscultated in the upper air fields bilaterally. No significant lower extremity edema or abnormal heart sounds on exam.  Differential diagnosis includes but is not limited to asthma exacerbation, CHF exacerbation, viral illness, URI, bronchitis, pneumonia, anemia, metabolic abnormality, renal failure, pleural effusion, PE, or cardiac etiology. On my personal interpretation of the EKG, the patient is in a normal sinus rhythm with non-specific ST changes in multiple leads, but there are no new changes compared to prior EKG. Initial troponin was 17, giving the patient a heart score of 4. Patient is PERC negative, therefore further evaluation for a pulmonary embolism was not pursued. Chest x-ray showed no acute cardiopulmonary disease and BNP was only 31, therefore very low suspicion for CHF exacerbation.   She reported significant relief with decadron and a DuoNeb. Reviewed all results with the patient, and all questions were answered to the best of my ability. Strict return precautions discussed and she verbalized understanding. Follow-up with PCP, and return to the ED in the interim with new or worsening symptoms. Problems Addressed:  Asthma exacerbation: acute illness or injury  Shortness of breath: acute illness or injury    Amount and/or Complexity of Data Reviewed  External Data Reviewed: labs, radiology, ECG and notes. Labs: ordered. Decision-making details documented in ED Course. Radiology: ordered and independent interpretation performed. ECG/medicine tests: ordered and independent interpretation performed. Risk  Prescription drug management. Disposition  Final diagnoses:   Shortness of breath   Asthma exacerbation     Time reflects when diagnosis was documented in both MDM as applicable and the Disposition within this note       Time User Action Codes Description Comment    10/8/2023  1:36 AM Liana Lin [R06.02] Shortness of breath     10/8/2023  2:44 AM Liana Lin [J45.901] Asthma exacerbation           ED Disposition       ED Disposition   Discharge    Condition   Stable    Date/Time   Sun Oct 8, 2023  2:40 AM    Comment   Melissa Duarte discharge to home/self care. Follow-up Information       Follow up With Specialties Details Why [id number] , [address] [address]  [phone]              Discharge Medication List as of 10/8/2023  2:48 AM        START taking these medications    Details   !! albuterol (ProAir HFA) 90 mcg/act inhaler Inhale 2 puffs every 6 (six) hours as needed for wheezing, Starting Sun 10/8/2023, Normal       !! - Potential duplicate medications found. Please discuss with provider.         CONTINUE these medications which have NOT CHANGED    Details   albuterol (2.5 mg/3 mL) 0.083 % nebulizer solution Take 3 mL (2.5 mg total) by nebulization every 6 (six) hours as needed for wheezing or shortness of breath, Starting Mon 8/14/2023, Normal      !! albuterol (PROVENTIL HFA,VENTOLIN HFA) 90 mcg/act inhaler Inhale 2 puffs every 4 (four) hours as needed for wheezing or shortness of breath, Starting Mon 8/14/2023, Normal      ALPRAZolam (XANAX) 0.5 mg tablet Take 1 tablet (0.5 mg total) by mouth once in imaging for anxiety for up to 1 dose, Starting Mon 5/22/2023, Normal      Blood Pressure Monitoring (Adult Blood Pressure Cuff Lg) KIT Use daily in the early morning, Starting Mon 5/22/2023, Normal      carvedilol (COREG) 12.5 mg tablet TAKE 1 TABLET (12.5 MG TOTAL) BY MOUTH 2 (TWO) TIMES A DAY WITH MEALS, Starting Thu 9/7/2023, Normal      Diclofenac Sodium (VOLTAREN) 1 % Apply 2 g topically 4 (four) times a day as needed (shoulder pain), Starting Thu 5/18/2023, Normal      fluticasone (Flovent HFA) 44 mcg/act inhaler Inhale 2 puffs 2 (two) times a day Rinse mouth after use., Starting Mon 8/14/2023, Normal      Jardiance 10 MG TABS tablet TAKE 1 TABLET (10 MG TOTAL) BY MOUTH EVERY MORNING, Starting Thu 9/14/2023, Normal      loratadine (CLARITIN) 10 mg tablet Take 1 tablet (10 mg total) by mouth daily, Starting Wed 4/26/2023, Normal      methocarbamol (ROBAXIN) 500 mg tablet Take 1 tablet (500 mg total) by mouth 2 (two) times a day as needed for muscle spasms, Starting Thu 5/18/2023, Normal      sacubitril-valsartan (Entresto)  MG TABS Take 1 tablet by mouth 2 (two) times a day, Starting Wed 7/26/2023, Until Thu 1/16/2025, Normal      spironolactone (ALDACTONE) 25 mg tablet TAKE 1 TABLET (25 MG TOTAL) BY MOUTH DAILY, Starting Thu 9/7/2023, Normal       !! - Potential duplicate medications found. Please discuss with provider. No discharge procedures on file.     PDMP Review         Value Time User    PDMP Reviewed  Yes 5/22/2023  8:42 AM Carole Tong PA-C            ED Provider  Electronically Signed by           Stephanie Medrano PA-C  10/12/23 1957

## 2023-11-02 DIAGNOSIS — J45.909 ASTHMA: ICD-10-CM

## 2023-11-03 RX ORDER — ALBUTEROL SULFATE 2.5 MG/3ML
SOLUTION RESPIRATORY (INHALATION)
Qty: 180 ML | Refills: 1 | Status: SHIPPED | OUTPATIENT
Start: 2023-11-03

## 2023-11-03 RX ORDER — FLUTICASONE PROPIONATE 44 MCG
AEROSOL WITH ADAPTER (GRAM) INHALATION
Qty: 31.8 G | Refills: 1 | Status: SHIPPED | OUTPATIENT
Start: 2023-11-03

## 2023-11-28 ENCOUNTER — OFFICE VISIT (OUTPATIENT)
Dept: FAMILY MEDICINE CLINIC | Facility: CLINIC | Age: 43
End: 2023-11-28

## 2023-11-28 VITALS
RESPIRATION RATE: 18 BRPM | WEIGHT: 278 LBS | HEIGHT: 60 IN | OXYGEN SATURATION: 99 % | TEMPERATURE: 97.8 F | HEART RATE: 94 BPM | BODY MASS INDEX: 54.58 KG/M2 | SYSTOLIC BLOOD PRESSURE: 134 MMHG | DIASTOLIC BLOOD PRESSURE: 78 MMHG

## 2023-11-28 DIAGNOSIS — J45.909 ASTHMA: Primary | ICD-10-CM

## 2023-11-28 DIAGNOSIS — I10 HYPERTENSION, UNSPECIFIED TYPE: ICD-10-CM

## 2023-11-28 DIAGNOSIS — L98.8 SKIN LESION OF BREAST: ICD-10-CM

## 2023-11-28 DIAGNOSIS — Z23 ENCOUNTER FOR IMMUNIZATION: ICD-10-CM

## 2023-11-28 PROCEDURE — 90471 IMMUNIZATION ADMIN: CPT | Performed by: PHYSICIAN ASSISTANT

## 2023-11-28 PROCEDURE — 90686 IIV4 VACC NO PRSV 0.5 ML IM: CPT | Performed by: PHYSICIAN ASSISTANT

## 2023-11-28 PROCEDURE — 99214 OFFICE O/P EST MOD 30 MIN: CPT | Performed by: PHYSICIAN ASSISTANT

## 2023-11-28 RX ORDER — ALBUTEROL SULFATE 90 UG/1
2 AEROSOL, METERED RESPIRATORY (INHALATION) EVERY 4 HOURS PRN
Qty: 6.7 G | Refills: 2 | Status: SHIPPED | OUTPATIENT
Start: 2023-11-28
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
## 2023-11-28 NOTE — ASSESSMENT & PLAN NOTE
- Located at 3 o'clock position of right breast.  - Recommend either biopsy with resident here in clinic or referral to dermatology for further evaluation, but patient prefers to hold off on further evaluation at this time. - Will recheck in 3 months.   - Recommend contact the office sooner if noticing any change in size, shape, color of the lesion.

## 2023-11-28 NOTE — ASSESSMENT & PLAN NOTE
- Blood pressure improved. - Continue carvedilol 12.5 mg twice daily, spironolactone 25 mg daily.  - Reviewed BP target goal with patient. - Continue to maintain healthy balanced diet with focus on low salt intake. Limit alcohol intake. - Advised to exercise at least 30 minutes a day for at least 5 days out of the week.

## 2023-11-28 NOTE — PATIENT INSTRUCTIONS
Please call central scheduling at [phone] to schedule mammogram. Thanks!      [address] [facility]  [address] [facility]  [address] [place] [address]  [phone]

## 2023-12-08 DIAGNOSIS — I42.9 CARDIOMYOPATHY, UNSPECIFIED TYPE (HCC): ICD-10-CM

## 2023-12-08 DIAGNOSIS — I50.22 CHRONIC HFREF (HEART FAILURE WITH REDUCED EJECTION FRACTION) (HCC): ICD-10-CM

## 2024-01-05 DIAGNOSIS — I50.22 CHRONIC HFREF (HEART FAILURE WITH REDUCED EJECTION FRACTION) (HCC): ICD-10-CM

## 2024-01-05 DIAGNOSIS — I42.9 CARDIOMYOPATHY, UNSPECIFIED TYPE (HCC): ICD-10-CM

## 2024-01-05 RX ORDER — EMPAGLIFLOZIN 10 MG/1
10 TABLET, FILM COATED ORAL EVERY MORNING
Qty: 30 TABLET | Refills: 1 | Status: SHIPPED | OUTPATIENT
Start: 2024-01-05

## 2024-01-05 NOTE — TELEPHONE ENCOUNTER
Requested medication(s) are due for refill today: yes  Patient has already received a courtesy refill: no    Other reason request has been forwarded to provider: failed

## 2024-02-01 DIAGNOSIS — I42.9 CARDIOMYOPATHY, UNSPECIFIED TYPE (HCC): ICD-10-CM

## 2024-02-01 DIAGNOSIS — I50.22 CHRONIC HFREF (HEART FAILURE WITH REDUCED EJECTION FRACTION) (HCC): ICD-10-CM

## 2024-02-01 RX ORDER — EMPAGLIFLOZIN 10 MG/1
10 TABLET, FILM COATED ORAL EVERY MORNING
Qty: 30 TABLET | Refills: 0 | Status: SHIPPED | OUTPATIENT
Start: 2024-02-01

## 2024-02-09 DIAGNOSIS — J45.909 ASTHMA: ICD-10-CM

## 2024-02-14 RX ORDER — FLUTICASONE FUROATE 100 UG/1
POWDER RESPIRATORY (INHALATION)
Qty: 30 BLISTER | Refills: 2 | Status: SHIPPED | OUTPATIENT
Start: 2024-02-14

## 2024-02-22 DIAGNOSIS — I42.9 CARDIOMYOPATHY, UNSPECIFIED TYPE (HCC): ICD-10-CM

## 2024-02-22 RX ORDER — CARVEDILOL 12.5 MG/1
TABLET ORAL
Qty: 60 TABLET | Refills: 5 | Status: SHIPPED | OUTPATIENT
Start: 2024-02-22

## 2024-03-01 DIAGNOSIS — J45.909 ASTHMA: ICD-10-CM

## 2024-03-01 RX ORDER — ALBUTEROL SULFATE 90 UG/1
AEROSOL, METERED RESPIRATORY (INHALATION)
Qty: 6.7 G | Refills: 1 | Status: SHIPPED | OUTPATIENT
Start: 2024-03-01

## 2024-03-26 ENCOUNTER — HOSPITAL ENCOUNTER (EMERGENCY)
Facility: HOSPITAL | Age: 44
Discharge: HOME | End: 2024-03-27
Attending: EMERGENCY MEDICINE
Payer: MEDICARE

## 2024-03-26 DIAGNOSIS — I10 HYPERTENSION: ICD-10-CM

## 2024-03-26 DIAGNOSIS — N93.9 VAGINAL BLEEDING: Primary | ICD-10-CM

## 2024-03-26 PROCEDURE — 99284 EMERGENCY DEPT VISIT MOD MDM: CPT | Performed by: EMERGENCY MEDICINE

## 2024-03-26 PROCEDURE — 81025 URINE PREGNANCY TEST: CPT

## 2024-03-26 PROCEDURE — 99284 EMERGENCY DEPT VISIT MOD MDM: CPT

## 2024-03-26 NOTE — Clinical Note
Kalina Cohen was seen and treated in our emergency department on 3/26/2024.                Diagnosis:     Klaina  .    She may return on this date:          If you have any questions or concerns, please don't hesitate to call.      Franklin Linder MD    ______________________________           _______________          _______________  Hospital Representative                              Date                                Time

## 2024-03-27 VITALS
BODY MASS INDEX: 57.87 KG/M2 | WEIGHT: 293 LBS | RESPIRATION RATE: 18 BRPM | SYSTOLIC BLOOD PRESSURE: 180 MMHG | OXYGEN SATURATION: 96 % | DIASTOLIC BLOOD PRESSURE: 80 MMHG | HEART RATE: 85 BPM | TEMPERATURE: 99.7 F

## 2024-03-27 LAB
BACTERIA UR QL AUTO: ABNORMAL /HPF
BILIRUB UR QL STRIP: ABNORMAL
CLARITY UR: ABNORMAL
COLOR UR: ABNORMAL
EXT PREGNANCY TEST URINE: NEGATIVE
EXT. CONTROL: NORMAL
GLUCOSE UR STRIP-MCNC: NEGATIVE MG/DL
HGB UR QL STRIP.AUTO: 250
KETONES UR STRIP-MCNC: ABNORMAL MG/DL
LEUKOCYTE ESTERASE UR QL STRIP: 100
NITRITE UR QL STRIP: NEGATIVE
NON-SQ EPI CELLS URNS QL MICRO: ABNORMAL /HPF
PH UR STRIP.AUTO: 5 [PH]
PROT UR STRIP-MCNC: ABNORMAL MG/DL
RBC #/AREA URNS AUTO: ABNORMAL /HPF
SP GR UR STRIP.AUTO: 1.02 (ref 1–1.04)
UROBILINOGEN UA: 1 MG/DL
WBC #/AREA URNS AUTO: ABNORMAL /HPF

## 2024-03-27 PROCEDURE — 81003 URINALYSIS AUTO W/O SCOPE: CPT

## 2024-03-27 PROCEDURE — 81001 URINALYSIS AUTO W/SCOPE: CPT

## 2024-03-27 PROCEDURE — 81514 NFCT DS BV&VAGINITIS DNA ALG: CPT

## 2024-03-27 PROCEDURE — 87086 URINE CULTURE/COLONY COUNT: CPT

## 2024-03-27 PROCEDURE — 87591 N.GONORRHOEAE DNA AMP PROB: CPT

## 2024-03-27 PROCEDURE — 87491 CHLMYD TRACH DNA AMP PROBE: CPT

## 2024-03-27 NOTE — DISCHARGE INSTRUCTIONS
Please follow-up with your family doctor for reassessment and management of your hypertension.    If you have vaginal bleeding continues or if you start to develop lightheadedness, dizziness, shortness of breath or chest pain please return to the emergency department for reevaluation.    Thank you for allowing us to take part in your care.

## 2024-03-27 NOTE — ED ATTENDING ATTESTATION
3/26/2024  I, Amarjit Javed DO, saw and evaluated the patient. I have discussed the patient with the resident/non-physician practitioner and agree with the resident's/non-physician practitioner's findings, Plan of Care, and MDM as documented in the resident's/non-physician practitioner's note, except where noted. All available labs and Radiology studies were reviewed.  I was present for key portions of any procedure(s) performed by the resident/non-physician practitioner and I was immediately available to provide assistance.       At this point I agree with the current assessment done in the Emergency Department.  I have conducted an independent evaluation of this patient a history and physical is as follows:    44-year-old female presents with complaint of vaginal bleeding.  She reports that she missed her period last month and believes that that is what is going on now.  She presented because she had shown a picture of the bleeding and clots to her mother who directed her here for evaluation.  She complains of suprapubic cramping similar to previous period pain.  Patient has low suspicion for pregnancy as she had her tubes tied 15 years ago and also has low suspicion for STI.  Clinically and by history she has no signs of anemia necessitating additional blood work.  She has been taking aspirin for her pain.  Plan to check urine.  Discussed supportive care measures and expected clinical course along with reasons to return to the ER.    ED Course         Critical Care Time  Procedures

## 2024-03-27 NOTE — ED PROVIDER NOTES
"History  Chief Complaint   Patient presents with    Vaginal Bleeding     Pt had a regular period the \"first week in march\" today pt has had vaginal bleeding passing clots pt states that she has changed pad 3 x in an hour, with suprapubic pain. Pt has no history of abnormal bleeding      44-year-old female with history of asthma, HFrEF, type 2 diabetes presenting due to vaginal bleeding and cramping.  Patient states 2 days ago she started having lower abdominal cramping after using a new sexual toy.  Since then patient has been having continued cramping that feels like her normal menstrual cramping and also today had vaginal bleeding that is worsening with passage of blood stating she soaked through 3 pads in 1 hour.  Patient otherwise denies any lightheadedness, dizziness, headache, shortness of breath, chest pain, dysuria, diarrhea.  Patient denies any other vaginal discharge or abnormal smells.  Last menstrual cycle was at the beginning of this month, patient denies any vaginal intercourse since then.        Prior to Admission Medications   Prescriptions Last Dose Informant Patient Reported? Taking?   ALPRAZolam (XANAX) 0.5 mg tablet  Self No No   Sig: Take 1 tablet (0.5 mg total) by mouth once in imaging for anxiety for up to 1 dose   Patient not taking: Reported on 6/21/2023   Blood Pressure Monitoring (Adult Blood Pressure Cuff Lg) KIT  Self No No   Sig: Use daily in the early morning   Diclofenac Sodium (VOLTAREN) 1 %  Self No No   Sig: Apply 2 g topically 4 (four) times a day as needed (shoulder pain)   Jardiance 10 MG TABS tablet   No No   Sig: TAKE 1 TABLET (10 MG TOTAL) BY MOUTH EVERY MORNING   albuterol (2.5 mg/3 mL) 0.083 % nebulizer solution   No No   Sig: USE ONE VIAL BY NEBULIZATION EVERY 6 (SIX) HOURS AS NEEDED FOR WHEEZING OR SHORTNESS OF BREATH   albuterol (PROVENTIL HFA,VENTOLIN HFA) 90 mcg/act inhaler   No No   Sig: INHALE TWO PUFFS BY MOUTH EVERY 4 HOURS AS NEEDED FOR WHEEZING OR SHORTNESS OF " BREATH   albuterol (ProAir HFA) 90 mcg/act inhaler   No No   Sig: Inhale 2 puffs every 6 (six) hours as needed for wheezing   carvedilol (COREG) 12.5 mg tablet   No No   Sig: TAKE 1 TABLET (12.5 MG TOTAL) BY MOUTH EVERY 12 HOURS WITH FOOD   fluticasone (Arnuity Ellipta) 100 MCG/ACT AEPB inhaler   No No   Sig: INHALE TWO PUFFS BY MOUTH TWO (TWO) TIMES A DAY (RINSE MOUTH WITH WATER AFTER USE.)   loratadine (CLARITIN) 10 mg tablet  Self No No   Sig: Take 1 tablet (10 mg total) by mouth daily   methocarbamol (ROBAXIN) 500 mg tablet  Self No No   Sig: Take 1 tablet (500 mg total) by mouth 2 (two) times a day as needed for muscle spasms   sacubitril-valsartan (Entresto)  MG TABS   No No   Sig: Take 1 tablet by mouth 2 (two) times a day   spironolactone (ALDACTONE) 25 mg tablet   No No   Sig: TAKE 1 TABLET (25 MG TOTAL) BY MOUTH DAILY      Facility-Administered Medications: None       Past Medical History:   Diagnosis Date    Asthma     Chronic HFrEF (heart failure with reduced ejection fraction) ([facility]) 05/15/2023    Hypertension     NSVT (nonsustained ventricular tachycardia) ([facility]) 05/2023    Obstructive sleep apnea     Prediabetes 07/2020    Tobacco abuse     Type 2 myocardial infarction +/- [facility] 07/2020       Past Surgical History:   Procedure Laterality Date    NO PAST SURGERIES         Family History   Problem Relation Age of Onset    Hypertension Mother     Diabetes Mother     Heart failure Mother         possible stenting?    Hypertension Father     Diabetes Father     No Known Problems Sister     Hypertension Sister     Hypertension Brother     Asthma Brother     Kidney disease Brother     Hypertension Maternal Grandmother     Kidney failure Maternal Grandmother         in HD    Heart disease Maternal Grandfather     Diabetes Maternal Grandfather     Hypertension Maternal Grandfather     No Known Problems Paternal Grandmother     No Known Problems Paternal Grandfather     Sudden death Maternal Aunt           in her sleep; family told it was natural causes     I have reviewed and agree with the history as documented.    E-Cigarette/Vaping    E-Cigarette Use Never User      E-Cigarette/Vaping Substances     Social History     Tobacco Use    Smoking status: Some Days     Current packs/day: 0.50     Average packs/day: 0.5 packs/day for 19.2 years (9.6 ttl pk-yrs)     Types: Cigarettes     Start date:     Smokeless tobacco: Never    Tobacco comments:     Currently smoking 2-3 pulls of a cigarette weekly (Updated 2023).    Vaping Use    Vaping status: Never Used   Substance Use Topics    Alcohol use: Not Currently    Drug use: Not Currently     Types: Marijuana     Comment: Denies curernt use (Updated 2023).        Review of Systems   Constitutional:  Negative for chills and fever.   HENT:  Negative for ear pain and sore throat.    Eyes:  Negative for pain and visual disturbance.   Respiratory:  Negative for cough and shortness of breath.    Cardiovascular:  Negative for chest pain and palpitations.   Gastrointestinal:  Negative for abdominal pain, diarrhea, nausea and vomiting.   Genitourinary:  Positive for pelvic pain and vaginal bleeding. Negative for dysuria, flank pain, hematuria, vaginal discharge and vaginal pain.   Musculoskeletal:  Negative for arthralgias and back pain.   Skin:  Negative for color change and rash.   Neurological:  Negative for dizziness, seizures, syncope, weakness and headaches.   All other systems reviewed and are negative.      Physical Exam  ED Triage Vitals [245]   Temperature Pulse Respirations Blood Pressure SpO2   99.7 °F (37.6 °C) 85 18 (!) 198/107 96 %      Temp Source Heart Rate Source Patient Position - Orthostatic VS BP Location FiO2 (%)   Tympanic Monitor Sitting Left arm --      Pain Score       6             Orthostatic Vital Signs  Vitals:    24 2335 24 0054   BP: (!) 198/107 (!) 180/80   Pulse: 85    Patient Position - Orthostatic VS:  Sitting        Physical Exam  Vitals and nursing note reviewed.   Constitutional:       General: She is not in acute distress.     Appearance: She is well-developed. She is obese.   HENT:      Head: Normocephalic and atraumatic.      Nose: Nose normal. No congestion.   Eyes:      Extraocular Movements: Extraocular movements intact.      Conjunctiva/sclera: Conjunctivae normal.      Pupils: Pupils are equal, round, and reactive to light.   Cardiovascular:      Rate and Rhythm: Normal rate and regular rhythm.      Pulses: Normal pulses.      Heart sounds: Normal heart sounds. No murmur heard.  Pulmonary:      Effort: Pulmonary effort is normal. No respiratory distress.      Breath sounds: Normal breath sounds. No wheezing or rales.   Chest:      Chest wall: No tenderness.   Abdominal:      General: Abdomen is flat. Bowel sounds are normal.      Palpations: Abdomen is soft.      Tenderness: There is no abdominal tenderness. There is no right CVA tenderness or left CVA tenderness.   Musculoskeletal:         General: No deformity or signs of injury. Normal range of motion.      Cervical back: Normal range of motion and neck supple. No rigidity or tenderness.   Skin:     General: Skin is warm and dry.      Findings: No bruising, lesion or rash.   Neurological:      General: No focal deficit present.      Mental Status: She is alert.         ED Medications  Medications - No data to display    Diagnostic Studies  Results Reviewed       Procedure Component Value Units Date/Time    POCT pregnancy, urine [[id number]]  (Normal) Resulted: 03/27/24 0036    Lab Status: Final result Updated: 03/27/24 0036     EXT Preg Test, Ur Negative     Control Valid    Urine Microscopic [[id number]]  (Abnormal) Collected: 03/27/24 0004    Lab Status: Final result Specimen: Urine, Clean Catch Updated: 03/27/24 0034     RBC, UA Innumerable /hpf      WBC, UA       Field obscured, unable to enumerate     /hpf     Epithelial Cells Occasional /hpf       Bacteria, UA       Field obscured, unable to enumerate     /hpf    Urine culture [[id number]] Collected: 03/27/24 0004    Lab Status: In process Specimen: Urine, Clean Catch Updated: 03/27/24 0034    UA w Reflex to Microscopic w Reflex to Culture [[id number]]  (Abnormal) Collected: 03/27/24 0004    Lab Status: Final result Specimen: Urine, Clean Catch Updated: 03/27/24 0022     Color, UA Red     Clarity, UA Bloody     Specific Gravity, UA 1.025     pH, UA 5.0     Leukocytes, .0     Nitrite, UA Negative     Protein,  (2+) mg/dl      Glucose, UA Negative mg/dl      Ketones, UA 5 (Trace) mg/dl      Bilirubin, UA 1 mg/dL     Occult Blood, .0     UROBILINOGEN UA 1.0 mg/dL     Molecular Vaginal Panel [[id number]] Collected: 03/27/24 0004    Lab Status: In process Specimen: Genital from Vaginal Updated: 03/27/24 0014    Chlamydia/GC amplified DNA by PCR [[id number]] Collected: 03/27/24 0004    Lab Status: In process Specimen: Urine, Other Updated: 03/27/24 0014                   No orders to display         Procedures  Procedures      ED Course  ED Course as of 03/27/24 0413   Wed Mar 27, 2024   0046 Urine Microscopic(!)  Innumerable RBCs obscuring field of WBCs and bacteria.  Likely due to menstrual bleeding.  Will not treat at this time and have patient follow-up with family doctor if symptoms of infection start.   0048 PREGNANCY TEST URINE: Negative  Negative   0048 44-year-old female presenting due to lower abdominal pain and vaginal bleeding.  Patient's periods have been irregular recently but will evaluate with molecular vaginal panel and GC chlamydia.  UA not suggestive of infection at this time.  Negative nitrites but analysis difficult with blood.  Patient will be contacted if STI testing is positive.  Bleeding likely due to menstrual cycle.  Will discharge with strict return precautions.  Patient is agreeable and appropriate discharge at this time.                             SBIRT 20yo+       Flowsheet Row Most Recent Value   Initial Alcohol Screen: US AUDIT-C     1. How often do you have a drink containing alcohol? 0 Filed at: 03/26/2024 2343   2. How many drinks containing alcohol do you have on a typical day you are drinking?  0 Filed at: 03/26/2024 2343   3b. FEMALE Any Age, or MALE 65+: How often do you have 4 or more drinks on one occassion? 0 Filed at: 03/26/2024 2343   Audit-C Score 0 Filed at: 03/26/2024 2343   VANCE: How many times in the past year have you...    Used an illegal drug or used a prescription medication for non-medical reasons? Never Filed at: 03/26/2024 2343                  Medical Decision Making  Amount and/or Complexity of Data Reviewed  Labs: ordered. Decision-making details documented in ED Course.          Disposition  Final diagnoses:   Vaginal bleeding   Hypertension     Time reflects when diagnosis was documented in both MDM as applicable and the Disposition within this note       Time User Action Codes Description Comment    3/27/2024 12:55 AM Franklin Linder [N93.9] Vaginal bleeding     3/27/2024 12:55 AM Franklin Linder [I10] Hypertension           ED Disposition       ED Disposition   Discharge    Condition   Stable    Date/Time   Wed Mar 27, 2024 0055    Comment   Kalina Cohen discharge to home/self care.                   Follow-up Information       Follow up With Specialties Details Why Contact Info Additional Information    Lucero Louise PA-C Family Medicine   [address] [place] [id number]  [phone]       [facility] Emergency Medicine   [address] [facility] [address]  [phone] [facility]            Discharge Medication List as of 3/27/2024 12:57 AM        CONTINUE these medications which have NOT CHANGED    Details   albuterol (2.5 mg/3 mL) 0.083 % nebulizer solution USE ONE VIAL BY NEBULIZATION EVERY 6 (SIX)  HOURS AS NEEDED FOR WHEEZING OR SHORTNESS OF BREATH, Normal      !! albuterol (ProAir HFA) 90 mcg/act inhaler Inhale 2 puffs every 6 (six) hours as needed for wheezing, Starting Sun 10/8/2023, Normal      !! albuterol (PROVENTIL HFA,VENTOLIN HFA) 90 mcg/act inhaler INHALE TWO PUFFS BY MOUTH EVERY 4 HOURS AS NEEDED FOR WHEEZING OR SHORTNESS OF BREATH, Normal      ALPRAZolam (XANAX) 0.5 mg tablet Take 1 tablet (0.5 mg total) by mouth once in imaging for anxiety for up to 1 dose, Starting Mon 5/22/2023, Normal      Blood Pressure Monitoring (Adult Blood Pressure Cuff Lg) KIT Use daily in the early morning, Starting Mon 5/22/2023, Normal      carvedilol (COREG) 12.5 mg tablet TAKE 1 TABLET (12.5 MG TOTAL) BY MOUTH EVERY 12 HOURS WITH FOOD, Normal      Diclofenac Sodium (VOLTAREN) 1 % Apply 2 g topically 4 (four) times a day as needed (shoulder pain), Starting Thu 5/18/2023, Normal      fluticasone (Arnuity Ellipta) 100 MCG/ACT AEPB inhaler INHALE TWO PUFFS BY MOUTH TWO (TWO) TIMES A DAY (RINSE MOUTH WITH WATER AFTER USE.), Normal      Jardiance 10 MG TABS tablet TAKE 1 TABLET (10 MG TOTAL) BY MOUTH EVERY MORNING, Starting Thu 2/1/2024, Normal      loratadine (CLARITIN) 10 mg tablet Take 1 tablet (10 mg total) by mouth daily, Starting Wed 4/26/2023, Normal      methocarbamol (ROBAXIN) 500 mg tablet Take 1 tablet (500 mg total) by mouth 2 (two) times a day as needed for muscle spasms, Starting Thu 5/18/2023, Normal      sacubitril-valsartan (Entresto)  MG TABS Take 1 tablet by mouth 2 (two) times a day, Starting Wed 7/26/2023, Until Thu 1/16/2025, Normal      spironolactone (ALDACTONE) 25 mg tablet TAKE 1 TABLET (25 MG TOTAL) BY MOUTH DAILY, Starting Thu 9/7/2023, Normal       !! - Potential duplicate medications found. Please discuss with provider.        No discharge procedures on file.    PDMP Review         Value Time User    PDMP Reviewed  Yes 5/22/2023  8:42 AM Keerthi Shelton PA-C             ED  Provider  Attending physically available and evaluated Kailna Cohen. I managed the patient along with the ED Attending.    Electronically Signed by           Franklin Linder MD  03/27/24 0279

## 2024-03-28 ENCOUNTER — TELEPHONE (OUTPATIENT)
Dept: OTHER | Facility: HOSPITAL | Age: 44
End: 2024-03-28

## 2024-03-28 DIAGNOSIS — A54.9 GONORRHEA: Primary | ICD-10-CM

## 2024-03-28 LAB
C GLABRATA DNA VAG QL NAA+PROBE: NEGATIVE
C KRUSEI DNA VAG QL NAA+PROBE: NEGATIVE
C TRACH DNA SPEC QL NAA+PROBE: NEGATIVE
CANDIDA SP 6 PNL VAG NAA+PROBE: NEGATIVE
N GONORRHOEA DNA SPEC QL NAA+PROBE: POSITIVE
T VAGINALIS DNA VAG QL NAA+PROBE: NEGATIVE
VAGINOSIS/ITIS DNA PNL VAG PROBE+SIG AMP: POSITIVE

## 2024-03-28 RX ORDER — CEFIXIME 400 MG/1
800 CAPSULE ORAL DAILY
Qty: 2 CAPSULE | Refills: 0 | Status: SHIPPED | OUTPATIENT
Start: 2024-03-28 | End: 2024-03-29

## 2024-03-29 DIAGNOSIS — I42.9 CARDIOMYOPATHY, UNSPECIFIED TYPE (HCC): ICD-10-CM

## 2024-03-29 DIAGNOSIS — I50.22 CHRONIC HFREF (HEART FAILURE WITH REDUCED EJECTION FRACTION) (HCC): ICD-10-CM

## 2024-03-29 LAB — BACTERIA UR CULT: NORMAL

## 2024-03-29 RX ORDER — EMPAGLIFLOZIN 10 MG/1
10 TABLET, FILM COATED ORAL EVERY MORNING
Qty: 30 TABLET | Refills: 1 | Status: SHIPPED | OUTPATIENT
Start: 2024-03-29

## 2024-04-10 DIAGNOSIS — J45.909 ASTHMA: ICD-10-CM

## 2024-04-10 RX ORDER — FLUTICASONE FUROATE 100 UG/1
POWDER RESPIRATORY (INHALATION)
Qty: 30 BLISTER | Refills: 1 | Status: SHIPPED | OUTPATIENT
Start: 2024-04-10

## 2024-05-16 DIAGNOSIS — I42.9 CARDIOMYOPATHY, UNSPECIFIED TYPE (HCC): ICD-10-CM

## 2024-05-16 DIAGNOSIS — I50.22 CHRONIC HFREF (HEART FAILURE WITH REDUCED EJECTION FRACTION) (HCC): ICD-10-CM

## 2024-05-16 RX ORDER — EMPAGLIFLOZIN 10 MG/1
10 TABLET, FILM COATED ORAL EVERY MORNING
Qty: 30 TABLET | Refills: 1 | Status: SHIPPED | OUTPATIENT
Start: 2024-05-16

## 2024-05-24 DIAGNOSIS — J45.909 ASTHMA: ICD-10-CM

## 2024-05-24 RX ORDER — ALBUTEROL SULFATE 90 UG/1
AEROSOL, METERED RESPIRATORY (INHALATION)
Qty: 6.7 G | Refills: 0 | Status: SHIPPED | OUTPATIENT
Start: 2024-05-24

## 2024-05-24 RX ORDER — ALBUTEROL SULFATE 2.5 MG/3ML
SOLUTION RESPIRATORY (INHALATION)
Qty: 180 ML | Refills: 0 | Status: SHIPPED | OUTPATIENT
Start: 2024-05-24

## 2024-06-05 DIAGNOSIS — J45.909 ASTHMA: ICD-10-CM

## 2024-06-05 RX ORDER — FLUTICASONE FUROATE 100 UG/1
POWDER RESPIRATORY (INHALATION)
Qty: 30 BLISTER | Refills: 1 | Status: SHIPPED | OUTPATIENT
Start: 2024-06-05

## 2024-06-11 ENCOUNTER — TELEPHONE (OUTPATIENT)
Dept: FAMILY MEDICINE CLINIC | Facility: CLINIC | Age: 44
End: 2024-06-11

## 2024-06-11 NOTE — TELEPHONE ENCOUNTER
Pt called in requesting a letter for [employer] due to receiving a shut off notice.    Name on account: Kalina Cohen   Account number: [id number]  Shut off date: 06/17/2024

## 2024-06-12 NOTE — TELEPHONE ENCOUNTER
[employer] Medical Certification signed by Dr. Almonte and faxed on 6/11/24. Confirmation received and scanned into chart.

## 2024-06-18 DIAGNOSIS — I42.9 CARDIOMYOPATHY, UNSPECIFIED TYPE (HCC): ICD-10-CM

## 2024-06-18 DIAGNOSIS — I50.22 CHRONIC HFREF (HEART FAILURE WITH REDUCED EJECTION FRACTION) (HCC): ICD-10-CM

## 2024-06-18 RX ORDER — EMPAGLIFLOZIN 10 MG/1
10 TABLET, FILM COATED ORAL EVERY MORNING
Qty: 90 TABLET | Refills: 0 | Status: SHIPPED | OUTPATIENT
Start: 2024-06-18

## 2024-07-07 ENCOUNTER — HOSPITAL ENCOUNTER (EMERGENCY)
Facility: HOSPITAL | Age: 44
Discharge: HOME | End: 2024-07-07
Attending: EMERGENCY MEDICINE | Admitting: EMERGENCY MEDICINE
Payer: MEDICARE

## 2024-07-07 VITALS
WEIGHT: 293 LBS | SYSTOLIC BLOOD PRESSURE: 181 MMHG | RESPIRATION RATE: 18 BRPM | TEMPERATURE: 98.8 F | OXYGEN SATURATION: 93 % | BODY MASS INDEX: 60.84 KG/M2 | HEART RATE: 92 BPM | DIASTOLIC BLOOD PRESSURE: 104 MMHG

## 2024-07-07 DIAGNOSIS — J45.901 ASTHMA EXACERBATION: ICD-10-CM

## 2024-07-07 DIAGNOSIS — F12.20 CANNABIS DEPENDENCE, EPISODIC (HCC): ICD-10-CM

## 2024-07-07 DIAGNOSIS — J45.21 MILD INTERMITTENT ASTHMA WITH EXACERBATION: Primary | ICD-10-CM

## 2024-07-07 DIAGNOSIS — R06.02 SHORTNESS OF BREATH: ICD-10-CM

## 2024-07-07 PROCEDURE — 99284 EMERGENCY DEPT VISIT MOD MDM: CPT

## 2024-07-07 PROCEDURE — 99284 EMERGENCY DEPT VISIT MOD MDM: CPT | Performed by: EMERGENCY MEDICINE

## 2024-07-07 PROCEDURE — 94640 AIRWAY INHALATION TREATMENT: CPT

## 2024-07-07 RX ORDER — IPRATROPIUM BROMIDE AND ALBUTEROL SULFATE 2.5; .5 MG/3ML; MG/3ML
3 SOLUTION RESPIRATORY (INHALATION) ONCE
Status: COMPLETED | OUTPATIENT
Start: 2024-07-07 | End: 2024-07-07

## 2024-07-07 RX ORDER — PREDNISONE 10 MG/1
50 TABLET ORAL DAILY
Qty: 25 TABLET | Refills: 0 | Status: SHIPPED | OUTPATIENT
Start: 2024-07-07 | End: 2024-07-12

## 2024-07-07 RX ORDER — ALBUTEROL SULFATE 90 UG/1
2 AEROSOL, METERED RESPIRATORY (INHALATION) ONCE
Status: COMPLETED | OUTPATIENT
Start: 2024-07-07 | End: 2024-07-07

## 2024-07-07 RX ORDER — ALBUTEROL SULFATE 90 UG/1
2 AEROSOL, METERED RESPIRATORY (INHALATION) EVERY 6 HOURS PRN
Qty: 8.5 G | Refills: 0 | Status: SHIPPED | OUTPATIENT
Start: 2024-07-07

## 2024-07-07 RX ADMIN — PREDNISONE 50 MG: 10 TABLET ORAL at 01:53

## 2024-07-07 RX ADMIN — ALBUTEROL SULFATE 2 PUFF: 90 AEROSOL, METERED RESPIRATORY (INHALATION) at 01:53

## 2024-07-07 RX ADMIN — IPRATROPIUM BROMIDE AND ALBUTEROL SULFATE 3 ML: 2.5; .5 SOLUTION RESPIRATORY (INHALATION) at 01:53

## 2024-07-07 NOTE — ED PROVIDER NOTES
History  Chief Complaint   Patient presents with    Asthma     Pt presents to the ED with c/o an asthma exacerbation. States that it woke her up from sleep and it took her about 20 mins to get her breathing under control.     HPI    45 yo F hx of mild intermittent asthma presents to ed for eval of wheezing. Woke up 20 min pta sob.   Patient denies prior intubations or hospitalizations for asthma.   Speaking in full sentences without difficulty.   No cough no fever.   Denies chest pain.  Smoking: yes, thc pta  Last steroid use: unsure      Prior to Admission Medications   Prescriptions Last Dose Informant Patient Reported? Taking?   ALPRAZolam (XANAX) 0.5 mg tablet  Self No No   Sig: Take 1 tablet (0.5 mg total) by mouth once in imaging for anxiety for up to 1 dose   Patient not taking: Reported on 6/21/2023   Blood Pressure Monitoring (Adult Blood Pressure Cuff Lg) KIT  Self No No   Sig: Use daily in the early morning   Diclofenac Sodium (VOLTAREN) 1 %  Self No No   Sig: Apply 2 g topically 4 (four) times a day as needed (shoulder pain)   Empagliflozin (Jardiance) 10 MG TABS tablet   No No   Sig: TAKE 1 TABLET (10 MG TOTAL) BY MOUTH EVERY MORNING   albuterol (2.5 mg/3 mL) 0.083 % nebulizer solution   No No   Sig: USE ONE VIAL BY NEBULIZATION EVERY 6 (SIX) HOURS AS NEEDED FOR WHEEZING OR SHORTNESS OF BREATH   albuterol (PROVENTIL HFA,VENTOLIN HFA) 90 mcg/act inhaler   No No   Sig: INHALE TWO PUFFS BY MOUTH EVERY 4 HOURS AS NEEDED FOR WHEEZING OR SHORTNESS OF BREATH   albuterol (ProAir HFA) 90 mcg/act inhaler   No No   Sig: Inhale 2 puffs every 6 (six) hours as needed for wheezing   albuterol (ProAir HFA) 90 mcg/act inhaler   No Yes   Sig: Inhale 2 puffs every 6 (six) hours as needed for wheezing   carvedilol (COREG) 12.5 mg tablet   No No   Sig: TAKE 1 TABLET (12.5 MG TOTAL) BY MOUTH EVERY 12 HOURS WITH FOOD   fluticasone (Arnuity Ellipta) 100 MCG/ACT AEPB inhaler   No No   Sig: INHALE 1 PUFFS BY MOUTH DAILY (RINSE  MOUTH WITH WATER AFTER USE.)   loratadine (CLARITIN) 10 mg tablet  Self No No   Sig: Take 1 tablet (10 mg total) by mouth daily   methocarbamol (ROBAXIN) 500 mg tablet  Self No No   Sig: Take 1 tablet (500 mg total) by mouth 2 (two) times a day as needed for muscle spasms   sacubitril-valsartan (Entresto)  MG TABS   No No   Sig: Take 1 tablet by mouth 2 (two) times a day   spironolactone (ALDACTONE) 25 mg tablet   No No   Sig: TAKE 1 TABLET (25 MG TOTAL) BY MOUTH DAILY      Facility-Administered Medications: None       Past Medical History:   Diagnosis Date    Asthma     Chronic HFrEF (heart failure with reduced ejection fraction) ([facility]) 05/15/2023    Hypertension     NSVT (nonsustained ventricular tachycardia) ([facility]) 2023    Obstructive sleep apnea     Prediabetes 2020    Tobacco abuse     Type 2 myocardial infarction +/- [facility] 2020       Past Surgical History:   Procedure Laterality Date    NO PAST SURGERIES         Family History   Problem Relation Age of Onset    Hypertension Mother     Diabetes Mother     Heart failure Mother         possible stenting?    Hypertension Father     Diabetes Father     No Known Problems Sister     Hypertension Sister     Hypertension Brother     Asthma Brother     Kidney disease Brother     Hypertension Maternal Grandmother     Kidney failure Maternal Grandmother         in HD    Heart disease Maternal Grandfather     Diabetes Maternal Grandfather     Hypertension Maternal Grandfather     No Known Problems Paternal Grandmother     No Known Problems Paternal Grandfather     Sudden death Maternal Aunt          in her sleep; family told it was natural causes     I have reviewed and agree with the history as documented.    E-Cigarette/Vaping    E-Cigarette Use Never User      E-Cigarette/Vaping Substances     Social History     Tobacco Use    Smoking status: Some Days     Current packs/day: 0.50     Average packs/day: 0.5 packs/day for 19.5 years (9.8 ttl pk-yrs)      Types: Cigarettes     Start date: 2005    Smokeless tobacco: Never    Tobacco comments:     Currently smoking 2-3 pulls of a cigarette weekly (Updated 05/22/2023).    Vaping Use    Vaping status: Never Used   Substance Use Topics    Alcohol use: Not Currently    Drug use: Not Currently     Types: Marijuana     Comment: Denies curernt use (Updated 05/22/2023).       Review of Systems   Constitutional:  Negative for chills, fatigue and fever.   HENT:  Negative for sore throat.    Eyes:  Negative for redness and visual disturbance.   Respiratory:  Positive for wheezing. Negative for cough and shortness of breath.    Cardiovascular:  Negative for chest pain.   Gastrointestinal:  Negative for abdominal pain, diarrhea and nausea.   Genitourinary:  Negative for difficulty urinating, dysuria and pelvic pain.   Musculoskeletal:  Negative for back pain.   Skin:  Negative for rash.   Neurological:  Negative for syncope, weakness and headaches.   All other systems reviewed and are negative.      Physical Exam  Physical Exam  Vitals and nursing note reviewed.   Constitutional:       General: She is not in acute distress.  HENT:      Head: Normocephalic and atraumatic.      Right Ear: External ear normal.      Left Ear: External ear normal.   Eyes:      Extraocular Movements: Extraocular movements intact.      Conjunctiva/sclera: Conjunctivae normal.   Cardiovascular:      Rate and Rhythm: Normal rate and regular rhythm.      Heart sounds: Normal heart sounds.   Pulmonary:      Effort: Pulmonary effort is normal. No respiratory distress.      Breath sounds: Wheezing present.      Comments: Mild exp wheezing b/l  Abdominal:      General: Abdomen is flat.      Tenderness: There is no abdominal tenderness.   Musculoskeletal:         General: Normal range of motion.      Cervical back: Normal range of motion.   Skin:     General: Skin is warm and dry.   Neurological:      Mental Status: She is alert and oriented to person, place, and  time.      Cranial Nerves: No cranial nerve deficit.      Motor: No abnormal muscle tone.      Coordination: Coordination normal.         Vital Signs  ED Triage Vitals [07/07/24 0149]   Temperature Pulse Respirations Blood Pressure SpO2   98.8 °F (37.1 °C) 92 18 (!) 181/104 93 %      Temp Source Heart Rate Source Patient Position - Orthostatic VS BP Location FiO2 (%)   Tympanic Monitor Sitting Left arm --      Pain Score       --           Vitals:    07/07/24 0149   BP: (!) 181/104   Pulse: 92   Patient Position - Orthostatic VS: Sitting         Visual Acuity      ED Medications  Medications   ipratropium-albuterol (DUO-NEB) 0.5-2.5 mg/3 mL inhalation solution 3 mL (3 mL Nebulization Given 7/7/24 0153)   albuterol (PROVENTIL HFA,VENTOLIN HFA) inhaler 2 puff (2 puffs Inhalation Given 7/7/24 0153)   predniSONE tablet 50 mg (50 mg Oral Given 7/7/24 0153)       Diagnostic Studies  Results Reviewed       None                   No orders to display              Procedures  Procedures         ED Course                                             Medical Decision Making  Risk  Prescription drug management.      Clinical lab tests: not ordered for simple asthma exacerbation.   Reviewed past medical records: yes, no recent intubations or hospitalizations  Independent visualization of images: imaging not indicated, no fever, productive cough or exam findings to suggest severe exacerbation in setting of pna or infection    History Provided by patient    Differential considered: asthma exacerbation, no signs of infection / fever to suggest pna.    Patient given duoneb, started steroids, with resolution of symptoms, will defer inpatient admission at this time. Resting comfortably, speaking in full sentences, no signs of respiratory distress. Prescribed meds, stable for outpatient follow up.    The patient was instructed to follow up as documented. Strict return precautions were discussed with the patient and the patient was  instructed to return to the emergency department immediately if symptoms worsen. The patient/patient family member acknowledged and were in agreement with plan.              Disposition  Final diagnoses:   Mild intermittent asthma with exacerbation   Cannabis dependence, episodic (HCC)     Time reflects when diagnosis was documented in both MDM as applicable and the Disposition within this note       Time User Action Codes Description Comment    7/7/2024  2:08 AM Matty Stratton [J45.21] Mild intermittent asthma with exacerbation     7/7/2024  2:08 AM Matty Stratton [F12.20] Cannabis dependence, episodic (HCC)     7/7/2024  2:09 AM Matty Stratton [R06.02] Shortness of breath     7/7/2024  2:09 AM Matty Stratton [J45.901] Asthma exacerbation           ED Disposition       ED Disposition   Discharge    Condition   Stable    Date/Time   Sun Jul 7, 2024  2:08 AM    Comment   Kalina Cohen discharge to home/self care.                   Follow-up Information       Follow up With Specialties Details Why Contact Info    Lucero Louise PA-C Family Medicine Schedule an appointment as soon as possible for a visit in 3 days For follow up regarding your symptoms and recheck [address] [place] [id number]  [phone]              Discharge Medication List as of 7/7/2024  2:09 AM        START taking these medications    Details   predniSONE 10 mg tablet Take 5 tablets (50 mg total) by mouth daily for 5 days, Starting Sun 7/7/2024, Until Fri 7/12/2024, Normal           CONTINUE these medications which have CHANGED    Details   !! albuterol (ProAir HFA) 90 mcg/act inhaler Inhale 2 puffs every 6 (six) hours as needed for wheezing, Starting Sun 7/7/2024, Normal       !! - Potential duplicate medications found. Please discuss with provider.        CONTINUE these medications which have NOT CHANGED    Details   albuterol (2.5 mg/3 mL) 0.083 % nebulizer solution USE ONE VIAL BY NEBULIZATION EVERY 6  (SIX) HOURS AS NEEDED FOR WHEEZING OR SHORTNESS OF BREATH, Normal      !! albuterol (PROVENTIL HFA,VENTOLIN HFA) 90 mcg/act inhaler INHALE TWO PUFFS BY MOUTH EVERY 4 HOURS AS NEEDED FOR WHEEZING OR SHORTNESS OF BREATH, Normal      ALPRAZolam (XANAX) 0.5 mg tablet Take 1 tablet (0.5 mg total) by mouth once in imaging for anxiety for up to 1 dose, Starting Mon 5/22/2023, Normal      Blood Pressure Monitoring (Adult Blood Pressure Cuff Lg) KIT Use daily in the early morning, Starting Mon 5/22/2023, Normal      carvedilol (COREG) 12.5 mg tablet TAKE 1 TABLET (12.5 MG TOTAL) BY MOUTH EVERY 12 HOURS WITH FOOD, Normal      Diclofenac Sodium (VOLTAREN) 1 % Apply 2 g topically 4 (four) times a day as needed (shoulder pain), Starting Thu 5/18/2023, Normal      Empagliflozin (Jardiance) 10 MG TABS tablet TAKE 1 TABLET (10 MG TOTAL) BY MOUTH EVERY MORNING, Starting Tue 6/18/2024, Normal      fluticasone (Arnuity Ellipta) 100 MCG/ACT AEPB inhaler INHALE 1 PUFFS BY MOUTH DAILY (RINSE MOUTH WITH WATER AFTER USE.), Normal      loratadine (CLARITIN) 10 mg tablet Take 1 tablet (10 mg total) by mouth daily, Starting Wed 4/26/2023, Normal      methocarbamol (ROBAXIN) 500 mg tablet Take 1 tablet (500 mg total) by mouth 2 (two) times a day as needed for muscle spasms, Starting Thu 5/18/2023, Normal      sacubitril-valsartan (Entresto)  MG TABS Take 1 tablet by mouth 2 (two) times a day, Starting Wed 7/26/2023, Until Thu 1/16/2025, Normal      spironolactone (ALDACTONE) 25 mg tablet TAKE 1 TABLET (25 MG TOTAL) BY MOUTH DAILY, Starting Thu 9/7/2023, Normal       !! - Potential duplicate medications found. Please discuss with provider.          No discharge procedures on file.    PDMP Review         Value Time User    PDMP Reviewed  Yes 5/22/2023  8:42 AM Keerthi Shelton PA-C            ED Provider  Electronically Signed by             Matty Stratton MD  07/07/24 0227

## 2024-07-31 DIAGNOSIS — J45.909 ASTHMA: ICD-10-CM

## 2024-07-31 RX ORDER — FLUTICASONE FUROATE 100 UG/1
POWDER RESPIRATORY (INHALATION)
Qty: 30 BLISTER | Refills: 0 | Status: SHIPPED | OUTPATIENT
Start: 2024-07-31

## 2024-08-08 DIAGNOSIS — I42.9 CARDIOMYOPATHY, UNSPECIFIED TYPE (HCC): ICD-10-CM

## 2024-08-12 RX ORDER — CARVEDILOL 12.5 MG/1
TABLET ORAL
Qty: 60 TABLET | Refills: 4 | Status: SHIPPED | OUTPATIENT
Start: 2024-08-12

## 2024-08-16 ENCOUNTER — OFFICE VISIT (OUTPATIENT)
Dept: FAMILY MEDICINE CLINIC | Facility: CLINIC | Age: 44
End: 2024-08-16

## 2024-08-16 VITALS
SYSTOLIC BLOOD PRESSURE: 132 MMHG | DIASTOLIC BLOOD PRESSURE: 80 MMHG | OXYGEN SATURATION: 98 % | WEIGHT: 293 LBS | HEART RATE: 80 BPM | RESPIRATION RATE: 16 BRPM | BODY MASS INDEX: 57.52 KG/M2 | HEIGHT: 60 IN | TEMPERATURE: 98 F

## 2024-08-16 DIAGNOSIS — Z11.4 SCREENING FOR HIV (HUMAN IMMUNODEFICIENCY VIRUS): ICD-10-CM

## 2024-08-16 DIAGNOSIS — Z02.89 ENCOUNTER FOR PHYSICAL EXAMINATION RELATED TO EMPLOYMENT: ICD-10-CM

## 2024-08-16 DIAGNOSIS — E66.01 CLASS 3 SEVERE OBESITY DUE TO EXCESS CALORIES WITH BODY MASS INDEX (BMI) OF 60.0 TO 69.9 IN ADULT, UNSPECIFIED WHETHER SERIOUS COMORBIDITY PRESENT (HCC): ICD-10-CM

## 2024-08-16 DIAGNOSIS — Z11.1 SCREENING FOR TUBERCULOSIS: ICD-10-CM

## 2024-08-16 DIAGNOSIS — I10 HYPERTENSION, UNSPECIFIED TYPE: Chronic | ICD-10-CM

## 2024-08-16 DIAGNOSIS — Z11.59 NEED FOR HEPATITIS C SCREENING TEST: ICD-10-CM

## 2024-08-16 DIAGNOSIS — Z00.00 ANNUAL PHYSICAL EXAM: Primary | ICD-10-CM

## 2024-08-16 DIAGNOSIS — I42.9 CARDIOMYOPATHY, UNSPECIFIED TYPE (HCC): ICD-10-CM

## 2024-08-16 DIAGNOSIS — Z23 ENCOUNTER FOR IMMUNIZATION: ICD-10-CM

## 2024-08-16 DIAGNOSIS — J45.909 ASTHMA: ICD-10-CM

## 2024-08-16 DIAGNOSIS — I50.22 CHRONIC HFREF (HEART FAILURE WITH REDUCED EJECTION FRACTION) (HCC): ICD-10-CM

## 2024-08-16 DIAGNOSIS — G47.30 SLEEP APNEA, UNSPECIFIED TYPE: ICD-10-CM

## 2024-08-16 DIAGNOSIS — Z72.0 TOBACCO ABUSE: ICD-10-CM

## 2024-08-16 PROBLEM — E66.813 CLASS 3 SEVERE OBESITY DUE TO EXCESS CALORIES WITH BODY MASS INDEX (BMI) OF 60.0 TO 69.9 IN ADULT (HCC): Status: ACTIVE | Noted: 2020-07-28

## 2024-08-16 RX ORDER — ALBUTEROL SULFATE 0.83 MG/ML
2.5 SOLUTION RESPIRATORY (INHALATION) EVERY 6 HOURS PRN
Qty: 180 ML | Refills: 0 | Status: SHIPPED | OUTPATIENT
Start: 2024-08-16

## 2024-08-16 RX ORDER — FLUTICASONE PROPIONATE AND SALMETEROL 250; 50 UG/1; UG/1
1 POWDER RESPIRATORY (INHALATION) 2 TIMES DAILY
Qty: 60 BLISTER | Refills: 5 | Status: SHIPPED | OUTPATIENT
Start: 2024-08-16 | End: 2025-02-12

## 2024-08-16 RX ORDER — ALBUTEROL SULFATE 90 MCG
2 HFA AEROSOL WITH ADAPTER (GRAM) INHALATION EVERY 4 HOURS PRN
Qty: 6.7 G | Refills: 0 | Status: SHIPPED | OUTPATIENT
Start: 2024-08-16

## 2024-08-16 NOTE — ASSESSMENT & PLAN NOTE
- Reports using fluticasone inhaler 3x daily for SOB with ADLs, using albuterol inhaler 4xweekly  - Will discontinue fluticasone and will start patient with Advair BID.   -Patient is agreeable to get PFT done.

## 2024-08-16 NOTE — PATIENT INSTRUCTIONS
"  Sleep Medicine- [phone]   Central Scheduling/weight management- [phone]   Patient Education     Routine physical for adults   The Basics   Written by the doctors and editors at [facility]   What is a physical? -- A physical is a routine visit, or \"check-up,\" with your doctor. You might also hear it called a \"wellness visit\" or \"preventive visit.\"  During each visit, the doctor will:   Ask about your physical and mental health   Ask about your habits, behaviors, and lifestyle   Do an exam   Give you vaccines if needed   Talk to you about any medicines you take   Give advice about your health   Answer your questions  Getting regular check-ups is an important part of taking care of your health. It can help your doctor find and treat any problems you have. But it's also important for preventing health problems.  A routine physical is different from a \"sick visit.\" A sick visit is when you see a doctor because of a health concern or problem. Since physicals are scheduled ahead of time, you can think about what you want to ask the doctor.  How often should I get a physical? -- It depends on your age and health. In general, for people age 21 years and older:   If you are younger than 50 years, you might be able to get a physical every 3 years.   If you are 50 years or older, your doctor might recommend a physical every year.  If you have an ongoing health condition, like diabetes or high blood pressure, your doctor will probably want to see you more often.  What happens during a physical? -- In general, each visit will include:   Physical exam - The doctor or nurse will check your height, weight, heart rate, and blood pressure. They will also look at your eyes and ears. They will ask about how you are feeling and whether you have any symptoms that bother you.   Medicines - It's a good idea to bring a list of all the medicines you take to each doctor visit. Your doctor will talk to you about your medicines and " "answer any questions. Tell them if you are having any side effects that bother you. You should also tell them if you are having trouble paying for any of your medicines.   Habits and behaviors - This includes:   Your diet   Your exercise habits   Whether you smoke, drink alcohol, or use drugs   Whether you are sexually active   Whether you feel safe at home  Your doctor will talk to you about things you can do to improve your health and lower your risk of health problems. They will also offer help and support. For example, if you want to quit smoking, they can give you advice and might prescribe medicines. If you want to improve your diet or get more physical activity, they can help you with this, too.   Lab tests, if needed - The tests you get will depend on your age and situation. For example, your doctor might want to check your:   Cholesterol   Blood sugar   Iron level   Vaccines - The recommended vaccines will depend on your age, health, and what vaccines you already had. Vaccines are very important because they can prevent certain serious or deadly infections.   Discussion of screening - \"Screening\" means checking for diseases or other health problems before they cause symptoms. Your doctor can recommend screening based on your age, risk, and preferences. This might include tests to check for:   Cancer, such as breast, prostate, cervical, ovarian, colorectal, prostate, lung, or skin cancer   Sexually transmitted infections, such as chlamydia and gonorrhea   Mental health conditions like depression and anxiety  Your doctor will talk to you about the different types of screening tests. They can help you decide which screenings to have. They can also explain what the results might mean.   Answering questions - The physical is a good time to ask the doctor or nurse questions about your health. If needed, they can refer you to other doctors or specialists, too.  Adults older than 65 years often need other care, " too. As you get older, your doctor will talk to you about:   How to prevent falling at home   Hearing or vision tests   Memory testing   How to take your medicines safely   Making sure that you have the help and support you need at home  All topics are updated as new evidence becomes available and our peer review process is complete.  This topic retrieved from [employer] on: May 02, 2024.  Topic 054691 Version 1.0  Release: 32.4.3 - C32.122  © 2024 UpToDate, Inc. and/or its affiliates. All rights reserved.  Consumer Information Use and Disclaimer   Disclaimer: This generalized information is a limited summary of diagnosis, treatment, and/or medication information. It is not meant to be comprehensive and should be used as a tool to help the user understand and/or assess potential diagnostic and treatment options. It does NOT include all information about conditions, treatments, medications, side effects, or risks that may apply to a specific patient. It is not intended to be medical advice or a substitute for the medical advice, diagnosis, or treatment of a health care provider based on the health care provider's examination and assessment of a patient's specific and unique circumstances. Patients must speak with a health care provider for complete information about their health, medical questions, and treatment options, including any risks or benefits regarding use of medications. This information does not endorse any treatments or medications as safe, effective, or approved for treating a specific patient. UpToDate, Inc. and its affiliates disclaim any warranty or liability relating to this information or the use thereof.The use of this information is governed by the Terms of Use, available at https://www.wolterskluwer.com/en/know/clinical-effectiveness-terms. 2024© UpToDate, Inc. and its affiliates and/or licensors. All rights reserved.  Copyright   © 2024 UpToDate, Inc. and/or its affiliates. All rights  reserved.

## 2024-08-16 NOTE — ASSESSMENT & PLAN NOTE
- Reports SOB, denies fluid retention  - Continue current medications.   - Reviewed basic heart healthy dietary guidance.  - Highly encourage to F/U with cardiology. Patient supposed to obtain cardiac MRI but have not at this date. Inform patient to get this obtain and schedule a f/u with cardiology  -

## 2024-08-16 NOTE — ASSESSMENT & PLAN NOTE
BP Readings from Last 3 Encounters:   08/16/24 132/80   07/07/24 (!) 181/104   03/27/24 (!) 180/80    -At goal  -Reports compliance with medications. Continue carvedilol 12.5 mg twice daily, spironolactone 25 mg daily.  - Encourage to check BP at home.  -reviewed eating a low salt diet, limiting alcohol, exercising, and wt loss.

## 2024-08-16 NOTE — ASSESSMENT & PLAN NOTE
Wt Readings from Last 3 Encounters:   08/16/24 (!) 141 kg (310 lb)   07/07/24 (!) 141 kg (311 lb 8.2 oz)   03/26/24 134 kg (296 lb 4.8 oz)     - Reports SOB, denies chest pain.  - Have 15 lbs weight gain for the past 5 months; unsure if its related to increase calorie intake or fluid retention. No edema noted on assessment today.   -Not wearing lifevest since 8/1/23  - Continue current medications.   - Reviewed basic heart healthy dietary guidance.  - Highly encourage to F/U with cardiology. Patient supposed to obtain cardiac MRI but have not at this date. Inform patient to get this obtain and schedule a f/u with cardiology  -Will order chest xray and BNP today

## 2024-08-16 NOTE — ASSESSMENT & PLAN NOTE
Body mass index is 60.54 kg/m².  The BMI is above normal. Nutrition recommendations include reducing portion sizes, decreasing overall calorie intake, 3-5 servings of fruits/vegetables daily, reducing fast food intake, consuming healthier snacks, decreasing soda and/or juice intake, moderation in carbohydrate intake, increasing intake of lean protein, reducing intake of saturated fat and trans fat and reducing intake of cholesterol. Exercise recommendations include moderate aerobic physical activity for 150 minutes/week.    -Patient is interested to see  weight management

## 2024-08-16 NOTE — PROGRESS NOTES
Adult Annual Physical  Name: Kalina Cohen      : 1980      MRN: [id number]  Encounter Provider: JOHN Paez  Encounter Date: 2024   Encounter department: [facility]    Assessment & Plan   1. Annual physical exam  2. Encounter for physical examination related to employment  Assessment & Plan:  - Patient have to drop employement physical form    3. Asthma  Assessment & Plan:  - Reports using fluticasone inhaler 3x daily for SOB with ADLs, using albuterol inhaler 4xweekly  - Will discontinue fluticasone and will start patient with Advair BID.   -Patient is agreeable to get PFT done.   Orders:  -     albuterol (Proventil HFA) 90 mcg/act inhaler; Inhale 2 puffs every 4 (four) hours as needed for wheezing  -     Fluticasone-Salmeterol (Advair) 250-50 mcg/dose inhaler; Inhale 1 puff 2 (two) times a day Rinse mouth after use.  -     albuterol (2.5 mg/3 mL) 0.083 % nebulizer solution; Take 3 mL (2.5 mg total) by nebulization every 6 (six) hours as needed for wheezing or shortness of breath  -     Complete PFT with post bronchodilator; Future  4. Screening for HIV (human immunodeficiency virus)  -     HIV 1/2 AG/AB w Reflex [facility] for 2 yr old and above; Future  5. Need for hepatitis C screening test  -     Hepatitis C Antibody; Future  6. Encounter for immunization  -     Pneumococcal Conjugate Vaccine 20-valent (Pcv20)  7. Sleep apnea, unspecified type  -     Ambulatory Referral to Sleep Medicine; Future  8. Cardiomyopathy, unspecified type (HCC)  -     XR chest pa & lateral; Future; Expected date: 2024  9. Chronic HFrEF (heart failure with reduced ejection fraction) (HCC)  Assessment & Plan:  Wt Readings from Last 3 Encounters:   24 (!) 141 kg (310 lb)   24 (!) 141 kg (311 lb 8.2 oz)   24 134 kg (296 lb 4.8 oz)     - Reports SOB, denies chest pain.  - Have 15 lbs weight gain for the past 5 months; unsure if its related to increase  calorie intake or fluid retention. No edema noted on assessment today.   -Not wearing lifevest since 8/1/23  - Continue current medications.   - Reviewed basic heart healthy dietary guidance.  - Highly encourage to F/U with cardiology. Patient supposed to obtain cardiac MRI but have not at this date. Inform patient to get this obtain and schedule a f/u with cardiology  -Will order chest xray and BNP today        Orders:  -     B-Type Natriuretic Peptide(BNP); Future  10. Hypertension, unspecified type  Assessment & Plan:  BP Readings from Last 3 Encounters:   08/16/24 132/80   07/07/24 (!) 181/104   03/27/24 (!) 180/80    -At goal  -Reports compliance with medications. Continue carvedilol 12.5 mg twice daily, spironolactone 25 mg daily.  - Encourage to check BP at home.  -reviewed eating a low salt diet, limiting alcohol, exercising, and wt loss.   11. Tobacco abuse  Assessment & Plan:  - Reports smoking cessation x 4 weeks. Congratulated patient.     12. Class 3 severe obesity due to excess calories with body mass index (BMI) of 60.0 to 69.9 in adult, unspecified whether serious comorbidity present (HCC)  Assessment & Plan:  Body mass index is 60.54 kg/m².  The BMI is above normal. Nutrition recommendations include reducing portion sizes, decreasing overall calorie intake, 3-5 servings of fruits/vegetables daily, reducing fast food intake, consuming healthier snacks, decreasing soda and/or juice intake, moderation in carbohydrate intake, increasing intake of lean protein, reducing intake of saturated fat and trans fat and reducing intake of cholesterol. Exercise recommendations include moderate aerobic physical activity for 150 minutes/week.    -Patient is interested to see  weight management  Orders:  -     Ambulatory Referral to Weight Management; Future  -     Ambulatory Referral to Sleep Medicine; Future  -     CBC and differential; Future  -     Comprehensive metabolic panel; Future  -     Lipid panel;  Future  -     TSH, 3rd generation with Free T4 reflex; Future  -     Hemoglobin A1C; Future  13. Screening for tuberculosis  -     TB Skin Test  Immunizations and preventive care screenings were discussed with patient today. Appropriate education was printed on patient's after visit summary.    Counseling:  Alcohol/drug use: discussed moderation in alcohol intake, the recommendations for healthy alcohol use, and avoidance of illicit drug use.  Dental Health: discussed importance of regular tooth brushing, flossing, and dental visits.  Injury prevention: discussed safety/seat belts, safety helmets, smoke detectors, carbon dioxide detectors, and smoking near bedding or upholstery.  Sexual health: discussed sexually transmitted diseases, partner selection, use of condoms, avoidance of unintended pregnancy, and contraceptive alternatives.  Exercise: the importance of regular exercise/physical activity was discussed. Recommend exercise 3-5 times per week for at least 30 minutes.     BMI Counseling: Body mass index is 60.54 kg/m². The BMI is above normal. Nutrition recommendations include decreasing portion sizes, encouraging healthy choices of fruits and vegetables, decreasing fast food intake, consuming healthier snacks, limiting drinks that contain sugar, moderation in carbohydrate intake, increasing intake of lean protein, reducing intake of saturated and trans fat and reducing intake of cholesterol. Exercise recommendations include exercising 3-5 times per week. No pharmacotherapy was ordered. Patient referred to weight management. Rationale for BMI follow-up plan is due to patient being overweight or obese.     Depression Screening and Follow-up Plan: Patient was screened for depression during today's encounter. They screened negative with a PHQ-2 score of 0.        History of Present Illness     Adult Annual Physical:  Patient presents for annual physical. Kalina Cohen is a 44 years old patient with past medical  history of Asthma, Chronic HFrEF (heart failure with reduced ejection fraction) ([facility]), Hypertension, NSVT (nonsustained ventricular tachycardia) ([facility]), Obstructive sleep apnea, Prediabetes, Tobacco abuse, and Type 2 myocardial infarction +/- [place].     Patient is here for work physical. Patient will be working in [employer].     Patient reports experiencing episodes of asthma exacerbation due to the current weather. She was recently seen in the ED on 7/7 for this problem and was prescribed prednisone. Patient states she has been using her fluticasone inhaler three times daily for shortness of breath, particularly when ambulating or performing ADLs, which started earlier this year. She also reports using her albuterol inhaler approximately four times weekly. The patient admits to nighttime awakenings but is unsure if they are related to her sleep apnea. She reports symptoms including snoring, witnessed apnea, choking or gasping for breath, sleep talking, and wheezing. Denies using CPAP when sleeping.     .     Diet and Physical Activity:    - Exercise: no formal exercise.    Depression Screening:  - PHQ-2 Score: 0    General Health:  - Sleep: 4-6 hours of sleep on average.  - Hearing: normal hearing right ear.  - Vision: no vision problems.  - Dental: brushes teeth twice daily.    /GYN Health:  - Follows with GYN: no.   - History of STDs: yes    Review of Systems   Constitutional: Negative.  Negative for chills and fever.   HENT: Negative.  Negative for ear pain and sore throat.    Eyes: Negative.  Negative for pain and visual disturbance.   Respiratory:  Positive for apnea, shortness of breath and wheezing. Negative for cough.    Cardiovascular: Negative.  Negative for chest pain and palpitations.   Gastrointestinal: Negative.  Negative for abdominal pain and vomiting.   Endocrine: Negative.    Genitourinary: Negative.  Negative for dysuria and hematuria.   Musculoskeletal: Negative.   Negative for arthralgias and back pain.   Skin: Negative.  Negative for color change and rash.   Neurological: Negative.  Negative for seizures and syncope.   Hematological: Negative.    Psychiatric/Behavioral: Negative.     All other systems reviewed and are negative.      Past Medical History   Past Medical History:   Diagnosis Date    Asthma     Chronic HFrEF (heart failure with reduced ejection fraction) ([facility]) 05/15/2023    Hypertension     NSVT (nonsustained ventricular tachycardia) ([facility]) 05/2023    Obstructive sleep apnea     Prediabetes 07/2020    Tobacco abuse     Type 2 myocardial infarction +/- [place] 07/2020     Past Surgical History:   Procedure Laterality Date    NO PAST SURGERIES         Current Outpatient Medications on File Prior to Visit   Medication Sig Dispense Refill    ALPRAZolam (XANAX) 0.5 mg tablet Take 1 tablet (0.5 mg total) by mouth once in imaging for anxiety for up to 1 dose (Patient not taking: Reported on 6/21/2023) 1 tablet 0    Blood Pressure Monitoring (Adult Blood Pressure Cuff Lg) KIT Use daily in the early morning 1 kit 0    carvedilol (COREG) 12.5 mg tablet TAKE 1 TABLET (12.5 MG TOTAL) BY MOUTH EVERY 12 HOURS WITH FOOD 60 tablet 4    Diclofenac Sodium (VOLTAREN) 1 % Apply 2 g topically 4 (four) times a day as needed (shoulder pain) 150 g 0    Empagliflozin (Jardiance) 10 MG TABS tablet TAKE 1 TABLET (10 MG TOTAL) BY MOUTH EVERY MORNING 90 tablet 0    loratadine (CLARITIN) 10 mg tablet Take 1 tablet (10 mg total) by mouth daily 30 tablet 2    methocarbamol (ROBAXIN) 500 mg tablet Take 1 tablet (500 mg total) by mouth 2 (two) times a day as needed for muscle spasms 30 tablet 0    sacubitril-valsartan (Entresto)  MG TABS Take 1 tablet by mouth 2 (two) times a day 180 tablet 5    spironolactone (ALDACTONE) 25 mg tablet TAKE 1 TABLET (25 MG TOTAL) BY MOUTH DAILY 30 tablet 2    [DISCONTINUED] albuterol (2.5 mg/3 mL) 0.083 % nebulizer solution USE ONE VIAL BY NEBULIZATION EVERY  6 (SIX) HOURS AS NEEDED FOR WHEEZING OR SHORTNESS OF BREATH 180 mL 0    [DISCONTINUED] albuterol (ProAir HFA) 90 mcg/act inhaler Inhale 2 puffs every 6 (six) hours as needed for wheezing 8.5 g 0    [DISCONTINUED] albuterol (PROVENTIL HFA,VENTOLIN HFA) 90 mcg/act inhaler INHALE TWO PUFFS BY MOUTH EVERY 4 HOURS AS NEEDED FOR WHEEZING OR SHORTNESS OF BREATH 6.7 g 0    [DISCONTINUED] fluticasone (Arnuity Ellipta) 100 MCG/ACT AEPB inhaler INHALE 1 PUFFS BY MOUTH DAILY (RINSE MOUTH WITH WATER AFTER USE.) 30 blister 0     No current facility-administered medications on file prior to visit.   No Known Allergies   Patient and Caregiver will verbalize understanding of medication regimen within 60 days.   Social History     Tobacco Use    Smoking status: Some Days     Current packs/day: 0.50     Average packs/day: 0.5 packs/day for 19.6 years (9.8 ttl pk-yrs)     Types: Cigarettes     Start date: 2005    Smokeless tobacco: Never    Tobacco comments:     Currently smoking 2-3 pulls of a cigarette weekly (Updated 05/22/2023).    Vaping Use    Vaping status: Never Used   Substance and Sexual Activity    Alcohol use: Not Currently    Drug use: Not Currently     Types: Marijuana     Comment: Denies curernt use (Updated 05/22/2023).    Sexual activity: Not on file       Objective     /80 (BP Location: Left arm, Patient Position: Sitting, Cuff Size: Large)   Pulse 80   Temp 98 °F (36.7 °C) (Temporal)   Resp 16   Ht 5' (1.524 m)   Wt (!) 141 kg (310 lb)   LMP 08/03/2024 (Approximate)   SpO2 98%   BMI 60.54 kg/m²     Physical Exam  Vitals and nursing note reviewed.   Constitutional:       General: She is not in acute distress.     Appearance: Normal appearance. She is well-developed. She is obese.   HENT:      Head: Normocephalic and atraumatic.      Right Ear: Tympanic membrane normal.      Left Ear: Tympanic membrane normal.      Nose: Nose normal.      Mouth/Throat:      Mouth: Mucous membranes are moist.   Eyes:       Conjunctiva/sclera: Conjunctivae normal.   Cardiovascular:      Rate and Rhythm: Normal rate and regular rhythm.      Pulses: Normal pulses.      Heart sounds: Normal heart sounds. No murmur heard.  Pulmonary:      Effort: Pulmonary effort is normal. No respiratory distress.      Breath sounds: Normal breath sounds.   Abdominal:      General: Abdomen is flat. Bowel sounds are normal.      Palpations: Abdomen is soft.      Tenderness: There is no abdominal tenderness.   Musculoskeletal:         General: No swelling. Normal range of motion.      Cervical back: Normal range of motion and neck supple.   Skin:     General: Skin is warm and dry.      Capillary Refill: Capillary refill takes less than 2 seconds.   Neurological:      General: No focal deficit present.      Mental Status: She is alert and oriented to person, place, and time. Mental status is at baseline.   Psychiatric:         Mood and Affect: Mood normal.         Behavior: Behavior normal.         Thought Content: Thought content normal.         Judgment: Judgment normal.

## 2024-08-19 ENCOUNTER — CLINICAL SUPPORT (OUTPATIENT)
Dept: FAMILY MEDICINE CLINIC | Facility: CLINIC | Age: 44
End: 2024-08-19

## 2024-08-19 ENCOUNTER — TELEPHONE (OUTPATIENT)
Dept: FAMILY MEDICINE CLINIC | Facility: CLINIC | Age: 44
End: 2024-08-19

## 2024-08-19 DIAGNOSIS — Z00.00 ANNUAL PHYSICAL EXAM: Primary | ICD-10-CM

## 2024-08-19 LAB
INDURATION: 0 MM
TB SKIN TEST: NEGATIVE

## 2024-08-19 NOTE — TELEPHONE ENCOUNTER
Pt came to the office for ppd reading ppd reading was negative.  Also pt needs forms fill out for school I print forms and place in your bin.    Please advise when forms complete , thank you

## 2024-08-20 NOTE — TELEPHONE ENCOUNTER
Patient notified form is ready for . Form scanned into patient chart  Form placed in  bin under Letter C.

## 2024-08-22 ENCOUNTER — TELEPHONE (OUTPATIENT)
Dept: CARDIOLOGY CLINIC | Facility: CLINIC | Age: 44
End: 2024-08-22

## 2024-08-22 DIAGNOSIS — I50.22 CHRONIC HFREF (HEART FAILURE WITH REDUCED EJECTION FRACTION) (HCC): ICD-10-CM

## 2024-08-22 RX ORDER — SACUBITRIL AND VALSARTAN 97; 103 MG/1; MG/1
1 TABLET, FILM COATED ORAL 2 TIMES DAILY
Qty: 180 TABLET | Refills: 3 | Status: SHIPPED | OUTPATIENT
Start: 2024-08-22 | End: 2026-02-13

## 2024-08-22 NOTE — TELEPHONE ENCOUNTER
LVM - for patient to call back to schedule a visit with Dr Murphy (overdue) and to have lab work done that was previously ordered prior to appointment.

## 2024-08-23 DIAGNOSIS — E66.01 CLASS 3 SEVERE OBESITY DUE TO EXCESS CALORIES WITH BODY MASS INDEX (BMI) OF 60.0 TO 69.9 IN ADULT, UNSPECIFIED WHETHER SERIOUS COMORBIDITY PRESENT (HCC): ICD-10-CM

## 2024-08-23 DIAGNOSIS — G47.30 SLEEP APNEA, UNSPECIFIED TYPE: Primary | ICD-10-CM

## 2024-08-26 ENCOUNTER — TELEPHONE (OUTPATIENT)
Dept: FAMILY MEDICINE CLINIC | Facility: CLINIC | Age: 44
End: 2024-08-26

## 2024-08-26 NOTE — TELEPHONE ENCOUNTER
Please call for peer to peer review @ [phone]  currently not approved due to missing information. ..Health help tracking # [id number]

## 2024-08-30 ENCOUNTER — TELEPHONE (OUTPATIENT)
Dept: FAMILY MEDICINE CLINIC | Facility: CLINIC | Age: 44
End: 2024-08-30

## 2024-08-30 NOTE — TELEPHONE ENCOUNTER
Yes, this is Doctor Oneal Panda. We're reaching out for [id number]. This is in regards to a sleep study the doctor ordered for a patient. The patient's name is Lauranicanor Cohen. The patients date of birth is  If the doctor could please give me a call back as soon as possible. My contact number is [phone]. This is time sensitive. Thank you.

## 2024-09-05 NOTE — TELEPHONE ENCOUNTER
Hi just want to confirm a peer to peer was completed for this patient.Patient has an upcoming appointment on 9/9/2024. Thanks!

## 2024-09-10 DIAGNOSIS — I42.9 CARDIOMYOPATHY, UNSPECIFIED TYPE (HCC): ICD-10-CM

## 2024-09-10 DIAGNOSIS — I50.22 CHRONIC HFREF (HEART FAILURE WITH REDUCED EJECTION FRACTION) (HCC): ICD-10-CM

## 2024-09-11 RX ORDER — EMPAGLIFLOZIN 10 MG/1
10 TABLET, FILM COATED ORAL EVERY MORNING
Qty: 90 TABLET | Refills: 1 | Status: SHIPPED | OUTPATIENT
Start: 2024-09-11

## 2024-09-12 NOTE — PROGRESS NOTES
"Advanced Heart Failure/Pulmonary Hypertension Outpatient Note - Kalina Cohen 44 y.o. female MRN: [id number]    @ Encounter: [id number]    Assessment:  44 y.o. female Hx per chart p/w HF fu.  I first met patient 7/2023.    NICM, HFrEF, LVEF 35% (low-nl 50% in 2020), LVIDD 5.9cm  discovered low EF 5/2023 7/27/20 [facility] no CAD with EF 50% in 2020  MINOCA in 2020  presented with chest pain and found to have elevated troponins with possible inferior wall hypokinesis on TTE  [facility] 07/27/2020: normal coronaries. LVEF 50% by contrast ventriculography.   Super morbidly obese  Hypertension, labile, hard to control  NSVT  KEYONA  Asthma   Strong family hx of heart disease and SCD  Tobacco smoker, quit 8/2024      Today I have reviewed all pertinent labs/imaging/data including but not limited to:        Lab Units 10/08/23  0009 08/14/23  1058 05/16/23  0459   CREATININE mg/dL 0.84 0.77 0.64         Lab Results   Component Value Date    K 3.4 (L) 10/08/2023     Lab Results   Component Value Date    HGBA1C 5.9 (H) 07/25/2020     Lab Results   Component Value Date    WOQ5OZYVLJID 3.052 08/14/2023     Lab Results   Component Value Date    LDLCALC 79 07/24/2020     Lab Results   Component Value Date    BNP 31 10/08/2023      Lab Results   Component Value Date    NTBNP 98.8 01/18/2023           Invalid input(s): \"02HGBVB\"    Plasma metanephrines WNL in 5/2023  Plasma kurt 7.9, renin activity low <0.167, ratio elevated  Cr 0.6  2019 tsh wnl    TODAY'S PLAN:     09/13/24  Warm, suspect euvolemic  Progressive ESCALANTE which she attributes to asthma  Asthma ED visit 7/2024  She did not complete various testing and referrals after our last 2023 visit, did not fu    She says planned for sleep med eval near term    She says planned for weight loss program near term  She states she would never have surgery for this    She will attempt to get endo fu as previously planned    gdmt below  Up coreg today  Prior plan at our 2023 visit was to " advance gdmt and then Repeat TTE for EF around 9/1/2023 - as of 9/2024 she will get repeat echo 10/2024  Narrow QRS    Ordered geentic testing now    Follow up:  With me in 2 months or sooner if symptoms evolve.  In addition to follow up with their other medical providers    Key info from my prior notes:    BP labile  No renal artery abnl mentioned 2018 CT AP w con  Secondary HTN unrevealing so far except the below:  Plasma kurt 7.9, renin activity low <0.167, ratio elevated    Endo cx for possible hyperaldosteronism  2018 CT AP w con: unremarkable adrenals  No prior chest imaging I can see, outside of CXR    Recheck tsh    She is working on cardiac rehab transportation now    CMR - not yet scheduled but ordered - she will schedule now    Ordered genetic testing today    estefanía screen ordered already - pending    Pharmacotherapies / Neurohormonal Blockade:  --Beta Blocker: carvedilol 12.5 mg q12 hours. > 18.75 bid  --ARNi / ACEi / ARB: Entresto 97/103 bid   --Aldosterone Antagonist: spironolactone 25 mg daily.   --SGLT2 Inhibitor: jardiance 10 qd    --Diuretic: None.   Long discussion about evidence of worsening HF, when to call us for diuretic     Sudden Cardiac Death Risk Reduction:  --LVEF 35%. had LifeVest.   Electrical Resynchronization:  --Candidacy for BiV device: narrow QRS.   Advanced Therapies: Will continue to monitor.    Studies:  I have reviewed all pertinent patient data/labs/imaging where available, including but not limited to the below studies. Selected results may be displayed here but comprehensive listing is omitted for note clarity and can be found in the epic chart.    ECG.    Echo.    Stress.    Cath.    HPI:   43 y.o. female Hx per chart p/w HF fu.  No new CP/SOB/dizziness/palpitations/syncope.  No new fatigue.  No new unintentional weight changes.  No new leg swelling, PND, pillow orthopnea.  No new fevers, chills, cough, nausea, vomiting, diarrhea, dysuria.      Interval History:  As noted in  'plan' section above and prior epic chart notes.    No new CP/dizziness/palpitations/syncope.  No new unintentional weight changes.  No new leg swelling, PND, pillow orthopnea.  No new fevers, chills, cough, nausea, vomiting, diarrhea, dysuria.      Past Medical History:   Diagnosis Date    Asthma     Chronic HFrEF (heart failure with reduced ejection fraction) ([facility]) 05/15/2023    Hypertension     NSVT (nonsustained ventricular tachycardia) ([facility]) 05/2023    Obstructive sleep apnea     Prediabetes 07/2020    Tobacco abuse     Type 2 myocardial infarction +/- MINOCA 07/2020     Patient Active Problem List    Diagnosis Date Noted    Skin lesion of breast 11/28/2023    Chronic HFrEF (heart failure with reduced ejection fraction) ([facility]) 08/17/2023    Encounter for physical examination related to employment 05/27/2023    NSVT (nonsustained ventricular tachycardia) ([facility]) 05/24/2023    Acute pain of left shoulder 05/24/2023    Cardiomyopathy ([facility]) 05/16/2023    Back pain 05/14/2023    Class 3 severe obesity due to excess calories with body mass index (BMI) of 60.0 to 69.9 in adult ([facility]) 07/28/2020    Chest pain 07/25/2020    Asthma 07/25/2020    Nonintractable headache 12/08/2019    Hypertension 12/08/2019    Sleep apnea 11/24/2019    PVC (premature ventricular contraction) 11/23/2019    Tobacco abuse 11/23/2019    Abnormal urinalysis 11/23/2019    Allergic rhinitis 06/10/2019       ROS:  10 point ROS negative except as specified in HPI/interval history    No Known Allergies    Current Outpatient Medications   Medication Instructions    albuterol (Proventil HFA) 90 mcg/act inhaler 2 puffs, Inhalation, Every 4 hours PRN    albuterol 2.5 mg, Nebulization, Every 6 hours PRN    ALPRAZolam (XANAX) 0.5 mg, Oral, Once in imaging    Blood Pressure Monitoring (Adult Blood Pressure Cuff Lg) KIT Does not apply, Daily (early morning)    carvedilol (COREG) 18.75 mg, Oral, 2 times daily with meals    Diclofenac Sodium (VOLTAREN) 2 g,  Topical, 4 times daily PRN    Entresto  MG TABS 1 tablet, Oral, 2 times daily    Fluticasone-Salmeterol (Advair) 250-50 mcg/dose inhaler 1 puff, Inhalation, 2 times daily, Rinse mouth after use.    Jardiance 10 mg, Oral, Every morning    loratadine (CLARITIN) 10 mg, Oral, Daily    methocarbamol (ROBAXIN) 500 mg, Oral, 2 times daily PRN    spironolactone (ALDACTONE) 25 mg, Oral, Daily        Social History     Socioeconomic History    Marital status: /Civil Union     Spouse name: Not on file    Number of children: 5    Years of education: Not on file    Highest education level: 11th grade   Occupational History    Not on file   Tobacco Use    Smoking status: Some Days     Current packs/day: 0.50     Average packs/day: 0.5 packs/day for 19.7 years (9.8 ttl pk-yrs)     Types: Cigarettes     Start date: 2005    Smokeless tobacco: Never    Tobacco comments:     Currently smoking 2-3 pulls of a cigarette weekly (Updated 05/22/2023).    Vaping Use    Vaping status: Never Used   Substance and Sexual Activity    Alcohol use: Not Currently    Drug use: Not Currently     Types: Marijuana     Comment: Denies curernt use (Updated 05/22/2023).    Sexual activity: Not on file   Other Topics Concern    Not on file   Social History Narrative    Not on file     Social Determinants of Health     Financial Resource Strain: Low Risk  (8/19/2024)    Overall Financial Resource Strain (CARDIA)     Difficulty of Paying Living Expenses: Not hard at all   Food Insecurity: No Food Insecurity (8/19/2024)    Hunger Vital Sign     Worried About Running Out of Food in the Last Year: Never true     Ran Out of Food in the Last Year: Never true   Recent Concern: Food Insecurity - Food Insecurity Present (8/16/2024)    Hunger Vital Sign     Worried About Running Out of Food in the Last Year: Never true     Ran Out of Food in the Last Year: Sometimes true   Transportation Needs: No Transportation Needs (8/19/2024)    PRAPARE -  Transportation     Lack of Transportation (Medical): No     Lack of Transportation (Non-Medical): No   Recent Concern: Transportation Needs - Unmet Transportation Needs (2024)    PRAPARE - Transportation     Lack of Transportation (Medical): Yes     Lack of Transportation (Non-Medical): Yes   Physical Activity: Not on file   Stress: Not on file   Social Connections: Not on file   Intimate Partner Violence: Not on file   Housing Stability: Unknown (2024)    Housing Stability Vital Sign     Unable to Pay for Housing in the Last Year: No     Number of Times Moved in the Last Year: Not on file     Homeless in the Last Year: No       Family History   Problem Relation Age of Onset    Hypertension Mother     Diabetes Mother     Heart failure Mother         possible stenting?    Hypertension Father     Diabetes Father     No Known Problems Sister     Hypertension Sister     Hypertension Brother     Asthma Brother     Kidney disease Brother     Hypertension Maternal Grandmother     Kidney failure Maternal Grandmother         in HD    Heart disease Maternal Grandfather     Diabetes Maternal Grandfather     Hypertension Maternal Grandfather     No Known Problems Paternal Grandmother     No Known Problems Paternal Grandfather     Sudden death Maternal Aunt          in her sleep; family told it was natural causes       Physical Exam:  Vitals:    24 1336   BP: 134/86   BP Location: Left arm   Patient Position: Sitting   Cuff Size: Large   Pulse: 73   SpO2: 98%   Weight: (!) 145 kg (319 lb 11.2 oz)   Height: 5' (1.524 m)       Constitutional: NAD, non toxic, obese  Ears/nose/mouth/throat: atraumatic  CV: RRR, nl S1S2, no murmurs/rubs/gallups, no JVD, no HJR  Resp: ctabl  GI: Soft, NTND  MSK: no swollen joints in exposed areas  Extr: No edema, warm LE  Pysche: Normal affect  Neuro: appropriate in conversation  Skin: dry and intact in exposed areas    Labs & Results:    Lab Results   Component Value Date     SODIUM 138 10/08/2023    K 3.4 (L) 10/08/2023     10/08/2023    CO2 29 10/08/2023    BUN 13 10/08/2023    CREATININE 0.84 10/08/2023    GLUC 107 10/08/2023    CALCIUM 9.2 10/08/2023     Lab Results   Component Value Date    WBC 7.48 10/08/2023    HGB 12.2 10/08/2023    HCT 38.2 10/08/2023    MCV 88 10/08/2023     10/08/2023     Lab Results   Component Value Date    BNP 31 10/08/2023      Lab Results   Component Value Date    CHOLESTEROL 154 07/24/2020    CHOLESTEROL 146 06/06/2019     Lab Results   Component Value Date    HDL 35 (L) 07/24/2020    HDL 45 06/06/2019     Lab Results   Component Value Date    TRIG 200 (H) 07/24/2020    TRIG 96 06/06/2019     Lab Results   Component Value Date    NONHDLC 119 07/24/2020    NONHDLC 101 06/06/2019       Counseling / Coordination of Care  Greater than 50% of total time was spent with the patient and / or family counseling and / or coordination of care.  We discussed diagnoses, most recent studies, tests and any changes in treatment plan.    Thank you for the opportunity to participate in the care of this patient.    Ollie Murphy MD  Attending Physician  Advanced Heart Failure and Transplant Cardiology  [facility]

## 2024-09-13 ENCOUNTER — OFFICE VISIT (OUTPATIENT)
Dept: CARDIOLOGY CLINIC | Facility: CLINIC | Age: 44
End: 2024-09-13
Payer: MEDICARE

## 2024-09-13 VITALS
HEART RATE: 73 BPM | HEIGHT: 60 IN | DIASTOLIC BLOOD PRESSURE: 86 MMHG | SYSTOLIC BLOOD PRESSURE: 134 MMHG | OXYGEN SATURATION: 98 % | BODY MASS INDEX: 57.52 KG/M2 | WEIGHT: 293 LBS

## 2024-09-13 DIAGNOSIS — G47.33 OBSTRUCTIVE SLEEP APNEA: ICD-10-CM

## 2024-09-13 DIAGNOSIS — F41.9 ANXIETY: ICD-10-CM

## 2024-09-13 DIAGNOSIS — I42.9 CARDIOMYOPATHY, UNSPECIFIED TYPE (HCC): ICD-10-CM

## 2024-09-13 DIAGNOSIS — E66.01 CLASS 3 SEVERE OBESITY WITH SERIOUS COMORBIDITY AND BODY MASS INDEX (BMI) OF 50.0 TO 59.9 IN ADULT, UNSPECIFIED OBESITY TYPE (HCC): ICD-10-CM

## 2024-09-13 DIAGNOSIS — I50.22 CHRONIC HFREF (HEART FAILURE WITH REDUCED EJECTION FRACTION) (HCC): Primary | ICD-10-CM

## 2024-09-13 PROCEDURE — 99214 OFFICE O/P EST MOD 30 MIN: CPT | Performed by: STUDENT IN AN ORGANIZED HEALTH CARE EDUCATION/TRAINING PROGRAM

## 2024-09-13 RX ORDER — CARVEDILOL 12.5 MG/1
18.75 TABLET ORAL 2 TIMES DAILY WITH MEALS
Qty: 270 TABLET | Refills: 5 | Status: SHIPPED | OUTPATIENT
Start: 2024-09-13 | End: 2026-03-07

## 2024-09-19 ENCOUNTER — DOCUMENTATION (OUTPATIENT)
Dept: CARDIOLOGY CLINIC | Facility: CLINIC | Age: 44
End: 2024-09-19

## 2024-09-23 ENCOUNTER — HOSPITAL ENCOUNTER (EMERGENCY)
Facility: HOSPITAL | Age: 44
Discharge: HOME | End: 2024-09-23
Attending: EMERGENCY MEDICINE
Payer: MEDICARE

## 2024-09-23 VITALS
RESPIRATION RATE: 22 BRPM | OXYGEN SATURATION: 98 % | DIASTOLIC BLOOD PRESSURE: 111 MMHG | BODY MASS INDEX: 62.09 KG/M2 | TEMPERATURE: 98.5 F | HEART RATE: 62 BPM | WEIGHT: 293 LBS | SYSTOLIC BLOOD PRESSURE: 200 MMHG

## 2024-09-23 DIAGNOSIS — J45.909 ASTHMA: ICD-10-CM

## 2024-09-23 DIAGNOSIS — J45.901 MILD ASTHMA WITH EXACERBATION, UNSPECIFIED WHETHER PERSISTENT: Primary | ICD-10-CM

## 2024-09-23 PROCEDURE — 94640 AIRWAY INHALATION TREATMENT: CPT

## 2024-09-23 PROCEDURE — 99284 EMERGENCY DEPT VISIT MOD MDM: CPT

## 2024-09-23 PROCEDURE — 99284 EMERGENCY DEPT VISIT MOD MDM: CPT | Performed by: EMERGENCY MEDICINE

## 2024-09-23 RX ORDER — ALBUTEROL SULFATE 90 UG/1
2 INHALANT RESPIRATORY (INHALATION) EVERY 4 HOURS PRN
Qty: 6.7 G | Refills: 0 | Status: SHIPPED | OUTPATIENT
Start: 2024-09-23

## 2024-09-23 RX ORDER — IPRATROPIUM BROMIDE AND ALBUTEROL SULFATE .5; 3 MG/3ML; MG/3ML
1 SOLUTION RESPIRATORY (INHALATION) ONCE
Status: COMPLETED | OUTPATIENT
Start: 2024-09-23 | End: 2024-09-23

## 2024-09-23 RX ORDER — ALBUTEROL SULFATE 0.83 MG/ML
2.5 SOLUTION RESPIRATORY (INHALATION) EVERY 6 HOURS PRN
Qty: 180 ML | Refills: 0 | Status: SHIPPED | OUTPATIENT
Start: 2024-09-23

## 2024-09-23 RX ORDER — ALBUTEROL SULFATE 5 MG/ML
5 SOLUTION RESPIRATORY (INHALATION) ONCE
Status: COMPLETED | OUTPATIENT
Start: 2024-09-23 | End: 2024-09-23

## 2024-09-23 RX ORDER — PREDNISONE 20 MG/1
60 TABLET ORAL DAILY
Qty: 15 TABLET | Refills: 0 | Status: SHIPPED | OUTPATIENT
Start: 2024-09-23 | End: 2024-09-28

## 2024-09-23 RX ORDER — PREDNISONE 20 MG/1
60 TABLET ORAL ONCE
Status: COMPLETED | OUTPATIENT
Start: 2024-09-23 | End: 2024-09-23

## 2024-09-23 RX ADMIN — IPRATROPIUM BROMIDE 0.5 MG: 0.5 SOLUTION RESPIRATORY (INHALATION) at 15:45

## 2024-09-23 RX ADMIN — ALBUTEROL SULFATE 5 MG: 2.5 SOLUTION RESPIRATORY (INHALATION) at 15:45

## 2024-09-23 RX ADMIN — PREDNISONE 60 MG: 20 TABLET ORAL at 15:45

## 2024-09-23 RX ADMIN — ALBUTEROL SULFATE 5 MG: 2.5 SOLUTION RESPIRATORY (INHALATION) at 16:18

## 2024-09-23 NOTE — ED PROVIDER NOTES
1. Mild asthma with exacerbation, unspecified whether persistent    2. Asthma      ED Disposition       ED Disposition   Discharge    Condition   Stable    Date/Time   Mon Sep 23, 2024  4:53 PM    Comment   Kalina Cohen discharge to home/self care.                   Assessment & Plan       Medical Decision Making  Problems Addressed:  Mild asthma with exacerbation, unspecified whether persistent: chronic illness or injury with exacerbation, progression, or side effects of treatment     Details: H/o asthma.  Improved post nebs, steroids.  No resp distress at discharge.      Amount and/or Complexity of Data Reviewed  Independent Historian: EMS    Risk  Prescription drug management.                ED Course as of 09/23/24 1956   Mon Sep 23, 2024   1653 Lungs clear after second neb.  Will dc with refills of meds, prednisone.       Medications   albuterol inhalation solution 5 mg (5 mg Nebulization Given 9/23/24 1545)   ipratropium (ATROVENT) 0.02 % inhalation solution 0.5 mg (0.5 mg Nebulization Given 9/23/24 1545)   predniSONE tablet 60 mg (60 mg Oral Given 9/23/24 1545)   ipratropium-albuterol (FOR EMS ONLY) (DUO-NEB) 0.5-2.5 mg/3 mL inhalation solution 3 mL (0 mL Does not apply Given to EMS 9/23/24 1536)   albuterol inhalation solution 5 mg (5 mg Nebulization Given 9/23/24 1618)       History of Present Illness       Patient is a 44-year-old female with a h/o asthma brought in by [facility] with a 2 day h/o asthma exacerbation.  Similar to previous exacerbations.  Bad time of year for her.  Not currently taking her allergy medications.  No fever.  +non productive cough.  Used inhaler and neb at home without relief.  No CP.  Given duoneb by EMS with improved bs (diminished to wheezing).  Sats % on truck.         Review of Systems   Constitutional: Negative.    HENT: Negative.     Eyes: Negative.    Respiratory:  Positive for shortness of breath and wheezing.    Cardiovascular: Negative.    Gastrointestinal:  Negative.    Endocrine: Negative.    Genitourinary: Negative.    Musculoskeletal: Negative.    Skin: Negative.    Allergic/Immunologic: Negative.    Neurological: Negative.    Hematological: Negative.    Psychiatric/Behavioral: Negative.     All other systems reviewed and are negative.          Objective     ED Triage Vitals [09/23/24 1533]   Temperature Pulse Blood Pressure Respirations SpO2 Patient Position - Orthostatic VS   98.5 °F (36.9 °C) 94 (!) 205/100 20 97 % Sitting      Temp Source Heart Rate Source BP Location FiO2 (%) Pain Score    Oral Monitor Right arm -- --        Physical Exam  Vitals and nursing note reviewed.   Constitutional:       Appearance: Normal appearance. She is normal weight.   HENT:      Head: Normocephalic and atraumatic.      Mouth/Throat:      Mouth: Mucous membranes are moist.      Pharynx: Oropharynx is clear.   Eyes:      Conjunctiva/sclera: Conjunctivae normal.      Pupils: Pupils are equal, round, and reactive to light.   Cardiovascular:      Rate and Rhythm: Normal rate and regular rhythm.      Pulses: Normal pulses.      Heart sounds: Normal heart sounds.   Pulmonary:      Breath sounds: Wheezing present.      Comments: Diffuse exp wheezing in b/l post fields.  Abdominal:      General: Bowel sounds are normal.      Palpations: Abdomen is soft.   Musculoskeletal:         General: No swelling or tenderness. Normal range of motion.      Cervical back: Normal range of motion and neck supple.      Right lower leg: No edema.      Left lower leg: No edema.   Skin:     General: Skin is warm and dry.      Capillary Refill: Capillary refill takes less than 2 seconds.   Neurological:      General: No focal deficit present.      Mental Status: She is alert and oriented to person, place, and time.   Psychiatric:         Mood and Affect: Mood normal.         Behavior: Behavior normal.         Labs Reviewed - No data to display  No orders to display       Procedures    ED Medication and  Procedure Management   Prior to Admission Medications   Prescriptions Last Dose Informant Patient Reported? Taking?   ALPRAZolam (XANAX) 0.5 mg tablet  Self No No   Sig: Take 1 tablet (0.5 mg total) by mouth once in imaging for anxiety for up to 1 dose   Patient not taking: Reported on 6/21/2023   Blood Pressure Monitoring (Adult Blood Pressure Cuff Lg) KIT  Self No No   Sig: Use daily in the early morning   Diclofenac Sodium (VOLTAREN) 1 %  Self No No   Sig: Apply 2 g topically 4 (four) times a day as needed (shoulder pain)   Entresto  MG TABS  Self No No   Sig: TAKE 1 TABLET BY MOUTH 2 (TWO) TIMES A DAY   Fluticasone-Salmeterol (Advair) 250-50 mcg/dose inhaler  Self No No   Sig: Inhale 1 puff 2 (two) times a day Rinse mouth after use.   Jardiance 10 MG TABS tablet  Self No No   Sig: TAKE 1 TABLET (10 MG TOTAL) BY MOUTH EVERY MORNING   albuterol (2.5 mg/3 mL) 0.083 % nebulizer solution  Self No No   Sig: Take 3 mL (2.5 mg total) by nebulization every 6 (six) hours as needed for wheezing or shortness of breath   albuterol (2.5 mg/3 mL) 0.083 % nebulizer solution   No Yes   Sig: Take 3 mL (2.5 mg total) by nebulization every 6 (six) hours as needed for wheezing or shortness of breath   albuterol (Proventil HFA) 90 mcg/act inhaler  Self No No   Sig: Inhale 2 puffs every 4 (four) hours as needed for wheezing   albuterol (Proventil HFA) 90 mcg/act inhaler   No Yes   Sig: Inhale 2 puffs every 4 (four) hours as needed for wheezing   carvedilol (COREG) 12.5 mg tablet   No No   Sig: Take 1.5 tablets (18.75 mg total) by mouth 2 (two) times a day with meals   loratadine (CLARITIN) 10 mg tablet  Self No No   Sig: Take 1 tablet (10 mg total) by mouth daily   methocarbamol (ROBAXIN) 500 mg tablet  Self No No   Sig: Take 1 tablet (500 mg total) by mouth 2 (two) times a day as needed for muscle spasms   Patient not taking: Reported on 9/13/2024   spironolactone (ALDACTONE) 25 mg tablet  Self No No   Sig: TAKE 1 TABLET (25 MG  TOTAL) BY MOUTH DAILY      Facility-Administered Medications: None     Discharge Medication List as of 9/23/2024  4:55 PM        START taking these medications    Details   predniSONE 20 mg tablet Take 3 tablets (60 mg total) by mouth daily for 5 days, Starting Mon 9/23/2024, Until Sat 9/28/2024, Normal           CONTINUE these medications which have CHANGED    Details   albuterol (2.5 mg/3 mL) 0.083 % nebulizer solution Take 3 mL (2.5 mg total) by nebulization every 6 (six) hours as needed for wheezing or shortness of breath, Starting Mon 9/23/2024, Normal      albuterol (Proventil HFA) 90 mcg/act inhaler Inhale 2 puffs every 4 (four) hours as needed for wheezing, Starting Mon 9/23/2024, Normal           CONTINUE these medications which have NOT CHANGED    Details   ALPRAZolam (XANAX) 0.5 mg tablet Take 1 tablet (0.5 mg total) by mouth once in imaging for anxiety for up to 1 dose, Starting Mon 5/22/2023, Normal      Blood Pressure Monitoring (Adult Blood Pressure Cuff Lg) KIT Use daily in the early morning, Starting Mon 5/22/2023, Normal      carvedilol (COREG) 12.5 mg tablet Take 1.5 tablets (18.75 mg total) by mouth 2 (two) times a day with meals, Starting Fri 9/13/2024, Until Sat 3/7/2026, Normal      Diclofenac Sodium (VOLTAREN) 1 % Apply 2 g topically 4 (four) times a day as needed (shoulder pain), Starting Thu 5/18/2023, Normal      Entresto  MG TABS TAKE 1 TABLET BY MOUTH 2 (TWO) TIMES A DAY, Starting Thu 8/22/2024, Until Fri 2/13/2026, Normal      Fluticasone-Salmeterol (Advair) 250-50 mcg/dose inhaler Inhale 1 puff 2 (two) times a day Rinse mouth after use., Starting Fri 8/16/2024, Until Wed 2/12/2025, Normal      Jardiance 10 MG TABS tablet TAKE 1 TABLET (10 MG TOTAL) BY MOUTH EVERY MORNING, Starting Wed 9/11/2024, Normal      loratadine (CLARITIN) 10 mg tablet Take 1 tablet (10 mg total) by mouth daily, Starting Wed 4/26/2023, Normal      methocarbamol (ROBAXIN) 500 mg tablet Take 1 tablet (500 mg  total) by mouth 2 (two) times a day as needed for muscle spasms, Starting u 5/18/2023, Normal      spironolactone (ALDACTONE) 25 mg tablet TAKE 1 TABLET (25 MG TOTAL) BY MOUTH DAILY, Starting u 9/7/2023, Normal           No discharge procedures on file.     Thompson Green MD  09/23/24 1956

## 2024-11-20 ENCOUNTER — VBI (OUTPATIENT)
Dept: ADMINISTRATIVE | Facility: OTHER | Age: 44
End: 2024-11-20

## 2024-11-20 NOTE — TELEPHONE ENCOUNTER
11/20/24 10:22 AM     Chart reviewed for Hemoglobin A1c ; nothing is submitted to the patient's insurance at this time.     Mark Bernal MA   [facility]

## 2025-01-03 DIAGNOSIS — J45.909 ASTHMA: ICD-10-CM

## 2025-01-03 RX ORDER — ALBUTEROL SULFATE 90 UG/1
INHALANT RESPIRATORY (INHALATION)
Qty: 6.7 G | Refills: 2 | Status: SHIPPED | OUTPATIENT
Start: 2025-01-03

## 2025-01-03 RX ORDER — ALBUTEROL SULFATE 0.83 MG/ML
SOLUTION RESPIRATORY (INHALATION)
Qty: 180 ML | Refills: 2 | Status: SHIPPED | OUTPATIENT
Start: 2025-01-03

## 2025-02-10 DIAGNOSIS — J45.909 ASTHMA: ICD-10-CM

## 2025-02-10 RX ORDER — FLUTICASONE PROPIONATE AND SALMETEROL 250; 50 UG/1; UG/1
POWDER RESPIRATORY (INHALATION)
Qty: 60 BLISTER | Refills: 4 | Status: SHIPPED | OUTPATIENT
Start: 2025-02-10

## 2025-02-26 DIAGNOSIS — I42.9 CARDIOMYOPATHY, UNSPECIFIED TYPE (HCC): ICD-10-CM

## 2025-02-26 DIAGNOSIS — I50.22 CHRONIC HFREF (HEART FAILURE WITH REDUCED EJECTION FRACTION) (HCC): ICD-10-CM

## 2025-02-27 RX ORDER — EMPAGLIFLOZIN 10 MG/1
10 TABLET, FILM COATED ORAL EVERY MORNING
Qty: 90 TABLET | Refills: 1 | Status: SHIPPED | OUTPATIENT
Start: 2025-02-27

## 2025-03-05 DIAGNOSIS — J45.909 ASTHMA: ICD-10-CM

## 2025-03-05 RX ORDER — ALBUTEROL SULFATE 0.83 MG/ML
SOLUTION RESPIRATORY (INHALATION)
Qty: 180 ML | Refills: 1 | OUTPATIENT
Start: 2025-03-05

## 2025-03-05 RX ORDER — ALBUTEROL SULFATE 90 UG/1
INHALANT RESPIRATORY (INHALATION)
Qty: 6.7 G | Refills: 1 | OUTPATIENT
Start: 2025-03-05

## 2025-03-10 ENCOUNTER — OFFICE VISIT (OUTPATIENT)
Dept: FAMILY MEDICINE CLINIC | Facility: CLINIC | Age: 45
End: 2025-03-10

## 2025-03-10 VITALS
TEMPERATURE: 97.2 F | OXYGEN SATURATION: 97 % | HEART RATE: 78 BPM | WEIGHT: 293 LBS | DIASTOLIC BLOOD PRESSURE: 102 MMHG | RESPIRATION RATE: 18 BRPM | HEIGHT: 60 IN | BODY MASS INDEX: 57.52 KG/M2 | SYSTOLIC BLOOD PRESSURE: 148 MMHG

## 2025-03-10 DIAGNOSIS — E66.01 CLASS 3 SEVERE OBESITY DUE TO EXCESS CALORIES WITH BODY MASS INDEX (BMI) OF 60.0 TO 69.9 IN ADULT, UNSPECIFIED WHETHER SERIOUS COMORBIDITY PRESENT (HCC): ICD-10-CM

## 2025-03-10 DIAGNOSIS — J45.909 ASTHMA: ICD-10-CM

## 2025-03-10 DIAGNOSIS — I50.22 CHRONIC HFREF (HEART FAILURE WITH REDUCED EJECTION FRACTION) (HCC): ICD-10-CM

## 2025-03-10 DIAGNOSIS — Z12.4 SCREENING FOR CERVICAL CANCER: ICD-10-CM

## 2025-03-10 DIAGNOSIS — Z12.31 ENCOUNTER FOR SCREENING MAMMOGRAM FOR BREAST CANCER: Primary | ICD-10-CM

## 2025-03-10 DIAGNOSIS — E66.813 CLASS 3 SEVERE OBESITY DUE TO EXCESS CALORIES WITH BODY MASS INDEX (BMI) OF 60.0 TO 69.9 IN ADULT, UNSPECIFIED WHETHER SERIOUS COMORBIDITY PRESENT (HCC): ICD-10-CM

## 2025-03-10 DIAGNOSIS — I10 HYPERTENSION, UNSPECIFIED TYPE: Chronic | ICD-10-CM

## 2025-03-10 PROCEDURE — 99214 OFFICE O/P EST MOD 30 MIN: CPT | Performed by: PHYSICIAN ASSISTANT

## 2025-03-10 RX ORDER — ALBUTEROL SULFATE 90 UG/1
2 INHALANT RESPIRATORY (INHALATION) EVERY 6 HOURS PRN
Qty: 6.7 G | Refills: 2 | Status: SHIPPED | OUTPATIENT
Start: 2025-03-10

## 2025-03-10 RX ORDER — ALBUTEROL SULFATE 0.83 MG/ML
2.5 SOLUTION RESPIRATORY (INHALATION) EVERY 6 HOURS PRN
Qty: 180 ML | Refills: 2 | Status: SHIPPED | OUTPATIENT
Start: 2025-03-10

## 2025-03-10 NOTE — ASSESSMENT & PLAN NOTE
-Uncontrolled.  Patient has been using nebulizer every 4-5 hours.  Patient does not believe the Advair has been effective.  Patient prefers to use her nebulizer several times throughout the day.  Patient currently using her sisters nebulizer.  Will place order for nebulizer via parachute so patient could have her own.  -Continue Advair twice daily, butyryl inhaler as needed, nebulizer as needed.  -Reviewed ED visit from 9/23/2024.  Patient treated for asthma exacerbation.  -Advised patient to reschedule missed appointment for pulmonary function testing.  - Will refer to pulmonology for further evaluation and management.  - Advised patient to reschedule missed appointment for sleep study.  Orders:    albuterol (2.5 mg/3 mL) 0.083 % nebulizer solution; Take 3 mL (2.5 mg total) by nebulization every 6 (six) hours as needed for wheezing or shortness of breath    Ambulatory Referral to Pulmonology; Future    albuterol (PROVENTIL HFA,VENTOLIN HFA) 90 mcg/act inhaler; Inhale 2 puffs every 6 (six) hours as needed for wheezing

## 2025-03-10 NOTE — PATIENT INSTRUCTIONS
Please call central scheduling at [phone] to schedule echocardiogram and mammogram. Thanks!     [facility]'s Pulmonology  Please contact us at [phone] to schedule your appointment.       [facility] [place] - [address] [address]  [phone]

## 2025-03-10 NOTE — PROGRESS NOTES
Name: Kalina Cohen      : 1980      MRN: [id number]  Encounter Provider: Lucero Louise PA-C  Encounter Date: 3/10/2025   Encounter department: [facility]  :  Assessment & Plan  Encounter for screening mammogram for breast cancer    Orders:    Mammo screening bilateral w 3d and cad; Future    Screening for cervical cancer    Orders:    Ambulatory Referral to Obstetrics / Gynecology; Future    Asthma  -Uncontrolled.  Patient has been using nebulizer every 4-5 hours.  Patient does not believe the Advair has been effective.  Patient prefers to use her nebulizer several times throughout the day.  Patient currently using her sisters nebulizer.  Will place order for nebulizer via parachute so patient could have her own.  -Continue Advair twice daily, butyryl inhaler as needed, nebulizer as needed.  -Reviewed ED visit from 2024.  Patient treated for asthma exacerbation.  -Advised patient to reschedule missed appointment for pulmonary function testing.  - Will refer to pulmonology for further evaluation and management.  - Advised patient to reschedule missed appointment for sleep study.  Orders:    albuterol (2.5 mg/3 mL) 0.083 % nebulizer solution; Take 3 mL (2.5 mg total) by nebulization every 6 (six) hours as needed for wheezing or shortness of breath    Ambulatory Referral to Pulmonology; Future    albuterol (PROVENTIL HFA,VENTOLIN HFA) 90 mcg/act inhaler; Inhale 2 puffs every 6 (six) hours as needed for wheezing    Hypertension, unspecified type  - Blood pressure slightly elevated today, however patient has yet to take her medications.  Advised to take them as soon as she gets home.  Per patient, home blood pressure readings have been within normal range.  - Continue carvedilol 12.5 mg twice daily, spironolactone 25 mg daily.  - Reviewed BP target goal with patient.    - Continue to maintain healthy balanced diet with focus on low salt intake. Limit alcohol  intake.   - Advised to exercise at least 30 minutes a day for at least 5 days out of the week.   -Reviewed cardiology note from September 2024.  Echocardiogram ordered.  Advised patient to call to schedule.  - Continue follow-up with cardiology as scheduled.         Chronic HFrEF (heart failure with reduced ejection fraction) (HCC)  Wt Readings from Last 3 Encounters:   03/10/25 (!) 146 kg (322 lb)   09/23/24 (!) 144 kg (317 lb 14.5 oz)   09/13/24 (!) 145 kg (319 lb 11.2 oz)   -Patient reports shortness of breath, denies chest pain.  Appears euvolemic.  - Reviewed cardiology note from September 2024.  Patient advised to schedule echocardiogram.  - Continue spironolactone, carvedilol, Jardiance, Entresto as prescribed through cardiology.  - Continue follow-up with cardiology as scheduled.       Class 3 severe obesity due to excess calories with body mass index (BMI) of 60.0 to 69.9 in adult, unspecified whether serious comorbidity present ([facility])  - Continue following healthy diet and regular exercise.  - Patient scheduled to establish with weight management in April 2025.             BMI Counseling: Body mass index is 62.89 kg/m². The BMI is above normal. Nutrition recommendations include decreasing portion sizes, encouraging healthy choices of fruits and vegetables, consuming healthier snacks, limiting drinks that contain sugar, moderation in carbohydrate intake, increasing intake of lean protein and reducing intake of cholesterol. Exercise recommendations include moderate physical activity 150 minutes/week. No pharmacotherapy was ordered. Rationale for BMI follow-up plan is due to patient being overweight or obese.     Tobacco Cessation Counseling: Tobacco cessation counseling was provided. The patient is sincerely urged to quit consumption of tobacco. She is not ready to quit tobacco. Medication options and side effects of medication discussed. Patient refused medication.       History of Present Illness      Patient is a 45 y.o. female whom  has a past medical history of Asthma, Chronic HFrEF (heart failure with reduced ejection fraction) ([facility]) (05/15/2023), Hypertension, NSVT (nonsustained ventricular tachycardia) ([facility]) (05/2023), Obstructive sleep apnea, Prediabetes (07/2020), Tobacco abuse, and Type 2 myocardial infarction +/- [facility] (07/2020). who is seen today in office for asthma follow up.    -Patient notes she experiences increased shortness of breath especially when going up and down stairs.  Patient notes she has been using her nebulizer about every 4-5 hours she is home.  Patient notes she will use albuterol inhaler when she is outside the house.  Patient notes she does not believe the Advair is effective.  Patient notes her current nebulizer is her sisters but she would like her own.  She denies any headaches, dizziness, chest pain, palpitations, lower leg swelling.  Patient notes she has been checking her blood pressure at home and it has been within normal range, 120-130 systolic and 80s diastolic.    -Patient notes she has been trying to lose weight but it has been difficult.      Review of Systems   Constitutional:  Negative for fatigue, fever and unexpected weight change.   HENT:  Negative for congestion and sore throat.    Respiratory:  Positive for shortness of breath. Negative for cough, chest tightness and wheezing.    Cardiovascular:  Negative for chest pain, palpitations and leg swelling.   Gastrointestinal:  Negative for abdominal pain, nausea and vomiting.   Musculoskeletal:  Positive for arthralgias.   Neurological:  Negative for dizziness, numbness and headaches.   Psychiatric/Behavioral:  Negative for sleep disturbance. The patient is not nervous/anxious.    All other systems reviewed and are negative.      Objective   BP (!) 148/102 (BP Location: Left arm, Patient Position: Sitting, Cuff Size: Extra-Large)   Pulse 78   Temp (!) 97.2 °F (36.2 °C) (Temporal)   Resp 18   Ht 5' (1.524 m)    Wt (!) 146 kg (322 lb)   LMP 03/01/2025 (Exact Date)   SpO2 97%   BMI 62.89 kg/m²      Physical Exam  Vitals and nursing note reviewed.   Constitutional:       General: She is not in acute distress.     Appearance: She is well-developed.   HENT:      Head: Normocephalic and atraumatic.      Right Ear: External ear normal.      Left Ear: External ear normal.      Nose: Nose normal.      Mouth/Throat:      Pharynx: Uvula midline.   Eyes:      Conjunctiva/sclera: Conjunctivae normal.   Cardiovascular:      Rate and Rhythm: Normal rate and regular rhythm.      Pulses: Normal pulses.      Heart sounds: Normal heart sounds. No murmur heard.  Pulmonary:      Effort: Pulmonary effort is normal. No respiratory distress.      Breath sounds: Normal breath sounds. No wheezing.   Musculoskeletal:      Cervical back: Normal range of motion and neck supple.   Skin:     General: Skin is warm.   Neurological:      Mental Status: She is alert and oriented to person, place, and time.   Psychiatric:         Speech: Speech normal.         Behavior: Behavior normal.       Administrative Statements   I have spent a total time of 30 minutes in caring for this patient on the day of the visit/encounter including Diagnostic results, Prognosis, Risks and benefits of tx options, Instructions for management, Patient and family education, Importance of tx compliance, Risk factor reductions, Impressions, Counseling / Coordination of care, Documenting in the medical record, Reviewing/placing orders in the medical record (including tests, medications, and/or procedures), and Obtaining or reviewing history  .

## 2025-03-15 PROBLEM — I50.22 CHRONIC HFREF (HEART FAILURE WITH REDUCED EJECTION FRACTION) (HCC): Chronic | Status: ACTIVE | Noted: 2023-08-17

## 2025-03-15 LAB
DME PARACHUTE DELIVERY DATE REQUESTED: NORMAL
DME PARACHUTE ITEM DESCRIPTION: NORMAL
DME PARACHUTE ORDER STATUS: NORMAL
DME PARACHUTE SUPPLIER NAME: NORMAL
DME PARACHUTE SUPPLIER PHONE: NORMAL

## 2025-03-15 NOTE — ASSESSMENT & PLAN NOTE
- Continue following healthy diet and regular exercise.  - Patient scheduled to establish with weight management in April 2025.

## 2025-03-15 NOTE — ASSESSMENT & PLAN NOTE
Wt Readings from Last 3 Encounters:   03/10/25 (!) 146 kg (322 lb)   09/23/24 (!) 144 kg (317 lb 14.5 oz)   09/13/24 (!) 145 kg (319 lb 11.2 oz)   -Patient reports shortness of breath, denies chest pain.  Appears euvolemic.  - Reviewed cardiology note from September 2024.  Patient advised to schedule echocardiogram.  - Continue spironolactone, carvedilol, Jardiance, Entresto as prescribed through cardiology.  - Continue follow-up with cardiology as scheduled.

## 2025-03-15 NOTE — ASSESSMENT & PLAN NOTE
- Blood pressure slightly elevated today, however patient has yet to take her medications.  Advised to take them as soon as she gets home.  Per patient, home blood pressure readings have been within normal range.  - Continue carvedilol 12.5 mg twice daily, spironolactone 25 mg daily.  - Reviewed BP target goal with patient.    - Continue to maintain healthy balanced diet with focus on low salt intake. Limit alcohol intake.   - Advised to exercise at least 30 minutes a day for at least 5 days out of the week.   -Reviewed cardiology note from September 2024.  Echocardiogram ordered.  Advised patient to call to schedule.  - Continue follow-up with cardiology as scheduled.

## 2025-03-25 LAB
DME PARACHUTE DELIVERY DATE ACTUAL: NORMAL
DME PARACHUTE DELIVERY DATE REQUESTED: NORMAL
DME PARACHUTE ITEM DESCRIPTION: NORMAL
DME PARACHUTE ORDER STATUS: NORMAL
DME PARACHUTE SUPPLIER NAME: NORMAL
DME PARACHUTE SUPPLIER PHONE: NORMAL

## 2025-04-15 ENCOUNTER — TELEPHONE (OUTPATIENT)
Dept: BARIATRICS | Facility: CLINIC | Age: 45
End: 2025-04-15

## 2025-04-15 ENCOUNTER — CONSULT (OUTPATIENT)
Dept: PULMONOLOGY | Facility: CLINIC | Age: 45
End: 2025-04-15
Payer: MEDICARE

## 2025-04-15 VITALS
WEIGHT: 293 LBS | SYSTOLIC BLOOD PRESSURE: 160 MMHG | OXYGEN SATURATION: 98 % | DIASTOLIC BLOOD PRESSURE: 90 MMHG | BODY MASS INDEX: 57.52 KG/M2 | HEART RATE: 75 BPM | HEIGHT: 60 IN

## 2025-04-15 DIAGNOSIS — J45.909 ASTHMA: Primary | ICD-10-CM

## 2025-04-15 DIAGNOSIS — G47.30 SLEEP APNEA, UNSPECIFIED TYPE: ICD-10-CM

## 2025-04-15 DIAGNOSIS — I50.22 CHRONIC HFREF (HEART FAILURE WITH REDUCED EJECTION FRACTION) (HCC): Chronic | ICD-10-CM

## 2025-04-15 DIAGNOSIS — J30.9 ALLERGIC RHINITIS, UNSPECIFIED SEASONALITY, UNSPECIFIED TRIGGER: ICD-10-CM

## 2025-04-15 DIAGNOSIS — E66.813 CLASS 3 SEVERE OBESITY DUE TO EXCESS CALORIES WITH BODY MASS INDEX (BMI) OF 60.0 TO 69.9 IN ADULT, UNSPECIFIED WHETHER SERIOUS COMORBIDITY PRESENT: ICD-10-CM

## 2025-04-15 PROCEDURE — 99244 OFF/OP CNSLTJ NEW/EST MOD 40: CPT | Performed by: INTERNAL MEDICINE

## 2025-04-15 RX ORDER — ALBUTEROL SULFATE 90 UG/1
2 INHALANT RESPIRATORY (INHALATION) EVERY 6 HOURS PRN
Qty: 6.7 G | Refills: 2 | Status: SHIPPED | OUTPATIENT
Start: 2025-04-15

## 2025-04-15 RX ORDER — ALBUTEROL SULFATE 0.83 MG/ML
2.5 SOLUTION RESPIRATORY (INHALATION) EVERY 6 HOURS PRN
Qty: 180 ML | Refills: 2 | Status: SHIPPED | OUTPATIENT
Start: 2025-04-15

## 2025-04-15 RX ORDER — FLUTICASONE PROPIONATE AND SALMETEROL 500; 50 UG/1; UG/1
1 POWDER RESPIRATORY (INHALATION) 2 TIMES DAILY
Qty: 180 BLISTER | Refills: 1 | Status: SHIPPED | OUTPATIENT
Start: 2025-04-15 | End: 2025-10-12

## 2025-04-15 NOTE — ASSESSMENT & PLAN NOTE
I believe part of her symptoms could be related to obesity/deconditioning but decided to increase her Advair to 500/50 twice daily, we can add LAMA in the future if needed.  Will follow on PFTs ordered by her PCP.

## 2025-04-15 NOTE — ASSESSMENT & PLAN NOTE
Wt Readings from Last 3 Encounters:   04/15/25 (!) 147 kg (324 lb)   03/10/25 (!) 146 kg (322 lb)   09/23/24 (!) 144 kg (317 lb 14.5 oz)     Continue diuretics, follow-up with cardiology.  Will check BMP

## 2025-04-15 NOTE — ASSESSMENT & PLAN NOTE
I believe patient still has obstructive sleep apnea and when counseled her she is interested with CPAP, I believe it is crucial in her case given cardiomyopathy.  I will order split sleep study with transcutaneous CO2.  There are no recent labs to check whether she has possible hypercapnia

## 2025-04-15 NOTE — PROGRESS NOTES
Consultation - Pulmonary Medicine   Kalina Cohen 45 y.o. female MRN: [id number]    Physician Requesting Consult: Lucero Louise PA-C     Reason for Consult: Asthma    Asthma  I believe part of her symptoms could be related to obesity/deconditioning but decided to increase her Advair to 500/50 twice daily, we can add LAMA in the future if needed.  Will follow on PFTs ordered by her PCP.    Sleep apnea  I believe patient still has obstructive sleep apnea and when counseled her she is interested with CPAP, I believe it is crucial in her case given cardiomyopathy.  I will order split sleep study with transcutaneous CO2.  There are no recent labs to check whether she has possible hypercapnia    Chronic HFrEF (heart failure with reduced ejection fraction) ([facility])  Wt Readings from Last 3 Encounters:   04/15/25 (!) 147 kg (324 lb)   03/10/25 (!) 146 kg (322 lb)   09/23/24 (!) 144 kg (317 lb 14.5 oz)     Continue diuretics, follow-up with cardiology.  Will check BMP          Allergic rhinitis  Seasonal and very mild, can take over-the-counter antihistamine    Class 3 severe obesity due to excess calories with body mass index (BMI) of 60.0 to 69.9 in adult ([facility])  I counseled the patient about the importance of weight loss in her case specially with cardiomyopathy, encouraged to decrease calorie intake and exercise as tolerated, she will follow with weight management and probably she will be a candidate for GLP-1 medications.  Zepbound has been considered for sleep apnea also.      Return in about 6 months (around 10/15/2025).    Diagnoses and all orders for this visit:    Asthma  -     Ambulatory Referral to Pulmonology  -     Fluticasone-Salmeterol (Advair Diskus) 500-50 mcg/dose inhaler; Inhale 1 puff 2 (two) times a day Rinse mouth after use.  -     albuterol (PROVENTIL HFA,VENTOLIN HFA) 90 mcg/act inhaler; Inhale 2 puffs every 6 (six) hours as needed for wheezing  -     albuterol (2.5 mg/3 mL) 0.083 %  nebulizer solution; Take 3 mL (2.5 mg total) by nebulization every 6 (six) hours as needed for wheezing or shortness of breath  -     CBC and differential; Future    Allergic rhinitis, unspecified seasonality, unspecified trigger    Chronic HFrEF (heart failure with reduced ejection fraction) ([facility])  -     Basic metabolic panel; Future    Sleep apnea, unspecified type  -     Ambulatory Referral to Sleep Medicine; Future    Class 3 severe obesity due to excess calories with body mass index (BMI) of 60.0 to 69.9 in adult, unspecified whether serious comorbidity present ([facility])  -     Ambulatory Referral to Sleep Medicine; Future      ______________________________________________________________________    HPI:    Kalina Cohen is a 45 y.o. female who presents for evaluation and management of asthma.    Patient has asthma since early childhood, but her symptoms got worse recently especially over the past 2 weeks with more shortness of breath and mild intermittent wheezing, no significant cough, sometimes she has chest tightness, denies chest pain, denies fever chills or night sweats.  She denies frequent exacerbations to require steroids and no exacerbations over the past year at all.  She has no history of hospitalization or intubation due to asthma.  She is currently on Advair 250/50 and as needed albuterol that she uses frequently currently.    Patient has very mild allergic rhinitis with no significant postnasal drip and usually does not take much medications.  She denies GERD or dysphagia.    She has a history of obstructive sleep apnea years ago and used to have CPAP machine but she stopped using and gave it back.  She continues to have snoring, restless leg syndrome, excessive daytime sleepiness and fatigue.  Denies sleep choking.    She has systolic cardiomyopathy and currently on diuretics and other cardiac medications and follows with cardiology.  She believes she had heart attacks before.    Patient  lives at home with her children, she has a dog, no exposure to birds, she smoked 1.5 pack/day for 7 years and quit 2024.  Currently unemployed on disability secondary to cardiac disease but works before in a  with no occupational exposure.    Review of Systems:  Review of Systems   Constitutional: Negative.    HENT: Negative.     Eyes: Negative.    Respiratory:  Positive for shortness of breath and wheezing.    Cardiovascular: Negative.    Gastrointestinal: Negative.    Endocrine: Negative.    Genitourinary: Negative.    Musculoskeletal: Negative.    Skin: Negative.    Allergic/Immunologic: Negative.    Neurological: Negative.    Hematological: Negative.    Psychiatric/Behavioral: Negative.       Aside from what is mentioned in the HPI, the review of systems otherwise negative.    Current Medications:    Current Outpatient Medications:     albuterol (2.5 mg/3 mL) 0.083 % nebulizer solution, Take 3 mL (2.5 mg total) by nebulization every 6 (six) hours as needed for wheezing or shortness of breath, Disp: 180 mL, Rfl: 2    albuterol (PROVENTIL HFA,VENTOLIN HFA) 90 mcg/act inhaler, Inhale 2 puffs every 6 (six) hours as needed for wheezing, Disp: 6.7 g, Rfl: 2    Blood Pressure Monitoring (Adult Blood Pressure Cuff Lg) KIT, Use daily in the early morning, Disp: 1 kit, Rfl: 0    carvedilol (COREG) 12.5 mg tablet, Take 1.5 tablets (18.75 mg total) by mouth 2 (two) times a day with meals, Disp: 270 tablet, Rfl: 5    Diclofenac Sodium (VOLTAREN) 1 %, Apply 2 g topically 4 (four) times a day as needed (shoulder pain), Disp: 150 g, Rfl: 0    Empagliflozin (Jardiance) 10 MG TABS tablet, TAKE 1 TABLET (10 MG TOTAL) BY MOUTH EVERY MORNING, Disp: 90 tablet, Rfl: 1    Entresto  MG TABS, TAKE 1 TABLET BY MOUTH 2 (TWO) TIMES A DAY, Disp: 180 tablet, Rfl: 3    Fluticasone-Salmeterol (Advair) 250-50 mcg/dose inhaler, INHALE 1 PUFF TWO TIMES A DAY RINSE MOUTH AFTER USE., Disp: 60 blister, Rfl: 4    loratadine (CLARITIN) 10  mg tablet, Take 1 tablet (10 mg total) by mouth daily, Disp: 30 tablet, Rfl: 2    spironolactone (ALDACTONE) 25 mg tablet, TAKE 1 TABLET (25 MG TOTAL) BY MOUTH DAILY, Disp: 30 tablet, Rfl: 2    ALPRAZolam (XANAX) 0.5 mg tablet, Take 1 tablet (0.5 mg total) by mouth once in imaging for anxiety for up to 1 dose (Patient not taking: Reported on 6/21/2023), Disp: 1 tablet, Rfl: 0    methocarbamol (ROBAXIN) 500 mg tablet, Take 1 tablet (500 mg total) by mouth 2 (two) times a day as needed for muscle spasms (Patient not taking: Reported on 9/13/2024), Disp: 30 tablet, Rfl: 0    Historical Information   Past Medical History:   Diagnosis Date    Asthma     Chronic HFrEF (heart failure with reduced ejection fraction) ([facility]) 05/15/2023    Hypertension     NSVT (nonsustained ventricular tachycardia) ([facility]) 05/2023    Obstructive sleep apnea     Prediabetes 07/2020    Tobacco abuse     Type 2 myocardial infarction +/- [facility] 07/2020     Past Surgical History:   Procedure Laterality Date    NO PAST SURGERIES       Social History   Social History     Tobacco Use   Smoking Status Former    Current packs/day: 1.50    Average packs/day: 1.5 packs/day for 20.3 years (30.4 ttl pk-yrs)    Types: Cigarettes    Start date: 2005   Smokeless Tobacco Never   Tobacco Comments    Currently smoking 2-3 pulls of a cigarette weekly (Updated 05/22/2023).        Occupational history:  No occupational exposure    Family History:   Family History   Problem Relation Age of Onset    Hypertension Mother     Diabetes Mother     Heart failure Mother         possible stenting?    Hypertension Father     Diabetes Father     No Known Problems Sister     Hypertension Sister     Hypertension Brother     Asthma Brother     Kidney disease Brother     Hypertension Maternal Grandmother     Kidney failure Maternal Grandmother         in HD    Heart disease Maternal Grandfather     Diabetes Maternal Grandfather     Hypertension Maternal Grandfather     No Known  "Problems Paternal Grandmother     No Known Problems Paternal Grandfather     Sudden death Maternal Aunt          in her sleep; family told it was natural causes         PhysicalExamination:  Vitals:   /90 (BP Location: Left arm, Patient Position: Sitting, Cuff Size: Large)   Pulse 75   Ht 5' (1.524 m)   Wt (!) 147 kg (324 lb)   SpO2 98%   BMI 63.28 kg/m²     Gen:  Comfortable on room air.  No conversational dyspnea  HEENT:  Conjugate gaze.  sclerae anicteric.  Oropharynx moist, Mallampati 4  Neck: Trachea is midline. No JVD. No adenopathy  Chest: Equal breath sounds and clear to auscultation bilaterally, no wheezing or crackles  Cardiac: S1-S2 regular. no murmur  Abdomen:  benign  Extremities: Trace edema  Neuro:  Normal speech and mentation      Diagnostic Data:  Labs:  I personally reviewed the most recent laboratory data pertinent to today's visit    Lab Results   Component Value Date    WBC 7.48 10/08/2023    HGB 12.2 10/08/2023    HCT 38.2 10/08/2023    MCV 88 10/08/2023     10/08/2023     Lab Results   Component Value Date    GLUCOSE 98 2016    CALCIUM 9.2 10/08/2023     2015    K 3.4 (L) 10/08/2023    CO2 29 10/08/2023     10/08/2023    BUN 13 10/08/2023    CREATININE 0.84 10/08/2023     No results found for: \"IGE\"  Lab Results   Component Value Date    ALT 13 10/08/2023    AST 15 10/08/2023    ALKPHOS 51 10/08/2023    BILITOT 0.3 2015       Imaging:  I personally reviewed the images on the PAC system pertinent to today's visit  Chest x-ray reviewed in PACS: Clear lungs    Other studies:  Echocardiogram : LVEF 35%, severe global hypokinesis, mildly dilated RV with normal systolic function    Left heart catheterization : Normal coronary arteries.      Ariel Payne MD  "

## 2025-04-15 NOTE — ASSESSMENT & PLAN NOTE
I counseled the patient about the importance of weight loss in her case specially with cardiomyopathy, encouraged to decrease calorie intake and exercise as tolerated, she will follow with weight management and probably she will be a candidate for GLP-1 medications.  Zepbound has been considered for sleep apnea also.

## 2025-04-22 ENCOUNTER — TRANSCRIBE ORDERS (OUTPATIENT)
Dept: SLEEP CENTER | Facility: CLINIC | Age: 45
End: 2025-04-22

## 2025-04-22 DIAGNOSIS — E66.813 CLASS 3 SEVERE OBESITY DUE TO EXCESS CALORIES WITH BODY MASS INDEX (BMI) OF 60.0 TO 69.9 IN ADULT, UNSPECIFIED WHETHER SERIOUS COMORBIDITY PRESENT: ICD-10-CM

## 2025-04-22 DIAGNOSIS — G47.30 SLEEP APNEA, UNSPECIFIED TYPE: Primary | ICD-10-CM

## 2025-04-28 ENCOUNTER — HOSPITAL ENCOUNTER (EMERGENCY)
Facility: HOSPITAL | Age: 45
Discharge: HOME | End: 2025-04-28
Attending: EMERGENCY MEDICINE
Payer: MEDICARE

## 2025-04-28 ENCOUNTER — APPOINTMENT (EMERGENCY)
Dept: RADIOLOGY | Facility: HOSPITAL | Age: 45
End: 2025-04-28
Payer: MEDICARE

## 2025-04-28 VITALS
SYSTOLIC BLOOD PRESSURE: 191 MMHG | TEMPERATURE: 98 F | OXYGEN SATURATION: 96 % | HEART RATE: 81 BPM | DIASTOLIC BLOOD PRESSURE: 108 MMHG | WEIGHT: 293 LBS | RESPIRATION RATE: 18 BRPM | BODY MASS INDEX: 64.86 KG/M2

## 2025-04-28 DIAGNOSIS — J45.901 ASTHMA EXACERBATION: Primary | ICD-10-CM

## 2025-04-28 DIAGNOSIS — R79.89 ELEVATED TROPONIN: ICD-10-CM

## 2025-04-28 LAB
2HR DELTA HS TROPONIN: -2 NG/L
ALBUMIN SERPL BCG-MCNC: 4.1 G/DL (ref 3.5–5)
ALP SERPL-CCNC: 59 U/L (ref 34–104)
ALT SERPL W P-5'-P-CCNC: 19 U/L (ref 7–52)
ANION GAP SERPL CALCULATED.3IONS-SCNC: 7 MMOL/L (ref 4–13)
AST SERPL W P-5'-P-CCNC: 17 U/L (ref 13–39)
ATRIAL RATE: 73 BPM
BASOPHILS # BLD AUTO: 0.04 THOUSANDS/ÂΜL (ref 0–0.1)
BASOPHILS NFR BLD AUTO: 1 % (ref 0–1)
BILIRUB SERPL-MCNC: 0.53 MG/DL (ref 0.2–1)
BNP SERPL-MCNC: 97 PG/ML (ref 0–100)
BUN SERPL-MCNC: 9 MG/DL (ref 5–25)
CALCIUM SERPL-MCNC: 8.8 MG/DL (ref 8.4–10.2)
CARDIAC TROPONIN I PNL SERPL HS: 39 NG/L (ref ?–50)
CARDIAC TROPONIN I PNL SERPL HS: 41 NG/L (ref ?–50)
CHLORIDE SERPL-SCNC: 102 MMOL/L (ref 96–108)
CO2 SERPL-SCNC: 30 MMOL/L (ref 21–32)
CREAT SERPL-MCNC: 0.7 MG/DL (ref 0.6–1.3)
EOSINOPHIL # BLD AUTO: 0.24 THOUSAND/ÂΜL (ref 0–0.61)
EOSINOPHIL NFR BLD AUTO: 4 % (ref 0–6)
ERYTHROCYTE [DISTWIDTH] IN BLOOD BY AUTOMATED COUNT: 14.3 % (ref 11.6–15.1)
GFR SERPL CREATININE-BSD FRML MDRD: 106 ML/MIN/1.73SQ M
GLUCOSE SERPL-MCNC: 98 MG/DL (ref 65–140)
HCT VFR BLD AUTO: 38.8 % (ref 34.8–46.1)
HGB BLD-MCNC: 12.1 G/DL (ref 11.5–15.4)
IMM GRANULOCYTES # BLD AUTO: 0.04 THOUSAND/UL (ref 0–0.2)
IMM GRANULOCYTES NFR BLD AUTO: 1 % (ref 0–2)
LYMPHOCYTES # BLD AUTO: 2.5 THOUSANDS/ÂΜL (ref 0.6–4.47)
LYMPHOCYTES NFR BLD AUTO: 41 % (ref 14–44)
MCH RBC QN AUTO: 28 PG (ref 26.8–34.3)
MCHC RBC AUTO-ENTMCNC: 31.2 G/DL (ref 31.4–37.4)
MCV RBC AUTO: 90 FL (ref 82–98)
MONOCYTES # BLD AUTO: 0.53 THOUSAND/ÂΜL (ref 0.17–1.22)
MONOCYTES NFR BLD AUTO: 9 % (ref 4–12)
NEUTROPHILS # BLD AUTO: 2.79 THOUSANDS/ÂΜL (ref 1.85–7.62)
NEUTS SEG NFR BLD AUTO: 44 % (ref 43–75)
NRBC BLD AUTO-RTO: 0 /100 WBCS
P AXIS: 66 DEGREES
PLATELET # BLD AUTO: 282 THOUSANDS/UL (ref 149–390)
PMV BLD AUTO: 9.9 FL (ref 8.9–12.7)
POTASSIUM SERPL-SCNC: 4.1 MMOL/L (ref 3.5–5.3)
PR INTERVAL: 166 MS
PROT SERPL-MCNC: 7.4 G/DL (ref 6.4–8.4)
QRS AXIS: 85 DEGREES
QRSD INTERVAL: 86 MS
QT INTERVAL: 396 MS
QTC INTERVAL: 437 MS
RBC # BLD AUTO: 4.32 MILLION/UL (ref 3.81–5.12)
SODIUM SERPL-SCNC: 139 MMOL/L (ref 135–147)
T WAVE AXIS: -72 DEGREES
VENTRICULAR RATE: 73 BPM
WBC # BLD AUTO: 6.14 THOUSAND/UL (ref 4.31–10.16)

## 2025-04-28 PROCEDURE — 85025 COMPLETE CBC W/AUTO DIFF WBC: CPT

## 2025-04-28 PROCEDURE — 93005 ELECTROCARDIOGRAM TRACING: CPT

## 2025-04-28 PROCEDURE — 99285 EMERGENCY DEPT VISIT HI MDM: CPT

## 2025-04-28 PROCEDURE — 94640 AIRWAY INHALATION TREATMENT: CPT

## 2025-04-28 PROCEDURE — 84484 ASSAY OF TROPONIN QUANT: CPT

## 2025-04-28 PROCEDURE — 71046 X-RAY EXAM CHEST 2 VIEWS: CPT

## 2025-04-28 PROCEDURE — 93010 ELECTROCARDIOGRAM REPORT: CPT | Performed by: STUDENT IN AN ORGANIZED HEALTH CARE EDUCATION/TRAINING PROGRAM

## 2025-04-28 PROCEDURE — 99285 EMERGENCY DEPT VISIT HI MDM: CPT | Performed by: EMERGENCY MEDICINE

## 2025-04-28 PROCEDURE — 83880 ASSAY OF NATRIURETIC PEPTIDE: CPT

## 2025-04-28 PROCEDURE — 36415 COLL VENOUS BLD VENIPUNCTURE: CPT

## 2025-04-28 PROCEDURE — 80053 COMPREHEN METABOLIC PANEL: CPT

## 2025-04-28 RX ORDER — ALBUTEROL SULFATE 90 UG/1
2 INHALANT RESPIRATORY (INHALATION) EVERY 4 HOURS PRN
Qty: 8 G | Refills: 0 | Status: SHIPPED | OUTPATIENT
Start: 2025-04-28

## 2025-04-28 RX ORDER — IPRATROPIUM BROMIDE AND ALBUTEROL SULFATE 2.5; .5 MG/3ML; MG/3ML
3 SOLUTION RESPIRATORY (INHALATION) ONCE
Status: COMPLETED | OUTPATIENT
Start: 2025-04-28 | End: 2025-04-28

## 2025-04-28 RX ORDER — PREDNISONE 20 MG/1
60 TABLET ORAL DAILY
Qty: 15 TABLET | Refills: 0 | Status: SHIPPED | OUTPATIENT
Start: 2025-04-28 | End: 2025-05-03

## 2025-04-28 RX ADMIN — IPRATROPIUM BROMIDE AND ALBUTEROL SULFATE 3 ML: 2.5; .5 SOLUTION RESPIRATORY (INHALATION) at 17:07

## 2025-04-28 NOTE — ED PROVIDER NOTES
ED Disposition       None          Assessment & Plan       Medical Decision Making  Ddx includes but is not limited to CHF exacerbation, asthma exacerbation, pna, ptx, acs, PE.     No acute respiratory distress. No wheezing auscultated on exam limited by body habitus. Reports mild symptomatic improvement s/p duo neb. +pedal edema b/l. PERC negative. CXR without focal consolidation, ptx or effusion on wet read. ECG similar to previous no new/acute ischemic changes or dysrhythmia. BNP WNL. Elevated troponin, will obtain delta.     Patient signed out pending remaining labs, ambulatory pulse ox, re-evaluation.     Amount and/or Complexity of Data Reviewed  Labs: ordered.  Radiology: ordered.    Risk  Prescription drug management.        ED Course as of 04/28/25 1845   Mon Apr 28, 2025   1703 Worsening ESCALANTE x 1 week   1751 Mild improvement in ESCALANTE s/p duo neb on short walk to bathroom    1833 Procedure Note: EKG  Date/Time: 04/28/25 6:33 PM   Performed by: Dunia Cortez   Authorized by: Dunia Cortez  ECG interpreted by me, the ED Provider: yes   The EKG demonstrates:  Rate 73 bpm  Rhythm NSR   QTc 440 ms   No ST elevations/depressions  T wave inversions unchanged from October 2023 in II, III, aVF, v5, v6   1844 Patient signed out pending delta troponin       Medications   ipratropium-albuterol (DUO-NEB) 0.5-2.5 mg/3 mL inhalation solution 3 mL (3 mL Nebulization Given 4/28/25 1707)       ED Risk Strat Scores                    No data recorded    PERC Rule for PE      Flowsheet Row Most Recent Value   PERC Rule for PE    Age >=50 0 Filed at: 04/28/2025 1836   HR >=100 0 Filed at: 04/28/2025 1836   O2 Sat on room air < 95% 0 Filed at: 04/28/2025 1836   History of PE or DVT 0 Filed at: 04/28/2025 1836   Recent trauma or surgery 0 Filed at: 04/28/2025 1836   Hemoptysis 0 Filed at: 04/28/2025 1836   Exogenous estrogen 0 Filed at: 04/28/2025 1836   Unilateral leg swelling 0 Filed at: 04/28/2025 1836   PERC Rule for PE Results  0 Filed at: 2025 1836                                History of Present Illness       Chief Complaint   Patient presents with    Asthma     Asthma flare up x1 week, using nebulizer but not working.        Past Medical History:   Diagnosis Date    Asthma     Chronic HFrEF (heart failure with reduced ejection fraction) ([facility]) 05/15/2023    Hypertension     NSVT (nonsustained ventricular tachycardia) ([facility]) 2023    Obstructive sleep apnea     Prediabetes 2020    Tobacco abuse     Type 2 myocardial infarction +/- [facility] 2020      Past Surgical History:   Procedure Laterality Date    NO PAST SURGERIES        Family History   Problem Relation Age of Onset    Hypertension Mother     Diabetes Mother     Heart failure Mother         possible stenting?    Hypertension Father     Diabetes Father     No Known Problems Sister     Hypertension Sister     Hypertension Brother     Asthma Brother     Kidney disease Brother     Hypertension Maternal Grandmother     Kidney failure Maternal Grandmother         in HD    Heart disease Maternal Grandfather     Diabetes Maternal Grandfather     Hypertension Maternal Grandfather     No Known Problems Paternal Grandmother     No Known Problems Paternal Grandfather     Sudden death Maternal Aunt          in her sleep; family told it was natural causes      Social History     Tobacco Use    Smoking status: Former     Current packs/day: 1.50     Average packs/day: 1.5 packs/day for 20.3 years (30.5 ttl pk-yrs)     Types: Cigarettes     Start date:     Smokeless tobacco: Never    Tobacco comments:     Currently smoking 2-3 pulls of a cigarette weekly (Updated 2023).    Vaping Use    Vaping status: Never Used   Substance Use Topics    Alcohol use: Not Currently    Drug use: Yes     Types: Marijuana      E-Cigarette/Vaping    E-Cigarette Use Never User       E-Cigarette/Vaping Substances    Nicotine No     THC No     CBD No     Flavoring No     Other No     Unknown No        I have reviewed and agree with the history as documented.     44-year-old female past medical history of asthma, hypertension, chronic HFrEF, obstructive sleep apnea, type II myocardial infarction, tobacco abuse presents to emergency department complaining of worsening dyspnea on exertion for the past week.  Unrelieved by albuterol nebulization treatments.  Denies chest pain, reports chest tightness.  Denies cough, fever, chills, hemoptysis, sore throat, rhinorrhea, congestion, headache, myalgias, rash, nausea, vomiting, ear pain.     Has been sleeping in a chair, and states that she thought that was why her feet have been swollen bilaterally the past few days.      History provided by:  Patient  Asthma  Associated symptoms: fatigue, shortness of breath and wheezing    Associated symptoms: no abdominal pain, no chest pain, no congestion, no cough, no ear pain, no fever, no nausea, no rash, no rhinorrhea, no sore throat and no vomiting        Review of Systems   Constitutional:  Positive for fatigue. Negative for chills and fever.   HENT:  Negative for congestion, ear pain, rhinorrhea, sore throat and trouble swallowing.    Eyes:  Negative for visual disturbance.   Respiratory:  Positive for chest tightness, shortness of breath and wheezing. Negative for cough.    Cardiovascular:  Positive for leg swelling. Negative for chest pain and palpitations.   Gastrointestinal:  Negative for abdominal pain, nausea and vomiting.   Musculoskeletal:  Negative for back pain and gait problem.        Denies unilateral leg pain or swelling    Skin:  Negative for rash.   Neurological:  Negative for dizziness, syncope, weakness and numbness.   All other systems reviewed and are negative.          Objective       ED Triage Vitals [04/28/25 1644]   Temperature Pulse Blood Pressure Respirations SpO2 Patient Position - Orthostatic VS   98 °F (36.7 °C) 81 (!) 191/108 18 96 % Sitting      Temp Source Heart Rate Source BP Location FiO2  (%) Pain Score    Oral Monitor Left arm -- --      Vitals      Date and Time Temp Pulse SpO2 Resp BP Pain Score FACES Pain Rating User   04/28/25 1644 98 °F (36.7 °C) 81 96 % 18 191/108 -- -- TW            Physical Exam  Vitals and nursing note reviewed.   Constitutional:       General: She is awake. She is not in acute distress.     Appearance: Normal appearance. She is morbidly obese. She is not ill-appearing, toxic-appearing or diaphoretic.   HENT:      Head: Normocephalic.      Mouth/Throat:      Lips: Pink.      Mouth: Mucous membranes are moist.   Eyes:      General: Vision grossly intact.   Cardiovascular:      Rate and Rhythm: Normal rate and regular rhythm.      Heart sounds: Normal heart sounds.   Pulmonary:      Effort: Pulmonary effort is normal. No tachypnea, bradypnea, accessory muscle usage, respiratory distress or retractions.      Comments: Breath sounds diminished bilaterally. No wheezing or rales auscultated. Patient in no respiratory distress, speaking in full sentences, managing oral secretions without difficulty, no accessory muscle use, retractions, or belly breathing noted.  Abdominal:      General: There is no distension.      Palpations: Abdomen is soft.      Tenderness: There is no abdominal tenderness.   Musculoskeletal:      Right lower leg: Edema present.      Left lower leg: Edema present.   Skin:     General: Skin is warm and dry.      Findings: No rash.   Neurological:      Mental Status: She is alert.      Gait: Gait normal.   Psychiatric:         Behavior: Behavior is cooperative.         Results Reviewed       Procedure Component Value Units Date/Time    HS Troponin 0hr (reflex protocol) [[id number]]  (Normal) Collected: 04/28/25 1812    Lab Status: Final result Specimen: Blood from Arm, Right Updated: 04/28/25 1841     hs TnI 0hr 41 ng/L     HS Troponin I 2hr [[id number]]     Lab Status: No result Specimen: Blood     B-Type Natriuretic Peptide(BNP) [[id number]]  (Normal) Collected:  04/28/25 1812    Lab Status: Final result Specimen: Blood from Arm, Right Updated: 04/28/25 1840     BNP 97 pg/mL     Comprehensive metabolic panel [[id number]] Collected: 04/28/25 1812    Lab Status: Final result Specimen: Blood from Arm, Right Updated: 04/28/25 1836     Sodium 139 mmol/L      Potassium 4.1 mmol/L      Chloride 102 mmol/L      CO2 30 mmol/L      ANION GAP 7 mmol/L      BUN 9 mg/dL      Creatinine 0.70 mg/dL      Glucose 98 mg/dL      Calcium 8.8 mg/dL      AST 17 U/L      ALT 19 U/L      Alkaline Phosphatase 59 U/L      Total Protein 7.4 g/dL      Albumin 4.1 g/dL      Total Bilirubin 0.53 mg/dL      eGFR 106 ml/min/1.73sq m     Narrative:      National Kidney Disease Foundation guidelines for Chronic Kidney Disease (CKD):     Stage 1 with normal or high GFR (GFR > 90 mL/min/1.73 square meters)    Stage 2 Mild CKD (GFR = 60-89 mL/min/1.73 square meters)    Stage 3A Moderate CKD (GFR = 45-59 mL/min/1.73 square meters)    Stage 3B Moderate CKD (GFR = 30-44 mL/min/1.73 square meters)    Stage 4 Severe CKD (GFR = 15-29 mL/min/1.73 square meters)    Stage 5 End Stage CKD (GFR <15 mL/min/1.73 square meters)  Note: GFR calculation is accurate only with a steady state creatinine    CBC and differential [[id number]]  (Abnormal) Collected: 04/28/25 1812    Lab Status: Final result Specimen: Blood from Arm, Right Updated: 04/28/25 1819     WBC 6.14 Thousand/uL      RBC 4.32 Million/uL      Hemoglobin 12.1 g/dL      Hematocrit 38.8 %      MCV 90 fL      MCH 28.0 pg      MCHC 31.2 g/dL      RDW 14.3 %      MPV 9.9 fL      Platelets 282 Thousands/uL      nRBC 0 /100 WBCs      Segmented % 44 %      Immature Grans % 1 %      Lymphocytes % 41 %      Monocytes % 9 %      Eosinophils Relative 4 %      Basophils Relative 1 %      Absolute Neutrophils 2.79 Thousands/µL      Absolute Immature Grans 0.04 Thousand/uL      Absolute Lymphocytes 2.50 Thousands/µL      Absolute Monocytes 0.53 Thousand/µL      Eosinophils  Absolute 0.24 Thousand/µL      Basophils Absolute 0.04 Thousands/µL             XR chest 2 views    (Results Pending)       Procedures    ED Medication and Procedure Management   Prior to Admission Medications   Prescriptions Last Dose Informant Patient Reported? Taking?   ALPRAZolam (XANAX) 0.5 mg tablet  Self No No   Sig: Take 1 tablet (0.5 mg total) by mouth once in imaging for anxiety for up to 1 dose   Patient not taking: Reported on 6/21/2023   Blood Pressure Monitoring (Adult Blood Pressure Cuff Lg) KIT  Self No No   Sig: Use daily in the early morning   Diclofenac Sodium (VOLTAREN) 1 %  Self No No   Sig: Apply 2 g topically 4 (four) times a day as needed (shoulder pain)   Empagliflozin (Jardiance) 10 MG TABS tablet   No No   Sig: TAKE 1 TABLET (10 MG TOTAL) BY MOUTH EVERY MORNING   Entresto  MG TABS  Self No No   Sig: TAKE 1 TABLET BY MOUTH 2 (TWO) TIMES A DAY   Fluticasone-Salmeterol (Advair Diskus) 500-50 mcg/dose inhaler   No No   Sig: Inhale 1 puff 2 (two) times a day Rinse mouth after use.   albuterol (2.5 mg/3 mL) 0.083 % nebulizer solution   No No   Sig: Take 3 mL (2.5 mg total) by nebulization every 6 (six) hours as needed for wheezing or shortness of breath   albuterol (PROVENTIL HFA,VENTOLIN HFA) 90 mcg/act inhaler   No No   Sig: Inhale 2 puffs every 6 (six) hours as needed for wheezing   carvedilol (COREG) 12.5 mg tablet   No No   Sig: Take 1.5 tablets (18.75 mg total) by mouth 2 (two) times a day with meals   loratadine (CLARITIN) 10 mg tablet  Self No No   Sig: Take 1 tablet (10 mg total) by mouth daily   methocarbamol (ROBAXIN) 500 mg tablet  Self No No   Sig: Take 1 tablet (500 mg total) by mouth 2 (two) times a day as needed for muscle spasms   Patient not taking: Reported on 9/13/2024   spironolactone (ALDACTONE) 25 mg tablet  Self No No   Sig: TAKE 1 TABLET (25 MG TOTAL) BY MOUTH DAILY      Facility-Administered Medications: None     Patient's Medications   Discharge Prescriptions     No medications on file     No discharge procedures on file.  ED SEPSIS DOCUMENTATION            Dunia Cortez PA-C  04/28/25 7460

## 2025-04-28 NOTE — ED CARE HANDOFF
Emergency Department Sign Out Note        Sign out and transfer of care from Dunia Cortez PA-C. See Separate Emergency Department note.     The patient, Kalina Cohen, was evaluated by the previous provider for asthma exacerbation not responding to at home nebulizers.    Workup Completed:  CBC, CMP, BNP, troponin, CXR    ED Course / Workup Pending (followup):  Delta troponin  ambulatory pulse ox    HEART Risk Score      Flowsheet Row Most Recent Value   Heart Score Risk Calculator    History 0 Filed at: 04/28/2025 1950   ECG 1 Filed at: 04/28/2025 1950   Age 0 Filed at: 04/28/2025 1950   Risk Factors 2 Filed at: 04/28/2025 1950   Troponin 2 Filed at: 04/28/2025 1950   HEART Score 5 Filed at: 04/28/2025 1950          No data recorded        PERC Rule for PE      Flowsheet Row Most Recent Value   PERC Rule for PE    Age >=50 0 Filed at: 04/28/2025 1836   HR >=100 0 Filed at: 04/28/2025 1836   O2 Sat on room air < 95% 0 Filed at: 04/28/2025 1836   History of PE or DVT 0 Filed at: 04/28/2025 1836   Recent trauma or surgery 0 Filed at: 04/28/2025 1836   Hemoptysis 0 Filed at: 04/28/2025 1836   Exogenous estrogen 0 Filed at: 04/28/2025 1836   Unilateral leg swelling 0 Filed at: 04/28/2025 1836   PERC Rule for PE Results 0 Filed at: 04/28/2025 1836                          ED Course as of 04/28/25 2054 Mon Apr 28, 2025   1842 If delta is normal, discharge with steroids.   2049 hs TnI 2hr: 39   2049 Delta 2hr hsTnI: -2     Procedures  Medical Decision Making  Sign out from previous provider for CHF versus asthma exacerbation  Pending delta troponin due to initial value of 41  If delta normal, may discharge with prednisone for asthma exacerbation.   Delta troponin resulted with -2  Plan to discharge with steroids for next few days. Discussed supportive care.   Pt stable at time of discharge, vital signs reviewed, questions answered. Strict ER return precautions provided/discussed and were well understood by  "patient. Patient's vitals, labs and/or imaging results, diagnosis, and treatment plan were discussed with the patient. All new and/or changed medications were discussed - specifically to include route of administration, how often to take, when to take, and the pharmacy they were sent to. Strict return precautions as well as close follow up with PCP was discussed with the patient and the patient was agreeable to my recommendations.  Patient verbally acknowledged understanding. All labs, imaging were reviewed and used in the medical decision making process (if ordered).     Portions of this chart may have been written with voice recognition software.  Occasional grammatical errors, wrong word or \"sound a like\" substitutions may have occurred due to software limitations.  Please read carefully and use context to recognize where substitutions have occurred.      Problems Addressed:  Asthma exacerbation: acute illness or injury    Amount and/or Complexity of Data Reviewed  Labs: ordered. Decision-making details documented in ED Course.  Radiology: ordered.    Risk  Prescription drug management.            Disposition  Final diagnoses:   Asthma exacerbation   Elevated troponin     Time reflects when diagnosis was documented in both MDM as applicable and the Disposition within this note       Time User Action Codes Description Comment    4/28/2025  8:50 PM Aggie Newell Add [J45.901] Asthma exacerbation     4/28/2025  8:50 PM Aggie Newell Add [R79.89] Elevated troponin           ED Disposition       ED Disposition   Discharge    Condition   Stable    Date/Time   Mon Apr 28, 2025 2050    Comment   Kalina Cohen discharge to home/self care.                   Follow-up Information       Follow up With Specialties Details Why Contact Info Additional Information    Lucero Louise PA-C Family Medicine Schedule an appointment as soon as possible for a visit  Follow up [address] [place] [address] [place] " [id number]  [phone]       [facility] Emergency Department Emergency Medicine Go to  If symptoms worsen [address]  [facility] [id number] [phone] [facility] Emergency Department          Patient's Medications   Discharge Prescriptions    ALBUTEROL (PROAIR HFA) 90 MCG/ACT INHALER    Inhale 2 puffs every 4 (four) hours as needed for wheezing       Start Date: 4/28/2025 End Date: --       Order Dose: 2 puffs       Quantity: 8 g    Refills: 0    PREDNISONE 20 MG TABLET    Take 3 tablets (60 mg total) by mouth daily for 5 days       Start Date: 4/28/2025 End Date: 5/3/2025       Order Dose: 60 mg       Quantity: 15 tablet    Refills: 0     No discharge procedures on file.       ED Provider  Electronically Signed by     Aggie Newell PA-C  04/28/25 2054

## 2025-04-28 NOTE — ED NOTES
Patient ambulated with slow steady gait, pulse ox stayed 95%     Ruperto Polanco, RN  04/28/25 0919

## 2025-04-28 NOTE — ED ATTENDING ATTESTATION
4/28/2025  I, Davin Medrano MD, saw and evaluated the patient. I have discussed the patient with the resident/non-physician practitioner and agree with the resident's/non-physician practitioner's findings, Plan of Care, and MDM as documented in the resident's/non-physician practitioner's note, except where noted. All available labs and Radiology studies were reviewed.  I was present for key portions of any procedure(s) performed by the resident/non-physician practitioner and I was immediately available to provide assistance.       At this point I agree with the current assessment done in the Emergency Department.  I have conducted an independent evaluation of this patient a history and physical is as follows:    45-year-old female, history of asthma and cardiomyopathy, presents with shortness of breath.  Patient received DuoNeb in ED, reports improvement in symptoms.  On my exam after DuoNeb, lungs clear with no wheezing.  Patient looks well in no distress, normal effort, speaking full sentences, oxygen saturation normal on room air.    ED Course     Chest x-ray dependently reviewed myself, no pulmonary edema, infiltrate, or effusion.  Similar to previous chest x-ray.  EKG with lateral T wave inversions similar to previous EKG, no acute changes.    Critical Care Time  Procedures

## 2025-04-29 NOTE — DISCHARGE INSTRUCTIONS
Take the prednisone for the next five days, take earlier in the day to avoid interference with sleep.     Follow up with your PCP regarding the elevated troponin.

## 2025-05-28 ENCOUNTER — TELEPHONE (OUTPATIENT)
Dept: OBGYN CLINIC | Facility: CLINIC | Age: 45
End: 2025-05-28

## 2025-06-27 DIAGNOSIS — I50.22 CHRONIC HFREF (HEART FAILURE WITH REDUCED EJECTION FRACTION) (HCC): ICD-10-CM

## 2025-07-07 RX ORDER — SACUBITRIL AND VALSARTAN 97; 103 MG/1; MG/1
1 TABLET, FILM COATED ORAL 2 TIMES DAILY
Qty: 180 TABLET | Refills: 10 | Status: SHIPPED | OUTPATIENT
Start: 2025-07-07

## 2025-07-28 ENCOUNTER — TELEPHONE (OUTPATIENT)
Dept: CARDIOLOGY CLINIC | Facility: CLINIC | Age: 45
End: 2025-07-28

## 2025-08-01 ENCOUNTER — HOSPITAL ENCOUNTER (EMERGENCY)
Facility: HOSPITAL | Age: 45
Discharge: HOME | End: 2025-08-02
Attending: EMERGENCY MEDICINE
Payer: MEDICARE

## 2025-08-01 ENCOUNTER — APPOINTMENT (EMERGENCY)
Dept: RADIOLOGY | Facility: HOSPITAL | Age: 45
End: 2025-08-01
Payer: MEDICARE

## 2025-08-01 DIAGNOSIS — R55 NEAR SYNCOPE: Primary | ICD-10-CM

## 2025-08-01 DIAGNOSIS — R53.1 GENERALIZED WEAKNESS: ICD-10-CM

## 2025-08-01 LAB
BASOPHILS # BLD AUTO: 0.05 THOUSANDS/ÂΜL (ref 0–0.1)
BASOPHILS NFR BLD AUTO: 1 % (ref 0–1)
CARDIAC TROPONIN I PNL SERPL HS: 20 NG/L (ref ?–50)
EOSINOPHIL # BLD AUTO: 0.14 THOUSAND/ÂΜL (ref 0–0.61)
EOSINOPHIL NFR BLD AUTO: 2 % (ref 0–6)
ERYTHROCYTE [DISTWIDTH] IN BLOOD BY AUTOMATED COUNT: 14.7 % (ref 11.6–15.1)
GLUCOSE SERPL-MCNC: 99 MG/DL (ref 65–140)
HCT VFR BLD AUTO: 38.9 % (ref 34.8–46.1)
HGB BLD-MCNC: 12.6 G/DL (ref 11.5–15.4)
IMM GRANULOCYTES # BLD AUTO: 0.04 THOUSAND/UL (ref 0–0.2)
IMM GRANULOCYTES NFR BLD AUTO: 1 % (ref 0–2)
LYMPHOCYTES # BLD AUTO: 2.9 THOUSANDS/ÂΜL (ref 0.6–4.47)
LYMPHOCYTES NFR BLD AUTO: 50 % (ref 14–44)
MCH RBC QN AUTO: 28.4 PG (ref 26.8–34.3)
MCHC RBC AUTO-ENTMCNC: 32.4 G/DL (ref 31.4–37.4)
MCV RBC AUTO: 88 FL (ref 82–98)
MONOCYTES # BLD AUTO: 0.65 THOUSAND/ÂΜL (ref 0.17–1.22)
MONOCYTES NFR BLD AUTO: 11 % (ref 4–12)
NEUTROPHILS # BLD AUTO: 2.04 THOUSANDS/ÂΜL (ref 1.85–7.62)
NEUTS SEG NFR BLD AUTO: 35 % (ref 43–75)
NRBC BLD AUTO-RTO: 0 /100 WBCS
PLATELET # BLD AUTO: 280 THOUSANDS/UL (ref 149–390)
PMV BLD AUTO: 10.9 FL (ref 8.9–12.7)
RBC # BLD AUTO: 4.43 MILLION/UL (ref 3.81–5.12)
WBC # BLD AUTO: 5.82 THOUSAND/UL (ref 4.31–10.16)

## 2025-08-01 PROCEDURE — 96360 HYDRATION IV INFUSION INIT: CPT

## 2025-08-01 PROCEDURE — 36415 COLL VENOUS BLD VENIPUNCTURE: CPT

## 2025-08-01 PROCEDURE — 85025 COMPLETE CBC W/AUTO DIFF WBC: CPT

## 2025-08-01 PROCEDURE — 93005 ELECTROCARDIOGRAM TRACING: CPT

## 2025-08-01 PROCEDURE — 99284 EMERGENCY DEPT VISIT MOD MDM: CPT

## 2025-08-01 PROCEDURE — 80053 COMPREHEN METABOLIC PANEL: CPT

## 2025-08-01 PROCEDURE — 83735 ASSAY OF MAGNESIUM: CPT

## 2025-08-01 PROCEDURE — 82948 REAGENT STRIP/BLOOD GLUCOSE: CPT

## 2025-08-01 PROCEDURE — 99285 EMERGENCY DEPT VISIT HI MDM: CPT

## 2025-08-01 PROCEDURE — 71046 X-RAY EXAM CHEST 2 VIEWS: CPT

## 2025-08-01 PROCEDURE — 84443 ASSAY THYROID STIM HORMONE: CPT

## 2025-08-01 PROCEDURE — 84484 ASSAY OF TROPONIN QUANT: CPT

## 2025-08-01 RX ADMIN — SODIUM CHLORIDE 500 ML: 0.9 INJECTION, SOLUTION INTRAVENOUS at 23:36

## 2025-08-02 VITALS
BODY MASS INDEX: 64.33 KG/M2 | SYSTOLIC BLOOD PRESSURE: 113 MMHG | OXYGEN SATURATION: 100 % | RESPIRATION RATE: 20 BRPM | HEART RATE: 82 BPM | WEIGHT: 293 LBS | DIASTOLIC BLOOD PRESSURE: 56 MMHG | TEMPERATURE: 98.2 F

## 2025-08-02 LAB
2HR DELTA HS TROPONIN: 8 NG/L
ALBUMIN SERPL BCG-MCNC: 4.4 G/DL (ref 3.5–5)
ALP SERPL-CCNC: 58 U/L (ref 34–104)
ALT SERPL W P-5'-P-CCNC: 20 U/L (ref 7–52)
ANION GAP SERPL CALCULATED.3IONS-SCNC: 7 MMOL/L (ref 4–13)
AST SERPL W P-5'-P-CCNC: 22 U/L (ref 13–39)
ATRIAL RATE: 83 BPM
ATRIAL RATE: 84 BPM
BILIRUB SERPL-MCNC: 0.49 MG/DL (ref 0.2–1)
BUN SERPL-MCNC: 6 MG/DL (ref 5–25)
CALCIUM SERPL-MCNC: 9.4 MG/DL (ref 8.4–10.2)
CARDIAC TROPONIN I PNL SERPL HS: 28 NG/L (ref ?–50)
CHLORIDE SERPL-SCNC: 102 MMOL/L (ref 96–108)
CO2 SERPL-SCNC: 28 MMOL/L (ref 21–32)
CREAT SERPL-MCNC: 0.82 MG/DL (ref 0.6–1.3)
GFR SERPL CREATININE-BSD FRML MDRD: 87 ML/MIN/1.73SQ M
GLUCOSE SERPL-MCNC: 114 MG/DL (ref 65–140)
MAGNESIUM SERPL-MCNC: 1.9 MG/DL (ref 1.9–2.7)
P AXIS: 66 DEGREES
P AXIS: 80 DEGREES
POTASSIUM SERPL-SCNC: 3.6 MMOL/L (ref 3.5–5.3)
PR INTERVAL: 164 MS
PR INTERVAL: 166 MS
PROT SERPL-MCNC: 7.3 G/DL (ref 6.4–8.4)
QRS AXIS: 68 DEGREES
QRS AXIS: 81 DEGREES
QRSD INTERVAL: 86 MS
QRSD INTERVAL: 90 MS
QT INTERVAL: 376 MS
QT INTERVAL: 380 MS
QTC INTERVAL: 441 MS
QTC INTERVAL: 449 MS
SODIUM SERPL-SCNC: 137 MMOL/L (ref 135–147)
T WAVE AXIS: 229 DEGREES
T WAVE AXIS: 258 DEGREES
TSH SERPL DL<=0.05 MIU/L-ACNC: 4.25 UIU/ML (ref 0.45–4.5)
VENTRICULAR RATE: 83 BPM
VENTRICULAR RATE: 84 BPM

## 2025-08-02 PROCEDURE — 36415 COLL VENOUS BLD VENIPUNCTURE: CPT

## 2025-08-02 PROCEDURE — 84484 ASSAY OF TROPONIN QUANT: CPT

## 2025-08-02 PROCEDURE — 93010 ELECTROCARDIOGRAM REPORT: CPT

## 2025-08-02 PROCEDURE — 93005 ELECTROCARDIOGRAM TRACING: CPT

## 2025-08-02 PROCEDURE — 96361 HYDRATE IV INFUSION ADD-ON: CPT
